# Patient Record
Sex: MALE | Race: WHITE | NOT HISPANIC OR LATINO | Employment: OTHER | URBAN - METROPOLITAN AREA
[De-identification: names, ages, dates, MRNs, and addresses within clinical notes are randomized per-mention and may not be internally consistent; named-entity substitution may affect disease eponyms.]

---

## 2017-03-28 ENCOUNTER — TELEPHONE (OUTPATIENT)
Dept: ORTHOPEDICS | Facility: CLINIC | Age: 67
End: 2017-03-28

## 2017-03-28 DIAGNOSIS — M16.11 PRIMARY OSTEOARTHRITIS OF RIGHT HIP: Primary | ICD-10-CM

## 2017-03-28 NOTE — TELEPHONE ENCOUNTER
----- Message from Charissa Singleton NP sent at 3/28/2017  3:50 PM CDT -----  Contact: self@ home   Order is in. Can you please schedule.  Felisha Carrington  ----- Message -----     From: Deidre Garay MA     Sent: 3/28/2017  10:32 AM       To: Charissa Singleton NP     Can you put in a order for this or does the pt need to be seen first?   ----- Message -----     From: Kaelyn Velasco     Sent: 3/28/2017  10:03 AM       To: Rafael CLARK Staff    Pt would like to schedule a right hip inj through radiology.

## 2017-03-28 NOTE — PROGRESS NOTES
Pt of Dr. Hall's with right hip djd. He had a cortisone injection in August 2016 and is requiring another injection at this time. Order entered.

## 2017-03-28 NOTE — TELEPHONE ENCOUNTER
Spoke to pt and he was notified the order was put in our system. I transferred the pt over to schedule the injection.

## 2017-05-10 ENCOUNTER — TELEPHONE (OUTPATIENT)
Dept: RADIOLOGY | Facility: HOSPITAL | Age: 67
End: 2017-05-10

## 2017-05-11 ENCOUNTER — HOSPITAL ENCOUNTER (OUTPATIENT)
Dept: RADIOLOGY | Facility: HOSPITAL | Age: 67
Discharge: HOME | End: 2017-05-11
Attending: NURSE PRACTITIONER
Payer: COMMERCIAL

## 2017-05-11 DIAGNOSIS — M16.11 PRIMARY OSTEOARTHRITIS OF RIGHT HIP: ICD-10-CM

## 2017-05-11 PROCEDURE — 63600175 PHARM REV CODE 636 W HCPCS: Performed by: NURSE PRACTITIONER

## 2017-05-11 PROCEDURE — 20610 DRAIN/INJ JOINT/BURSA W/O US: CPT | Mod: GC,,, | Performed by: RADIOLOGY

## 2017-05-11 PROCEDURE — 25000003 PHARM REV CODE 250: Performed by: NURSE PRACTITIONER

## 2017-05-11 PROCEDURE — 77002 NEEDLE LOCALIZATION BY XRAY: CPT | Mod: 26,GC,, | Performed by: RADIOLOGY

## 2017-05-11 PROCEDURE — 25500020 PHARM REV CODE 255: Performed by: NURSE PRACTITIONER

## 2017-05-11 PROCEDURE — 77002 NEEDLE LOCALIZATION BY XRAY: CPT | Mod: TC

## 2017-05-11 RX ORDER — BUPIVACAINE HYDROCHLORIDE 2.5 MG/ML
5 INJECTION, SOLUTION EPIDURAL; INFILTRATION; INTRACAUDAL ONCE
Status: COMPLETED | OUTPATIENT
Start: 2017-05-11 | End: 2017-05-11

## 2017-05-11 RX ORDER — LIDOCAINE HYDROCHLORIDE 10 MG/ML
2 INJECTION, SOLUTION EPIDURAL; INFILTRATION; INTRACAUDAL; PERINEURAL ONCE
Status: COMPLETED | OUTPATIENT
Start: 2017-05-11 | End: 2017-05-11

## 2017-05-11 RX ORDER — METHYLPREDNISOLONE ACETATE 80 MG/ML
80 INJECTION, SUSPENSION INTRA-ARTICULAR; INTRALESIONAL; INTRAMUSCULAR; SOFT TISSUE ONCE
Status: COMPLETED | OUTPATIENT
Start: 2017-05-11 | End: 2017-05-11

## 2017-05-11 RX ADMIN — METHYLPREDNISOLONE ACETATE 80 MG: 80 INJECTION, SUSPENSION INTRA-ARTICULAR; INTRALESIONAL; INTRAMUSCULAR; SOFT TISSUE at 11:05

## 2017-05-11 RX ADMIN — BUPIVACAINE HYDROCHLORIDE 12.5 MG: 2.5 INJECTION, SOLUTION EPIDURAL; INFILTRATION; INTRACAUDAL; PERINEURAL at 11:05

## 2017-05-11 RX ADMIN — LIDOCAINE HYDROCHLORIDE 20 MG: 10 INJECTION, SOLUTION EPIDURAL; INFILTRATION; INTRACAUDAL; PERINEURAL at 11:05

## 2017-05-11 RX ADMIN — IOHEXOL 1 ML: 300 INJECTION, SOLUTION INTRAVENOUS at 11:05

## 2017-05-11 NOTE — PROGRESS NOTES
Pt arrived for right hip injection. Pt identified using two pt identifiers. Allergies reviewed. NAD noted. VSS. Pt able to clearly state that he is having a right hip injection today. To be left under care of [facility] radiology resident and Xray staff.

## 2018-02-28 ENCOUNTER — TELEPHONE (OUTPATIENT)
Dept: ORTHOPEDICS | Facility: CLINIC | Age: 68
End: 2018-02-28

## 2018-02-28 NOTE — TELEPHONE ENCOUNTER
Spoke to pt and he stated he would like to schedule a hip injection. I informed the patient that he will have to schedule an appointment to be seen first because he has not been seen in over a year. Pt stated he wanted to discuss some new procedures also. I explained to the patient that if he wanted to discuss sx he will randa to see Dr. Hall but if he only wanted to be evaluated for an injection he can see a mid-level provider. Pt verbalized understanding.       ----- Message from Aurelia Lane sent at 2/28/2018  1:07 PM CST -----  Contact: Pt  Pt was calling in regards to a injection.    Pt would like a call back at [phone].    Thank You

## 2018-03-09 ENCOUNTER — HOSPITAL ENCOUNTER (OUTPATIENT)
Dept: RADIOLOGY | Facility: HOSPITAL | Age: 68
Discharge: HOME | End: 2018-03-09
Attending: NURSE PRACTITIONER
Payer: MEDICARE

## 2018-03-09 ENCOUNTER — OFFICE VISIT (OUTPATIENT)
Dept: ORTHOPEDICS | Facility: CLINIC | Age: 68
End: 2018-03-09
Payer: MEDICARE

## 2018-03-09 DIAGNOSIS — M25.562 LEFT KNEE PAIN, UNSPECIFIED CHRONICITY: ICD-10-CM

## 2018-03-09 DIAGNOSIS — M17.12 PRIMARY OSTEOARTHRITIS OF LEFT KNEE: ICD-10-CM

## 2018-03-09 DIAGNOSIS — M25.551 RIGHT HIP PAIN: Primary | ICD-10-CM

## 2018-03-09 DIAGNOSIS — M16.11 PRIMARY OSTEOARTHRITIS OF RIGHT HIP: ICD-10-CM

## 2018-03-09 DIAGNOSIS — M25.551 RIGHT HIP PAIN: ICD-10-CM

## 2018-03-09 PROCEDURE — 99213 OFFICE O/P EST LOW 20 MIN: CPT | Mod: PBBFAC,25 | Performed by: NURSE PRACTITIONER

## 2018-03-09 PROCEDURE — 73562 X-RAY EXAM OF KNEE 3: CPT | Mod: TC,RT

## 2018-03-09 PROCEDURE — 73502 X-RAY EXAM HIP UNI 2-3 VIEWS: CPT | Mod: TC,RT

## 2018-03-09 PROCEDURE — 73562 X-RAY EXAM OF KNEE 3: CPT | Mod: 26,59,RT, | Performed by: RADIOLOGY

## 2018-03-09 PROCEDURE — 99213 OFFICE O/P EST LOW 20 MIN: CPT | Mod: 25,S$PBB,, | Performed by: NURSE PRACTITIONER

## 2018-03-09 PROCEDURE — 20610 DRAIN/INJ JOINT/BURSA W/O US: CPT | Mod: PBBFAC | Performed by: NURSE PRACTITIONER

## 2018-03-09 PROCEDURE — 73564 X-RAY EXAM KNEE 4 OR MORE: CPT | Mod: 26,LT,, | Performed by: RADIOLOGY

## 2018-03-09 PROCEDURE — 73502 X-RAY EXAM HIP UNI 2-3 VIEWS: CPT | Mod: 26,RT,, | Performed by: RADIOLOGY

## 2018-03-09 PROCEDURE — 20610 DRAIN/INJ JOINT/BURSA W/O US: CPT | Mod: S$PBB,LT,, | Performed by: NURSE PRACTITIONER

## 2018-03-09 PROCEDURE — 99999 PR PBB SHADOW E&M-EST. PATIENT-LVL III: CPT | Mod: PBBFAC,,, | Performed by: NURSE PRACTITIONER

## 2018-03-09 RX ADMIN — TRIAMCINOLONE ACETONIDE 40 MG: 40 INJECTION, SUSPENSION INTRA-ARTICULAR; INTRAMUSCULAR at 02:03

## 2018-03-11 RX ORDER — TRIAMCINOLONE ACETONIDE 40 MG/ML
40 INJECTION, SUSPENSION INTRA-ARTICULAR; INTRAMUSCULAR
Status: COMPLETED | OUTPATIENT
Start: 2018-03-09 | End: 2018-03-09

## 2018-03-11 NOTE — PROGRESS NOTES
CC: Pain of the Right Hip and Pain of the Left Knee      HPI: Pt with right hip and left knee pain for years. He had a cortisone injection in the right hip last May with relief of his pain until now. He is aware that he has significant djd of the right hip and left knee, but he has a planned trip coming up in the [facility] and he wants to have cortisone injections for pain relief today in preparation for his trip. He will be ready to consider replacement when he returns. He reports that he has had injections in the left knee by an orthopedist in [place], but his last injection was 6 months ago. He comes to the appt in a wheelchair. He uses a cane to walk at home. He is accompanied by his wife at this appt..    ROS  General: denies fever and chills  Resp: no c/o sob  CVS: no c/o cp  MSK: c/o right hip pain which is grinding and worse with walking and standing and left knee pain which is aching and global and worse with increased activity    PE  General: AAOx3, pleasant and cooperative  Resp: respirations even and unlabored  MSK: right hip exam  + stinchfield  + straight leg raise  + pain with internal rotation  + pain with external rotation    Left knee exam  -5 degrees extension  90 degrees flexion  No erythema or warmth  - effusion    Xray:  Reviewed by me: hip:severe DJD of the right hip    Knee:  left knee has severe joint space loss with complete obliteration of the tibiofemoral joints and lateral patellofemoral joint space.  There is some lateral subluxation of patella    Assessment:  Right hip djd  Left knee djd    Plan:  Cortisone injection right hip scheduled in IR  Cortisone injection left knee today for pain relief  RICE  nsaids prn  Pain medication as prescribed  F/u as scheduled to discuss surgery. He and his wife understand that the cortisone injections will delay surgery by 3 months due to the increased risk of infection    Knee Injection Procedure Note    Pre-operative Diagnosis: left knee  degenerative arthritis    Post-operative Diagnosis: same    Indications: left knee pain    Anesthesia: none    Procedure Details     Verbal consent was obtained for the procedure. The injection site was identified and the skin was prepared with alcohol. The left knee was injected from an anterolateral approach with 1 ml of Kenalog and 5 ml Lidocaine under sterile technique using a 22 gauge needle. The needle was removed and the area cleansed and dressed.    Complications:  None; patient tolerated the procedure well.    he was advised to rest the knee today, using ice and elevation as needed for comfort and swelling. he did receive immediate relief of the knee pain. he was told this would be short lived and is secondary to the lidocaine. he may have an increase in discomfort tonight followed by steady improvement over the next several days. It may take 1-3 weeks following the injection to get the full benefit of the medication.

## 2018-03-21 ENCOUNTER — TELEPHONE (OUTPATIENT)
Dept: RADIOLOGY | Facility: HOSPITAL | Age: 68
End: 2018-03-21

## 2018-03-21 ENCOUNTER — HOSPITAL ENCOUNTER (OUTPATIENT)
Dept: RADIOLOGY | Facility: HOSPITAL | Age: 68
Discharge: HOME | End: 2018-03-21
Attending: NURSE PRACTITIONER
Payer: MEDICARE

## 2018-03-21 VITALS
RESPIRATION RATE: 17 BRPM | DIASTOLIC BLOOD PRESSURE: 87 MMHG | OXYGEN SATURATION: 96 % | SYSTOLIC BLOOD PRESSURE: 168 MMHG | HEART RATE: 63 BPM

## 2018-03-21 DIAGNOSIS — M16.11 PRIMARY OSTEOARTHRITIS OF RIGHT HIP: ICD-10-CM

## 2018-03-21 PROCEDURE — 77002 NEEDLE LOCALIZATION BY XRAY: CPT | Mod: 26,RT,, | Performed by: RADIOLOGY

## 2018-03-21 PROCEDURE — 63600175 PHARM REV CODE 636 W HCPCS: Performed by: NURSE PRACTITIONER

## 2018-03-21 PROCEDURE — 77002 NEEDLE LOCALIZATION BY XRAY: CPT | Mod: TC

## 2018-03-21 PROCEDURE — 20610 DRAIN/INJ JOINT/BURSA W/O US: CPT | Mod: ,,, | Performed by: RADIOLOGY

## 2018-03-21 PROCEDURE — 25500020 PHARM REV CODE 255: Performed by: NURSE PRACTITIONER

## 2018-03-21 PROCEDURE — 25000003 PHARM REV CODE 250: Performed by: NURSE PRACTITIONER

## 2018-03-21 RX ORDER — LIDOCAINE HYDROCHLORIDE 10 MG/ML
5 INJECTION, SOLUTION EPIDURAL; INFILTRATION; INTRACAUDAL; PERINEURAL ONCE
Status: COMPLETED | OUTPATIENT
Start: 2018-03-21 | End: 2018-03-21

## 2018-03-21 RX ORDER — METHYLPREDNISOLONE ACETATE 80 MG/ML
80 INJECTION, SUSPENSION INTRA-ARTICULAR; INTRALESIONAL; INTRAMUSCULAR; SOFT TISSUE ONCE
Status: COMPLETED | OUTPATIENT
Start: 2018-03-21 | End: 2018-03-21

## 2018-03-21 RX ORDER — BUPIVACAINE HYDROCHLORIDE 2.5 MG/ML
5 INJECTION, SOLUTION EPIDURAL; INFILTRATION; INTRACAUDAL ONCE
Status: COMPLETED | OUTPATIENT
Start: 2018-03-21 | End: 2018-03-21

## 2018-03-21 RX ADMIN — METHYLPREDNISOLONE ACETATE 80 MG: 80 INJECTION, SUSPENSION INTRA-ARTICULAR; INTRALESIONAL; INTRAMUSCULAR; SOFT TISSUE at 02:03

## 2018-03-21 RX ADMIN — LIDOCAINE HYDROCHLORIDE 50 MG: 10 INJECTION, SOLUTION EPIDURAL; INFILTRATION; INTRACAUDAL; PERINEURAL at 02:03

## 2018-03-21 RX ADMIN — IOHEXOL 5 ML: 300 INJECTION, SOLUTION INTRAVENOUS at 02:03

## 2018-03-21 RX ADMIN — BUPIVACAINE HYDROCHLORIDE 12.5 MG: 2.5 INJECTION, SOLUTION EPIDURAL; INFILTRATION; INTRACAUDAL; PERINEURAL at 02:03

## 2018-04-23 ENCOUNTER — OFFICE VISIT (OUTPATIENT)
Dept: ORTHOPEDICS | Facility: CLINIC | Age: 68
End: 2018-04-23
Payer: MEDICARE

## 2018-04-23 VITALS — BODY MASS INDEX: 42.34 KG/M2 | HEIGHT: 70 IN | WEIGHT: 295.75 LBS

## 2018-04-23 DIAGNOSIS — M16.11 PRIMARY OSTEOARTHRITIS OF RIGHT HIP: ICD-10-CM

## 2018-04-23 DIAGNOSIS — M17.12 PRIMARY OSTEOARTHRITIS OF LEFT KNEE: Primary | ICD-10-CM

## 2018-04-23 PROCEDURE — 99213 OFFICE O/P EST LOW 20 MIN: CPT | Mod: S$PBB,,, | Performed by: ORTHOPAEDIC SURGERY

## 2018-04-23 PROCEDURE — 99213 OFFICE O/P EST LOW 20 MIN: CPT | Mod: PBBFAC | Performed by: ORTHOPAEDIC SURGERY

## 2018-04-23 PROCEDURE — 99999 PR PBB SHADOW E&M-EST. PATIENT-LVL III: CPT | Mod: PBBFAC,,, | Performed by: ORTHOPAEDIC SURGERY

## 2018-04-23 RX ORDER — AMOXICILLIN 500 MG/1
CAPSULE ORAL
COMMUNITY
Start: 2018-01-19 | End: 2019-02-22

## 2018-04-23 RX ORDER — FLUTICASONE PROPIONATE 50 MCG
1 SPRAY, SUSPENSION (ML) NASAL NIGHTLY
Status: ON HOLD | COMMUNITY
Start: 2018-03-16 | End: 2023-04-06 | Stop reason: CLARIF

## 2018-04-23 RX ORDER — AMLODIPINE BESYLATE 10 MG/1
TABLET ORAL
COMMUNITY
Start: 2018-03-16 | End: 2019-08-26

## 2018-04-23 RX ORDER — LISINOPRIL 40 MG/1
40 TABLET ORAL DAILY
COMMUNITY
Start: 2018-04-16

## 2018-04-23 RX ORDER — ESCITALOPRAM OXALATE 20 MG/1
20 TABLET ORAL DAILY
COMMUNITY
Start: 2018-03-24 | End: 2021-02-02 | Stop reason: SDUPTHER

## 2018-04-23 RX ORDER — OXYBUTYNIN CHLORIDE 10 MG/1
TABLET, EXTENDED RELEASE ORAL
COMMUNITY
Start: 2018-04-07 | End: 2019-02-22

## 2018-04-23 RX ORDER — CIPROFLOXACIN 500 MG/1
TABLET ORAL
COMMUNITY
Start: 2018-02-05 | End: 2019-02-22

## 2018-04-23 RX ORDER — OXYCODONE AND ACETAMINOPHEN 10; 325 MG/1; MG/1
1 TABLET ORAL EVERY 4 HOURS PRN
Status: ON HOLD | COMMUNITY
Start: 2018-04-18 | End: 2019-09-03 | Stop reason: HOSPADM

## 2018-04-23 NOTE — PROGRESS NOTES
"Subjective:      Patient ID: Иван Fuentes is a 67 y.o. male.    Chief Complaint: Pain of the Left Knee and Pain of the Right Hip    HPI  Иван Fuentes has left knee pain. His hip pain is improved following the injection by Dr. Mak.  The knee pain is unchanged. The pain is located in the global aspect of the knee.  There  is not radiation.   There is not associated stiffness.   There is not catching and locking. The pain is described as achy. The pain is aggravated by walking.  It is alleviated by rest on occasion.  There is associated back pain.  His history, medications and problem list were reviewed.    Review of Systems   Constitution: Negative for chills, fever and night sweats.   HENT: Negative for hearing loss.    Eyes: Negative for blurred vision and double vision.   Cardiovascular: Negative for chest pain, claudication and leg swelling.   Respiratory: Negative for shortness of breath.    Endocrine: Negative for polydipsia, polyphagia and polyuria.   Hematologic/Lymphatic: Negative for adenopathy and bleeding problem. Does not bruise/bleed easily.   Skin: Negative for poor wound healing.   Musculoskeletal: Positive for back pain, joint pain and neck pain.   Gastrointestinal: Negative for diarrhea and heartburn.   Genitourinary: Negative for bladder incontinence.   Neurological: Negative for focal weakness, headaches, numbness, paresthesias and sensory change.   Psychiatric/Behavioral: The patient is not nervous/anxious.    Allergic/Immunologic: Negative for persistent infections.         Objective:      Body mass index is 42.44 kg/m².  Vitals:    04/23/18 1618   Weight: 134.1 kg (295 lb 12 oz)   Height: 5' 10" (1.778 m)           General    Constitutional: He is oriented to person, place, and time. He appears well-developed and well-nourished.   HENT:   Head: Normocephalic and atraumatic.   Eyes: EOM are normal.   Cardiovascular: Normal rate.    Pulmonary/Chest: Effort normal.   Neurological: " He is alert and oriented to person, place, and time.   Psychiatric: He has a normal mood and affect. His behavior is normal.     General Musculoskeletal Exam   Gait: abnormal       Right Knee Exam     Inspection   Erythema: absent  Scars: absent  Swelling: absent  Effusion: effusion  Deformity: deformity  Bruising: absent    Tenderness   The patient is experiencing no tenderness.         Range of Motion   Extension: 0   Flexion: 130     Tests   Ligament Examination Lachman: normal (-1 to 2mm)   MCL - Valgus: normal (0 to 2mm)  LCL - Varus: normal  Patella   Passive Patellar Tilt: neutral    Other   Sensation: normal    Left Knee Exam     Inspection   Erythema: absent  Scars: absent  Swelling: present  Effusion: absent  Deformity: deformity  Bruising: absent    Tenderness   The patient tender to palpation of the medial joint line and lateral joint line.    Range of Motion   Extension: 0   Flexion: 110     Tests   Stability Lachman: normal (-1 to 2mm)   MCL - Valgus: normal (0 to 2mm)  LCL - Varus: normal (0 to 2mm)  Patella   Passive Patellar Tilt: neutral    Other   Sensation: normal    Muscle Strength   Right Lower Extremity   Hip Abduction: 5/5   Quadriceps:  5/5   Hamstrin/5   Left Lower Extremity   Hip Abduction: 5/5   Quadriceps:  5/5   Hamstrin/5     Reflexes     Left Side  Quadriceps:  2+    Right Side   Quadriceps:  2+    Vascular Exam     Right Pulses  Dorsalis Pedis:      2+          Left Pulses  Dorsalis Pedis:      2+          Edema  Right Lower Leg: present  Left Lower Leg: present    Radiographs taken previuosly and reviewed by me demonstrate severe arthritic change of the left KNEE(s).There is not a fracture.   The changes are tricompartmental.            Assessment:       Encounter Diagnoses   Name Primary?    Primary osteoarthritis of left knee Yes    Primary osteoarthritis of right hip           Plan:       Иван was seen today for pain and pain.    Diagnoses and all orders for this  visit:    Primary osteoarthritis of left knee  -     Ambulatory referral to Pain Clinic    Primary osteoarthritis of right hip  -     Ambulatory referral to Pain Clinic      Options discussed. Due to his co-morbidities and gait abnormality secondary to his cervical myelopathy he is at increased risk for complications and continued gait problems. After a discussion we will try an RFA.  I will see him back in three months.

## 2018-04-30 ENCOUNTER — OFFICE VISIT (OUTPATIENT)
Dept: PAIN MEDICINE | Facility: CLINIC | Age: 68
End: 2018-04-30
Attending: ANESTHESIOLOGY
Payer: MEDICARE

## 2018-04-30 VITALS
HEART RATE: 65 BPM | DIASTOLIC BLOOD PRESSURE: 70 MMHG | WEIGHT: 295 LBS | BODY MASS INDEX: 42.23 KG/M2 | SYSTOLIC BLOOD PRESSURE: 134 MMHG | TEMPERATURE: 98 F | HEIGHT: 70 IN

## 2018-04-30 DIAGNOSIS — Z98.1 S/P CERVICAL SPINAL FUSION: Primary | ICD-10-CM

## 2018-04-30 DIAGNOSIS — G95.9 CERVICAL MYELOPATHY: ICD-10-CM

## 2018-04-30 DIAGNOSIS — G89.29 CHRONIC PAIN OF LEFT KNEE: ICD-10-CM

## 2018-04-30 DIAGNOSIS — M17.12 PRIMARY OSTEOARTHRITIS OF LEFT KNEE: ICD-10-CM

## 2018-04-30 DIAGNOSIS — M25.562 CHRONIC PAIN OF LEFT KNEE: ICD-10-CM

## 2018-04-30 PROCEDURE — 99999 PR PBB SHADOW E&M-EST. PATIENT-LVL III: CPT | Mod: PBBFAC,,, | Performed by: ANESTHESIOLOGY

## 2018-04-30 PROCEDURE — 99213 OFFICE O/P EST LOW 20 MIN: CPT | Mod: PBBFAC | Performed by: ANESTHESIOLOGY

## 2018-04-30 PROCEDURE — 99204 OFFICE O/P NEW MOD 45 MIN: CPT | Mod: S$PBB,,, | Performed by: ANESTHESIOLOGY

## 2018-04-30 RX ORDER — CHLORHEXIDINE GLUCONATE ORAL RINSE 1.2 MG/ML
SOLUTION DENTAL
COMMUNITY
Start: 2018-04-24 | End: 2019-08-26

## 2018-04-30 RX ORDER — DICLOFENAC SODIUM 10 MG/G
2 GEL TOPICAL 3 TIMES DAILY
Qty: 1 TUBE | Refills: 2 | Status: SHIPPED | OUTPATIENT
Start: 2018-04-30 | End: 2018-08-07 | Stop reason: SDUPTHER

## 2018-04-30 NOTE — LETTER
April 30, 2018      Ever Hall MD  [address]  [facility] [address]           [facility]  [address]  [place] [address]  Phone: [phone]  Fax: [phone]          Patient: Иван Fuentes   MR Number: [id number]   YOB: 1950   Date of Visit: 4/30/2018       Dear Dr. Ever Hall:    Thank you for referring Иван Fuentes to me for evaluation. Attached you will find relevant portions of my assessment and plan of care.    If you have questions, please do not hesitate to call me. I look forward to following Иван Fuentes along with you.    Sincerely,    Dania Garcia MD    Enclosure  CC:  No Recipients    If you would like to receive this communication electronically, please contact [email or web address] or [phone] to request more information on [facility] access.    For providers and/or their staff who would like to refer a patient to [facility], please contact us through our one-stop-shop provider referral line, [facility], at [phone].    If you feel you have received this communication in error or would no longer like to receive these types of communications, please e-mail [email or web address]

## 2018-04-30 NOTE — PROGRESS NOTES
Chronic Pain - New Consult    Referring Physician: Ever Hall MD    Chief Complaint:   Chief Complaint   Patient presents with    Knee Pain     Left side    Hip Pain     Right Hip        SUBJECTIVE: Disclaimer: This note has been generated using voice-recognition software. There may be typographical errors that have been missed during proof-reading    Initial encounter:    Иван Fuentes presents to the clinic for the evaluation of left knee and right hip pain. The pain started 3 years ago following arthritis and symptoms have been worsening.    Brief history:  Patient being sent for evaluation for knee RFA    Pain Description:    The pain is located in the left knee and right hip area.      At BEST  2/10     At WORST  9/10 on the WORST day.      On average pain is rated as 7/10.     Today the pain is rated as 6/10    The pain is described as sharp      Symptoms interfere with daily activity.     Exacerbating factors: Standing, Walking and Extension.      Mitigating factors medications.     Patient denies night fever/night sweats, urinary incontinence, bowel incontinence, significant weight loss, significant motor weakness and loss of sensations.  Patient denies any suicidal or homicidal ideations    Pain Medications:  Current:  Advil  Aleve  Percocet  Tramadol  Norco    Tried in Past:  NSAIDs -Never  TCA -Never  SNRI -Never  Anti-convulsants -Never  Muscle Relaxants -Never  Opioids-Never    Physical Therapy/Home Exercise: no       report:  Reviewed and consistent with medication use as prescribed.    Pain Procedures: previous hip injections with steroid with significant relief for approximately 8 months at a time (last injection 2 months ago, still getting relief)  Previous knee joint injections without sustained relief.    Chiropractor -never  Acupuncture - never  TENS unit -never  Spinal decompression -never  Joint replacement -left hip replacement    Imaging:     Narrative     2 views:  There is severe DJD of the right hip. There is a left NATALIYA in place. No fracture dislocation bone destruction seen.        Electronically signed by: ELICIA FRANCO MD  Date: 03/09/18  Time: 15:46        Narrative     Left knee orthopedic views with flexion includes 5 total views.  The study includes AP standing and flexion views.  There is slight medial subluxation of the femurs in relation to the tibia bilaterally, more on the left.  There is mild genu varus deformity on the left     The left knee has severe joint space loss with complete obliteration of the tibiofemoral joints and lateral patellofemoral joint space.  There is some lateral subluxation of patella.      Right knee also has narrowing of the lateral patellar joint space, lateral subluxation of the patella, and moderate to severe medial tibiofemoral joint space narrowing.  There is a 6 mm exostosis from the medial proximal tibia metaphysis.   Impression      Extensive degenerative changes, as above.      Electronically signed by: OLESYA OCONNOR MD  Date: 03/09/18  Time: 15:18            Past Medical History:   Diagnosis Date    Arthritis of both knees     BPH (benign prostatic hyperplasia)     CVA (cerebral vascular accident)     Diabetes mellitus     High cholesterol     Hypertension     Left-sided weakness     Obese     Sleep apnea     Urosepsis      Past Surgical History:   Procedure Laterality Date    BACK SURGERY      HIP ARTHROPLASTY      JOINT REPLACEMENT      PROSTATE SURGERY  2015    SPINAL FUSION  09/2014    TONSILLECTOMY       Social History     Social History    Marital status:      Spouse name: N/A    Number of children: N/A    Years of education: N/A     Occupational History    Not on file.     Social History Main Topics    Smoking status: Former Smoker     Quit date: 1/1/2012    Smokeless tobacco: Not on file    Alcohol use No    Drug use: No    Sexual activity: Not on file     Other Topics Concern    Not on  file     Social History Narrative    No narrative on file     Family History   Problem Relation Age of Onset    Hypertension Brother     Heart attack Neg Hx     Heart disease Neg Hx        Review of patient's allergies indicates:  No Known Allergies    Current Outpatient Prescriptions   Medication Sig    amLODIPine (NORVASC) 10 MG tablet     amoxicillin (AMOXIL) 500 MG capsule     atorvastatin (LIPITOR) 80 MG tablet Take by mouth once daily.     chlorhexidine (PERIDEX) 0.12 % solution     CIALIS 20 mg Tab     ciclesonide (OMNARIS) 50 mcg Spry 2 sprays by Each Nare route once daily.    dutasteride (AVODART) 0.5 mg capsule Take 0.5 mg by mouth once daily.    escitalopram oxalate (LEXAPRO) 20 MG tablet     FLORANEX 100 million cell packet     fluticasone (FLONASE) 50 mcg/actuation nasal spray     levothyroxine (SYNTHROID) 50 MCG tablet Take 50 mcg by mouth once daily.    lisinopril (PRINIVIL,ZESTRIL) 2.5 MG tablet Take by mouth once daily.    lisinopril (PRINIVIL,ZESTRIL) 40 MG tablet     LYRICA 100 mg capsule Take 100 mg by mouth 2 (two) times daily.    metformin (GLUCOPHAGE) 500 MG tablet Take 500 mg by mouth 2 (two) times daily with meals.    oxybutynin (DITROPAN-XL) 10 MG 24 hr tablet     oxyCODONE-acetaminophen (PERCOCET)  mg per tablet     senna-docusate 8.6-50 mg (PERICOLACE) 8.6-50 mg per tablet Take 2 tablets by mouth nightly as needed for Constipation.    tamsulosin (FLOMAX) 0.4 mg Cp24 Take 0.4 mg by mouth once daily.    tramadol (ULTRAM) 50 mg tablet Take 50 mg by mouth every 6 (six) hours as needed for Pain.    valsartan (DIOVAN) 160 MG tablet Take 160 mg by mouth once daily.    ciprofloxacin HCl (CIPRO) 500 MG tablet     diazepam (VALIUM) 5 MG tablet Take 1 tablet (5 mg total) by mouth every 6 (six) hours as needed (muscle spasm).    diclofenac sodium 1 % Gel Apply 2 g topically 3 (three) times daily.    eszopiclone 3 mg Tab Take 3 mg by mouth every evening.     No  "current facility-administered medications for this visit.        REVIEW OF SYSTEMS:    GENERAL:  No weight loss, malaise or fevers.  HEENT:   No recent changes in vision or hearing  NECK:  Negative for lumps, no difficulty with swallowing.  RESPIRATORY:  Negative for cough, wheezing or shortness of breath, patient denies any recent URI.  CARDIOVASCULAR:  Negative for chest pain, leg swelling or palpitations. Hx PFO, takes prophylactic abx prior to dental procedures.  GI:  Negative for abdominal discomfort, blood in stools or black stools or change in bowel habits.  : recent treatment with cipro for UTI, symptoms resolved.  Not currently on abx.  MUSCULOSKELETAL:  See HPI.  SKIN:  Negative for lesions, rash, and itching.  PSYCH:  No mood disorder or recent psychosocial stressors.  Patients sleep is disturbed secondary to pain.  HEMATOLOGY/LYMPHOLOGY:  Negative for prolonged bleeding, bruising easily or swollen nodes.  Patient is not currently taking any anti-coagulants  ENDO: DM2, hypothyroidism on stable dose of synthroid  NEURO:   Hx of CVA, Hx of cervical fusion and meylopathy  All other reviewed and negative other than HPI.    OBJECTIVE:    /70   Pulse 65   Temp 97.6 °F (36.4 °C)   Ht 5' 10" (1.778 m)   Wt 133.8 kg (295 lb)   BMI 42.33 kg/m²     PHYSICAL EXAMINATION:    GENERAL: Well appearing, in no acute distress, alert and oriented x3.  PSYCH:  Mood and affect appropriate.  SKIN: Skin color, texture, turgor normal, no rashes or lesions.  HEAD/FACE:  Normocephalic, atraumatic.   CV: RRR with palpation of the radial artery.  PULM: No evidence of respiratory difficulty, symmetric chest rise.  BACK:   There is pain with palpation over the facet joints of the lumbar spine bilaterally. There is decreased range of motion with extension to 15 degrees, and facet loading maneuvers cause reproducible pain.    EXTREMITIES: bilateral lower extremity edema, 1 + pitting.  MUSCULOSKELETAL: left knee provocative " maneuvers caused pain.  No evidence of instability, pain with palpation over the medial and lateral joint line. There is  pain with palpation over the sacroiliac joints bilaterally.  There is no pain to palpation over the greater trochanteric bursa bilaterally.  FABERs test is positive.  FADIRs test is negative.   Bilateral lower extremity strength is normal and symmetric.  No atrophy or tone abnormalities are noted.  NEURO: Cranial nerves grossly intact.  GAIT: Antalgic, ambulates with cane, presented in wheelchair    Recent lab work done, but not available for review today    ASSESSMENT: 67 y.o. year old male with pain, consistent with     Encounter Diagnoses   Name Primary?    S/P cervical spinal fusion Yes    Cervical myelopathy     Chronic pain of left knee     Primary osteoarthritis of left knee        PLAN:   I will schedule the patient for a left knee genicular nerve block under x-ray to be followed by radio frequency ablation of diagnostic, follow up with NP 2 weeks after injection to determine response to procedure.    I provided the patient with a Natalya perry DR informational packet to consider spinal cord stimulation for both his cervical radicular symptoms, peripheral neuropathy lumbar spine is history of cervical posterior decompression and fusion.    In the future can consider further escalation of Lyrica to 300 mg per day    Voltaren gel to apply over the knee 3 times a day when necessary    The above plan and management options were discussed at length with patient. Patient is in agreement with the above and verbalized understanding. It will be communicated with the referring physician via electronic record, fax, or mail.    Dania Garcia  04/30/2018

## 2018-05-10 ENCOUNTER — HOSPITAL ENCOUNTER (OUTPATIENT)
Facility: OTHER | Age: 68
Discharge: HOME | End: 2018-05-10
Attending: ANESTHESIOLOGY | Admitting: ANESTHESIOLOGY
Payer: MEDICARE

## 2018-05-10 VITALS
HEIGHT: 70 IN | TEMPERATURE: 97 F | SYSTOLIC BLOOD PRESSURE: 138 MMHG | HEART RATE: 58 BPM | BODY MASS INDEX: 42.23 KG/M2 | RESPIRATION RATE: 18 BRPM | OXYGEN SATURATION: 95 % | DIASTOLIC BLOOD PRESSURE: 86 MMHG | WEIGHT: 295 LBS

## 2018-05-10 DIAGNOSIS — G89.29 CHRONIC PAIN OF LEFT KNEE: Primary | ICD-10-CM

## 2018-05-10 DIAGNOSIS — M17.12 PRIMARY OSTEOARTHRITIS OF LEFT KNEE: ICD-10-CM

## 2018-05-10 DIAGNOSIS — M25.562 CHRONIC PAIN OF LEFT KNEE: Primary | ICD-10-CM

## 2018-05-10 LAB — POCT GLUCOSE: 101 MG/DL (ref 70–110)

## 2018-05-10 PROCEDURE — 77002 NEEDLE LOCALIZATION BY XRAY: CPT | Performed by: ANESTHESIOLOGY

## 2018-05-10 PROCEDURE — 63600175 PHARM REV CODE 636 W HCPCS: Performed by: ANESTHESIOLOGY

## 2018-05-10 PROCEDURE — 64450 NJX AA&/STRD OTHER PN/BRANCH: CPT | Mod: LT,,, | Performed by: ANESTHESIOLOGY

## 2018-05-10 PROCEDURE — 64450 NJX AA&/STRD OTHER PN/BRANCH: CPT | Performed by: ANESTHESIOLOGY

## 2018-05-10 PROCEDURE — 25000003 PHARM REV CODE 250: Performed by: ANESTHESIOLOGY

## 2018-05-10 RX ORDER — METHYLPREDNISOLONE ACETATE 40 MG/ML
INJECTION, SUSPENSION INTRA-ARTICULAR; INTRALESIONAL; INTRAMUSCULAR; SOFT TISSUE
Status: DISCONTINUED | OUTPATIENT
Start: 2018-05-10 | End: 2018-05-10 | Stop reason: HOSPADM

## 2018-05-10 RX ORDER — LIDOCAINE HYDROCHLORIDE 10 MG/ML
INJECTION INFILTRATION; PERINEURAL
Status: DISCONTINUED | OUTPATIENT
Start: 2018-05-10 | End: 2018-05-10 | Stop reason: HOSPADM

## 2018-05-10 RX ORDER — BUPIVACAINE HYDROCHLORIDE 2.5 MG/ML
INJECTION, SOLUTION EPIDURAL; INFILTRATION; INTRACAUDAL
Status: DISCONTINUED | OUTPATIENT
Start: 2018-05-10 | End: 2018-05-10 | Stop reason: HOSPADM

## 2018-05-10 RX ORDER — SODIUM CHLORIDE 9 MG/ML
INJECTION, SOLUTION INTRAVENOUS CONTINUOUS
Status: DISCONTINUED | OUTPATIENT
Start: 2018-05-10 | End: 2018-05-10 | Stop reason: HOSPADM

## 2018-05-10 NOTE — OP NOTE
Procedure Note:   Left   4 needle Geniculate nerve block under fluoroscopy                               1) suprapatellar geniculate nerve block  2) medial superior epicondyle geniculate nerve block  3) lateral superior epicondyle geniculate nerve block  4) medial tibial metaphysis geniculate nerve block  5) xray guidance for needle placement                               Pre-Op Diagnosis:  Left  Left knee pain, unspecified chronicity [M25.562]  Osteoarthritis of left knee, unspecified osteoarthritis type [M17.12]    Post-Op Diagnosis: Left knee pain, unspecified chronicity [M25.562]  Osteoarthritis of left knee, unspecified osteoarthritis type [M17.12]    Surgeon: Dania Garcia M.D.    Assistant: None    EBL: None    Complications: None    Specimens: None    Description of procedure:    After written consent was obtained, patient placed in supine position.  The area over the medial and lateral aspect of the superior patellar, superior epi-condyle of the femur and the medial tibial metaphysis were prepped with chlorhexidine.  The area was draped in the usual sterile fashion.  Approximately 8 mL total 1% lidocaine was infiltrated into the skin overlying the 4 predetermined entry points. A 22 gauge spinal needle was then advanced under fluoroscopy in the AP and lateral views into the positions of the geniculate nerves at these levels. A mixture of 9mL 0.25% bupivacaine +40mg depo-medrol was prepared (10mL total).  After negative aspiration and no paresthesias there was injection of 2.5 mL of this mixture was injected into each of these 4 areas for a total volume of 10 mL. Needle was withdrawn and a sterile band-aid applied to the skin.    Patient tolerated the procedure well, and was reporting improvement of pain symptoms after the injection.  She was discharged from the clinic in stable condition.

## 2018-05-10 NOTE — DISCHARGE SUMMARY
Discharge Note  Short Stay      SUMMARY     Admit Date: 5/10/2018    Attending Physician: Dania Garcia    Discharge Diagnosis: Left knee pain, unspecified chronicity [M25.562]  Osteoarthritis of left knee, unspecified osteoarthritis type [M17.12]    Discharge Physician: Dania Garcia      Discharge Date: 5/10/2018 4:20 PM     Procedure Note:   Left   4 needle Geniculate nerve block under fluoroscopy                               1) suprapatellar geniculate nerve block  2) medial superior epicondyle geniculate nerve block  3) lateral superior epicondyle geniculate nerve block  4) medial tibial metaphysis geniculate nerve block  5) xray guidance for needle placement                               Pre-Op Diagnosis:  Left  Left knee pain, unspecified chronicity [M25.562]  Osteoarthritis of left knee, unspecified osteoarthritis type [M17.12]    Post-Op Diagnosis: Left knee pain, unspecified chronicity [M25.562]  Osteoarthritis of left knee, unspecified osteoarthritis type [M17.12]    Disposition: Home or self care    Patient Instructions:   Discharge Medication List as of 5/10/2018 11:46 AM      CONTINUE these medications which have NOT CHANGED    Details   amLODIPine (NORVASC) 10 MG tablet Starting Fri 3/16/2018, Historical Med      amoxicillin (AMOXIL) 500 MG capsule Starting Fri 1/19/2018, Historical Med      atorvastatin (LIPITOR) 80 MG tablet Take by mouth once daily. , Until Discontinued, Historical Med      chlorhexidine (PERIDEX) 0.12 % solution Starting Tue 4/24/2018, Historical Med      CIALIS 20 mg Tab Starting 10/30/2014, Until Discontinued, Historical Med      ciclesonide (OMNARIS) 50 mcg Spry 2 sprays by Each Nare route once daily., Until Discontinued, Historical Med      ciprofloxacin HCl (CIPRO) 500 MG tablet Starting Mon 2/5/2018, Historical Med      diclofenac sodium 1 % Gel Apply 2 g topically 3 (three) times daily., Starting Mon 4/30/2018, Normal      dutasteride (AVODART) 0.5 mg capsule Take  0.5 mg by mouth once daily., Until Discontinued, Historical Med      escitalopram oxalate (LEXAPRO) 20 MG tablet Starting Sat 3/24/2018, Historical Med      eszopiclone 3 mg Tab Take 3 mg by mouth every evening., Until Discontinued, Historical Med      FLORANEX 100 million cell packet Starting 10/30/2014, Until Discontinued, Historical Med      fluticasone (FLONASE) 50 mcg/actuation nasal spray Starting Fri 3/16/2018, Historical Med      levothyroxine (SYNTHROID) 50 MCG tablet Take 50 mcg by mouth once daily., Until Discontinued, Historical Med      !! lisinopril (PRINIVIL,ZESTRIL) 2.5 MG tablet Take by mouth once daily., Until Discontinued, Historical Med      !! lisinopril (PRINIVIL,ZESTRIL) 40 MG tablet Starting Mon 4/16/2018, Historical Med      LYRICA 100 mg capsule Take 100 mg by mouth 2 (two) times daily., Starting 5/12/2015, Until Discontinued, Historical Med      metformin (GLUCOPHAGE) 500 MG tablet Take 500 mg by mouth 2 (two) times daily with meals., Until Discontinued, Historical Med      oxybutynin (DITROPAN-XL) 10 MG 24 hr tablet Starting Sat 4/7/2018, Historical Med      oxyCODONE-acetaminophen (PERCOCET)  mg per tablet Starting Wed 4/18/2018, Historical Med      senna-docusate 8.6-50 mg (PERICOLACE) 8.6-50 mg per tablet Take 2 tablets by mouth nightly as needed for Constipation., Starting 10/15/2014, Until Discontinued, No Print      tamsulosin (FLOMAX) 0.4 mg Cp24 Take 0.4 mg by mouth once daily., Until Discontinued, Historical Med      tramadol (ULTRAM) 50 mg tablet Take 50 mg by mouth every 6 (six) hours as needed for Pain., Until Discontinued, Historical Med      valsartan (DIOVAN) 160 MG tablet Take 160 mg by mouth once daily., Until Discontinued, Historical Med       !! - Potential duplicate medications found. Please discuss with provider.          Resume home diet and activity

## 2018-05-10 NOTE — DISCHARGE INSTRUCTIONS
Home Care Instructions Pain Management:    1. DIET:   You may resume your normal diet today.   2. BATHING:   You may shower with luke warm water.  3. DRESSING:   You may remove your bandage today.   4. ACTIVITY LEVEL:   You may resume your normal activities 24 hrs after your procedure.  5. MEDICATIONS:   You may resume your normal medications today.   6. SPECIAL INSTRUCTIONS:   No heat to the injection site for 24 hrs including, bath or shower, heating pad, moist heat, or hot tubs.    Use ice pack to injection site for any pain or discomfort.  Apply ice packs for 20 minute intervals as needed.   If you have received any sedatives by mouth today you may not drive for 12 hours.    If you have received any sedation through your IV, you may not drive for 24 hrs.     PLEASE CALL YOUR DOCTOR IF:  1. Redness or swelling around the injection site.  2. Fever of 101 degrees  3. Drainage (pus) from the injection site.  4. For any continuous bleeding (some dried blood over the incision is normal.)    FOR EMERGENCIES:   If any unusual problems or difficulties occur during clinic hours, call [phone] or [phone].     Thank you for allowing us to care for you today. You may receive a survey about the care we provided. Your feedback is valuable and helps us provide excellent care throughout the community.

## 2018-05-14 ENCOUNTER — TELEPHONE (OUTPATIENT)
Dept: PAIN MEDICINE | Facility: CLINIC | Age: 68
End: 2018-05-14

## 2018-05-14 NOTE — TELEPHONE ENCOUNTER
----- Message from Bony Beth sent at 5/14/2018 10:09 AM CDT -----  Contact: Иван Fuentes      Name of Who is Calling: Иван Fuentes      What is the request in detail: Patient reporting pain diary results.   Hour No. 1- Pain Scale- 1,   Hour No. 2- 1,   Hour No. 3- 0,   Hour No. 4- 0,   Hour No. 5- 1 Pain Scale,   12 Hour post procedure- 1,   24 Hour- 1      Can the clinic reply by [facility]: No      [facility]: [phone]

## 2018-05-14 NOTE — TELEPHONE ENCOUNTER
Attempted to contact patient regarding message, no answer, left voice message requesting return call.

## 2018-05-15 NOTE — TELEPHONE ENCOUNTER
Per Ana Laura Martinez, patient to keep his follow up appointment on 05/25/18 to discuss left knee genicular block relief and will also discuss scheduling RFA if pt reports 50% or more relief.

## 2018-05-19 ENCOUNTER — PATIENT MESSAGE (OUTPATIENT)
Dept: PAIN MEDICINE | Facility: CLINIC | Age: 68
End: 2018-05-19

## 2018-05-20 ENCOUNTER — PATIENT MESSAGE (OUTPATIENT)
Dept: PAIN MEDICINE | Facility: CLINIC | Age: 68
End: 2018-05-20

## 2018-05-21 ENCOUNTER — TELEPHONE (OUTPATIENT)
Dept: PAIN MEDICINE | Facility: CLINIC | Age: 68
End: 2018-05-21

## 2018-05-21 NOTE — TELEPHONE ENCOUNTER
Spoke with patient regarding appointment that he assumed was for for today but it is scheduled for 05/25/2018, patient changed appointment to 05/23/2018 at this time

## 2018-05-23 ENCOUNTER — OFFICE VISIT (OUTPATIENT)
Dept: PAIN MEDICINE | Facility: CLINIC | Age: 68
End: 2018-05-23
Payer: MEDICARE

## 2018-05-23 VITALS
HEIGHT: 70 IN | TEMPERATURE: 98 F | DIASTOLIC BLOOD PRESSURE: 86 MMHG | HEART RATE: 88 BPM | SYSTOLIC BLOOD PRESSURE: 143 MMHG | RESPIRATION RATE: 18 BRPM

## 2018-05-23 DIAGNOSIS — G89.29 CHRONIC PAIN OF LEFT KNEE: Primary | ICD-10-CM

## 2018-05-23 DIAGNOSIS — M25.562 CHRONIC PAIN OF LEFT KNEE: Primary | ICD-10-CM

## 2018-05-23 DIAGNOSIS — M17.12 PRIMARY OSTEOARTHRITIS OF LEFT KNEE: ICD-10-CM

## 2018-05-23 PROCEDURE — 99999 PR PBB SHADOW E&M-EST. PATIENT-LVL III: CPT | Mod: PBBFAC,,, | Performed by: NURSE PRACTITIONER

## 2018-05-23 PROCEDURE — 99213 OFFICE O/P EST LOW 20 MIN: CPT | Mod: PBBFAC | Performed by: NURSE PRACTITIONER

## 2018-05-23 PROCEDURE — 99213 OFFICE O/P EST LOW 20 MIN: CPT | Mod: S$PBB,,, | Performed by: NURSE PRACTITIONER

## 2018-05-23 NOTE — PROGRESS NOTES
Chronic Pain - Established Visit    Referring Physician: No ref. provider found    Chief Complaint:   Chief Complaint   Patient presents with    Follow-up        SUBJECTIVE: Disclaimer: This note has been generated using voice-recognition software. There may be typographical errors that have been missed during proof-reading    Interval History 5/23/2018:  The patient returns to clinic today for follow up. He is s/p left genicular nerve block on 5/10/2018. He reports 90% relief of his left knee pain. He continues to report benefit at this time. He reports that he is able to walk for longer distances and periods of time since procedure. He denies any other health changes. His pain today is 0/10.    Initial encounter:    Иван Fuentes presents to the clinic for the evaluation of left knee and right hip pain. The pain started 3 years ago following arthritis and symptoms have been worsening.    Brief history:  Patient being sent for evaluation for knee RFA    Pain Description:    The pain is located in the left knee and right hip area.      At BEST  2/10     At WORST  9/10 on the WORST day.      On average pain is rated as 7/10.     Today the pain is rated as 6/10    The pain is described as sharp      Symptoms interfere with daily activity.     Exacerbating factors: Standing, Walking and Extension.      Mitigating factors medications.     Patient denies night fever/night sweats, urinary incontinence, bowel incontinence, significant weight loss, significant motor weakness and loss of sensations.  Patient denies any suicidal or homicidal ideations    Pain Medications:  Current:  Advil  Aleve  Percocet    Tried in Past:  NSAIDs -Never  TCA -Never  SNRI -Never  Anti-convulsants -Never  Muscle Relaxants -Never  Opioids-Never    Physical Therapy/Home Exercise: no       report:  Reviewed and consistent with medication use as prescribed.    Pain Procedures: previous hip injections with steroid with significant  relief for approximately 8 months at a time (last injection 2 months ago, still getting relief)  Previous knee joint injections without sustained relief.   5/10/2018- Left genicular nerve block-90% relief     Chiropractor -never  Acupuncture - never  TENS unit -never  Spinal decompression -never  Joint replacement -left hip replacement    Imaging:      Hip Xray 3/9/2018:  2 views: There is severe DJD of the right hip. There is a left NATALIYA in place. No fracture dislocation bone destruction seen.        Electronically signed by: ELICIA FRANCO MD  Date: 03/09/18  Time: 15:46        Xray Knee 3/9/2018:  Left knee orthopedic views with flexion includes 5 total views.  The study includes AP standing and flexion views.  There is slight medial subluxation of the femurs in relation to the tibia bilaterally, more on the left.  There is mild genu varus deformity on the left     The left knee has severe joint space loss with complete obliteration of the tibiofemoral joints and lateral patellofemoral joint space.  There is some lateral subluxation of patella.      Right knee also has narrowing of the lateral patellar joint space, lateral subluxation of the patella, and moderate to severe medial tibiofemoral joint space narrowing.  There is a 6 mm exostosis from the medial proximal tibia metaphysis.   Impression      Extensive degenerative changes, as above.      Electronically signed by: OLESYA OCONNOR MD  Date: 03/09/18  Time: 15:18            Past Medical History:   Diagnosis Date    Arthritis of both knees     BPH (benign prostatic hyperplasia)     CVA (cerebral vascular accident)     Diabetes mellitus     High cholesterol     Hypertension     Left-sided weakness     Obese     Sleep apnea     Urosepsis      Past Surgical History:   Procedure Laterality Date    BACK SURGERY      HIP ARTHROPLASTY      JOINT REPLACEMENT      PROSTATE SURGERY  2015    SPINAL FUSION  09/2014    TONSILLECTOMY       Social History      Social History    Marital status:      Spouse name: N/A    Number of children: N/A    Years of education: N/A     Occupational History    Not on file.     Social History Main Topics    Smoking status: Current Every Day Smoker     Packs/day: 0.50    Smokeless tobacco: Not on file    Alcohol use No    Drug use: No    Sexual activity: Not on file     Other Topics Concern    Not on file     Social History Narrative    No narrative on file     Family History   Problem Relation Age of Onset    Hypertension Brother     Heart attack Neg Hx     Heart disease Neg Hx        Review of patient's allergies indicates:  No Known Allergies    Current Outpatient Prescriptions   Medication Sig    amLODIPine (NORVASC) 10 MG tablet     amoxicillin (AMOXIL) 500 MG capsule     atorvastatin (LIPITOR) 80 MG tablet Take by mouth once daily.     chlorhexidine (PERIDEX) 0.12 % solution     CIALIS 20 mg Tab     ciclesonide (OMNARIS) 50 mcg Spry 2 sprays by Each Nare route once daily.    ciprofloxacin HCl (CIPRO) 500 MG tablet     diclofenac sodium 1 % Gel Apply 2 g topically 3 (three) times daily.    dutasteride (AVODART) 0.5 mg capsule Take 0.5 mg by mouth once daily.    escitalopram oxalate (LEXAPRO) 20 MG tablet     eszopiclone 3 mg Tab Take 3 mg by mouth every evening.    FLORANEX 100 million cell packet     fluticasone (FLONASE) 50 mcg/actuation nasal spray     levothyroxine (SYNTHROID) 50 MCG tablet Take 50 mcg by mouth once daily.    lisinopril (PRINIVIL,ZESTRIL) 2.5 MG tablet Take by mouth once daily.    lisinopril (PRINIVIL,ZESTRIL) 40 MG tablet     LYRICA 100 mg capsule Take 100 mg by mouth 2 (two) times daily.    metformin (GLUCOPHAGE) 500 MG tablet Take 500 mg by mouth 2 (two) times daily with meals.    oxybutynin (DITROPAN-XL) 10 MG 24 hr tablet     oxyCODONE-acetaminophen (PERCOCET)  mg per tablet     senna-docusate 8.6-50 mg (PERICOLACE) 8.6-50 mg per tablet Take 2 tablets by  "mouth nightly as needed for Constipation.    tamsulosin (FLOMAX) 0.4 mg Cp24 Take 0.4 mg by mouth once daily.    tramadol (ULTRAM) 50 mg tablet Take 50 mg by mouth every 6 (six) hours as needed for Pain.    valsartan (DIOVAN) 160 MG tablet Take 160 mg by mouth once daily.     No current facility-administered medications for this visit.        REVIEW OF SYSTEMS:    GENERAL:  No weight loss, malaise or fevers.  HEENT:   No recent changes in vision or hearing  NECK:  Negative for lumps, no difficulty with swallowing.  RESPIRATORY:  Negative for cough, wheezing or shortness of breath, patient denies any recent URI.  CARDIOVASCULAR:  Negative for chest pain, leg swelling or palpitations. Hx PFO, takes prophylactic abx prior to dental procedures.  GI:  Negative for abdominal discomfort, blood in stools or black stools or change in bowel habits.  : recent treatment with cipro for UTI, symptoms resolved.  Not currently on abx.  MUSCULOSKELETAL:  See HPI.  SKIN:  Negative for lesions, rash, and itching.  PSYCH:  No mood disorder or recent psychosocial stressors.  Patient's sleep is disturbed secondary to pain.  HEMATOLOGY/LYMPHOLOGY:  Negative for prolonged bleeding, bruising easily or swollen nodes.  Patient is not currently taking any anti-coagulants  ENDO: DM2, hypothyroidism on stable dose of synthroid  NEURO:   Hx of CVA, Hx of cervical fusion and meylopathy  All other reviewed and negative other than HPI.    OBJECTIVE:    BP (!) 143/86   Pulse 88   Temp 97.7 °F (36.5 °C) (Oral)   Resp 18   Ht 5' 10" (1.778 m)     PHYSICAL EXAMINATION:    GENERAL: Well appearing, in no acute distress, alert and oriented x3.  PSYCH:  Mood and affect appropriate.  SKIN: Skin color, texture, turgor normal, no rashes or lesions.  HEAD/FACE:  Normocephalic, atraumatic.   CV: RRR with palpation of the radial artery.  PULM: No evidence of respiratory difficulty, symmetric chest rise.  BACK:   There is pain with palpation over the " facet joints of the lumbar spine bilaterally. There is decreased range of motion with extension to 15 degrees, and facet loading maneuvers cause reproducible pain.    EXTREMITIES: bilateral lower extremity edema, 1 + pitting.  MUSCULOSKELETAL: No pain with palpation over medial and lateral joint line of left knee. Full ROM with mild pain on extension of left knee. Crepitus noted to left knee. There is no pain to palpation over the greater trochanteric bursa bilaterally.  FABERs test is positive.  FADIRs test is negative.   Bilateral lower extremity strength is normal and symmetric.  No atrophy or tone abnormalities are noted.  NEURO: Cranial nerves grossly intact.  GAIT: Antalgic, ambulates with cane, presented in wheelchair        ASSESSMENT: 67 y.o. year old male with pain, consistent with     Encounter Diagnoses   Name Primary?    Chronic pain of left knee Yes    Primary osteoarthritis of left knee        PLAN:     - Previous imaging was reviewed and discussed with the patient today.    - He is s/p left genicular nerve block with significant relief. He continues to report relief at this time.     - In the future, we may schedule left genicular RFA.     - He may be a candidate for spinal cord stimulation in the future. Previous provided with Natalya Martinez DR information.     - Continue Voltaren gel.     - Continue home exercise routine.     - RTC PRN.     - Dr. Garcia was consulted on the patient and agrees with this plan.    The above plan and management options were discussed at length with patient. Patient is in agreement with the above and verbalized understanding.    Ana Laura Martinez NP  05/23/2018

## 2018-07-24 ENCOUNTER — PATIENT MESSAGE (OUTPATIENT)
Dept: PAIN MEDICINE | Facility: CLINIC | Age: 68
End: 2018-07-24

## 2018-07-25 DIAGNOSIS — M16.11 PRIMARY OSTEOARTHRITIS OF RIGHT HIP: Primary | ICD-10-CM

## 2018-07-25 NOTE — TELEPHONE ENCOUNTER
Please schedule the patient for left genicular nerve block with steroid for therapeutic purposes with Dr. Garcia.

## 2018-08-07 ENCOUNTER — SURGERY (OUTPATIENT)
Age: 68
End: 2018-08-07

## 2018-08-07 ENCOUNTER — HOSPITAL ENCOUNTER (OUTPATIENT)
Facility: OTHER | Age: 68
Discharge: HOME | End: 2018-08-07
Attending: ANESTHESIOLOGY | Admitting: ANESTHESIOLOGY
Payer: MEDICARE

## 2018-08-07 VITALS
HEART RATE: 56 BPM | BODY MASS INDEX: 42.23 KG/M2 | OXYGEN SATURATION: 96 % | WEIGHT: 295 LBS | TEMPERATURE: 98 F | SYSTOLIC BLOOD PRESSURE: 146 MMHG | RESPIRATION RATE: 18 BRPM | DIASTOLIC BLOOD PRESSURE: 67 MMHG | HEIGHT: 70 IN

## 2018-08-07 DIAGNOSIS — M17.12 PRIMARY OSTEOARTHRITIS OF LEFT KNEE: Primary | ICD-10-CM

## 2018-08-07 LAB — POCT GLUCOSE: 90 MG/DL (ref 70–110)

## 2018-08-07 PROCEDURE — 63600175 PHARM REV CODE 636 W HCPCS: Performed by: ANESTHESIOLOGY

## 2018-08-07 PROCEDURE — 82947 ASSAY GLUCOSE BLOOD QUANT: CPT | Performed by: ANESTHESIOLOGY

## 2018-08-07 PROCEDURE — 64450 NJX AA&/STRD OTHER PN/BRANCH: CPT | Performed by: ANESTHESIOLOGY

## 2018-08-07 PROCEDURE — 64450 NJX AA&/STRD OTHER PN/BRANCH: CPT | Mod: LT,,, | Performed by: ANESTHESIOLOGY

## 2018-08-07 PROCEDURE — S0020 INJECTION, BUPIVICAINE HYDRO: HCPCS | Performed by: ANESTHESIOLOGY

## 2018-08-07 PROCEDURE — 25000003 PHARM REV CODE 250: Performed by: ANESTHESIOLOGY

## 2018-08-07 RX ORDER — BUPIVACAINE HYDROCHLORIDE 5 MG/ML
INJECTION, SOLUTION EPIDURAL; INTRACAUDAL
Status: DISCONTINUED | OUTPATIENT
Start: 2018-08-07 | End: 2018-08-07 | Stop reason: HOSPADM

## 2018-08-07 RX ORDER — DICLOFENAC SODIUM 10 MG/G
2 GEL TOPICAL 3 TIMES DAILY
Qty: 1 TUBE | Refills: 2 | Status: SHIPPED | OUTPATIENT
Start: 2018-08-07 | End: 2018-12-06 | Stop reason: SDUPTHER

## 2018-08-07 RX ORDER — METHYLPREDNISOLONE ACETATE 40 MG/ML
INJECTION, SUSPENSION INTRA-ARTICULAR; INTRALESIONAL; INTRAMUSCULAR; SOFT TISSUE
Status: DISCONTINUED | OUTPATIENT
Start: 2018-08-07 | End: 2018-08-07 | Stop reason: HOSPADM

## 2018-08-07 RX ORDER — SODIUM CHLORIDE 9 MG/ML
INJECTION, SOLUTION INTRAVENOUS CONTINUOUS
Status: DISCONTINUED | OUTPATIENT
Start: 2018-08-07 | End: 2018-08-07 | Stop reason: HOSPADM

## 2018-08-07 RX ORDER — LIDOCAINE HYDROCHLORIDE 20 MG/ML
INJECTION, SOLUTION INFILTRATION; PERINEURAL
Status: DISCONTINUED | OUTPATIENT
Start: 2018-08-07 | End: 2018-08-07 | Stop reason: HOSPADM

## 2018-08-07 RX ADMIN — LIDOCAINE HYDROCHLORIDE 10 MG: 20 INJECTION, SOLUTION INFILTRATION; PERINEURAL at 04:08

## 2018-08-07 RX ADMIN — METHYLPREDNISOLONE ACETATE 40 MG: 40 INJECTION, SUSPENSION INTRA-ARTICULAR; INTRALESIONAL; INTRAMUSCULAR; SOFT TISSUE at 04:08

## 2018-08-07 RX ADMIN — BUPIVACAINE HYDROCHLORIDE 10 ML: 5 INJECTION, SOLUTION EPIDURAL; INTRACAUDAL; PERINEURAL at 04:08

## 2018-08-07 NOTE — DISCHARGE INSTRUCTIONS

## 2018-08-07 NOTE — DISCHARGE SUMMARY
Discharge Note  Short Stay      SUMMARY     Admit Date: 8/7/2018    Attending Physician: Dania Garcia      Discharge Physician: Dania Garcia      Discharge Date: 8/7/2018 4:12 PM    Procedure(s) (LRB):  BLOCK-NERVE-GENICULAR (Left)    Final Diagnosis: Left knee pain, unspecified chronicity [M25.562]    Disposition: Home or self care    Patient Instructions:   Current Discharge Medication List      CONTINUE these medications which have NOT CHANGED    Details   amLODIPine (NORVASC) 10 MG tablet       amoxicillin (AMOXIL) 500 MG capsule       atorvastatin (LIPITOR) 80 MG tablet Take by mouth once daily.       chlorhexidine (PERIDEX) 0.12 % solution       CIALIS 20 mg Tab       ciclesonide (OMNARIS) 50 mcg Spry 2 sprays by Each Nare route once daily.      ciprofloxacin HCl (CIPRO) 500 MG tablet       diclofenac sodium 1 % Gel Apply 2 g topically 3 (three) times daily.  Qty: 1 Tube, Refills: 2      dutasteride (AVODART) 0.5 mg capsule Take 0.5 mg by mouth once daily.      escitalopram oxalate (LEXAPRO) 20 MG tablet       eszopiclone 3 mg Tab Take 3 mg by mouth every evening.      FLORANEX 100 million cell packet       fluticasone (FLONASE) 50 mcg/actuation nasal spray       levothyroxine (SYNTHROID) 50 MCG tablet Take 50 mcg by mouth once daily.      !! lisinopril (PRINIVIL,ZESTRIL) 2.5 MG tablet Take by mouth once daily.      !! lisinopril (PRINIVIL,ZESTRIL) 40 MG tablet       LYRICA 100 mg capsule Take 100 mg by mouth 2 (two) times daily.  Refills: 5      metformin (GLUCOPHAGE) 500 MG tablet Take 500 mg by mouth 2 (two) times daily with meals.      oxybutynin (DITROPAN-XL) 10 MG 24 hr tablet       oxyCODONE-acetaminophen (PERCOCET)  mg per tablet       senna-docusate 8.6-50 mg (PERICOLACE) 8.6-50 mg per tablet Take 2 tablets by mouth nightly as needed for Constipation.      tamsulosin (FLOMAX) 0.4 mg Cp24 Take 0.4 mg by mouth once daily.      tramadol (ULTRAM) 50 mg tablet Take 50 mg by mouth every 6  (six) hours as needed for Pain.      valsartan (DIOVAN) 160 MG tablet Take 160 mg by mouth once daily.       !! - Potential duplicate medications found. Please discuss with provider.              Discharge Diagnosis: Left knee pain, unspecified chronicity [M25.562]  Condition on Discharge: Stable with no complications to procedure   Diet on Discharge: Same as before.  Activity: as per instruction sheet.  Discharge to: Home with a responsible adult.  Follow up: 2-4 weeks

## 2018-08-07 NOTE — OP NOTE
Procedure Note:   Left   4 needle Geniculate nerve block under fluoroscopy                               1) suprapatellar geniculate nerve block  2) medial superior epicondyle geniculate nerve block  3) lateral superior epicondyle geniculate nerve block  4) medial tibial metaphysis geniculate nerve block  5) xray guidance for needle placement                               Pre-Op Diagnosis:  Left  Left knee pain, unspecified chronicity [M25.562]    Post-Op Diagnosis: Left knee pain, unspecified chronicity [M25.562]    Surgeon: Dania Garcia M.D.    Assistant: None    EBL: None    Complications: None    Specimens: None    Description of procedure:    After written consent was obtained, patient placed in supine position.  The area over the medial and lateral aspect of the superior patellar, superior epi-condyle of the femur and the medial tibial metaphysis were prepped with chlorhexidine.  The area was draped in the usual sterile fashion.  Approximately 8 mL total 1% lidocaine was infiltrated into the skin overlying the 4 predetermined entry points. A 22 gauge spinal needle was then advanced under fluoroscopy in the AP and lateral views into the positions of the geniculate nerves at these levels. A mixture of 9mL 0.25% bupivacaine +40mg depo-medrol was prepared (10mL total).  After negative aspiration and no paresthesias there was injection of 2.5 mL of this mixture was injected into each of these 4 areas for a total volume of 10 mL. Needle was withdrawn and a sterile band-aid applied to the skin.    Patient tolerated the procedure well, and was reporting improvement of pain symptoms after the injection.  She was discharged from the clinic in stable condition.

## 2018-08-07 NOTE — PLAN OF CARE
Pt tolerated procedure well. Reports 1/10 pain after procedure. Pt assisted up for first time, steady on feet. D/c instructions given.

## 2018-08-14 ENCOUNTER — TELEPHONE (OUTPATIENT)
Dept: INTERVENTIONAL RADIOLOGY/VASCULAR | Facility: HOSPITAL | Age: 68
End: 2018-08-14

## 2018-08-14 DIAGNOSIS — M16.11 PRIMARY OSTEOARTHRITIS OF RIGHT HIP: Primary | ICD-10-CM

## 2018-08-28 ENCOUNTER — HOSPITAL ENCOUNTER (OUTPATIENT)
Dept: RADIOLOGY | Facility: HOSPITAL | Age: 68
Discharge: HOME | End: 2018-08-28
Attending: NURSE PRACTITIONER
Payer: MEDICARE

## 2018-08-28 VITALS — HEART RATE: 65 BPM | RESPIRATION RATE: 16 BRPM | DIASTOLIC BLOOD PRESSURE: 67 MMHG | SYSTOLIC BLOOD PRESSURE: 137 MMHG

## 2018-08-28 DIAGNOSIS — M16.11 PRIMARY OSTEOARTHRITIS OF RIGHT HIP: ICD-10-CM

## 2018-08-28 PROCEDURE — 77002 NEEDLE LOCALIZATION BY XRAY: CPT | Mod: 26,RT,, | Performed by: RADIOLOGY

## 2018-08-28 PROCEDURE — 25500020 PHARM REV CODE 255: Performed by: NURSE PRACTITIONER

## 2018-08-28 PROCEDURE — 25000003 PHARM REV CODE 250: Performed by: NURSE PRACTITIONER

## 2018-08-28 PROCEDURE — 20610 DRAIN/INJ JOINT/BURSA W/O US: CPT | Mod: ,,, | Performed by: RADIOLOGY

## 2018-08-28 PROCEDURE — 63600175 PHARM REV CODE 636 W HCPCS: Performed by: NURSE PRACTITIONER

## 2018-08-28 PROCEDURE — 77002 NEEDLE LOCALIZATION BY XRAY: CPT | Mod: TC

## 2018-08-28 RX ORDER — BUPIVACAINE HYDROCHLORIDE 2.5 MG/ML
5 INJECTION, SOLUTION EPIDURAL; INFILTRATION; INTRACAUDAL ONCE
Status: COMPLETED | OUTPATIENT
Start: 2018-08-28 | End: 2018-08-28

## 2018-08-28 RX ORDER — METHYLPREDNISOLONE ACETATE 80 MG/ML
80 INJECTION, SUSPENSION INTRA-ARTICULAR; INTRALESIONAL; INTRAMUSCULAR; SOFT TISSUE ONCE
Status: COMPLETED | OUTPATIENT
Start: 2018-08-28 | End: 2018-08-28

## 2018-08-28 RX ORDER — LIDOCAINE HYDROCHLORIDE 10 MG/ML
5 INJECTION, SOLUTION EPIDURAL; INFILTRATION; INTRACAUDAL; PERINEURAL ONCE
Status: COMPLETED | OUTPATIENT
Start: 2018-08-28 | End: 2018-08-28

## 2018-08-28 RX ADMIN — LIDOCAINE HYDROCHLORIDE 50 MG: 10 INJECTION, SOLUTION EPIDURAL; INFILTRATION; INTRACAUDAL; PERINEURAL at 01:08

## 2018-08-28 RX ADMIN — IOHEXOL 2 ML: 300 INJECTION, SOLUTION INTRAVENOUS at 01:08

## 2018-08-28 RX ADMIN — BUPIVACAINE HYDROCHLORIDE 12.5 MG: 2.5 INJECTION, SOLUTION EPIDURAL; INFILTRATION; INTRACAUDAL; PERINEURAL at 01:08

## 2018-08-28 RX ADMIN — METHYLPREDNISOLONE ACETATE 80 MG: 80 INJECTION, SUSPENSION INTRA-ARTICULAR; INTRALESIONAL; INTRAMUSCULAR; SOFT TISSUE at 01:08

## 2018-08-28 NOTE — PROGRESS NOTES
Patient in fluoroscopy for hip injection. Verified name,  and allergies with patient. VSS. Patient left under care of [facility] staff.

## 2018-12-06 ENCOUNTER — PATIENT MESSAGE (OUTPATIENT)
Dept: PAIN MEDICINE | Facility: CLINIC | Age: 68
End: 2018-12-06

## 2018-12-06 DIAGNOSIS — M17.12 OSTEOARTHRITIS OF LEFT KNEE, UNSPECIFIED OSTEOARTHRITIS TYPE: ICD-10-CM

## 2018-12-06 DIAGNOSIS — G89.29 CHRONIC PAIN OF LEFT KNEE: Primary | ICD-10-CM

## 2018-12-06 DIAGNOSIS — M25.562 CHRONIC PAIN OF LEFT KNEE: Primary | ICD-10-CM

## 2018-12-06 RX ORDER — DICLOFENAC SODIUM 10 MG/G
2 GEL TOPICAL 3 TIMES DAILY
Qty: 1 TUBE | Refills: 2 | Status: SHIPPED | OUTPATIENT
Start: 2018-12-06 | End: 2020-12-11 | Stop reason: SDUPTHER

## 2019-02-20 ENCOUNTER — TELEPHONE (OUTPATIENT)
Dept: PAIN MEDICINE | Facility: CLINIC | Age: 69
End: 2019-02-20

## 2019-02-20 NOTE — TELEPHONE ENCOUNTER
----- Message from Dorita Dowell sent at 2/20/2019 11:58 AM CST -----  Contact: MADI MALIN [[id number]]  Name of Who is Calling:MADI MALIN [[id number]]        What is the request in detail: Pt requesting to schedule nerve block injection for his knee. He would like to schedule on 2.22.19 if possible. Advise at your earliest convenience. Thanks-          Can the clinic reply by [facility]: N    What Number to Call Back if not in [facility]: [phone]

## 2019-02-22 ENCOUNTER — OFFICE VISIT (OUTPATIENT)
Dept: PAIN MEDICINE | Facility: CLINIC | Age: 69
End: 2019-02-22
Payer: MEDICARE

## 2019-02-22 VITALS
BODY MASS INDEX: 42.66 KG/M2 | TEMPERATURE: 98 F | WEIGHT: 271.81 LBS | HEIGHT: 67 IN | HEART RATE: 67 BPM | DIASTOLIC BLOOD PRESSURE: 70 MMHG | SYSTOLIC BLOOD PRESSURE: 119 MMHG

## 2019-02-22 DIAGNOSIS — M17.12 PRIMARY OSTEOARTHRITIS OF LEFT KNEE: Primary | ICD-10-CM

## 2019-02-22 DIAGNOSIS — G89.29 CHRONIC PAIN OF LEFT KNEE: ICD-10-CM

## 2019-02-22 DIAGNOSIS — M25.562 CHRONIC PAIN OF LEFT KNEE: ICD-10-CM

## 2019-02-22 PROCEDURE — 99213 OFFICE O/P EST LOW 20 MIN: CPT | Mod: S$PBB,,, | Performed by: NURSE PRACTITIONER

## 2019-02-22 PROCEDURE — 99999 PR PBB SHADOW E&M-EST. PATIENT-LVL III: CPT | Mod: PBBFAC,,, | Performed by: NURSE PRACTITIONER

## 2019-02-22 PROCEDURE — 99213 PR OFFICE/OUTPT VISIT, EST, LEVL III, 20-29 MIN: ICD-10-PCS | Mod: S$PBB,,, | Performed by: NURSE PRACTITIONER

## 2019-02-22 PROCEDURE — 99213 OFFICE O/P EST LOW 20 MIN: CPT | Mod: PBBFAC | Performed by: NURSE PRACTITIONER

## 2019-02-22 PROCEDURE — 99999 PR PBB SHADOW E&M-EST. PATIENT-LVL III: ICD-10-PCS | Mod: PBBFAC,,, | Performed by: NURSE PRACTITIONER

## 2019-02-22 NOTE — PROGRESS NOTES
Chronic Pain - Established Visit    Referring Physician: No ref. provider found    Chief Complaint:   Chief Complaint   Patient presents with    Knee Pain     Left Side        SUBJECTIVE: Disclaimer: This note has been generated using voice-recognition software. There may be typographical errors that have been missed during proof-reading    Interval History 2/22/2019:  The patient returns to clinic today for follow up. He reports that his left knee pain returned over the last month. He describes this pain as sharp in nature. His pain is worse with prolonged standing and walking. He reports that previous procedure provided significant relief for several months. He would like to repeat. He denies any right knee pain. He continues to participate in a home exercise routine. His pain today is 7/10.    Interval History 5/23/2018:  The patient returns to clinic today for follow up. He is s/p left genicular nerve block on 5/10/2018. He reports 90% relief of his left knee pain. He continues to report benefit at this time. He reports that he is able to walk for longer distances and periods of time since procedure. He denies any other health changes. His pain today is 0/10.    Initial encounter:    Иван Fuentes presents to the clinic for the evaluation of left knee and right hip pain. The pain started 3 years ago following arthritis and symptoms have been worsening.    Brief history:  Patient being sent for evaluation for knee RFA    Pain Description:    The pain is located in the left knee and right hip area.      At BEST  2/10     At WORST  9/10 on the WORST day.      On average pain is rated as 7/10.     Today the pain is rated as 6/10    The pain is described as sharp      Symptoms interfere with daily activity.     Exacerbating factors: Standing, Walking and Extension.      Mitigating factors medications.     Patient denies night fever/night sweats, urinary incontinence, bowel incontinence, significant weight  loss, significant motor weakness and loss of sensations.  Patient denies any suicidal or homicidal ideations    Pain Medications:  Current:  Advil  Aleve  Percocet    Tried in Past:  NSAIDs -Never  TCA -Never  SNRI -Never  Anti-convulsants -Never  Muscle Relaxants -Never  Opioids-Never    Physical Therapy/Home Exercise: no       report:  Reviewed and consistent with medication use as prescribed.    Pain Procedures: previous hip injections with steroid with significant relief for approximately 8 months at a time (last injection 2 months ago, still getting relief)  Previous knee joint injections without sustained relief.   5/10/2018- Left genicular nerve block with steroid-90% relief     Chiropractor -never  Acupuncture - never  TENS unit -never  Spinal decompression -never  Joint replacement -left hip replacement    Imaging:      Hip Xray 3/9/2018:  2 views: There is severe DJD of the right hip. There is a left NATALIYA in place. No fracture dislocation bone destruction seen.        Electronically signed by: ELICIA FRANCO MD  Date: 03/09/18  Time: 15:46        Xray Knee 3/9/2018:  Left knee orthopedic views with flexion includes 5 total views.  The study includes AP standing and flexion views.  There is slight medial subluxation of the femurs in relation to the tibia bilaterally, more on the left.  There is mild genu varus deformity on the left     The left knee has severe joint space loss with complete obliteration of the tibiofemoral joints and lateral patellofemoral joint space.  There is some lateral subluxation of patella.      Right knee also has narrowing of the lateral patellar joint space, lateral subluxation of the patella, and moderate to severe medial tibiofemoral joint space narrowing.  There is a 6 mm exostosis from the medial proximal tibia metaphysis.   Impression      Extensive degenerative changes, as above.      Electronically signed by: OLESYA OCONNOR MD  Date: 03/09/18  Time: 15:18            Past  Medical History:   Diagnosis Date    Arthritis of both knees     BPH (benign prostatic hyperplasia)     CVA (cerebral vascular accident)     Diabetes mellitus     High cholesterol     Hypertension     Left-sided weakness     Obese     Sleep apnea     Urosepsis      Past Surgical History:   Procedure Laterality Date    BACK SURGERY      BLOCK-NERVE-GENICULAR Left 8/7/2018    [facility]    HIP ARTHROPLASTY      INJECTION-JOINT Left 5/10/2018    Performed [facility]    JOINT REPLACEMENT      LAMINECTOMY-CERVICAL/FUSION-POSTERIOR N/A 10/9/2014    [facility] OR [address] [place] [address]    PROSTATE SURGERY  2015    SPINAL FUSION  09/2014    TONSILLECTOMY       Social History     Socioeconomic History    Marital status:      Spouse name: Not on file    Number of children: Not on file    Years of education: Not on file    Highest education level: Not on file   Social Needs    Financial resource strain: Not on file    Food insecurity - worry: Not on file    Food insecurity - inability: Not on file    Transportation needs - medical: Not on file    Transportation needs - non-medical: Not on file   Occupational History    Not on file   Tobacco Use    Smoking status: Current Every Day Smoker     Packs/day: 0.50   Substance and Sexual Activity    Alcohol use: No    Drug use: No    Sexual activity: Not on file   Other Topics Concern    Not on file   Social History Narrative    Not on file     Family History   Problem Relation Age of Onset    Hypertension Brother     Heart attack Neg Hx     Heart disease Neg Hx        Review of patient's allergies indicates:  No Known Allergies    Current Outpatient Medications   Medication Sig    amLODIPine (NORVASC) 10 MG tablet     atorvastatin (LIPITOR) 80 MG tablet Take by mouth once daily.     chlorhexidine (PERIDEX) 0.12 % solution     ciclesonide (OMNARIS) 50  "mcg [place] 2 sprays by Each Nare route once daily.    diclofenac sodium (VOLTAREN) 1 % Gel Apply 2 g topically 3 (three) times daily.    escitalopram oxalate (LEXAPRO) 20 MG tablet     eszopiclone 3 mg Tab Take 3 mg by mouth every evening.    fluticasone (FLONASE) 50 mcg/actuation nasal spray     levothyroxine (SYNTHROID) 50 MCG tablet Take 50 mcg by mouth once daily.    lisinopril (PRINIVIL,ZESTRIL) 40 MG tablet     LYRICA 100 mg capsule Take 100 mg by mouth 2 (two) times daily.    metformin (GLUCOPHAGE) 500 MG tablet Take 500 mg by mouth 2 (two) times daily with meals.    oxyCODONE-acetaminophen (PERCOCET)  mg per tablet     valsartan (DIOVAN) 160 MG tablet Take 160 mg by mouth once daily.     No current facility-administered medications for this visit.        REVIEW OF SYSTEMS:    GENERAL:  No weight loss, malaise or fevers.  HEENT:   No recent changes in vision or hearing  NECK:  Negative for lumps, no difficulty with swallowing.  RESPIRATORY:  Negative for cough, wheezing or shortness of breath, patient denies any recent URI.  CARDIOVASCULAR:  Negative for chest pain, leg swelling or palpitations. Hx PFO, takes prophylactic abx prior to dental procedures.  GI:  Negative for abdominal discomfort, blood in stools or black stools or change in bowel habits.  : recent treatment with cipro for UTI, symptoms resolved.  Not currently on abx.  MUSCULOSKELETAL:  See HPI.  SKIN:  Negative for lesions, rash, and itching.  PSYCH:  No mood disorder or recent psychosocial stressors.  Patient's sleep is disturbed secondary to pain.  HEMATOLOGY/LYMPHOLOGY:  Negative for prolonged bleeding, bruising easily or swollen nodes.  Patient is not currently taking any anti-coagulants  ENDO: DM2, hypothyroidism on stable dose of synthroid  NEURO:   Hx of CVA, Hx of cervical fusion and meylopathy  All other reviewed and negative other than HPI.    OBJECTIVE:    /70   Pulse 67   Temp 98.1 °F (36.7 °C)   Ht 5' 7" " (1.702 m)   Wt 123.3 kg (271 lb 13.2 oz)   BMI 42.57 kg/m²     PHYSICAL EXAMINATION:    GENERAL: Well appearing, in no acute distress, alert and oriented x3.  PSYCH:  Mood and affect appropriate.  SKIN: Skin color, texture, turgor normal, no rashes or lesions.  HEAD/FACE:  Normocephalic, atraumatic.   CV: RRR with palpation of the radial artery.  PULM: No evidence of respiratory difficulty, symmetric chest rise.    EXTREMITIES: bilateral lower extremity edema, 1 + pitting.  MUSCULOSKELETAL: There is pain with palpation over medial and lateral joint line of left knee. Full ROM with pain on flexion and extension of left knee. Crepitus noted to left knee. There is no pain to palpation over the greater trochanteric bursa bilaterally.  FABERs test is positive.  FADIRs test is negative.   Bilateral lower extremity strength is normal and symmetric.  No atrophy or tone abnormalities are noted.  NEURO: Cranial nerves grossly intact.  GAIT: Antalgic, ambulates with cane, presented in wheelchair        ASSESSMENT: 68 y.o. year old male with pain, consistent with     Encounter Diagnoses   Name Primary?    Primary osteoarthritis of left knee Yes    Chronic pain of left knee        PLAN:     - Previous imaging was reviewed and discussed with the patient today.    - Schedule for left genicular nerve block with steroid.   The procedure, risks, benefits and options were discussed with patient. There are no contraindications to the procedure. The patient expressed understanding and agreed to proceed.     - In the future, we may schedule left genicular RFA.     - He may be a candidate for spinal cord stimulation in the future. Previous provided with Natalya Martinez DR information.     - Continue Voltaren gel.     - Continue home exercise routine.     - RTC 2 weeks after above procedure.     - Dr. Garcia was consulted on the patient and agrees with this plan.    The above plan and management options were discussed at length with  patient. Patient is in agreement with the above and verbalized understanding.    Ana Laura Martinez NP  02/22/2019

## 2019-02-25 DIAGNOSIS — M16.11 PRIMARY OSTEOARTHRITIS OF RIGHT HIP: Primary | ICD-10-CM

## 2019-03-06 ENCOUNTER — PATIENT MESSAGE (OUTPATIENT)
Dept: ORTHOPEDICS | Facility: CLINIC | Age: 69
End: 2019-03-06

## 2019-03-08 ENCOUNTER — TELEPHONE (OUTPATIENT)
Dept: INTERVENTIONAL RADIOLOGY/VASCULAR | Facility: HOSPITAL | Age: 69
End: 2019-03-08

## 2019-03-20 ENCOUNTER — TELEPHONE (OUTPATIENT)
Dept: RADIOLOGY | Facility: HOSPITAL | Age: 69
End: 2019-03-20

## 2019-03-21 ENCOUNTER — HOSPITAL ENCOUNTER (OUTPATIENT)
Facility: OTHER | Age: 69
Discharge: HOME | End: 2019-03-21
Attending: ANESTHESIOLOGY | Admitting: ANESTHESIOLOGY
Payer: MEDICARE

## 2019-03-21 ENCOUNTER — HOSPITAL ENCOUNTER (OUTPATIENT)
Dept: RADIOLOGY | Facility: HOSPITAL | Age: 69
Discharge: HOME | End: 2019-03-21
Attending: ORTHOPAEDIC SURGERY
Payer: MEDICARE

## 2019-03-21 VITALS
RESPIRATION RATE: 18 BRPM | SYSTOLIC BLOOD PRESSURE: 141 MMHG | BODY MASS INDEX: 40.47 KG/M2 | HEIGHT: 68 IN | WEIGHT: 267 LBS | DIASTOLIC BLOOD PRESSURE: 74 MMHG | TEMPERATURE: 98 F | OXYGEN SATURATION: 95 % | HEART RATE: 56 BPM

## 2019-03-21 DIAGNOSIS — M17.12 PRIMARY OSTEOARTHRITIS OF LEFT KNEE: Primary | ICD-10-CM

## 2019-03-21 DIAGNOSIS — M16.11 PRIMARY OSTEOARTHRITIS OF RIGHT HIP: ICD-10-CM

## 2019-03-21 LAB — POCT GLUCOSE: 106 MG/DL (ref 70–110)

## 2019-03-21 PROCEDURE — 64450 NJX AA&/STRD OTHER PN/BRANCH: CPT | Mod: LT,,, | Performed by: PHYSICAL MEDICINE & REHABILITATION

## 2019-03-21 PROCEDURE — 77002 NEEDLE LOCALIZATION BY XRAY: CPT | Performed by: ANESTHESIOLOGY

## 2019-03-21 PROCEDURE — 20610 FL ASPIRATION INJECTION MAJOR JOINT RIGHT WITH FLUORO: ICD-10-PCS | Mod: RT,,, | Performed by: RADIOLOGY

## 2019-03-21 PROCEDURE — 63600175 PHARM REV CODE 636 W HCPCS: Performed by: ANESTHESIOLOGY

## 2019-03-21 PROCEDURE — 63600175 PHARM REV CODE 636 W HCPCS: Performed by: ORTHOPAEDIC SURGERY

## 2019-03-21 PROCEDURE — 77002 NEEDLE LOCALIZATION BY XRAY: CPT | Mod: 26,RT,, | Performed by: RADIOLOGY

## 2019-03-21 PROCEDURE — 64450 NJX AA&/STRD OTHER PN/BRANCH: CPT | Performed by: ANESTHESIOLOGY

## 2019-03-21 PROCEDURE — 82947 ASSAY GLUCOSE BLOOD QUANT: CPT | Performed by: ANESTHESIOLOGY

## 2019-03-21 PROCEDURE — 77002 FL ASPIRATION INJECTION MAJOR JOINT RIGHT WITH FLUORO: ICD-10-PCS | Mod: 26,RT,, | Performed by: RADIOLOGY

## 2019-03-21 PROCEDURE — 64450 PR NERVE BLOCK INJ, ANES/STEROID, OTHER PERIPHERAL: ICD-10-PCS | Mod: LT,,, | Performed by: PHYSICAL MEDICINE & REHABILITATION

## 2019-03-21 PROCEDURE — 25500020 PHARM REV CODE 255: Performed by: ORTHOPAEDIC SURGERY

## 2019-03-21 PROCEDURE — 25000003 PHARM REV CODE 250: Performed by: ORTHOPAEDIC SURGERY

## 2019-03-21 PROCEDURE — 77002 NEEDLE LOCALIZATION BY XRAY: CPT

## 2019-03-21 PROCEDURE — 20610 DRAIN/INJ JOINT/BURSA W/O US: CPT | Mod: RT,,, | Performed by: RADIOLOGY

## 2019-03-21 PROCEDURE — S0020 INJECTION, BUPIVICAINE HYDRO: HCPCS | Performed by: ANESTHESIOLOGY

## 2019-03-21 PROCEDURE — 25000003 PHARM REV CODE 250: Performed by: ANESTHESIOLOGY

## 2019-03-21 RX ORDER — LIDOCAINE HYDROCHLORIDE 10 MG/ML
INJECTION INFILTRATION; PERINEURAL
Status: DISCONTINUED | OUTPATIENT
Start: 2019-03-21 | End: 2019-03-21 | Stop reason: HOSPADM

## 2019-03-21 RX ORDER — BUPIVACAINE HYDROCHLORIDE 2.5 MG/ML
INJECTION, SOLUTION EPIDURAL; INFILTRATION; INTRACAUDAL
Status: DISCONTINUED | OUTPATIENT
Start: 2019-03-21 | End: 2019-03-21 | Stop reason: HOSPADM

## 2019-03-21 RX ORDER — BUPIVACAINE HYDROCHLORIDE 2.5 MG/ML
5 INJECTION, SOLUTION EPIDURAL; INFILTRATION; INTRACAUDAL ONCE
Status: COMPLETED | OUTPATIENT
Start: 2019-03-21 | End: 2019-03-21

## 2019-03-21 RX ORDER — LIDOCAINE HYDROCHLORIDE 10 MG/ML
5 INJECTION, SOLUTION EPIDURAL; INFILTRATION; INTRACAUDAL; PERINEURAL ONCE
Status: COMPLETED | OUTPATIENT
Start: 2019-03-21 | End: 2019-03-21

## 2019-03-21 RX ORDER — BUPIVACAINE HYDROCHLORIDE 5 MG/ML
INJECTION, SOLUTION EPIDURAL; INTRACAUDAL
Status: DISCONTINUED | OUTPATIENT
Start: 2019-03-21 | End: 2019-03-21 | Stop reason: HOSPADM

## 2019-03-21 RX ORDER — METHYLPREDNISOLONE ACETATE 40 MG/ML
INJECTION, SUSPENSION INTRA-ARTICULAR; INTRALESIONAL; INTRAMUSCULAR; SOFT TISSUE
Status: DISCONTINUED | OUTPATIENT
Start: 2019-03-21 | End: 2019-03-21 | Stop reason: HOSPADM

## 2019-03-21 RX ORDER — METHYLPREDNISOLONE ACETATE 80 MG/ML
80 INJECTION, SUSPENSION INTRA-ARTICULAR; INTRALESIONAL; INTRAMUSCULAR; SOFT TISSUE ONCE
Status: COMPLETED | OUTPATIENT
Start: 2019-03-21 | End: 2019-03-21

## 2019-03-21 RX ADMIN — IOHEXOL 2 ML: 300 INJECTION, SOLUTION INTRAVENOUS at 01:03

## 2019-03-21 RX ADMIN — METHYLPREDNISOLONE ACETATE 80 MG: 80 INJECTION, SUSPENSION INTRA-ARTICULAR; INTRALESIONAL; INTRAMUSCULAR; SOFT TISSUE at 01:03

## 2019-03-21 RX ADMIN — LIDOCAINE HYDROCHLORIDE 50 MG: 10 INJECTION, SOLUTION EPIDURAL; INFILTRATION; INTRACAUDAL; PERINEURAL at 01:03

## 2019-03-21 RX ADMIN — BUPIVACAINE HYDROCHLORIDE 12.5 MG: 2.5 INJECTION, SOLUTION EPIDURAL; INFILTRATION; INTRACAUDAL; PERINEURAL at 01:03

## 2019-03-21 NOTE — DISCHARGE INSTRUCTIONS
Thank you for allowing us to care for you today. You may receive a survey about the care we provided. Your feedback is valuable and helps us provide excellent care throughout the community.     Home Care Instructions for Pain Management:    1. DIET:   You may resume your normal diet today.   2. BATHING:   You may shower with luke warm water. No tub baths or anything that will soak injection sites under water for the next 24 hours.  3. DRESSING:   You may remove your bandage today.   4. ACTIVITY LEVEL:   You may resume your normal activities IMMEDIATELY after your procedure. Nothing strenuous today.  5. MEDICATIONS:   You may resume your normal medications today. To restart blood thinners, ask your doctor.  6. DRIVING    If you have received any sedatives by mouth today, you may not drive for 12 hours.    If you have received any sedation through your IV, you may not drive for 24 hrs.   7. SPECIAL INSTRUCTIONS:   No heat to the injection site for 24 hrs including, hot bath or shower, heating pad, moist heat, or hot tubs.    Use ice pack to injection site for any pain or discomfort.  Apply ice packs for 20 minute intervals as needed.    IF you have diabetes, be sure to monitor your blood sugar more closely. IF your injection contained steroids your blood sugar levels may become higher than normal.    If you are still having pain upon discharge:  Your pain may improve over the next 48 hours. The anesthetic (numbing medication) works immediately to 48 hours. IF your injection contained a steroid (anti-inflammatory medication), it takes approximately 3 days to start feeling relief and 7-10 days to see your greatest results from the medication. It is possible you may need subsequent injections. This would be discussed at your follow up appointment with pain management or your referring doctor.      PLEASE CALL YOUR DOCTOR IF:  1. Redness or swelling around the injection site.  2. Fever of 101 degrees or more  3.  Drainage (pus) from the injection site.  4. For any continuous bleeding (some dried blood over the incision is normal.)    FOR EMERGENCIES:   If any unusual problems or difficulties occur during clinic hours, call [phone] or 319.

## 2019-03-21 NOTE — OP NOTE
Procedure Note:    Left  Geniculate nerve block under fluoroscopy                               Surgeon: Dania Garcia M.D.     Assistant: Yisel Díaz MD    Pre-Op Diagnosis:  Left  Chronic pain of left knee [M25.562, G89.29]    Post-Op Diagnosis: Chronic pain of left knee [M25.562, G89.29]    EBL: None    Complications: None    Specimens: None    Description of procedure:    After written consent was obtained, patient placed in supine position.  The area over the medial and lateral aspect of the superior epi-condyle of the femur and the medial tibial metaphysis were prepped with chlorhexidine.  The area was draped in the usual sterile fashion.  Approximately 8 mL total 1% lidocaine was infiltrated into the skin overlying the 3 predetermined entry points. A 22 gauge spinal needle was then advanced under fluoroscopy in the AP and lateral views into the positions of the geniculate nerves at these levels. After negative aspiration and no paresthesias there was injection of 2 mL of 0.25% bupivacaine into each of these 3 areas for a total volume of 6 mL of 0.25% bupivacaine. Needle was withdrawn and a sterile band-aid applied to the skin.    Patient tolerated the procedure well, and was reporting improvement of pain symptoms after the injection.  She was discharged from the clinic in stable condition.

## 2019-06-14 DIAGNOSIS — M16.11 PRIMARY OSTEOARTHRITIS OF RIGHT HIP: ICD-10-CM

## 2019-06-14 DIAGNOSIS — M17.12 PRIMARY OSTEOARTHRITIS OF LEFT KNEE: Primary | ICD-10-CM

## 2019-06-17 ENCOUNTER — HOSPITAL ENCOUNTER (OUTPATIENT)
Dept: RADIOLOGY | Facility: HOSPITAL | Age: 69
Discharge: HOME | End: 2019-06-17
Attending: ORTHOPAEDIC SURGERY
Payer: MEDICARE

## 2019-06-17 ENCOUNTER — OFFICE VISIT (OUTPATIENT)
Dept: ORTHOPEDICS | Facility: CLINIC | Age: 69
End: 2019-06-17
Payer: MEDICARE

## 2019-06-17 VITALS — HEIGHT: 68 IN | BODY MASS INDEX: 40.6 KG/M2 | WEIGHT: 267.88 LBS

## 2019-06-17 DIAGNOSIS — M17.12 PRIMARY OSTEOARTHRITIS OF LEFT KNEE: Primary | ICD-10-CM

## 2019-06-17 DIAGNOSIS — M17.12 PRIMARY OSTEOARTHRITIS OF LEFT KNEE: ICD-10-CM

## 2019-06-17 DIAGNOSIS — M16.11 PRIMARY OSTEOARTHRITIS OF RIGHT HIP: ICD-10-CM

## 2019-06-17 DIAGNOSIS — E11.9 TYPE 2 DIABETES MELLITUS WITHOUT COMPLICATION, WITHOUT LONG-TERM CURRENT USE OF INSULIN: ICD-10-CM

## 2019-06-17 PROCEDURE — 73502 X-RAY EXAM HIP UNI 2-3 VIEWS: CPT | Mod: 26,RT,, | Performed by: RADIOLOGY

## 2019-06-17 PROCEDURE — 99213 OFFICE O/P EST LOW 20 MIN: CPT | Mod: PBBFAC,25 | Performed by: ORTHOPAEDIC SURGERY

## 2019-06-17 PROCEDURE — 73560 X-RAY EXAM OF KNEE 1 OR 2: CPT | Mod: 26,59,LT, | Performed by: RADIOLOGY

## 2019-06-17 PROCEDURE — 99999 PR PBB SHADOW E&M-EST. PATIENT-LVL III: CPT | Mod: PBBFAC,,, | Performed by: ORTHOPAEDIC SURGERY

## 2019-06-17 PROCEDURE — 73560 X-RAY EXAM OF KNEE 1 OR 2: CPT | Mod: TC,LT

## 2019-06-17 PROCEDURE — 73502 XR HIP 2 VIEW RIGHT: ICD-10-PCS | Mod: 26,RT,, | Performed by: RADIOLOGY

## 2019-06-17 PROCEDURE — 99999 PR PBB SHADOW E&M-EST. PATIENT-LVL III: ICD-10-PCS | Mod: PBBFAC,,, | Performed by: ORTHOPAEDIC SURGERY

## 2019-06-17 PROCEDURE — 73560 XR KNEE ORTHO LEFT: ICD-10-PCS | Mod: 26,59,LT, | Performed by: RADIOLOGY

## 2019-06-17 PROCEDURE — 99213 OFFICE O/P EST LOW 20 MIN: CPT | Mod: S$PBB,,, | Performed by: ORTHOPAEDIC SURGERY

## 2019-06-17 PROCEDURE — 73562 X-RAY EXAM OF KNEE 3: CPT | Mod: TC,LT

## 2019-06-17 PROCEDURE — 73562 XR KNEE ORTHO LEFT: ICD-10-PCS | Mod: 26,LT,, | Performed by: RADIOLOGY

## 2019-06-17 PROCEDURE — 73502 X-RAY EXAM HIP UNI 2-3 VIEWS: CPT | Mod: TC,RT

## 2019-06-17 PROCEDURE — 73562 X-RAY EXAM OF KNEE 3: CPT | Mod: 26,LT,, | Performed by: RADIOLOGY

## 2019-06-17 PROCEDURE — 99213 PR OFFICE/OUTPT VISIT, EST, LEVL III, 20-29 MIN: ICD-10-PCS | Mod: S$PBB,,, | Performed by: ORTHOPAEDIC SURGERY

## 2019-06-17 NOTE — PROGRESS NOTES
"Subjective:      Patient ID: Иван Fuentes is a 68 y.o. male.    Chief Complaint: Pain of the Left Knee and Pain of the Right Hip    HPI  Иван Fuentes has left knee pain.  The pain has worsened. The pain is located in the global aspect of the knee.  There  is not radiation.    There is associated stiffness.   There is not catching and locking. The pain is described as achy. The pain is aggravated by activity.  It is alleviated by rest on occasion. The symptoms have worsened to the point where it is interfering with his activities of daily living.  He has difficulty with stairs, getting dressed and getting in an out of a car.   There is not associated back pain.  His history, medications and problem list were reviewed.    Review of Systems   Constitution: Negative for chills, fever and night sweats.   HENT: Negative for hearing loss.    Eyes: Negative for blurred vision and double vision.   Cardiovascular: Negative for chest pain, claudication and leg swelling.   Respiratory: Negative for shortness of breath.    Endocrine: Negative for polydipsia, polyphagia and polyuria.   Hematologic/Lymphatic: Negative for adenopathy and bleeding problem. Does not bruise/bleed easily.   Skin: Negative for poor wound healing.   Musculoskeletal: Positive for joint pain.   Gastrointestinal: Negative for diarrhea and heartburn.   Genitourinary: Negative for bladder incontinence.   Neurological: Negative for focal weakness, headaches, numbness, paresthesias and sensory change.   Psychiatric/Behavioral: The patient is not nervous/anxious.    Allergic/Immunologic: Negative for persistent infections.         Objective:      Body mass index is 40.73 kg/m².  Vitals:    06/17/19 1358   Weight: 121.5 kg (267 lb 14.5 oz)   Height: 5' 8" (1.727 m)           General    Constitutional: He is oriented to person, place, and time. He appears well-developed and well-nourished.   HENT:   Head: Normocephalic and atraumatic.   Eyes: EOM are " normal.   Cardiovascular: Normal rate.    Pulmonary/Chest: Effort normal.   Neurological: He is alert and oriented to person, place, and time.   Psychiatric: He has a normal mood and affect. His behavior is normal.     General Musculoskeletal Exam   Gait: normal       Right Knee Exam     Inspection   Erythema: absent  Scars: absent  Swelling: absent  Effusion: absent  Deformity: absent  Bruising: absent    Tenderness   The patient is experiencing no tenderness.     Range of Motion   Extension: 0   Flexion: 130     Tests   Ligament Examination Lachman: normal (-1 to 2mm)   MCL - Valgus: normal (0 to 2mm)  LCL - Varus: normal  Patella   Passive Patellar Tilt: neutral    Other   Sensation: normal    Left Knee Exam     Inspection   Erythema: absent  Scars: absent  Swelling: present  Effusion: absent  Deformity: absent  Bruising: absent    Tenderness   The patient tender to palpation of the medial joint line and lateral joint line.    Crepitus   The patient has crepitus of the patella.    Range of Motion   Extension: 10   Flexion: 120     Tests   Stability Lachman: normal (-1 to 2mm)   MCL - Valgus: normal (0 to 2mm)  LCL - Varus: normal (0 to 2mm)  Patella   Passive Patellar Tilt: neutral    Other   Sensation: normal    Muscle Strength   Right Lower Extremity   Hip Abduction: 5/5   Quadriceps:  5/5   Hamstrin/5   Left Lower Extremity   Hip Abduction: 5/5   Quadriceps:  5/5   Hamstrin/5     Reflexes     Left Side  Quadriceps:  2+    Right Side   Quadriceps:  2+    Vascular Exam     Right Pulses  Dorsalis Pedis:      2+          Left Pulses  Dorsalis Pedis:      2+          Edema  Right Lower Leg: present  Left Lower Leg: present    Radiographs taken today and reviewed by me demonstrate severe arthritic change of the left KNEE(s).There  is not bone destruction.  There is not a fracture. The medial compartment is most involved.  There is a varus deformity.  The changes are tricompartmental.    Radiographs taken  today and reviewed by me demonstrate severe arthritic change of the right hip(s).There  is not bone destruction.  There is not a fracture.    Radiographs taken today were reviewed by me.  There is a prosthetic replacement of the left hip(s).  The prosthesis is well positioned.  There is not evidence of bone loss, osteolysis, or loosening. There is not a fracture.  ASR.          Assessment:       Encounter Diagnoses   Name Primary?    Primary osteoarthritis of left knee Yes    Type 2 diabetes mellitus without complication, without long-term current use of insulin           Plan:       Иван was seen today for pain and pain.    Diagnoses and all orders for this visit:    Primary osteoarthritis of left knee  -     Hemoglobin A1c; Future  -     CBC auto differential; Future  -     Comprehensive metabolic panel; Future    Type 2 diabetes mellitus without complication, without long-term current use of insulin  -     Hemoglobin A1c; Future  -     CBC auto differential; Future  -     Comprehensive metabolic panel; Future        He has several modifiable and non modifiable risk factors including PFO, smoking BMI> 40 and edema.  I would like him seen by cardiology here prior to surgery and he will work on continuing to lower BMI and quit smoking.  Tentative date of 9/3.    Will see early August.

## 2019-06-26 ENCOUNTER — HOSPITAL ENCOUNTER (OUTPATIENT)
Dept: CARDIOLOGY | Facility: CLINIC | Age: 69
Discharge: HOME | End: 2019-06-26
Payer: MEDICARE

## 2019-06-26 ENCOUNTER — OFFICE VISIT (OUTPATIENT)
Dept: CARDIOLOGY | Facility: CLINIC | Age: 69
End: 2019-06-26
Payer: MEDICARE

## 2019-06-26 ENCOUNTER — TELEPHONE (OUTPATIENT)
Dept: CARDIOLOGY | Facility: CLINIC | Age: 69
End: 2019-06-26

## 2019-06-26 VITALS
DIASTOLIC BLOOD PRESSURE: 60 MMHG | HEART RATE: 60 BPM | SYSTOLIC BLOOD PRESSURE: 117 MMHG | HEIGHT: 67 IN | WEIGHT: 263.69 LBS | BODY MASS INDEX: 41.39 KG/M2

## 2019-06-26 DIAGNOSIS — Z01.810 PRE-OPERATIVE CARDIOVASCULAR EXAMINATION: Primary | ICD-10-CM

## 2019-06-26 DIAGNOSIS — R00.2 PALPITATION: Primary | ICD-10-CM

## 2019-06-26 DIAGNOSIS — R00.2 PALPITATION: ICD-10-CM

## 2019-06-26 PROCEDURE — 99999 PR PBB SHADOW E&M-EST. PATIENT-LVL IV: ICD-10-PCS | Mod: PBBFAC,GC,, | Performed by: INTERNAL MEDICINE

## 2019-06-26 PROCEDURE — 99214 OFFICE O/P EST MOD 30 MIN: CPT | Mod: PBBFAC,25 | Performed by: INTERNAL MEDICINE

## 2019-06-26 PROCEDURE — 99203 PR OFFICE/OUTPT VISIT, NEW, LEVL III, 30-44 MIN: ICD-10-PCS | Mod: S$PBB,GC,, | Performed by: INTERNAL MEDICINE

## 2019-06-26 PROCEDURE — 93010 ELECTROCARDIOGRAM REPORT: CPT | Mod: S$PBB,,, | Performed by: INTERNAL MEDICINE

## 2019-06-26 PROCEDURE — 99203 OFFICE O/P NEW LOW 30 MIN: CPT | Mod: S$PBB,GC,, | Performed by: INTERNAL MEDICINE

## 2019-06-26 PROCEDURE — 99999 PR PBB SHADOW E&M-EST. PATIENT-LVL IV: CPT | Mod: PBBFAC,GC,, | Performed by: INTERNAL MEDICINE

## 2019-06-26 PROCEDURE — 93005 ELECTROCARDIOGRAM TRACING: CPT | Mod: PBBFAC | Performed by: INTERNAL MEDICINE

## 2019-06-26 PROCEDURE — 93010 EKG 12-LEAD: ICD-10-PCS | Mod: S$PBB,,, | Performed by: INTERNAL MEDICINE

## 2019-06-26 RX ORDER — BETAMETHASONE DIPROPIONATE 0.5 MG/G
CREAM TOPICAL
Status: ON HOLD | COMMUNITY
Start: 2019-05-01 | End: 2023-04-06 | Stop reason: CLARIF

## 2019-06-26 RX ORDER — FUROSEMIDE 40 MG/1
40 TABLET ORAL DAILY PRN
COMMUNITY
Start: 2019-06-11

## 2019-06-26 NOTE — PROGRESS NOTES
Cardiology Clinic Note  Reason for Visit: Cardiac risk assessment for non-cardiac event    HPI:   67 y/o male with PMH of PFO, HTN, HLD, and DMII who presents for cardiac risk assessment prior to non-cardiac surgery (left knee replacement). ECG c/w NSR, HR 61 BPM, low QRS voltage, and qWs in III and aVF. The patient reports a stroke after left hip replacement in 2008. He says he was told that recurrent stroke is an indication for PFO, so it has been considered by his primary care doctors. The patient does not take ASA. He is on PO medications for his diabetes. He was on warfarin after the stroke but is no longer on blood thinners. The patient uses a CPAP at night. He denies CP but doesn't exercise currently because of his knee and hip issues. The patient smokes tobacco. He uses a walker to get around as well as a mobility scooter. He has a cardiologist in [place].     TTE in 2008  EF 65%, +DD, mild AV sclerosis, MAC, trivial TR    Review of Systems   Constitution: Negative for chills and fever.   HENT: Negative for ear discharge.    Eyes: Negative for pain and visual disturbance.   Cardiovascular: Negative for chest pain, dyspnea on exertion, irregular heartbeat, leg swelling, orthopnea, palpitations, paroxysmal nocturnal dyspnea and syncope.   Respiratory: Negative for hemoptysis, shortness of breath and wheezing.    Skin: Negative for rash and suspicious lesions.   Musculoskeletal: Negative for joint pain and muscle weakness.   Gastrointestinal: Negative for abdominal pain, diarrhea, nausea and vomiting.   Genitourinary: Negative for dysuria and frequency.   Neurological: Negative for focal weakness, headaches and tremors.       PMH:     Past Medical History:   Diagnosis Date    Arthritis of both knees     BPH (benign prostatic hyperplasia)     CVA (cerebral vascular accident)     Diabetes mellitus     High cholesterol     Hypertension     Left-sided weakness     Obese     Sleep apnea      Urosepsis      Past Surgical History:   Procedure Laterality Date    BACK SURGERY      BLOCK, NERVE, GENICULAR, with steroid Left 3/21/2019    [facility]    BLOCK-NERVE-GENICULAR Left 8/7/2018    [facility] at [facility]    HIP ARTHROPLASTY      INJECTION-JOINT Left 5/10/2018    [facility]    JOINT REPLACEMENT      LAMINECTOMY-CERVICAL/FUSION-POSTERIOR N/A 10/9/2014    [facility] [address] [place] [address]    PROSTATE SURGERY  2015    SPINAL FUSION  09/2014    TONSILLECTOMY       Allergies:   Review of patient's allergies indicates:  No Known Allergies  Medications:     Current Outpatient Medications on File Prior to Visit   Medication Sig Dispense Refill    atorvastatin (LIPITOR) 80 MG tablet Take by mouth once daily.       augmented betamethasone dipropionate (DIPROLENE-AF) 0.05 % cream       diclofenac sodium (VOLTAREN) 1 % Gel Apply 2 g topically 3 (three) times daily. 1 Tube 2    escitalopram oxalate (LEXAPRO) 20 MG tablet Take 20 mg by mouth once daily.       eszopiclone 3 mg Tab Take 3 mg by mouth every evening.      fluticasone (FLONASE) 50 mcg/actuation nasal spray 1 spray daily as needed.       furosemide (LASIX) 40 MG tablet 40 mg daily as needed.       levothyroxine (SYNTHROID) 50 MCG tablet Take 50 mcg by mouth once daily.      lisinopril (PRINIVIL,ZESTRIL) 40 MG tablet Take 40 mg by mouth once daily.       LYRICA 100 mg capsule Take 100 mg by mouth 2 (two) times daily.  5    metformin (GLUCOPHAGE) 500 MG tablet Take 500 mg by mouth 2 (two) times daily with meals.      oxyCODONE-acetaminophen (PERCOCET)  mg per tablet 1 tablet every 4 (four) hours as needed.       amLODIPine (NORVASC) 10 MG tablet       chlorhexidine (PERIDEX) 0.12 % solution       ciclesonide (OMNARIS) 50 mcg Spry 2 sprays by Each Nare route once daily.      valsartan (DIOVAN) 160 MG  "tablet Take 160 mg by mouth once daily.       No current facility-administered medications on file prior to visit.      Social History:     Social History     Tobacco Use    Smoking status: Current Every Day Smoker     Packs/day: 0.50    Smokeless tobacco: Never Used   Substance Use Topics    Alcohol use: No     Family History:     Family History   Problem Relation Age of Onset    Hypertension Brother     Heart attack Neg Hx     Heart disease Neg Hx        Physical Exam   Constitutional: He is oriented to person, place, and time. He appears well-developed and well-nourished.   HENT:   Head: Normocephalic and atraumatic.   Eyes: EOM are normal.   Cardiovascular: Normal rate and regular rhythm. Exam reveals no gallop and no friction rub.   Pulmonary/Chest: Effort normal and breath sounds normal. No stridor. He has no wheezes. He has no rales.   Abdominal: Soft. Bowel sounds are normal. There is no rebound and no guarding.   Musculoskeletal: He exhibits no edema.   Neurological: He is alert and oriented to person, place, and time. No cranial nerve deficit.   Skin: Skin is warm and dry.   Psychiatric: He has a normal mood and affect. His behavior is normal.     /60 (BP Location: Left arm, Patient Position: Sitting, BP Method: X-Large (Automatic))   Pulse 60   Ht 5' 7" (1.702 m)   Wt 119.6 kg (263 lb 10.7 oz)   BMI 41.30 kg/m²          Labs:     Lab Results   Component Value Date     06/17/2019    K 4.2 06/17/2019     06/17/2019    CO2 25 06/17/2019    BUN 31 (H) 06/17/2019    CREATININE 0.7 06/17/2019    ANIONGAP 13 06/17/2019     Lab Results   Component Value Date    HGBA1C 5.9 (H) 06/17/2019     No results found for: BNP, BNPTRIAGEBLO Lab Results   Component Value Date    WBC 8.85 06/17/2019    HGB 16.1 06/17/2019    HCT 50.2 06/17/2019     06/17/2019    GRAN 4.5 06/17/2019    GRAN 51.0 06/17/2019     Lab Results   Component Value Date    CHOL 140 10/16/2008    HDL 20 (L) 10/16/2008 "    LDLCALC 75.2 10/16/2008    TRIG 224 (H) 10/16/2008          No results found for: EF    Assessment and Plan  Иван Fuentes is a 69 y/o male with PMH of PFO, HTN, HLD, and DMII who presents for cardiac risk assessment prior to non-cardiac surgery.     Cardiac risk assessment for non-cardiac surgery  - RCRI for pre-operative risk is 5-10% for 30-day risk of death, MI, or cardiac arrest  - if orthopedic surgery prefers the risk to be <5-10%, we can perform a stress test for further stratification; reach out to us if this is the case   - if 5-10% risk is acceptable to orthopedic surgery, we recommend proceeding with surgery  - we do not recommend intervention or further cardiac management at this time with a normal BP  - recommend taking ASA in the setting of PFO  - recommend PFO closure assessment after surgery in the setting of stroke hx  - obtain ECHO records for documentation of the PFO  - c/w statin    Signed:    Cesar Frank

## 2019-07-11 DIAGNOSIS — M16.11 PRIMARY OSTEOARTHRITIS OF RIGHT HIP: Primary | ICD-10-CM

## 2019-07-12 ENCOUNTER — TELEPHONE (OUTPATIENT)
Dept: INTERVENTIONAL RADIOLOGY/VASCULAR | Facility: HOSPITAL | Age: 69
End: 2019-07-12

## 2019-07-24 ENCOUNTER — TELEPHONE (OUTPATIENT)
Dept: RADIOLOGY | Facility: HOSPITAL | Age: 69
End: 2019-07-24

## 2019-07-25 ENCOUNTER — HOSPITAL ENCOUNTER (OUTPATIENT)
Dept: RADIOLOGY | Facility: HOSPITAL | Age: 69
Discharge: HOME | End: 2019-07-25
Attending: ORTHOPAEDIC SURGERY
Payer: MEDICARE

## 2019-07-25 DIAGNOSIS — M16.11 PRIMARY OSTEOARTHRITIS OF RIGHT HIP: ICD-10-CM

## 2019-07-25 PROCEDURE — 25500020 PHARM REV CODE 255: Performed by: ORTHOPAEDIC SURGERY

## 2019-07-25 PROCEDURE — 63600175 PHARM REV CODE 636 W HCPCS: Performed by: ORTHOPAEDIC SURGERY

## 2019-07-25 PROCEDURE — S0020 INJECTION, BUPIVICAINE HYDRO: HCPCS | Performed by: ORTHOPAEDIC SURGERY

## 2019-07-25 PROCEDURE — 77002 NEEDLE LOCALIZATION BY XRAY: CPT | Mod: 26,,, | Performed by: RADIOLOGY

## 2019-07-25 PROCEDURE — 25000003 PHARM REV CODE 250: Performed by: ORTHOPAEDIC SURGERY

## 2019-07-25 PROCEDURE — 20610 DRAIN/INJ JOINT/BURSA W/O US: CPT | Mod: RT,,, | Performed by: RADIOLOGY

## 2019-07-25 PROCEDURE — 20610 FL ASPIRATION INJECTION MAJOR JOINT RIGHT WITH FLUORO: ICD-10-PCS | Mod: RT,,, | Performed by: RADIOLOGY

## 2019-07-25 PROCEDURE — 77002 FL ASPIRATION INJECTION MAJOR JOINT RIGHT WITH FLUORO: ICD-10-PCS | Mod: 26,,, | Performed by: RADIOLOGY

## 2019-07-25 PROCEDURE — 77002 NEEDLE LOCALIZATION BY XRAY: CPT

## 2019-07-25 RX ORDER — LIDOCAINE HYDROCHLORIDE 10 MG/ML
3 INJECTION INFILTRATION; PERINEURAL ONCE
Status: COMPLETED | OUTPATIENT
Start: 2019-07-25 | End: 2019-07-25

## 2019-07-25 RX ORDER — METHYLPREDNISOLONE ACETATE 40 MG/ML
80 INJECTION, SUSPENSION INTRA-ARTICULAR; INTRALESIONAL; INTRAMUSCULAR; SOFT TISSUE ONCE
Status: COMPLETED | OUTPATIENT
Start: 2019-07-25 | End: 2019-07-25

## 2019-07-25 RX ORDER — BUPIVACAINE HYDROCHLORIDE 5 MG/ML
2.5 INJECTION, SOLUTION EPIDURAL; INTRACAUDAL ONCE
Status: COMPLETED | OUTPATIENT
Start: 2019-07-25 | End: 2019-07-25

## 2019-07-25 RX ADMIN — BUPIVACAINE HYDROCHLORIDE 2.5 MG: 5 INJECTION, SOLUTION EPIDURAL; INTRACAUDAL; PERINEURAL at 01:07

## 2019-07-25 RX ADMIN — LIDOCAINE HYDROCHLORIDE 3 ML: 10 INJECTION, SOLUTION INFILTRATION; PERINEURAL at 01:07

## 2019-07-25 RX ADMIN — IOHEXOL 2 ML: 300 INJECTION, SOLUTION INTRAVENOUS at 01:07

## 2019-07-25 RX ADMIN — METHYLPREDNISOLONE ACETATE 80 MG: 40 INJECTION, SUSPENSION INTRA-ARTICULAR; INTRALESIONAL; INTRAMUSCULAR; SOFT TISSUE at 01:07

## 2019-08-05 ENCOUNTER — OFFICE VISIT (OUTPATIENT)
Dept: ORTHOPEDICS | Facility: CLINIC | Age: 69
End: 2019-08-05
Payer: MEDICARE

## 2019-08-05 VITALS — HEIGHT: 67 IN | BODY MASS INDEX: 41.32 KG/M2 | WEIGHT: 263.25 LBS

## 2019-08-05 DIAGNOSIS — M17.12 PRIMARY OSTEOARTHRITIS OF LEFT KNEE: Primary | ICD-10-CM

## 2019-08-05 DIAGNOSIS — E66.01 MORBID OBESITY WITH BMI OF 40.0-44.9, ADULT: ICD-10-CM

## 2019-08-05 DIAGNOSIS — Z72.0 TOBACCO ABUSE: ICD-10-CM

## 2019-08-05 PROCEDURE — 99999 PR PBB SHADOW E&M-EST. PATIENT-LVL III: CPT | Mod: PBBFAC,,, | Performed by: ORTHOPAEDIC SURGERY

## 2019-08-05 PROCEDURE — 99213 PR OFFICE/OUTPT VISIT, EST, LEVL III, 20-29 MIN: ICD-10-PCS | Mod: S$PBB,,, | Performed by: ORTHOPAEDIC SURGERY

## 2019-08-05 PROCEDURE — 99213 OFFICE O/P EST LOW 20 MIN: CPT | Mod: S$PBB,,, | Performed by: ORTHOPAEDIC SURGERY

## 2019-08-05 PROCEDURE — 99999 PR PBB SHADOW E&M-EST. PATIENT-LVL III: ICD-10-PCS | Mod: PBBFAC,,, | Performed by: ORTHOPAEDIC SURGERY

## 2019-08-05 PROCEDURE — 99213 OFFICE O/P EST LOW 20 MIN: CPT | Mod: PBBFAC | Performed by: ORTHOPAEDIC SURGERY

## 2019-08-05 NOTE — PROGRESS NOTES
"Subjective:      Patient ID: Иван Fuentes is a 68 y.o. male.    Chief Complaint: Pain of the Left Knee    HPI  Иван Fuentes has left knee pain.  The pain is unchanged. The pain is located in the global aspect of the knee.  There  is not radiation.  There is associated stiffness.   There is catching and locking. The pain is described as achy. The pain is aggravated by standing and walking.  It is alleviated by rest on occasion.  There is associated back pain.  His history, medications and problem list were reviewed.    Review of Systems   Constitution: Negative for chills, fever and night sweats.   HENT: Negative for hearing loss.    Eyes: Negative for blurred vision and double vision.   Cardiovascular: Negative for chest pain, claudication and leg swelling.   Respiratory: Negative for shortness of breath.    Endocrine: Negative for polydipsia, polyphagia and polyuria.   Hematologic/Lymphatic: Negative for adenopathy and bleeding problem. Does not bruise/bleed easily.   Skin: Negative for poor wound healing.   Musculoskeletal: Positive for joint pain.   Gastrointestinal: Negative for diarrhea and heartburn.   Genitourinary: Negative for bladder incontinence.   Neurological: Negative for focal weakness, headaches, numbness, paresthesias and sensory change.   Psychiatric/Behavioral: The patient is not nervous/anxious.    Allergic/Immunologic: Negative for persistent infections.         Objective:      Body mass index is 41.23 kg/m².  Vitals:    08/05/19 1307   Weight: 119.4 kg (263 lb 3.7 oz)   Height: 5' 7" (1.702 m)           General    Constitutional: He is oriented to person, place, and time. He appears well-developed and well-nourished.   HENT:   Head: Normocephalic and atraumatic.   Eyes: EOM are normal.   Cardiovascular: Normal rate.    Pulmonary/Chest: Effort normal.   Neurological: He is alert and oriented to person, place, and time.   Psychiatric: He has a normal mood and affect. His behavior is " normal.     General Musculoskeletal Exam   Gait: normal and short leg       Right Knee Exam     Inspection   Erythema: absent  Scars: absent  Swelling: absent  Effusion: absent  Deformity: absent  Bruising: absent    Tenderness   The patient is experiencing no tenderness.     Range of Motion   Extension: 0   Flexion: 130     Tests   Ligament Examination Lachman: normal (-1 to 2mm)   MCL - Valgus: normal (0 to 2mm)  LCL - Varus: normal  Patella   Passive Patellar Tilt: neutral    Other   Sensation: normal    Left Knee Exam     Inspection   Erythema: absent  Scars: absent  Swelling: present  Effusion: present  Deformity: absent  Bruising: absent    Tenderness   The patient tender to palpation of the medial joint line and lateral joint line.    Crepitus   The patient has crepitus of the patella, medial joint line and lateral joint line.    Range of Motion   Extension: 15   Flexion: 100     Tests   Stability Lachman: normal (-1 to 2mm)   MCL - Valgus: normal (0 to 2mm)  LCL - Varus: normal (0 to 2mm)  Patella   Passive Patellar Tilt: neutral    Other   Sensation: normal    Muscle Strength   Right Lower Extremity   Hip Abduction: 5/5   Quadriceps:  5/5   Hamstrin/5   Left Lower Extremity   Hip Abduction: 5/5   Quadriceps:  5/5   Hamstrin/5     Reflexes     Left Side  Quadriceps:  2+    Right Side   Quadriceps:  2+    Vascular Exam     Right Pulses  Dorsalis Pedis:      2+          Left Pulses  Dorsalis Pedis:      2+          Edema  Right Lower Leg: absent  Left Lower Leg: absent      Radiographs taken today and reviewed by me demonstrate severe arthritic change of the left KNEE(s).There  is not bone destruction.  There is not a fracture.  The changes are tricompartmental.            Assessment:       Encounter Diagnoses   Name Primary?    Primary osteoarthritis of left knee Yes    Morbid obesity with BMI of 40.0-44.9, adult     Tobacco abuse           Plan:       Иван was seen today for pain.    Diagnoses  and all orders for this visit:    Primary osteoarthritis of left knee    Morbid obesity with BMI of 40.0-44.9, adult    Tobacco abuse      Treatment options were discussed. The surgical process of left knee replacement was discussed in detail with the patient including a detailed discussion of the procedure itself (including visual model, x-ray review, and literature review). The typical perioperative and post-operative course was discussed and perioperative risks were discussed to the patient's satisfaction.  Risks and complications discussed included but were not limited to the risks of anesthetic complications, infection, bleeding, wound healing complications, stiffness, aseptic loosening, instability, limb length inequality, neurologic dysfunction including numbness and weakness, additional surgery,  DVT, pulmonary embolism, perioperative medical risks (cardiac, pulmonary, renal, neurologic), and death and the patient elects to proceed.  The patient should get medically cleared and attend the joint seminar.Reviewed cardiology note.  Will have him get a stress test.  ASA for DVT prophylaxis.  HE will need to lower BMI and stop smoking prior to surgery.Will check new HbA1C.  Outpt PT.  Will go direct to outpt PT.  Will set pre-hab up now for conditioning pre-op.

## 2019-08-06 DIAGNOSIS — M17.12 PRIMARY OSTEOARTHRITIS OF LEFT KNEE: Primary | ICD-10-CM

## 2019-08-06 DIAGNOSIS — Z01.818 PRE-OP EXAM: Primary | ICD-10-CM

## 2019-08-07 ENCOUNTER — TELEPHONE (OUTPATIENT)
Dept: CARDIOLOGY | Facility: CLINIC | Age: 69
End: 2019-08-07

## 2019-08-15 DIAGNOSIS — Z01.818 PRE-OP TESTING: Primary | ICD-10-CM

## 2019-08-15 DIAGNOSIS — M79.605 PAIN OF LEFT LOWER EXTREMITY: ICD-10-CM

## 2019-08-16 ENCOUNTER — TELEPHONE (OUTPATIENT)
Dept: PREADMISSION TESTING | Facility: HOSPITAL | Age: 69
End: 2019-08-16

## 2019-08-16 NOTE — TELEPHONE ENCOUNTER
----- Message from Yun Still LPN sent at 8/15/2019  3:26 PM CDT -----  9/3/19-Ortho (arthroplasty knee) Patient needs BMP,PT/INR,OPOC and POC appt.

## 2019-08-19 ENCOUNTER — ANESTHESIA EVENT (OUTPATIENT)
Dept: SURGERY | Facility: HOSPITAL | Age: 69
End: 2019-08-19
Payer: MEDICARE

## 2019-08-19 ENCOUNTER — TELEPHONE (OUTPATIENT)
Dept: ORTHOPEDICS | Facility: CLINIC | Age: 69
End: 2019-08-19

## 2019-08-19 DIAGNOSIS — M17.12 PRIMARY OSTEOARTHRITIS OF LEFT KNEE: Primary | ICD-10-CM

## 2019-08-19 NOTE — ANESTHESIA PREPROCEDURE EVALUATION
Anesthesia Assessment: Preoperative EQUATION     Planned Procedure: Procedure(s) (LRB):  ARTHROPLASTY, KNEE, TOTAL-SANDIP (Left)  Requested Anesthesia Type:Regional  Surgeon: Ever Hall MD  Service: Orthopedics  Known or anticipated Date of Surgery:9/3/2019     Surgeon notes: Rafael 8/5/19        Previous anesthesia records:GETA and No problems 10/9/14: Posterior Cervical Fusion- Glidescope used     Last PCP note: > 1 year ago   Subspecialty notes: Cardiology: General: Cleared for surgery 6/17/19 with 5-10% risk     Other important co-morbidities: CVA/HTN/HLP/PFO/DM II/ BPH     Tests already available:  Available tests,  within 3 months , within [facility] . EKG 6/2019                                   Optimization:  Anesthesia Preop Clinic Assessment  Indicated                                Sub-specialist consult indicated:   Cleared 6/17/19 (Cardiology)                            Plan:              Testing:  Hematology Profile, BMP, A1C and PT/INR   Pre-anesthesia  visit                                        Visit focus: possible regional anesthesia and/or nerve block                            Consultation:IM Perioperative Hospitalist                               Navigation: Tests Scheduled.                         Consults scheduled.                        Results will be tracked by Preop Clinic.                          Caitlyn Rhodes RN                                                                                                           [facility]  Anesthesia Pre-Operative Evaluation         Patient Name: Иван Fuentes  YOB: 1950  MRN: [id number]    SUBJECTIVE:     Pre-operative evaluation for Procedure(s) (LRB):  ARTHROPLASTY, KNEE, TOTAL-SANDIP (Left)     08/26/2019    Иван Fuentes is a 68 y.o. male w/ a significant PMHx of CVA 2008 with residual left-sided weakness, DM type 2, HTN, HLD, Hypothyroidism, BPH, multiple-level cervical  stenosis/myelopathy s/p cervical fusion, Morbid obesity and primary OA of left knee.    Patient now presents for the above procedure(s).      LDA: None documented.       Prev airway: None documented.    Drips: None documented.      Patient Active Problem List   Diagnosis    Hyperreflexia    Upper extremity weakness    Lower extremity weakness    HLD (hyperlipidemia)    HTN (hypertension)    H/O: CVA (cerebrovascular accident)    BPH (benign prostatic hyperplasia)    Hypothyroid    Cervical myelopathy    PFO (patent foramen ovale)    Cerebral embolism with cerebral infarction    Preoperative cardiovascular examination    Diabetes mellitus, type 2    Obesity    S/P cervical spinal fusion    Chronic pain of left knee    Primary osteoarthritis of left knee    Osteoarthritis of left knee       Review of patient's allergies indicates:  No Known Allergies    Current Inpatient Medications:      Current Outpatient Medications on File Prior to Encounter   Medication Sig Dispense Refill    amLODIPine (NORVASC) 10 MG tablet       atorvastatin (LIPITOR) 80 MG tablet Take by mouth once daily.       augmented betamethasone dipropionate (DIPROLENE-AF) 0.05 % cream       chlorhexidine (PERIDEX) 0.12 % solution       ciclesonide (OMNARIS) 50 mcg Spry 2 sprays by Each Nare route once daily.      diclofenac sodium (VOLTAREN) 1 % Gel Apply 2 g topically 3 (three) times daily. 1 Tube 2    escitalopram oxalate (LEXAPRO) 20 MG tablet Take 20 mg by mouth once daily.       eszopiclone 3 mg Tab Take 3 mg by mouth every evening.      fluticasone (FLONASE) 50 mcg/actuation nasal spray 1 spray daily as needed.       furosemide (LASIX) 40 MG tablet 40 mg daily as needed.       levothyroxine (SYNTHROID) 50 MCG tablet Take 50 mcg by mouth once daily.      lisinopril (PRINIVIL,ZESTRIL) 40 MG tablet Take 40 mg by mouth once daily.       LYRICA 100 mg capsule Take 100 mg by mouth 2 (two) times daily.  5    metformin  (GLUCOPHAGE) 500 MG tablet Take 500 mg by mouth 2 (two) times daily with meals.      oxyCODONE-acetaminophen (PERCOCET)  mg per tablet 1 tablet every 4 (four) hours as needed.       valsartan (DIOVAN) 160 MG tablet Take 160 mg by mouth once daily.       No current facility-administered medications on file prior to encounter.        Past Surgical History:   Procedure Laterality Date    BACK SURGERY      BLOCK, NERVE, GENICULAR, with steroid Left 3/21/2019    [facility]    BLOCK-NERVE-GENICULAR Left 8/7/2018    [facility]    HIP ARTHROPLASTY      INJECTION-JOINT Left 5/10/2018    [facility]    JOINT REPLACEMENT      LAMINECTOMY-CERVICAL/FUSION-POSTERIOR N/A 10/9/2014    [facility]    PROSTATE SURGERY  2015    SPINAL FUSION  09/2014    TONSILLECTOMY         Social History     Socioeconomic History    Marital status:      Spouse name: Not on file    Number of children: Not on file    Years of education: Not on file    Highest education level: Not on file   Occupational History    Not on file   Social Needs    Financial resource strain: Not on file    Food insecurity:     Worry: Not on file     Inability: Not on file    Transportation needs:     Medical: Not on file     Non-medical: Not on file   Tobacco Use    Smoking status: Current Every Day Smoker     Packs/day: 0.50     Types: Cigarettes    Smokeless tobacco: Never Used   Substance and Sexual Activity    Alcohol use: No    Drug use: No    Sexual activity: Not on file   Lifestyle    Physical activity:     Days per week: Not on file     Minutes per session: Not on file    Stress: Not on file   Relationships    Social connections:     Talks on phone: Not on file     Gets together: Not on file     Attends [employer] service: Not on file     Active member of club or  organization: Not on file     Attends meetings of clubs or organizations: Not on file     Relationship status: Not on file   Other Topics Concern    Not on file   Social History Narrative    Not on file       OBJECTIVE:     Vital Signs Range (Last 24H):  Temp:  [36 °C (96.8 °F)-36.1 °C (97 °F)]   Pulse:  [57-58]   Resp:  [18]   BP: (132-136)/(65-72)   SpO2:  [93 %-94 %]       Significant Labs:  Lab Results   Component Value Date    WBC 8.85 06/17/2019    HGB 16.1 06/17/2019    HCT 50.2 06/17/2019     06/17/2019    CHOL 140 10/16/2008    TRIG 224 (H) 10/16/2008    HDL 20 (L) 10/16/2008    ALT 30 06/17/2019    AST 32 06/17/2019     06/17/2019    K 4.2 06/17/2019     06/17/2019    CREATININE 0.7 06/17/2019    BUN 31 (H) 06/17/2019    CO2 25 06/17/2019    TSH 3.489 10/02/2014    PSA 2.5 09/24/2004    INR 1.1 10/09/2014    HGBA1C 5.9 (H) 06/17/2019       Diagnostic Studies: No relevant studies.    EKG:   Results for orders placed or performed during the hospital encounter of 06/26/19   SCHEDULED EKG 12-LEAD (to Muse)    Collection Time: 06/26/19  1:21 PM    Narrative    Test Reason : R00.2,    Vent. Rate : 061 BPM     Atrial Rate : 061 BPM     P-R Int : 192 ms          QRS Dur : 084 ms      QT Int : 414 ms       P-R-T Axes : 009 -15 030 degrees     QTc Int : 416 ms    Normal sinus rhythm  Low voltage, precordial leads  Inferior infarct ,age undetermined  Abnormal ECG  When compared with ECG of 01-OCT-2014 17:49,  Inferior infarct is now Present  Confirmed by EMILIA BIANCHI MD ([id number]) on 6/26/2019 5:14:11 PM    Referred By: HERNESTO CARMONA           Confirmed By:EMILIA BIANCHI MD       2D ECHO:  TTE 08/2019  STRESS TEST, REGADENOSON W MYOCARDIAL PERFUSION SPECT (MULTI STUDY)   Conclusion          The perfusion scan is free of evidence from myocardial ischemia or injury.    LV cavity size is normal at rest.    There is mild  apical thinning which is a normal variant.    There is  normal wall motion at rest.     Visually estimated LV function is normal.    The EKG portion of this study is negative for ischemia.    The patient reported no chest pain during the stress test.    There are no prior studies for comparison.        BIBI:  No results found for this or any previous visit.    ASSESSMENT/PLAN:         Anesthesia Evaluation    I have reviewed the Patient Summary Reports.    I have reviewed the Nursing Notes.   I have reviewed the Medications.     Review of Systems  Anesthesia Hx:  No problems with previous Anesthesia  History of prior surgery of interest to airway management or planning: cervical fusion. Previous anesthesia: General Denies Family Hx of Anesthesia complications.   Denies Personal Hx of Anesthesia complications.   Social:  Smoker, No Alcohol Use    Hematology/Oncology:         -- Denies Anemia: Denies Current/Recent Cancer   EENT/Dental:   denies chronic allergic rhinitis   Cardiovascular:   Denies Pacemaker. Hypertension  Denies Valvular problems/Murmurs.  Denies MI.  Denies CAD.    Denies CABG/stent.  Denies Dysrhythmias.      hyperlipidemia ECG has been reviewed.    Pulmonary:   Denies COPD.  Denies Asthma. Sleep Apnea, CPAP    Renal/:   Denies Chronic Renal Disease. no renal calculi BPH    Hepatic/GI:   Denies PUD. Denies GERD. Denies Liver Disease.  Denies Hepatitis.    Musculoskeletal:   Arthritis   Spine Disorders: cervical Degenerative disease    Neurological:   CVA Denies Headaches.    Endocrine:   Diabetes Hypothyroidism        Physical Exam  General:  Well nourished, Obesity    Airway/Jaw/Neck:  Airway Findings: Mouth Opening: Normal Tongue: Normal  General Airway Assessment: Adult  Mallampati: III  Improves to II with phonation.  TM Distance: 4 - 6 cm  Jaw/Neck Findings:  Neck ROM: Decreased Lateral Motion, Extension Decreased, Mod.     Eyes/Ears/Nose:  EYES/EARS/NOSE FINDINGS: Normal   Dental:  Dental Findings: In tact   Chest/Lungs:  Chest/Lungs Findings: Clear to auscultation, Normal  Respiratory Rate     Heart/Vascular:  Heart Findings: Rate: Normal  Rhythm: Regular Rhythm  Sounds: Normal  Vascular Findings:     Abdomen:  Abdomen Findings:  Normal, Nontender, Soft     Musculoskeletal:  Musculoskeletal Findings:    Skin:  Skin Findings:  Bilateral LE mild swelling (better than baseline today per wife--improved w home lasix), no tenderness, normal ROM, temp normal;      Mental Status:  Mental Status Findings:  Cooperative, Alert and Oriented         Anesthesia Plan  Type of Anesthesia, risks & benefits discussed:  Anesthesia Type:  regional, spinal  Patient's Preference:   Intra-op Monitoring Plan: standard ASA monitors  Intra-op Monitoring Plan Comments:   Post Op Pain Control Plan: multimodal analgesia, IV/PO Opioids PRN and per primary service following discharge from PACU  Post Op Pain Control Plan Comments:   Induction:   IV  Beta Blocker:  Patient is not currently on a Beta-Blocker (No further documentation required).       Informed Consent: Patient understands risks and agrees with Anesthesia plan.  Questions answered. Anesthesia consent signed with patient.  ASA Score: 3     Day of Surgery Review of History & Physical:    H&P update referred to the surgeon.         Ready For Surgery From Anesthesia Perspective.

## 2019-08-19 NOTE — PRE ADMISSION SCREENING
Anesthesia Assessment: Preoperative EQUATION    Planned Procedure: Procedure(s) (LRB):  ARTHROPLASTY, KNEE, TOTAL-SNADIP (Left)  Requested Anesthesia Type:Regional  Surgeon: Ever Hall MD  Service: Orthopedics  Known or anticipated Date of Surgery:9/3/2019    Surgeon notes: Rafael 8/5/19      Previous anesthesia records:GETA and No problems 10/9/14: Posterior Cervical Fusion- Glidescope used    Last PCP note: > 1 year ago   Subspecialty notes: Cardiology: General: Cleared for surgery 6/17/19 with 5-10% risk    Other important co-morbidities: CVA/HTN/HLP/PFO/DM II/ BPH     Tests already available:  Available tests,  within 3 months , within [facility] . EKG 6/2019                 Optimization:  Anesthesia Preop Clinic Assessment  Indicated           Sub-specialist consult indicated:   Cleared 6/17/19 (Cardiology)      Plan:    Testing:  Hematology Profile, BMP, A1C and PT/INR   Pre-anesthesia  visit       Visit focus: possible regional anesthesia and/or nerve block      Consultation:IM Perioperative Hospitalist        Navigation: Tests Scheduled.              Consults scheduled.             Results will be tracked by Preop Clinic.                Caitlyn Rhodes RN

## 2019-08-19 NOTE — TELEPHONE ENCOUNTER
Left message stating date and time of Iovera Procedure. Provided name and number for questions and concerns.

## 2019-08-20 ENCOUNTER — CLINICAL SUPPORT (OUTPATIENT)
Dept: CARDIOLOGY | Facility: CLINIC | Age: 69
End: 2019-08-20
Attending: INTERNAL MEDICINE
Payer: MEDICARE

## 2019-08-20 VITALS — WEIGHT: 263 LBS | HEIGHT: 67 IN | BODY MASS INDEX: 41.28 KG/M2

## 2019-08-20 DIAGNOSIS — Z01.818 PRE-OP EXAM: ICD-10-CM

## 2019-08-20 LAB
CV PHARM DOSE: 0.4 MG
CV STRESS BASE HR: 56 BPM
DIASTOLIC BLOOD PRESSURE: 75 MMHG
END DIASTOLIC INDEX-HIGH: 170 ML/M2
END SYSTOLIC INDEX-HIGH: 70 ML/M2
OHS CV CPX 85 PERCENT MAX PREDICTED HEART RATE MALE: 128
OHS CV CPX MAX PREDICTED HEART RATE: 151
OHS CV CPX PATIENT IS FEMALE: 0
OHS CV CPX PATIENT IS MALE: 1
OHS CV CPX PEAK DIASTOLIC BLOOD PRESSURE: 89 MMHG
OHS CV CPX PEAK HEAR RATE: 58 BPM
OHS CV CPX PEAK RATE PRESSURE PRODUCT: 8294
OHS CV CPX PEAK SYSTOLIC BLOOD PRESSURE: 143 MMHG
OHS CV CPX PERCENT MAX PREDICTED HEART RATE ACHIEVED: 38
OHS CV CPX RATE PRESSURE PRODUCT PRESENTING: 7840
RETIRED EF AND QEF - SEE NOTES: 51 %
STRESS ECHO TARGET HR: 128.35 BPM
SYSTOLIC BLOOD PRESSURE: 140 MMHG

## 2019-08-20 PROCEDURE — 99999 PR PBB SHADOW E&M-EST. PATIENT-LVL I: ICD-10-PCS | Mod: PBBFAC,,,

## 2019-08-20 PROCEDURE — 93018 CV STRESS TEST I&R ONLY: CPT | Mod: S$PBB,,, | Performed by: INTERNAL MEDICINE

## 2019-08-20 PROCEDURE — 99211 OFF/OP EST MAY X REQ PHY/QHP: CPT | Mod: PBBFAC,25

## 2019-08-20 PROCEDURE — 93018 STRESS TEST WITH MYOCARDIAL PERFUSION (CUPID ONLY): ICD-10-PCS | Mod: S$PBB,,, | Performed by: INTERNAL MEDICINE

## 2019-08-20 PROCEDURE — 78452 HT MUSCLE IMAGE SPECT MULT: CPT | Mod: PBBFAC | Performed by: INTERNAL MEDICINE

## 2019-08-20 PROCEDURE — 93016 CV STRESS TEST SUPVJ ONLY: CPT | Mod: S$PBB,,, | Performed by: INTERNAL MEDICINE

## 2019-08-20 PROCEDURE — 78452 STRESS TEST WITH MYOCARDIAL PERFUSION (CUPID ONLY): ICD-10-PCS | Mod: 26,S$PBB,, | Performed by: INTERNAL MEDICINE

## 2019-08-20 PROCEDURE — 99999 PR PBB SHADOW E&M-EST. PATIENT-LVL I: CPT | Mod: PBBFAC,,,

## 2019-08-20 PROCEDURE — 93016 STRESS TEST WITH MYOCARDIAL PERFUSION (CUPID ONLY): ICD-10-PCS | Mod: S$PBB,,, | Performed by: INTERNAL MEDICINE

## 2019-08-20 RX ORDER — REGADENOSON 0.08 MG/ML
0.4 INJECTION, SOLUTION INTRAVENOUS
Status: COMPLETED | OUTPATIENT
Start: 2019-08-20 | End: 2019-08-20

## 2019-08-20 RX ADMIN — REGADENOSON 0.4 MG: 0.08 INJECTION, SOLUTION INTRAVENOUS at 09:08

## 2019-08-21 ENCOUNTER — TELEPHONE (OUTPATIENT)
Dept: ORTHOPEDICS | Facility: CLINIC | Age: 69
End: 2019-08-21

## 2019-08-21 NOTE — TELEPHONE ENCOUNTER
Spoke to pt's wife, confirmed Iovera appointment and answered questions. She also asked about stress results. Informed her to call Dr. Frank office as he ordered. Informed her that I will also notify Dr. Rafael ORTIZ as I am not sure if Dr. Hall can give results. Understanding verbalized.

## 2019-08-26 ENCOUNTER — INITIAL CONSULT (OUTPATIENT)
Dept: INTERNAL MEDICINE | Facility: CLINIC | Age: 69
End: 2019-08-26
Payer: MEDICARE

## 2019-08-26 ENCOUNTER — OFFICE VISIT (OUTPATIENT)
Dept: ORTHOPEDICS | Facility: CLINIC | Age: 69
End: 2019-08-26
Payer: MEDICARE

## 2019-08-26 ENCOUNTER — HOSPITAL ENCOUNTER (OUTPATIENT)
Dept: PREADMISSION TESTING | Facility: HOSPITAL | Age: 69
Discharge: HOME | End: 2019-08-26
Attending: ANESTHESIOLOGY | Admitting: ORTHOPAEDIC SURGERY
Payer: MEDICARE

## 2019-08-26 ENCOUNTER — HOSPITAL ENCOUNTER (OUTPATIENT)
Dept: RADIOLOGY | Facility: HOSPITAL | Age: 69
Discharge: HOME | End: 2019-08-26
Attending: NURSE PRACTITIONER | Admitting: ORTHOPAEDIC SURGERY
Payer: MEDICARE

## 2019-08-26 ENCOUNTER — HOSPITAL ENCOUNTER (OUTPATIENT)
Facility: HOSPITAL | Age: 69
Discharge: HOME | End: 2019-08-26
Attending: ORTHOPAEDIC SURGERY | Admitting: ANESTHESIOLOGY
Payer: MEDICARE

## 2019-08-26 VITALS
WEIGHT: 262 LBS | HEART RATE: 57 BPM | HEIGHT: 68 IN | SYSTOLIC BLOOD PRESSURE: 134 MMHG | RESPIRATION RATE: 18 BRPM | TEMPERATURE: 97 F | DIASTOLIC BLOOD PRESSURE: 70 MMHG | OXYGEN SATURATION: 93 % | BODY MASS INDEX: 39.71 KG/M2

## 2019-08-26 VITALS
WEIGHT: 264 LBS | OXYGEN SATURATION: 96 % | HEART RATE: 69 BPM | TEMPERATURE: 97 F | SYSTOLIC BLOOD PRESSURE: 132 MMHG | HEIGHT: 68 IN | DIASTOLIC BLOOD PRESSURE: 63 MMHG | BODY MASS INDEX: 40.01 KG/M2

## 2019-08-26 VITALS — HEIGHT: 68 IN | WEIGHT: 261.94 LBS | BODY MASS INDEX: 39.7 KG/M2

## 2019-08-26 DIAGNOSIS — M17.12 PRIMARY OSTEOARTHRITIS OF LEFT KNEE: ICD-10-CM

## 2019-08-26 DIAGNOSIS — I10 ESSENTIAL HYPERTENSION: ICD-10-CM

## 2019-08-26 DIAGNOSIS — Z98.1 S/P CERVICAL SPINAL FUSION: ICD-10-CM

## 2019-08-26 DIAGNOSIS — Z01.818 PREOP EXAMINATION: Primary | ICD-10-CM

## 2019-08-26 DIAGNOSIS — R80.9 PROTEINURIA, UNSPECIFIED TYPE: ICD-10-CM

## 2019-08-26 DIAGNOSIS — K59.00 CONSTIPATION, UNSPECIFIED CONSTIPATION TYPE: ICD-10-CM

## 2019-08-26 DIAGNOSIS — F32.A DEPRESSION, UNSPECIFIED DEPRESSION TYPE: ICD-10-CM

## 2019-08-26 DIAGNOSIS — Q21.12 PFO (PATENT FORAMEN OVALE): Chronic | ICD-10-CM

## 2019-08-26 DIAGNOSIS — I83.892 VARICOSE VEINS OF LEFT LOWER EXTREMITY WITH OTHER COMPLICATIONS: ICD-10-CM

## 2019-08-26 DIAGNOSIS — Z86.718 HISTORY OF THROMBOSIS: ICD-10-CM

## 2019-08-26 DIAGNOSIS — G47.30 SLEEP APNEA, UNSPECIFIED TYPE: ICD-10-CM

## 2019-08-26 DIAGNOSIS — E11.40 TYPE 2 DIABETES MELLITUS WITH DIABETIC NEUROPATHY, WITHOUT LONG-TERM CURRENT USE OF INSULIN: Chronic | ICD-10-CM

## 2019-08-26 DIAGNOSIS — M17.12 PRIMARY OSTEOARTHRITIS OF LEFT KNEE: Primary | ICD-10-CM

## 2019-08-26 DIAGNOSIS — R60.9 EDEMA, UNSPECIFIED TYPE: ICD-10-CM

## 2019-08-26 DIAGNOSIS — F11.90 CHRONIC, CONTINUOUS USE OF OPIOIDS: ICD-10-CM

## 2019-08-26 DIAGNOSIS — Z86.73 H/O: CVA (CEREBROVASCULAR ACCIDENT): ICD-10-CM

## 2019-08-26 DIAGNOSIS — E03.9 HYPOTHYROIDISM, UNSPECIFIED TYPE: ICD-10-CM

## 2019-08-26 DIAGNOSIS — M25.562 CHRONIC PAIN OF LEFT KNEE: ICD-10-CM

## 2019-08-26 DIAGNOSIS — E66.01 MORBID OBESITY: ICD-10-CM

## 2019-08-26 DIAGNOSIS — R06.2 WHEEZING: ICD-10-CM

## 2019-08-26 DIAGNOSIS — G89.29 CHRONIC PAIN OF LEFT KNEE: ICD-10-CM

## 2019-08-26 DIAGNOSIS — R35.0 BENIGN PROSTATIC HYPERPLASIA WITH URINARY FREQUENCY: ICD-10-CM

## 2019-08-26 DIAGNOSIS — Z72.0 TOBACCO ABUSE: ICD-10-CM

## 2019-08-26 DIAGNOSIS — E78.5 HYPERLIPIDEMIA, UNSPECIFIED HYPERLIPIDEMIA TYPE: ICD-10-CM

## 2019-08-26 DIAGNOSIS — N40.1 BENIGN PROSTATIC HYPERPLASIA WITH URINARY FREQUENCY: ICD-10-CM

## 2019-08-26 PROCEDURE — 99204 OFFICE O/P NEW MOD 45 MIN: CPT | Mod: S$PBB,,, | Performed by: HOSPITALIST

## 2019-08-26 PROCEDURE — 99999 PR PBB SHADOW E&M-EST. PATIENT-LVL III: CPT | Mod: PBBFAC,,, | Performed by: NURSE PRACTITIONER

## 2019-08-26 PROCEDURE — 73560 X-RAY EXAM OF KNEE 1 OR 2: CPT | Mod: 26,LT,, | Performed by: RADIOLOGY

## 2019-08-26 PROCEDURE — 99213 OFFICE O/P EST LOW 20 MIN: CPT | Mod: PBBFAC,25,27 | Performed by: NURSE PRACTITIONER

## 2019-08-26 PROCEDURE — 73560 X-RAY EXAM OF KNEE 1 OR 2: CPT | Mod: TC,LT

## 2019-08-26 PROCEDURE — 99999 PR PBB SHADOW E&M-EST. PATIENT-LVL III: ICD-10-PCS | Mod: PBBFAC,,, | Performed by: NURSE PRACTITIONER

## 2019-08-26 PROCEDURE — C2618 PROBE/NEEDLE, CRYO: HCPCS | Performed by: NURSE PRACTITIONER

## 2019-08-26 PROCEDURE — 99999 PR PBB SHADOW E&M-EST. PATIENT-LVL III: CPT | Mod: PBBFAC,,, | Performed by: HOSPITALIST

## 2019-08-26 PROCEDURE — 25000003 PHARM REV CODE 250: Performed by: NURSE PRACTITIONER

## 2019-08-26 PROCEDURE — 64640 PR DESTRUCT BY NEURO AGENT; OTHER PERIPH NERVE: ICD-10-PCS | Mod: LT,,, | Performed by: NURSE PRACTITIONER

## 2019-08-26 PROCEDURE — 73560 XR KNEE 1 OR 2 VIEW LEFT: ICD-10-PCS | Mod: 26,LT,, | Performed by: RADIOLOGY

## 2019-08-26 PROCEDURE — 99499 UNLISTED E&M SERVICE: CPT | Mod: S$PBB,,, | Performed by: NURSE PRACTITIONER

## 2019-08-26 PROCEDURE — 99999 PR PBB SHADOW E&M-EST. PATIENT-LVL III: ICD-10-PCS | Mod: PBBFAC,,, | Performed by: HOSPITALIST

## 2019-08-26 PROCEDURE — 99213 OFFICE O/P EST LOW 20 MIN: CPT | Mod: PBBFAC,25 | Performed by: HOSPITALIST

## 2019-08-26 PROCEDURE — 99204 PR OFFICE/OUTPT VISIT, NEW, LEVL IV, 45-59 MIN: ICD-10-PCS | Mod: S$PBB,,, | Performed by: HOSPITALIST

## 2019-08-26 PROCEDURE — 64640 INJECTION TREATMENT OF NERVE: CPT

## 2019-08-26 PROCEDURE — 99499 NO LOS: ICD-10-PCS | Mod: S$PBB,,, | Performed by: NURSE PRACTITIONER

## 2019-08-26 PROCEDURE — 64640 INJECTION TREATMENT OF NERVE: CPT | Mod: LT,,, | Performed by: NURSE PRACTITIONER

## 2019-08-26 RX ORDER — TRAMADOL HYDROCHLORIDE 100 MG/1
100 TABLET, EXTENDED RELEASE ORAL DAILY PRN
Status: ON HOLD | COMMUNITY
End: 2019-09-03 | Stop reason: HOSPADM

## 2019-08-26 RX ORDER — ALBUTEROL SULFATE 90 UG/1
2 AEROSOL, METERED RESPIRATORY (INHALATION) EVERY 6 HOURS PRN
Qty: 18 G | Refills: 0 | Status: SHIPPED | OUTPATIENT
Start: 2019-08-26

## 2019-08-26 RX ORDER — LIDOCAINE HYDROCHLORIDE 20 MG/ML
INJECTION, SOLUTION EPIDURAL; INFILTRATION; INTRACAUDAL; PERINEURAL
Status: DISCONTINUED | OUTPATIENT
Start: 2019-08-26 | End: 2019-08-26 | Stop reason: HOSPADM

## 2019-08-26 RX ORDER — DIPHENHYDRAMINE HCL 25 MG
25 TABLET ORAL NIGHTLY PRN
COMMUNITY
End: 2021-10-19

## 2019-08-26 RX ORDER — ACETAMINOPHEN 500 MG
1000 TABLET ORAL
Status: ON HOLD | COMMUNITY
End: 2019-09-03 | Stop reason: HOSPADM

## 2019-08-26 NOTE — DISCHARGE INSTRUCTIONS
Your surgery has been scheduled for:__________________________________________    You should report to:  ____[facility], located on the [place] side of the first floor of the           [facility] ([phone])  ____[facility], located on the [facility] side of the            Second floor of the [facility] ([phone])  ____3rd Floor [facility] located on the [facility] side of the [facility] [phone]  Please Note   - Tell your doctor if you take Aspirin, products containing Aspirin, herbal medications  or blood thinners, such as Coumadin, Ticlid, or Plavix.  (Consult your provider regarding holding or stopping before surgery).  - Arrange for someone to drive you home following surgery.  You will not be allowed to leave the surgical facility alone or drive yourself home following sedation and anesthesia.  Before Surgery  - Stop taking all herbal medications 14days prior to surgery  - No Motrin/Advil (Ibuprofen) 7 days before surgery  - No Aleve (Naproxen) 7 days before surgery  - Stop Taking Asprin, products containing Asprin _____days before surgery  - Stop taking blood thinners_______days before surgery  - No Goody's/BC  Powder 7 days before surgery  - Refrain from drinking alcoholic beverages for 24hours before and after surgery  - Stop or limit smoking _________days before surgery  - You may take Tylenol for pain  Night before Surgery  - Do not eat or drink after midnight  - Take a shower or bath (shower is recommended).  Bathe with Hibiclens soap or an antibacterial soap from the neck down.  If not supplied by your surgeon, hibiclens soap will need to be purchased over the counter in pharmacy.  Rinse soap off thoroughly.  - Shampoo your hair with your regular shampoo  The Day of Surgery  - Take another bath or shower with hibiclens or any antibacterial soap, to reduce the chance of infection.  - Take heart  and blood pressure medications with a small sip of water, as advised by the perioperative team.  - Do not take fluid pills  - You may brush your teeth and rinse your mouth, but do not swall any additional water.   - Do not apply perfumes, powder, body lotions or deodorant on the day of surgery.  - Nail polish should be removed.  - Do not wear makeup or moisturizer  - Wear comfortable clothes, such as a button front shirt and loose fitting pants.  - Leave all jewelry, including body piercings, and valuables at home.    - Bring any devices you will neeed after surgery such as crutches or canes.  - If you have sleep apnea, please bring your CPAP machine  In the event that your physical condition changes including the onset of a cold or respiratory illness, or if you have to delay or cancel your surgery, please notify your surgeon.   Anesthesia: Regional Anesthesia    Youre scheduled for surgery. During surgery, youll receive medicine called anesthesia to keep you comfortable and pain-free. Your surgeon has decided that youll receive regional anesthesia. This sheet tells you what to expect with this type of anesthesia.  What is regional anesthesia?  Regional anesthesia numbs one region of your body. The anesthesia may be given around nerves or into veins in your arms, neck, or legs (nerve block or [place] block). Or it may be sent into the spinal fluid (spinal anesthesia) or into the space just outside the spinal fluid (epidural anesthesia). You may also be given sedatives to help you relax.  Nerve block or [place] block  A small area of the body, such as an arm or leg, can be numbed using a nerve block or [place] block.  · Nerve block. During a nerve block, your skin is numbed. A needle is then inserted near nerves that serve the area to be numbed. Anesthetic is sent through the needle.  · IV regional or [place] block. For this type of block, an IV line is put into a vein. The blood flow to the area to be numbed is blocked for  a short time. Anesthetic is sent through the IV.  Spinal anesthesia  Spinal anesthesia numbs your body from about the waist down.  · Anesthetic is injected into the spinal fluid. This is a substance that surrounds the spinal cord in your spinal column. The anesthetic blocks pain traveling from the body to the brain.  · To receive the anesthetic, your skin is numbed at the injection site on your back.  · A needle is then inserted into the spinal space. Anesthetic is sent into the spinal fluid through the needle.  Epidural anesthesia  Epidural anesthesia is most commonly used during childbirth and may also be used after surgical procedures of the chest, belly, and legs.  · Anesthetic is injected into the epidural space. This is just outside the dural sac which contains the spinal fluid.  · To receive the anesthetic, your skin is numbed at the injection site on your back.  · A needle is then inserted into the epidural space. Anesthetic is sent into the epidural space through the needle.  · A small flexible catheter may be attached to the needle and left in place. This allows for continuous injections or infusions of anesthetic.  Anesthesia tools and medicines that might be near you during your procedure  · Local anesthetic. This medicine is given through a needle numbs one region of your body.  · Electrocardiography leads (electrodes). These are used to record your heart rate and rhythm.  · Blood pressure cuff. A cuff is placed on your arm to keep track of your blood pressure.  · Pulse oximeter. This small clip is placed on the end of the finger. It measures your blood oxygen level.  · Sedatives. These medicines may be given through an IV. They help to relax you and keep you comfortable. You may stay awake or sleep lightly.  · Oxygen. You may be given oxygen through a facemask.  Risks and possible complications  Regional anesthesia carries some risks. These include:  · Nausea and  vomiting  · Headache  · Backache  · Decreased blood pressure  · Allergic reaction to the anesthetic  · Ongoing numbness (rare)  · Irregular heartbeat (rare)  · Cardiac arrest (rare)   Date Last Reviewed: 12/1/2016  © 7406-4965 [employer]. [address] [place] [address]. All rights reserved. This information is not intended as a substitute for professional medical care. Always follow your healthcare professional's instructions.

## 2019-08-26 NOTE — ASSESSMENT & PLAN NOTE
Albuterol inhaler ordered   Technique taught    I suggest consideration of inhaled bronchodilator use if the patient has perioperative bronchospasm

## 2019-08-26 NOTE — OP NOTE
Procedure Note Iovera:    DATE OF PROCEDURE:  8/26/2019    PREOPERATIVE DIAGNOSIS: left knee osteoarthritis.     POSTOPERATIVE DIAGNOSIS: left knee osteoarthritis.     PROCEDURE: Iovera treatment of anterior femoral cutaneous nerve, and both branches of infrapatellar saphenous nerve using at least 3 different punctures to treat all 3 nerves. (cpt 64640 x3)    ATTENDING SURGEON: Ita Manning NP    Interventional Pain Management    ASSISTANT: none    COMPLICATIONS: None.     IMPLANTS:  None    ESTIMATED BLOOD LOSS:  < 5cc    SPECIMENS REMOVED:  None    ANESTHESIA: Local lidocaine 2%    INDICATIONS FOR PROCEDURE: This is a 68 y.o. male with longstanding knee pain. They have failed non operative management including injections.I discussed a new treatment therapy called Iovera, which is cryotherapy, to provide symptomatic relief along the sensory distribution of the infrapatellar tendon branch of the saphenous nerve  and AFCN. The patient elected to move forward with the procedure. The patient was given patient information and literature to review prior to the procedure as well.  Based on this, the patient agreed to move forward with doing the procedure.      PROCEDURE:    The patient was placed supine on the exam table and the proximal medial aspect of the left tibia and anterior aspect of distal femur was prepped with Chlorhexidine.  A line was drawn extending approximately 5 cm medial to inferior pole of the patella distally to a point approximately 5 cm medial to the tibial tubercle.  A second line was drawn in a medial to lateral direction the width of the patella approximately 7 cm proximal to the patella. We then infiltrated the skin with lidocaine 2% along both lines using a 25g needle. We then introduced the Iovera device along these lines and this device penetrated the skin, creating cryotherapy to both branches of the infrapatellar saphenous nerve and a third treatment to the anterior femoral  cutaneous nerve. 7 punctures of the skin were made to treat the 2 branches of the ISN and another 7 punctures were made to treat the AFCN. There were a total of 3 nerves treated with iovera. The patient tolerated the procedure well with no problem

## 2019-08-26 NOTE — PLAN OF CARE
Problem: Adult Inpatient Plan of Care  Goal: Plan of Care Review  Outcome: Ongoing (interventions implemented as appropriate)  Received report from Eusebio. Patient s/p pain injection, AAOx3. VSS, no c/o pain or discomfort at this time, resp even and unlabored. Bandaid x 2 to L knee is CDI. No active bleeding. No hematoma noted. Post procedure protocol reviewed with patient. Understanding verbalized. Nurse call bell within reach. Will continue to monitor per post procedure protocol.

## 2019-08-26 NOTE — PROGRESS NOTES
[facility] - Pre Op Consult  Progress Note    Patient Name: Иван Fuentes  MRN: [id number]  Date of Evaluation- 08/26/2019  PCP- Manuel Wiley MD    Future cases for Иван Fuentes [[id number]]     Case ID Status Date Time Johnnie Procedure Provider Location    [id number] [facility] 9/3/2019  9:00  ARTHROPLASTY, KNEE, TOTAL-SANDIP Hall MD [[id number]] [facility] OR 2ND FLR      Left     HPI:  History of present illness- I had the pleasure of meeting this pleasant 68 y.o. gentleman in the pre op clinic prior to his elective Orthopedic surgery. The patient is new to me . Иван was accompanied by wife Viktoria. Goes by Jeff    I have obtained the history by speaking to the patient and by reviewing the electronic health records.    Events leading up to surgery / History of presenting illness -    He has been troubled with severe  Left knee  pain for 3 years  . Pain increases with activity and decreases with resting.    Relevant health conditions of significance for the perioperative period/ History of presenting illness -    Health conditions of significance for the perioperative period - HTN, DM, Sleep apnea     Lives with wife   Help available post op     Not known to have heart disease ,  Lung disease       Subjective/ Objective:          Chief complaint-Preoperative evaluation, Perioperative Medical management, complication reduction plan     Active cardiac conditions- none    Revised cardiac risk index predictors- none    Functional capacity -Examples of physical activity . Home exercises ,  can take a flight of stairs holding on to the railing----- He can undertake all the above activities without  chest pain,chest tightness, Shortness of breath ,dizziness,lightheadedness making his exercise tolerance more,   than 4 Mets.       Review of Systems   Constitutional: Negative for chills and fever.        As noted   HENT:        Sleep apnea   Eyes:        No unusual vision changes   Respiratory:        No cough ,  "phlegm    No Hemoptysis   Cardiovascular:        As noted   Gastrointestinal:        Bowels-constipated  No overt GI/ blood losses   Endocrine:        Prednisone use > 20 mg daily for 3 weeks- None   Rt hip steroid injection - Julye 2019    Genitourinary: Negative for dysuria.        No urinary hesitancy    Musculoskeletal:        As above   Rt hip pain   Skin:        Psoriasis   No recent flare op    Neurological: Negative for syncope.        No unilateral weakness   Hematological:        Current use of Anticoagulants- None   Supposed to be on ASA - 81 mg po daily   Not taking it   Suggested checking With Nephrologist    Psychiatric/Behavioral:          Depression- Controlled   Family support      NO  VASCULAR STENTING             No anesthesia, bleeding, cardiac problems , PONVwith previous surgeries/procedures.  Medications and Allergies reviewed in epic.   FH- No anesthesia,bleeding / venous thrombosis , early onset heart disease in family     Physical Exam  Blood pressure 132/63, pulse 69, temperature 96.8 °F (36 °C), height 5' 8" (1.727 m), weight 119.7 kg (264 lb), SpO2 96 %.    Physical Exam  Constitutional- Vitals - Body mass index is 40.14 kg/m².,   Vitals:    08/26/19 1426   BP: 132/63   Pulse: 69   Temp: 96.8 °F (36 °C)     General appearance-Conscious,Coherent  Eyes- No conjunctival icterus,pupils  round  and reactive to light   ENT-Oral cavity- moist  , Hearing grossly normal   Neck- No thyromegaly ,Trachea -central, No jugular venous distension,   No Carotid Bruit   Cardiovascular -Heart Sounds- Normal  and  no murmur   , No gallop rhythm   Respiratory - Normal Respiratory Effort, Normal breath sounds,  wheeze  and Forced Expiratory wheeze    Peripheral pitting pedal edema-- mild, no calf pain   Gastrointestinal -Soft abdomen, No palpable masses, Non Tender,Liver,Spleen not palpable. No-- free fluid and shifting dullness  Musculoskeletal- No finger Clubbing. Strength grossly normal   Lymphatic-No " Palpable cervical, axillary,Inguinal lymphadenopathy   Psychiatric - normal effect,Orientation  Rt Dorsalis pedis pulses-palpable    Lt Dorsalis pedis pulses- palpable   Rt Posterior tibial pulses -palpable   Left posterior tibial pulses -palpable   Miscellaneous -  no asterixis,  Surgical scarPost neck   and  no renal bruit   No suggestion of bacterial feet infection     Investigations  Lab and Imaging have been reviewed in [facility].    Review of Medicine tests    EKG- I had independently reviewed the EKG from--6/26/2019   It was reported to be showing     Normal sinus rhythm  Low voltage, precordial leads  Inferior infarct ,age undetermined  Abnormal ECG  When compared with ECG of 01-OCT-2014 17:49,  Inferior infarct is now Present    MRI- Oct 2014     Old right LATOSHA infarct     Stress test Aug 2019     The perfusion scan is free of evidence from myocardial ischemia or injury.    LV cavity size is normal at rest.    There is mild  apical thinning which is a normal variant.    There is  normal wall motion at rest.    Visually estimated LV function is normal.    The EKG portion of this study is negative for ischemia.    The patient reported no chest pain during the stress test.    There are no prior studies for comparison.    Review of clinical lab tests:  Lab Results   Component Value Date    CREATININE 0.7 06/17/2019    HGB 16.1 06/17/2019     06/17/2019           Review of old records- Was done and information gathered regards to events leading to surgery and health conditions of significance in the perioperative period.        Preoperative cardiac risk assessment-  The patient does not have any active cardiac conditions . Revised cardiac risk index predictors- 0---.Functional capacity is more than 4 Mets. He will be undergoing a Orthopedic procedure that carries a intermediate risk     Risk of a major Cardiac event ( Defined as death, myocardial infarction, or cardiac arrest at 30 days after noncardiac  surgery), based on RCRI score     3.9%        No further cardiac work up is indicated prior to proceeding with the surgery     Orders Placed This Encounter    albuterol (VENTOLIN HFA) 90 mcg/actuation inhaler       American Society of Anesthesiologists Physical status classification ( ASA ) class: 3     Postoperative pulmonary complication risk assessment:      ARISCAT ( Canet) risk index- risk class -  Low, if duration of surgery is under 3 hours, intermediate, if duration of surgery is over 3 hours      Ashley Respiratory failure index- percentage risk of respiratory failure:1.8 %     Assessment/Plan:     HTN (hypertension)  Lisinopril    Home BP readings -130/70  Recent BP readings in the record-130/70's  Hypertension-  Blood pressure is acceptable .  I suggest holding Lisinopril  on the morning of the surgery and can continue that  post operatively under blood pressure, electrolyte and renal function monitoring as long as they are acceptable.I suggest addressing pain control as uncontrolled pain can increased blood pressure     H/O: CVA (cerebrovascular accident)  Reports a stroke 2 days after left hip replacement in 2008.   PFO   Left sided weakness   Recovered well   Residual - balance problem  No dysphagia    2008- no significant carotid stenosis   2008 - no evidence of lower extremity DVT    S/P cervical spinal fusion  Had quadriplegia- 2014    C3C6 decompression fusion - 2014   Had rehab in [facility]   Possible foot ball related injury   Having bladder problems, balance problems      Has limited neck movement    I suggest that the perioperative team be aware of this so that appropriate care can be taken     Chronic pain of left knee  For left knee replacement     Morbid obesity  Lost 40 pounds in the past 1 year with diet  Does upper and lower extremity exercises     Not known to  have fatty liver     Encouraged weight loss    Hypothyroid  Hypothyroidism- I suggest continuation of synthroid replacement  in the perioperative period    Under lab monitoring       Diabetes mellitus, type 2  Type 2  Diabetes Mellitus  On treatment with oral agent,not on  Insulin    Hemoglobin A1c- 5.9- June 2019  Following Keto diet   Suggested having balanced  meal for surgery    Capillary glucose check-none       Diabetes Mellitus-I suggest monitoring the glucose in the perioperative period ( Before meals and bed time,if the patient is on oral feeds or every 6 hourly ,if the patient is NPO )  Blood glucose target in hospitalized patients is 140-180. Oral Hypoglycemic agents are generally avoided during the hospital stay . If glucose is consistently elevated ,I suggest using basal ,prandial Insulin regimen to control the glucose , as elevated glucose can be associated with adverse surgical out comes. Please consider involving Hospital Medicine or Endocrinology ,if any help is needed with Glucose control. Patient will be instructed based on the pre op clinic guidelines  about adjustment of diabetic treatment (If applicable )  considering the NPO status for Surgery     Neuropathy - feet care suggested         BPH (benign prostatic hyperplasia)  Had surgery   Has urinary incontinence -Tried Botox    Increased risk of post operative urinary retention    PFO (patent foramen ovale)  Was not closed  Had Cardiology evaluation     HLD (hyperlipidemia)  HLD-I  suggest continuation of statin during the entire perioperative period.    Edema  Edema- I suggested avoidance of added salt,avoidance of NSAID's, unless advised or ordered  and suggested Limb elevation and keron hose use    Sleep apnea  As per wife - Severe sleep apnea   Suggested having it ready for use in Recovery    Sleep apnea .Uses CPAP .Suggested bringing for hospital use . Informed the risk of worsening deep apnea in the perioperative period and suggest using CPAP use any time in 24 hrs ( day or night )for planned sleep      I suggest a sleep study and suggest caution with usage of  medication that can cause respiratory suppression in the perioperative period    Avoidance of  supine sleep, weight gain , alcoholic beverages , care with , sedative , CNS depressant use indicated  since all of these can worsen EVONNE     Care suggested care with Sedative use             Depression  Controled on lexapro   I suggest monitoring the sodium as SIADH from Lexapro  use and hypersecretion of ADH associated with surgery can reduce sodium in the perioperative period    Chronic, continuous use of opioids  For 1.5 years   Not under pain management   Chronic continuous opioid use- In view of the opioid use, the patient may have opioid tolerance .. I suggest considering the possibility of opioid tolerance  in planning post operative pain control     History of thrombosis  In the setting of Rt hip replacement     History of DVT/  1 PE  Episode- 1  Associated with reduced mobility, no long journeys, nocancer, prior surgery, Hospital stay, no HRT  Anticoagulated with Coumadin for 2.5 - Years   IVC filter - None     Increased risk of thrombosis in the noni operative period , compression stocking use discussed    Proteinuria  NSAID related   Under Nephrology care    Constipation  Opioid related   Constipation- I suggest giving laxative regimen as opioid use,reduced ambulation  can increase the constipation   Suggested follow up Colonoscopy       Tobacco abuse   I suggested to consider stopping  smoking tobacco for its benefits in the noni operative period and in the long term    Tobacco use-I  Informed about risk of wound healing problem ,infection,lung complications,thrombosis with tobacco use     Not known to have COPD, Bronchitis, Emphysema, wheezing , chronic phlegm   No inhaler, oxygen use     Wheezing  Albuterol inhaler ordered   Technique taught    I suggest consideration of inhaled bronchodilator use if the patient has perioperative bronchospasm       Varicose veins of left lower extremity with other  complications  Increased risk of thrombosis in the noni operative period , compression stocking use discussed        Preventive perioperative care    Thromboembolic prophylaxis:  His risk factors for thrombosis include tobacco use, morbid obesity, surgical procedure, previous history of thrombosis, age and reduced mobility.I suggest  thromboembolic prophylaxis ( mechanical/pharmacological, weighing the risk benefits of pharmacological agent use considering noni procedural bleeding )  during the perioperative period.I suggested being active in the post operative period. The patient is a candidate for extended DVT prophylaxis     Postoperative pulmonary complication prophylaxis-Risk factors for post operative pulmonary complications include sleep apnea  age over 65 years, ASA class >2, functional dependence and tobacco use- I suggest incentive spirometry use, early ambulation and end tidal carbon dioxide monitoring  , oral care , head end of bed elevation      Renal complication prophylaxis-Risk factors for renal complications include diabetes mellitus and hypertension . I suggest keeping him well hydrated in the perioperative period.  Hydration suggested      Surgical site Infection Prophylaxis-I  suggest appropriate antibiotic for Prophylaxis against Surgical site infections     Delirium prophylaxis-Risk factors - opioid use - I suggest avoidance / minimizing the use of  Benzodiazepines ( unless the patient has been taking it on a regular basis ),Anticholinergic medication,Antihistamines ( like  Benadryl).I suggest minimizing the use of opioid medication and use of IV tylenol,if it is appropriate. I suggest using the lowest possible dose of opioids for the shortest duration possible in the perioperative period. I suggest to Keep shades/blinds open during the day, lights off and shades closed at night to encourage normal sleep/wake cycle.I encourage the presence of the family member with the patient at all times, if  at all possible as mental status changes can be picked up early by the family members and they help with reorientation. I encouraged the presence of family to help with orientation in the perioperative period. Benadryl avoidance suggested      In view of LUTS the patient  is at risk of postoperative urinary retention.  I suggest avoidance / minimizing the of  Benzodiazepines,Anticholinergic medication,antihistamines ( Benadryl) , if possible in the perioperative period. I suggest using the minimum possible use of opioids for the minimum period of time in the perioperative period. Benadryl avoidance suggested      This visit was focused on Preoperative evaluation, Perioperative Medical management, complication reduction plans. I suggest that the patient follows up with primary care or relevant sub specialists for ongoing health care.    I appreciate the opportunity to be involved in this patients care. Please feel free to contact me if there were any questions about this consultation.    Patient is optimized     Patient was instructed to call and update me about any changes to health,  medication, office visits ,testing out side of the noni operative care center , hospitalizations between now and surgery     Mary Centeno MD  Perioperative Medicine  [facility]   Pager [phone]  --  8/26- 2023     labs from 8/26   Hb, HCT , PLT - N  Macrocytosis - un likely to be alcohol related - as per history     , cardiplogy , PBVp repotts   --  8/28- 18 31    Called and spoke to him   As per him , still has PFO per primary cardiologist , last seen about 1 year ago   Call with questions   -  8/30- 2022    Called to follow up , spoke to him,  to address any concerns with the up coming surgery or any questions on Medication instructions-   Doing well ,No changes to Medication, Health -    Care with Tylenol suggested     Suggest taking into consideration the previous thrombosis in the post operative period in  deciding  the agent for VTE     Albuterol helping - no wheezing   Care with sedatives suggested     June 2019     cardiology evaluation   - recommend taking ASA in the setting of PFO  - recommend PFO closure assessment after surgery in the setting of stroke hx  Started ASA 81 mg po daily  Currently not taking other NSAID    No overt GI/  bleeding   -  20 51     Spoke to wife regarding ASA 81 mg  Coated PO daily   -  9/2- 19 15    Called to check to see how he is tolerating  ASA   Called to follow up , to address any concerns with the up coming surgery or any questions on Medication instructions   Unable to speak   Left a message to call, if needed   If due to take ASA on the AM of surgery, to take it   To let PCP know about ASA use

## 2019-08-26 NOTE — OUTPATIENT SUBJECTIVE & OBJECTIVE
"Outpatient Subjective & Objective     Chief complaint-Preoperative evaluation, Perioperative Medical management, complication reduction plan     Active cardiac conditions- none    Revised cardiac risk index predictors- none    Functional capacity -Examples of physical activity . Home exercises ,  can take a flight of stairs holding on to the railing----- He can undertake all the above activities without  chest pain,chest tightness, Shortness of breath ,dizziness,lightheadedness making his exercise tolerance more,   than 4 Mets.       Review of Systems   Constitutional: Negative for chills and fever.        As noted   HENT:        Sleep apnea   Eyes:        No unusual vision changes   Respiratory:        No cough , phlegm    No Hemoptysis   Cardiovascular:        As noted   Gastrointestinal:        Bowels-constipated  No overt GI/ blood losses   Endocrine:        Prednisone use > 20 mg daily for 3 weeks- None   Rt hip steroid injection - Julye 2019    Genitourinary: Negative for dysuria.        No urinary hesitancy    Musculoskeletal:        As above   Rt hip pain   Skin:        Psoriasis   No recent flare op    Neurological: Negative for syncope.        No unilateral weakness   Hematological:        Current use of Anticoagulants- None   Supposed to be on ASA - 81 mg po daily   Not taking it   Suggested checking With Nephrologist    Psychiatric/Behavioral:          Depression- Controlled   Family support      NO  VASCULAR STENTING             No anesthesia, bleeding, cardiac problems , PONVwith previous surgeries/procedures.  Medications and Allergies reviewed in epic.   FH- No anesthesia,bleeding / venous thrombosis , early onset heart disease in family     Physical Exam  Blood pressure 132/63, pulse 69, temperature 96.8 °F (36 °C), height 5' 8" (1.727 m), weight 119.7 kg (264 lb), SpO2 96 %.    Physical Exam  Constitutional- Vitals - Body mass index is 40.14 kg/m².,   Vitals:    08/26/19 1426   BP: 132/63 "   Pulse: 69   Temp: 96.8 °F (36 °C)     General appearance-Conscious,Coherent  Eyes- No conjunctival icterus,pupils  round  and reactive to light   ENT-Oral cavity- moist  , Hearing grossly normal   Neck- No thyromegaly ,Trachea -central, No jugular venous distension,   No Carotid Bruit   Cardiovascular -Heart Sounds- Normal  and  no murmur   , No gallop rhythm   Respiratory - Normal Respiratory Effort, Normal breath sounds,  wheeze  and Forced Expiratory wheeze    Peripheral pitting pedal edema-- mild, no calf pain   Gastrointestinal -Soft abdomen, No palpable masses, Non Tender,Liver,Spleen not palpable. No-- free fluid and shifting dullness  Musculoskeletal- No finger Clubbing. Strength grossly normal   Lymphatic-No Palpable cervical, axillary,Inguinal lymphadenopathy   Psychiatric - normal effect,Orientation  Rt Dorsalis pedis pulses-palpable    Lt Dorsalis pedis pulses- palpable   Rt Posterior tibial pulses -palpable   Left posterior tibial pulses -palpable   Miscellaneous -  no asterixis,  Surgical scarPost neck   and  no renal bruit   No suggestion of bacterial feet infection     Investigations  Lab and Imaging have been reviewed in epic.    Review of Medicine tests    EKG- I had independently reviewed the EKG from--6/26/2019   It was reported to be showing     Normal sinus rhythm  Low voltage, precordial leads  Inferior infarct ,age undetermined  Abnormal ECG  When compared with ECG of 01-OCT-2014 17:49,  Inferior infarct is now Present    MRI- Oct 2014     Old right LATOSHA infarct     Stress test Aug 2019     The perfusion scan is free of evidence from myocardial ischemia or injury.    LV cavity size is normal at rest.    There is mild  apical thinning which is a normal variant.    There is  normal wall motion at rest.    Visually estimated LV function is normal.    The EKG portion of this study is negative for ischemia.    The patient reported no chest pain during the stress test.    There are no prior  studies for comparison.    Review of clinical lab tests:  Lab Results   Component Value Date    CREATININE 0.7 06/17/2019    HGB 16.1 06/17/2019     06/17/2019           Review of old records- Was done and information gathered regards to events leading to surgery and health conditions of significance in the perioperative period.    Outpatient Subjective & Objective

## 2019-08-26 NOTE — ASSESSMENT & PLAN NOTE
Reports a stroke 2 days after left hip replacement in 2008.   PFO   Left sided weakness   Recovered well   Residual - balance problem  No dysphagia    2008- no significant carotid stenosis   2008 - no evidence of lower extremity DVT

## 2019-08-26 NOTE — ASSESSMENT & PLAN NOTE
Lisinopril    Home BP readings -130/70  Recent BP readings in the record-130/70's  Hypertension-  Blood pressure is acceptable .  I suggest holding Lisinopril  on the morning of the surgery and can continue that  post operatively under blood pressure, electrolyte and renal function monitoring as long as they are acceptable.I suggest addressing pain control as uncontrolled pain can increased blood pressure

## 2019-08-26 NOTE — H&P
"HPI    PMHx, PSHx, Allergies, Medications reviewed in epic    ROS negative except pain complaints in HPI    OBJECTIVE:    /72 (BP Location: Left arm, Patient Position: Sitting)   Pulse (!) 58   Temp 97 °F (36.1 °C) (Oral)   Resp 18   Ht 5' 8" (1.727 m)   Wt 118.8 kg (262 lb)   SpO2 (!) 94%   BMI 39.84 kg/m²     PHYSICAL EXAMINATION:    GENERAL: Well appearing, in no acute distress, alert and oriented x3.  PSYCH:  Mood and affect appropriate.  SKIN: Skin color, texture, turgor normal, no rashes or lesions.  CV: RRR with palpation of the radial artery.  PULM: No evidence of respiratory difficulty, symmetric chest rise.  NEURO: Cranial nerves grossly intact.    Plan:    Proceed with injection as planned    Ita Manning  08/26/2019    "

## 2019-08-26 NOTE — ASSESSMENT & PLAN NOTE
Had surgery   Has urinary incontinence -Tried Botox    Increased risk of post operative urinary retention

## 2019-08-26 NOTE — ASSESSMENT & PLAN NOTE
Had quadriplegia- 2014    C3C6 decompression fusion - 2014   Had rehab in [facility]   Possible foot ball related injury   Having bladder problems, balance problems      Has limited neck movement    I suggest that the perioperative team be aware of this so that appropriate care can be taken

## 2019-08-26 NOTE — ASSESSMENT & PLAN NOTE
Controled on lexapro   I suggest monitoring the sodium as SIADH from Lexapro  use and hypersecretion of ADH associated with surgery can reduce sodium in the perioperative period

## 2019-08-26 NOTE — HPI
History of present illness- I had the pleasure of meeting this pleasant 68 y.o. gentleman in the pre op clinic prior to his elective Orthopedic surgery. The patient is new to me . Иван was accompanied by wife Viktoria. Goes by Jeff    I have obtained the history by speaking to the patient and by reviewing the electronic health records.    Events leading up to surgery / History of presenting illness -    He has been troubled with severe  Left knee  pain for 3 years  . Pain increases with activity and decreases with resting.    Relevant health conditions of significance for the perioperative period/ History of presenting illness -    Health conditions of significance for the perioperative period - HTN, DM, Sleep apnea     Lives with wife   Help available post op     Not known to have heart disease ,  Lung disease

## 2019-08-26 NOTE — ASSESSMENT & PLAN NOTE
I suggested to consider stopping  smoking tobacco for its benefits in the noni operative period and in the long term    Tobacco use-I  Informed about risk of wound healing problem ,infection,lung complications,thrombosis with tobacco use     Not known to have COPD, Bronchitis, Emphysema, wheezing , chronic phlegm   No inhaler, oxygen use

## 2019-08-26 NOTE — ASSESSMENT & PLAN NOTE
Type 2  Diabetes Mellitus  On treatment with oral agent,not on  Insulin    Hemoglobin A1c- 5.9- June 2019  Following Keto diet   Suggested having balanced  meal for surgery    Capillary glucose check-none       Diabetes Mellitus-I suggest monitoring the glucose in the perioperative period ( Before meals and bed time,if the patient is on oral feeds or every 6 hourly ,if the patient is NPO )  Blood glucose target in hospitalized patients is 140-180. Oral Hypoglycemic agents are generally avoided during the hospital stay . If glucose is consistently elevated ,I suggest using basal ,prandial Insulin regimen to control the glucose , as elevated glucose can be associated with adverse surgical out comes. Please consider involving Hospital Medicine or Endocrinology ,if any help is needed with Glucose control. Patient will be instructed based on the pre op clinic guidelines  about adjustment of diabetic treatment (If applicable )  considering the NPO status for Surgery     Neuropathy - feet care suggested

## 2019-08-26 NOTE — ASSESSMENT & PLAN NOTE
Opioid related   Constipation- I suggest giving laxative regimen as opioid use,reduced ambulation  can increase the constipation   Suggested follow up Colonoscopy

## 2019-08-26 NOTE — ASSESSMENT & PLAN NOTE
In the setting of Rt hip replacement     History of DVT/  1 PE  Episode- 1  Associated with reduced mobility, no long journeys, nocancer, prior surgery, Hospital stay, no HRT  Anticoagulated with Coumadin for 2.5 - Years   IVC filter - None     Increased risk of thrombosis in the noni operative period , compression stocking use discussed

## 2019-08-26 NOTE — ASSESSMENT & PLAN NOTE
Lost 40 pounds in the past 1 year with diet  Does upper and lower extremity exercises     Not known to  have fatty liver     Encouraged weight loss

## 2019-08-26 NOTE — DISCHARGE INSTRUCTIONS
Home Care Instructions Pain Management:    1. DIET:   You may resume your normal diet today.   2. BATHING:   You may shower with luke warm water.  3. DRESSING:   You may remove your bandage today.   4. ACTIVITY LEVEL:   You may resume your normal activities today. No strenuous activity for 24 hours after your procedure.   5. MEDICATIONS:   You may resume your normal medications today.   6. SPECIAL INSTRUCTIONS:   You may bath or shower this evening.    Use ice pack to injection site for any pain or discomfort.  Apply ice packs for 20 minute intervals as needed.     PLEASE CALL YOUR DOCTOR IF:  1. Redness or swelling around the injection site.  2. Fever of 101 degrees  3. Drainage (pus) from the injection site.  4. For any continuous bleeding (some dried blood over the incision is normal.)    FOR EMERGENCIES:   If any unusual problems or difficulties occur during clinic hours, call [phone] or [phone].

## 2019-08-26 NOTE — ASSESSMENT & PLAN NOTE
Edema- I suggested avoidance of added salt,avoidance of NSAID's, unless advised or ordered  and suggested Limb elevation and keron hose use

## 2019-08-26 NOTE — ASSESSMENT & PLAN NOTE
For 1.5 years   Not under pain management   Chronic continuous opioid use- In view of the opioid use, the patient may have opioid tolerance .. I suggest considering the possibility of opioid tolerance  in planning post operative pain control

## 2019-08-26 NOTE — ASSESSMENT & PLAN NOTE
As per wife - Severe sleep apnea   Suggested having it ready for use in Recovery    Sleep apnea .Uses CPAP .Suggested bringing for hospital use . Informed the risk of worsening deep apnea in the perioperative period and suggest using CPAP use any time in 24 hrs ( day or night )for planned sleep      I suggest a sleep study and suggest caution with usage of medication that can cause respiratory suppression in the perioperative period    Avoidance of  supine sleep, weight gain , alcoholic beverages , care with , sedative , CNS depressant use indicated  since all of these can worsen EVONNE     Care suggested care with Sedative use

## 2019-08-26 NOTE — DISCHARGE SUMMARY
Discharge Note  Short Stay      SUMMARY     Admit Date: 8/26/2019    Attending Physician: Ita Manning      Discharge Physician: Ita Manning      Discharge Date: 8/26/2019 10:05 AM    Procedure(s) (LRB):  CRYOTHERAPY, NERVE, PERIPHERAL, PERCUTANEOUS, USING LIQUID NITROUS OXIDE IN CLOSED NEEDLE DEVICE LEFT KNEE SIMON (Left)    Final Diagnosis: Primary osteoarthritis of left knee [M17.12]    Disposition: Home or self care    Patient Instructions:   Current Discharge Medication List      CONTINUE these medications which have NOT CHANGED    Details   atorvastatin (LIPITOR) 80 MG tablet Take by mouth once daily.       escitalopram oxalate (LEXAPRO) 20 MG tablet Take 20 mg by mouth once daily.       eszopiclone 3 mg Tab Take 3 mg by mouth every evening.      fluticasone (FLONASE) 50 mcg/actuation nasal spray 1 spray daily as needed.       levothyroxine (SYNTHROID) 50 MCG tablet Take 50 mcg by mouth once daily.      lisinopril (PRINIVIL,ZESTRIL) 40 MG tablet Take 40 mg by mouth once daily.       LYRICA 100 mg capsule Take 100 mg by mouth 2 (two) times daily.  Refills: 5      metformin (GLUCOPHAGE) 500 MG tablet Take 500 mg by mouth 2 (two) times daily with meals.      oxyCODONE-acetaminophen (PERCOCET)  mg per tablet 1 tablet every 4 (four) hours as needed.       amLODIPine (NORVASC) 10 MG tablet       augmented betamethasone dipropionate (DIPROLENE-AF) 0.05 % cream       chlorhexidine (PERIDEX) 0.12 % solution       ciclesonide (OMNARIS) 50 mcg Spry 2 sprays by Each Nare route once daily.      diclofenac sodium (VOLTAREN) 1 % Gel Apply 2 g topically 3 (three) times daily.  Qty: 1 Tube, Refills: 2    Associated Diagnoses: Chronic pain of left knee; Osteoarthritis of left knee, unspecified osteoarthritis type      furosemide (LASIX) 40 MG tablet 40 mg daily as needed.       valsartan (DIOVAN) 160 MG tablet Take 160 mg by mouth once daily.                 Discharge Diagnosis: Primary osteoarthritis of  left knee [M17.12]  Condition on Discharge: Stable with no complications to procedure   Diet on Discharge: Same as before.  Activity: as per instruction sheet.  Discharge to: Home with a responsible adult.  Follow up: 2-4 weeks       Please call my office or pager at [phone] if experienced any weakness or loss of sensation, fever > 101.5, pain uncontrolled with oral medications, persistent nausea/vomiting/or diarrhea, redness or drainage from the incisions, or any other worrisome concerns. If physician on call was not reached or could not communicate with our office for any reason please go to the nearest emergency department

## 2019-08-26 NOTE — ASSESSMENT & PLAN NOTE
Hypothyroidism- I suggest continuation of synthroid replacement in the perioperative period    Under lab monitoring

## 2019-08-26 NOTE — PLAN OF CARE
Problem: Adult Inpatient Plan of Care  Goal: Plan of Care Review  Outcome: Ongoing (interventions implemented as appropriate)  Pt arrived to unit accompanied by spouse.  Vss.  Oriented pt to rm and unit.  Pre op orders and assessment initiated.  Pt in no acute distress and verbalizes no complaints.  Will continue to monitor.  Call bell within reach.

## 2019-08-27 RX ORDER — PREGABALIN 25 MG/1
75 CAPSULE ORAL NIGHTLY
Status: CANCELLED | OUTPATIENT
Start: 2019-08-27

## 2019-08-27 RX ORDER — LIDOCAINE HYDROCHLORIDE 10 MG/ML
1 INJECTION, SOLUTION EPIDURAL; INFILTRATION; INTRACAUDAL; PERINEURAL
Status: CANCELLED | OUTPATIENT
Start: 2019-08-27

## 2019-08-27 RX ORDER — OXYCODONE HYDROCHLORIDE 5 MG/1
10 TABLET ORAL
Status: CANCELLED | OUTPATIENT
Start: 2019-08-27

## 2019-08-27 RX ORDER — CELECOXIB 100 MG/1
400 CAPSULE ORAL
Status: CANCELLED | OUTPATIENT
Start: 2019-08-27

## 2019-08-27 RX ORDER — FENTANYL CITRATE 50 UG/ML
25 INJECTION, SOLUTION INTRAMUSCULAR; INTRAVENOUS EVERY 5 MIN PRN
Status: CANCELLED | OUTPATIENT
Start: 2019-08-27

## 2019-08-27 RX ORDER — PREGABALIN 25 MG/1
75 CAPSULE ORAL
Status: CANCELLED | OUTPATIENT
Start: 2019-08-27

## 2019-08-27 RX ORDER — AMOXICILLIN 250 MG
1 CAPSULE ORAL 2 TIMES DAILY
Status: CANCELLED | OUTPATIENT
Start: 2019-08-27

## 2019-08-27 RX ORDER — OXYCODONE HYDROCHLORIDE 5 MG/1
15 TABLET ORAL
Status: CANCELLED | OUTPATIENT
Start: 2019-08-27

## 2019-08-27 RX ORDER — FAMOTIDINE 20 MG/1
20 TABLET, FILM COATED ORAL 2 TIMES DAILY
Status: CANCELLED | OUTPATIENT
Start: 2019-08-27

## 2019-08-27 RX ORDER — NALOXONE HCL 0.4 MG/ML
0.02 VIAL (ML) INJECTION
Status: CANCELLED | OUTPATIENT
Start: 2019-08-27

## 2019-08-27 RX ORDER — MIDAZOLAM HYDROCHLORIDE 1 MG/ML
1 INJECTION INTRAMUSCULAR; INTRAVENOUS EVERY 5 MIN PRN
Status: CANCELLED | OUTPATIENT
Start: 2019-08-27

## 2019-08-27 RX ORDER — ONDANSETRON 2 MG/ML
4 INJECTION INTRAMUSCULAR; INTRAVENOUS EVERY 8 HOURS PRN
Status: CANCELLED | OUTPATIENT
Start: 2019-08-27

## 2019-08-27 RX ORDER — BISACODYL 10 MG
10 SUPPOSITORY, RECTAL RECTAL EVERY 12 HOURS PRN
Status: CANCELLED | OUTPATIENT
Start: 2019-08-27

## 2019-08-27 RX ORDER — SODIUM CHLORIDE 0.9 % (FLUSH) 0.9 %
10 SYRINGE (ML) INJECTION
Status: CANCELLED | OUTPATIENT
Start: 2019-08-27

## 2019-08-27 RX ORDER — MUPIROCIN 20 MG/G
1 OINTMENT TOPICAL 2 TIMES DAILY
Status: CANCELLED | OUTPATIENT
Start: 2019-08-27 | End: 2019-09-01

## 2019-08-27 RX ORDER — ACETAMINOPHEN 500 MG
1000 TABLET ORAL EVERY 6 HOURS
Status: CANCELLED | OUTPATIENT
Start: 2019-08-27 | End: 2019-08-29

## 2019-08-27 RX ORDER — SODIUM CHLORIDE 9 MG/ML
INJECTION, SOLUTION INTRAVENOUS CONTINUOUS
Status: CANCELLED | OUTPATIENT
Start: 2019-08-27 | End: 2019-08-28

## 2019-08-27 RX ORDER — ASPIRIN 81 MG/1
81 TABLET ORAL 2 TIMES DAILY
Status: CANCELLED | OUTPATIENT
Start: 2019-08-27

## 2019-08-27 RX ORDER — ACETAMINOPHEN 10 MG/ML
1000 INJECTION, SOLUTION INTRAVENOUS ONCE
Status: CANCELLED | OUTPATIENT
Start: 2019-08-27 | End: 2019-08-27

## 2019-08-27 RX ORDER — MORPHINE SULFATE 10 MG/ML
2 INJECTION, SOLUTION INTRAMUSCULAR; INTRAVENOUS
Status: CANCELLED | OUTPATIENT
Start: 2019-08-27

## 2019-08-27 RX ORDER — POLYETHYLENE GLYCOL 3350 17 G/17G
17 POWDER, FOR SOLUTION ORAL DAILY
Status: CANCELLED | OUTPATIENT
Start: 2019-08-28

## 2019-08-27 RX ORDER — RAMELTEON 8 MG/1
8 TABLET ORAL NIGHTLY PRN
Status: CANCELLED | OUTPATIENT
Start: 2019-08-27

## 2019-08-27 RX ORDER — MUPIROCIN 20 MG/G
1 OINTMENT TOPICAL
Status: CANCELLED | OUTPATIENT
Start: 2019-08-27

## 2019-08-27 RX ORDER — SODIUM CHLORIDE 9 MG/ML
INJECTION, SOLUTION INTRAVENOUS
Status: CANCELLED | OUTPATIENT
Start: 2019-08-27

## 2019-08-27 RX ORDER — CELECOXIB 100 MG/1
200 CAPSULE ORAL DAILY
Status: CANCELLED | OUTPATIENT
Start: 2019-08-28

## 2019-08-27 RX ORDER — OXYCODONE HYDROCHLORIDE 5 MG/1
5 TABLET ORAL
Status: CANCELLED | OUTPATIENT
Start: 2019-08-27

## 2019-08-27 RX ORDER — ROPIVACAINE HYDROCHLORIDE 2 MG/ML
8 INJECTION, SOLUTION EPIDURAL; INFILTRATION; PERINEURAL CONTINUOUS
Status: CANCELLED | OUTPATIENT
Start: 2019-08-27

## 2019-08-27 NOTE — H&P
CC: Left knee pain    Иван Fuentes is a 68 y.o. male with a history of Left knee pain. Pain is worse with activity and weight bearing.  Patient has experienced interference of activities of daily living due to decreased range of motion and an increase in joint pain and swelling.  Patient has failed non-operative treatment including NSAIDs, corticosteroid injections, viscosupplement injections, and activity modification.  Иван Fuentes currently ambulates with a cane at his home only.     Relevant medical conditions of significance in perioperative period:  Nicotine use- down to 1-2 cigarettes a day and still quitting  HTN- on medication managed by pcp  Hyperlipidemia- on medication managed by pcp  Chronic narcotic use- managed by pain management    Past Medical History:   Diagnosis Date    Arthritis of both knees     BPH (benign prostatic hyperplasia)     CVA (cerebral vascular accident)     Deep vein thrombosis     Diabetes mellitus     Diabetes mellitus, type 2     High cholesterol     Hypertension     Left-sided weakness     Obese     Sleep apnea     Thyroid disease     Urosepsis        Past Surgical History:   Procedure Laterality Date    BACK SURGERY      BLOCK, NERVE, GENICULAR, with steroid Left 3/21/2019    [facility]    BLOCK-NERVE-GENICULAR Left 8/7/2018    [facility]    CRYOTHERAPY, NERVE, PERIPHERAL, PERCUTANEOUS, USING LIQUID NITROUS OXIDE IN CLOSED NEEDLE DEVICE LEFT KNEE SIMON Left 8/26/2019    Performed by [facility] at [facility] CATH LAB    HIP ARTHROPLASTY      INJECTION-JOINT Left 5/10/2018    [facility]    JOINT REPLACEMENT      LAMINECTOMY-CERVICAL/FUSION-POSTERIOR N/A 10/9/2014    Performed by Parker Zarate [facility] at [facility] OR 2ND FLR    PROSTATE SURGERY  2015    SPINAL FUSION  09/2014    TONSILLECTOMY         Family History    Problem Relation Age of Onset    Hypertension Brother     Diabetes Mellitus Mother     Heart attack Neg Hx     Heart disease Neg Hx        Review of patient's allergies indicates:  No Known Allergies      Current Outpatient Medications:     atorvastatin (LIPITOR) 80 MG tablet, Take by mouth once daily. , Disp: , Rfl:     augmented betamethasone dipropionate (DIPROLENE-AF) 0.05 % cream, , Disp: , Rfl:     diclofenac sodium (VOLTAREN) 1 % Gel, Apply 2 g topically 3 (three) times daily., Disp: 1 Tube, Rfl: 2    escitalopram oxalate (LEXAPRO) 20 MG tablet, Take 20 mg by mouth once daily. , Disp: , Rfl:     eszopiclone 3 mg Tab, Take 3 mg by mouth every evening., Disp: , Rfl:     fluticasone (FLONASE) 50 mcg/actuation nasal spray, 1 spray every evening. , Disp: , Rfl:     furosemide (LASIX) 40 MG tablet, 40 mg daily as needed. , Disp: , Rfl:     levothyroxine (SYNTHROID) 50 MCG tablet, Take 50 mcg by mouth once daily., Disp: , Rfl:     lisinopril (PRINIVIL,ZESTRIL) 40 MG tablet, Take 40 mg by mouth once daily. , Disp: , Rfl:     LYRICA 100 mg capsule, Take 100 mg by mouth 2 (two) times daily., Disp: , Rfl: 5    metformin (GLUCOPHAGE) 500 MG tablet, Take 500 mg by mouth 2 (two) times daily with meals., Disp: , Rfl:     oxyCODONE-acetaminophen (PERCOCET)  mg per tablet, 1 tablet every 4 (four) hours as needed. , Disp: , Rfl:     acetaminophen (TYLENOL) 500 MG tablet, Take 1,000 mg by mouth as needed for Pain., Disp: , Rfl:     acetaminophen/diphenhydramine (TYLENOL PM ORAL), Take by mouth., Disp: , Rfl:     albuterol (VENTOLIN HFA) 90 mcg/actuation inhaler, Inhale 2 puffs into the lungs every 6 (six) hours as needed for Wheezing. Rescue, Disp: 18 g, Rfl: 0    diphenhydrAMINE (SOMINEX) 25 mg tablet, Take 25 mg by mouth nightly as needed for Insomnia., Disp: , Rfl:     docusate sodium (STOOL SOFTENER ORAL), Take by mouth every evening., Disp: , Rfl:     Lactobacillus acidophilus (PROBIOTIC  "ORAL), Take by mouth Daily., Disp: , Rfl:     traMADol (ULTRAM-ER) 100 MG Tb24, Take 100 mg by mouth daily as needed., Disp: , Rfl:     Review of Systems:  Constitutional: no fever or chills  Eyes: no visual changes  ENT: no nasal congestion or sore throat  Respiratory: no cough or shortness of breath  Cardiovascular: no chest pain or palpitations  Gastrointestinal: no nausea or vomiting, tolerating diet  Genitourinary: no hematuria or dysuria  Integument/Breast: no rash or pruritis  Hematologic/Lymphatic: no easy bruising or lymphadenopathy  Musculoskeletal: positive for c/o severe left knee pain  Neurological: no seizures or tremors  Behavioral/Psych: no auditory or visual hallucinations  Endocrine: no heat or cold intolerance    PE:  Ht 5' 8" (1.727 m)   Wt 118.8 kg (261 lb 14.5 oz)   BMI 39.82 kg/m²   General: Pleasant, cooperative, NAD   Gait: antalgic  HEENT: NCAT, sclera nonicteric   Lungs: Respirations clear bilaterally; equal and unlabored.   CV: S1S2; 2+ bilateral upper and lower extremity pulses.   Skin: Intact throughout with no rashes, erythema, or lesions  Extremities: No LE edema,  no erythema or warmth of the skin in either lower extremity.    Left knee exam:  Knee Range of Motion:limited by pain, pain with passive range of motion  Effusion:none  Condition of skin:intact  Location of tenderness:Medial joint line and Lateral joint line   Strength:limited by pain  Stability: stable to testing    Hip Examination:normal    Radiographs: Radiographs reveal advanced bilateral degenerative arthritic changes, more severely involving the left knee than the right     Knee Alignment: normal    Diagnosis: osteoarthritis Left knee    Plan: Left total knee arthroplasty    Due to the serious nature of total joint infection and the high prevalence of community acquired MRSA, vancomycin will be used perioperatively.            "

## 2019-08-27 NOTE — H&P (VIEW-ONLY)
CC: Left knee pain    Иван Fuentes is a 68 y.o. male with a history of Left knee pain. Pain is worse with activity and weight bearing.  Patient has experienced interference of activities of daily living due to decreased range of motion and an increase in joint pain and swelling.  Patient has failed non-operative treatment including NSAIDs, corticosteroid injections, viscosupplement injections, and activity modification.  Иавн Fuentes currently ambulates with a cane at his home only.     Relevant medical conditions of significance in perioperative period:  Nicotine use- down to 1-2 cigarettes a day and still quitting  HTN- on medication managed by pcp  Hyperlipidemia- on medication managed by pcp  Chronic narcotic use- managed by pain management    Past Medical History:   Diagnosis Date    Arthritis of both knees     BPH (benign prostatic hyperplasia)     CVA (cerebral vascular accident)     Deep vein thrombosis     Diabetes mellitus     Diabetes mellitus, type 2     High cholesterol     Hypertension     Left-sided weakness     Obese     Sleep apnea     Thyroid disease     Urosepsis        Past Surgical History:   Procedure Laterality Date    BACK SURGERY      BLOCK, NERVE, GENICULAR, with steroid Left 3/21/2019    [facility]    BLOCK-NERVE-GENICULAR Left 8/7/2018    [facility]    CRYOTHERAPY, NERVE, PERIPHERAL, PERCUTANEOUS, USING LIQUID NITROUS OXIDE IN CLOSED NEEDLE DEVICE LEFT KNEE SIMON Left 8/26/2019    [facility]formed [facility] at [facility] CATH LAB    HIP ARTHROPLASTY      INJECTION-JOINT Left 5/10/2018    [facility]    JOINT REPLACEMENT      LAMINECTOMY-CERVICAL/FUSION-POSTERIOR N/A 10/9/2014    Performed by Parker [facility] at [facility] OR 2ND FLR    PROSTATE SURGERY  2015    SPINAL FUSION  09/2014    TONSILLECTOMY         Family History    Problem Relation Age of Onset    Hypertension Brother     Diabetes Mellitus Mother     Heart attack Neg Hx     Heart disease Neg Hx        Review of patient's allergies indicates:  No Known Allergies      Current Outpatient Medications:     atorvastatin (LIPITOR) 80 MG tablet, Take by mouth once daily. , Disp: , Rfl:     augmented betamethasone dipropionate (DIPROLENE-AF) 0.05 % cream, , Disp: , Rfl:     diclofenac sodium (VOLTAREN) 1 % Gel, Apply 2 g topically 3 (three) times daily., Disp: 1 Tube, Rfl: 2    escitalopram oxalate (LEXAPRO) 20 MG tablet, Take 20 mg by mouth once daily. , Disp: , Rfl:     eszopiclone 3 mg Tab, Take 3 mg by mouth every evening., Disp: , Rfl:     fluticasone (FLONASE) 50 mcg/actuation nasal spray, 1 spray every evening. , Disp: , Rfl:     furosemide (LASIX) 40 MG tablet, 40 mg daily as needed. , Disp: , Rfl:     levothyroxine (SYNTHROID) 50 MCG tablet, Take 50 mcg by mouth once daily., Disp: , Rfl:     lisinopril (PRINIVIL,ZESTRIL) 40 MG tablet, Take 40 mg by mouth once daily. , Disp: , Rfl:     LYRICA 100 mg capsule, Take 100 mg by mouth 2 (two) times daily., Disp: , Rfl: 5    metformin (GLUCOPHAGE) 500 MG tablet, Take 500 mg by mouth 2 (two) times daily with meals., Disp: , Rfl:     oxyCODONE-acetaminophen (PERCOCET)  mg per tablet, 1 tablet every 4 (four) hours as needed. , Disp: , Rfl:     acetaminophen (TYLENOL) 500 MG tablet, Take 1,000 mg by mouth as needed for Pain., Disp: , Rfl:     acetaminophen/diphenhydramine (TYLENOL PM ORAL), Take by mouth., Disp: , Rfl:     albuterol (VENTOLIN HFA) 90 mcg/actuation inhaler, Inhale 2 puffs into the lungs every 6 (six) hours as needed for Wheezing. Rescue, Disp: 18 g, Rfl: 0    diphenhydrAMINE (SOMINEX) 25 mg tablet, Take 25 mg by mouth nightly as needed for Insomnia., Disp: , Rfl:     docusate sodium (STOOL SOFTENER ORAL), Take by mouth every evening., Disp: , Rfl:     Lactobacillus acidophilus (PROBIOTIC  "ORAL), Take by mouth Daily., Disp: , Rfl:     traMADol (ULTRAM-ER) 100 MG Tb24, Take 100 mg by mouth daily as needed., Disp: , Rfl:     Review of Systems:  Constitutional: no fever or chills  Eyes: no visual changes  ENT: no nasal congestion or sore throat  Respiratory: no cough or shortness of breath  Cardiovascular: no chest pain or palpitations  Gastrointestinal: no nausea or vomiting, tolerating diet  Genitourinary: no hematuria or dysuria  Integument/Breast: no rash or pruritis  Hematologic/Lymphatic: no easy bruising or lymphadenopathy  Musculoskeletal: positive for c/o severe left knee pain  Neurological: no seizures or tremors  Behavioral/Psych: no auditory or visual hallucinations  Endocrine: no heat or cold intolerance    PE:  Ht 5' 8" (1.727 m)   Wt 118.8 kg (261 lb 14.5 oz)   BMI 39.82 kg/m²   General: Pleasant, cooperative, NAD   Gait: antalgic  HEENT: NCAT, sclera nonicteric   Lungs: Respirations clear bilaterally; equal and unlabored.   CV: S1S2; 2+ bilateral upper and lower extremity pulses.   Skin: Intact throughout with no rashes, erythema, or lesions  Extremities: No LE edema,  no erythema or warmth of the skin in either lower extremity.    Left knee exam:  Knee Range of Motion:limited by pain, pain with passive range of motion  Effusion:none  Condition of skin:intact  Location of tenderness:Medial joint line and Lateral joint line   Strength:limited by pain  Stability: stable to testing    Hip Examination:normal    Radiographs: Radiographs reveal advanced bilateral degenerative arthritic changes, more severely involving the left knee than the right     Knee Alignment: normal    Diagnosis: osteoarthritis Left knee    Plan: Left total knee arthroplasty    Due to the serious nature of total joint infection and the high prevalence of community acquired MRSA, vancomycin will be used perioperatively.            "

## 2019-08-27 NOTE — PROGRESS NOTES
Иван Fuentes is a 68 y.o. year old here today for a pre-operative visit in preparation for a Left total knee arthroplasty to be performed by  Dr. Hall on 9/3/19.  he was last seen and treated in the clinic on 8/5/2019. he will be medically optimized by the pre op center. There has been no significant change in medical status since last visit. No fever, chills, malaise, or unexplained weight change.      Allergies, Medications, past medical and surgical history reviewed.    Focused examination performed.    Dr. Hall saw this patient today in clinic. All questions answered. Patient encouraged to call with questions. Contact information given.     Pre, noni, and post operative procedures and expectations discussed. Questions were answered. Иван Fuentes has been educated and is ready to proceed with surgery. Approximately 30 minutes was spent discussing surgical outcomes, plans, procedures pre, noni, and post operative expections and care.  Surgical consent signed.    Иван Fuentes will contact us if there are any questions, concerns, or changes in medical status prior to surgery.

## 2019-08-30 ENCOUNTER — TELEPHONE (OUTPATIENT)
Dept: ORTHOPEDICS | Facility: CLINIC | Age: 69
End: 2019-08-30

## 2019-08-30 RX ORDER — MELATONIN 10 MG
CAPSULE ORAL NIGHTLY
COMMUNITY

## 2019-08-30 RX ORDER — MAGNESIUM 250 MG
TABLET ORAL NIGHTLY
Status: ON HOLD | COMMUNITY
End: 2023-04-06 | Stop reason: CLARIF

## 2019-08-30 RX ORDER — ASPIRIN 81 MG/1
81 TABLET ORAL DAILY
Status: ON HOLD | COMMUNITY
End: 2019-09-03 | Stop reason: HOSPADM

## 2019-08-30 NOTE — TELEPHONE ENCOUNTER
Spoke with pt notified him of his 0500 arrival time for surgery on the 2nd floor. Pt verbalized understanding and had no further questions.

## 2019-09-03 ENCOUNTER — ANESTHESIA (OUTPATIENT)
Dept: SURGERY | Facility: HOSPITAL | Age: 69
End: 2019-09-03
Payer: MEDICARE

## 2019-09-03 ENCOUNTER — HOSPITAL ENCOUNTER (OUTPATIENT)
Facility: HOSPITAL | Age: 69
Discharge: HOME | End: 2019-09-04
Attending: ORTHOPAEDIC SURGERY | Admitting: ORTHOPAEDIC SURGERY
Payer: MEDICARE

## 2019-09-03 DIAGNOSIS — M17.12 PRIMARY OSTEOARTHRITIS OF LEFT KNEE: ICD-10-CM

## 2019-09-03 LAB
POCT GLUCOSE: 117 MG/DL (ref 70–110)
POCT GLUCOSE: 118 MG/DL (ref 70–110)
POCT GLUCOSE: 142 MG/DL (ref 70–110)

## 2019-09-03 PROCEDURE — 25000003 PHARM REV CODE 250: Performed by: ANESTHESIOLOGY

## 2019-09-03 PROCEDURE — 25000003 PHARM REV CODE 250: Performed by: NURSE PRACTITIONER

## 2019-09-03 PROCEDURE — 64448 ADDUCTOR CANAL CATHETER: ICD-10-PCS | Mod: 59,LT,, | Performed by: ANESTHESIOLOGY

## 2019-09-03 PROCEDURE — 94799 UNLISTED PULMONARY SVC/PX: CPT

## 2019-09-03 PROCEDURE — 88311 TISSUE SPECIMEN TO PATHOLOGY - SURGERY: ICD-10-PCS | Mod: 26,,, | Performed by: PATHOLOGY

## 2019-09-03 PROCEDURE — 63600175 PHARM REV CODE 636 W HCPCS: Performed by: NURSE PRACTITIONER

## 2019-09-03 PROCEDURE — C1776 JOINT DEVICE (IMPLANTABLE): HCPCS | Performed by: ORTHOPAEDIC SURGERY

## 2019-09-03 PROCEDURE — 63600175 PHARM REV CODE 636 W HCPCS: Performed by: NURSE ANESTHETIST, CERTIFIED REGISTERED

## 2019-09-03 PROCEDURE — 76942 ADDUCTOR CANAL CATHETER: ICD-10-PCS | Mod: 26,,, | Performed by: ANESTHESIOLOGY

## 2019-09-03 PROCEDURE — G0378 HOSPITAL OBSERVATION PER HR: HCPCS

## 2019-09-03 PROCEDURE — D9220A PRA ANESTHESIA: ICD-10-PCS | Mod: ANES,,, | Performed by: ANESTHESIOLOGY

## 2019-09-03 PROCEDURE — 25000242 PHARM REV CODE 250 ALT 637 W/ HCPCS: Performed by: ORTHOPAEDIC SURGERY

## 2019-09-03 PROCEDURE — 25000003 PHARM REV CODE 250: Performed by: ORTHOPAEDIC SURGERY

## 2019-09-03 PROCEDURE — 71000033 HC RECOVERY, INTIAL HOUR: Performed by: ORTHOPAEDIC SURGERY

## 2019-09-03 PROCEDURE — D9220A PRA ANESTHESIA: ICD-10-PCS | Mod: CRNA,,, | Performed by: NURSE ANESTHETIST, CERTIFIED REGISTERED

## 2019-09-03 PROCEDURE — 63600175 PHARM REV CODE 636 W HCPCS: Performed by: ANESTHESIOLOGY

## 2019-09-03 PROCEDURE — 97161 PT EVAL LOW COMPLEX 20 MIN: CPT

## 2019-09-03 PROCEDURE — 97530 THERAPEUTIC ACTIVITIES: CPT

## 2019-09-03 PROCEDURE — 88305 TISSUE EXAM BY PATHOLOGIST: CPT | Mod: 26,,, | Performed by: PATHOLOGY

## 2019-09-03 PROCEDURE — 25000003 PHARM REV CODE 250: Performed by: NURSE ANESTHETIST, CERTIFIED REGISTERED

## 2019-09-03 PROCEDURE — D9220A PRA ANESTHESIA: Mod: CRNA,,, | Performed by: NURSE ANESTHETIST, CERTIFIED REGISTERED

## 2019-09-03 PROCEDURE — 76942 ECHO GUIDE FOR BIOPSY: CPT | Mod: 26,,, | Performed by: ANESTHESIOLOGY

## 2019-09-03 PROCEDURE — 36415 COLL VENOUS BLD VENIPUNCTURE: CPT

## 2019-09-03 PROCEDURE — D9220A PRA ANESTHESIA: Mod: ANES,,, | Performed by: ANESTHESIOLOGY

## 2019-09-03 PROCEDURE — 36000710: Performed by: ORTHOPAEDIC SURGERY

## 2019-09-03 PROCEDURE — 27447 PR TOTAL KNEE ARTHROPLASTY: ICD-10-PCS | Mod: LT,GC,, | Performed by: ORTHOPAEDIC SURGERY

## 2019-09-03 PROCEDURE — 37000009 HC ANESTHESIA EA ADD 15 MINS: Performed by: ORTHOPAEDIC SURGERY

## 2019-09-03 PROCEDURE — 97165 OT EVAL LOW COMPLEX 30 MIN: CPT

## 2019-09-03 PROCEDURE — 37000008 HC ANESTHESIA 1ST 15 MINUTES: Performed by: ORTHOPAEDIC SURGERY

## 2019-09-03 PROCEDURE — 63600175 PHARM REV CODE 636 W HCPCS: Performed by: ORTHOPAEDIC SURGERY

## 2019-09-03 PROCEDURE — 88305 TISSUE EXAM BY PATHOLOGIST: CPT | Performed by: PATHOLOGY

## 2019-09-03 PROCEDURE — 88305 TISSUE SPECIMEN TO PATHOLOGY - SURGERY: ICD-10-PCS | Mod: 26,,, | Performed by: PATHOLOGY

## 2019-09-03 PROCEDURE — 94761 N-INVAS EAR/PLS OXIMETRY MLT: CPT

## 2019-09-03 PROCEDURE — 97116 GAIT TRAINING THERAPY: CPT | Mod: 59

## 2019-09-03 PROCEDURE — 76942 ECHO GUIDE FOR BIOPSY: CPT | Performed by: STUDENT IN AN ORGANIZED HEALTH CARE EDUCATION/TRAINING PROGRAM

## 2019-09-03 PROCEDURE — 27447 TOTAL KNEE ARTHROPLASTY: CPT | Mod: LT,GC,, | Performed by: ORTHOPAEDIC SURGERY

## 2019-09-03 PROCEDURE — 82962 GLUCOSE BLOOD TEST: CPT | Performed by: ORTHOPAEDIC SURGERY

## 2019-09-03 PROCEDURE — C1713 ANCHOR/SCREW BN/BN,TIS/BN: HCPCS | Performed by: ORTHOPAEDIC SURGERY

## 2019-09-03 PROCEDURE — 82962 GLUCOSE BLOOD TEST: CPT | Mod: 91 | Performed by: ORTHOPAEDIC SURGERY

## 2019-09-03 PROCEDURE — 27201423 OPTIME MED/SURG SUP & DEVICES STERILE SUPPLY: Performed by: ORTHOPAEDIC SURGERY

## 2019-09-03 PROCEDURE — 64450 NJX AA&/STRD OTHER PN/BRANCH: CPT | Mod: 59,LT,, | Performed by: ANESTHESIOLOGY

## 2019-09-03 PROCEDURE — 88311 DECALCIFY TISSUE: CPT | Mod: 26,,, | Performed by: PATHOLOGY

## 2019-09-03 PROCEDURE — 64448 NJX AA&/STRD FEM NRV NFS IMG: CPT | Mod: 59,LT,, | Performed by: ANESTHESIOLOGY

## 2019-09-03 PROCEDURE — 64450 IPACK SINGLE INJECTION BLOCK: ICD-10-PCS | Mod: 59,LT,, | Performed by: ANESTHESIOLOGY

## 2019-09-03 PROCEDURE — 64450 NJX AA&/STRD OTHER PN/BRANCH: CPT | Performed by: STUDENT IN AN ORGANIZED HEALTH CARE EDUCATION/TRAINING PROGRAM

## 2019-09-03 PROCEDURE — 36000711: Performed by: ORTHOPAEDIC SURGERY

## 2019-09-03 PROCEDURE — 64448 NJX AA&/STRD FEM NRV NFS IMG: CPT | Performed by: STUDENT IN AN ORGANIZED HEALTH CARE EDUCATION/TRAINING PROGRAM

## 2019-09-03 DEVICE — CEMENT BONE SMPLX HV GENTMYCN: Type: IMPLANTABLE DEVICE | Site: KNEE | Status: FUNCTIONAL

## 2019-09-03 DEVICE — BASEPLATE TIB TOTAL STAB SZ 5: Type: IMPLANTABLE DEVICE | Site: KNEE | Status: FUNCTIONAL

## 2019-09-03 DEVICE — COMP FEM POST STAB CEM SZ 6 LT: Type: IMPLANTABLE DEVICE | Site: KNEE | Status: FUNCTIONAL

## 2019-09-03 DEVICE — INSERT TIBIAL POST SZ 5 9MM: Type: IMPLANTABLE DEVICE | Site: KNEE | Status: FUNCTIONAL

## 2019-09-03 DEVICE — PATELLA TRIATHLON 33X9 SYMTRC: Type: IMPLANTABLE DEVICE | Site: KNEE | Status: FUNCTIONAL

## 2019-09-03 RX ORDER — MORPHINE SULFATE 2 MG/ML
2 INJECTION, SOLUTION INTRAMUSCULAR; INTRAVENOUS
Status: DISCONTINUED | OUTPATIENT
Start: 2019-09-03 | End: 2019-09-03

## 2019-09-03 RX ORDER — MIDAZOLAM HYDROCHLORIDE 1 MG/ML
1 INJECTION INTRAMUSCULAR; INTRAVENOUS EVERY 5 MIN PRN
Status: DISCONTINUED | OUTPATIENT
Start: 2019-09-03 | End: 2019-09-03 | Stop reason: HOSPADM

## 2019-09-03 RX ORDER — LISINOPRIL 20 MG/1
40 TABLET ORAL DAILY
Status: DISCONTINUED | OUTPATIENT
Start: 2019-09-04 | End: 2019-09-04 | Stop reason: HOSPADM

## 2019-09-03 RX ORDER — GLUCAGON 1 MG
1 KIT INJECTION
Status: DISCONTINUED | OUTPATIENT
Start: 2019-09-03 | End: 2019-09-04 | Stop reason: HOSPADM

## 2019-09-03 RX ORDER — OXYCODONE HYDROCHLORIDE 5 MG/1
5 TABLET ORAL EVERY 6 HOURS PRN
Qty: 40 TABLET | Refills: 0 | Status: SHIPPED | OUTPATIENT
Start: 2019-09-03 | End: 2019-09-03 | Stop reason: SDUPTHER

## 2019-09-03 RX ORDER — SODIUM CHLORIDE 0.9 % (FLUSH) 0.9 %
10 SYRINGE (ML) INJECTION
Status: DISCONTINUED | OUTPATIENT
Start: 2019-09-03 | End: 2019-09-04 | Stop reason: HOSPADM

## 2019-09-03 RX ORDER — IBUPROFEN 200 MG
16 TABLET ORAL
Status: DISCONTINUED | OUTPATIENT
Start: 2019-09-03 | End: 2019-09-04 | Stop reason: HOSPADM

## 2019-09-03 RX ORDER — MORPHINE SULFATE 2 MG/ML
2 INJECTION, SOLUTION INTRAMUSCULAR; INTRAVENOUS
Status: DISCONTINUED | OUTPATIENT
Start: 2019-09-03 | End: 2019-09-04 | Stop reason: HOSPADM

## 2019-09-03 RX ORDER — PREGABALIN 75 MG/1
75 CAPSULE ORAL NIGHTLY
Status: DISCONTINUED | OUTPATIENT
Start: 2019-09-03 | End: 2019-09-04 | Stop reason: HOSPADM

## 2019-09-03 RX ORDER — NALOXONE HCL 0.4 MG/ML
0.02 VIAL (ML) INJECTION
Status: DISCONTINUED | OUTPATIENT
Start: 2019-09-03 | End: 2019-09-04 | Stop reason: HOSPADM

## 2019-09-03 RX ORDER — DEXAMETHASONE SODIUM PHOSPHATE 4 MG/ML
INJECTION, SOLUTION INTRA-ARTICULAR; INTRALESIONAL; INTRAMUSCULAR; INTRAVENOUS; SOFT TISSUE
Status: DISCONTINUED | OUTPATIENT
Start: 2019-09-03 | End: 2019-09-03

## 2019-09-03 RX ORDER — DEXTROMETHORPHAN HYDROBROMIDE, GUAIFENESIN 5; 100 MG/5ML; MG/5ML
650 LIQUID ORAL EVERY 8 HOURS
Qty: 42 TABLET | Refills: 0 | Status: SHIPPED | OUTPATIENT
Start: 2019-09-03 | End: 2019-09-17

## 2019-09-03 RX ORDER — FAMOTIDINE 20 MG/1
20 TABLET, FILM COATED ORAL 2 TIMES DAILY
Status: DISCONTINUED | OUTPATIENT
Start: 2019-09-03 | End: 2019-09-04 | Stop reason: HOSPADM

## 2019-09-03 RX ORDER — PREGABALIN 75 MG/1
75 CAPSULE ORAL
Status: DISCONTINUED | OUTPATIENT
Start: 2019-09-03 | End: 2019-09-03

## 2019-09-03 RX ORDER — CELECOXIB 200 MG/1
200 CAPSULE ORAL DAILY
Status: DISCONTINUED | OUTPATIENT
Start: 2019-09-04 | End: 2019-09-04 | Stop reason: HOSPADM

## 2019-09-03 RX ORDER — RAMELTEON 8 MG/1
8 TABLET ORAL NIGHTLY PRN
Status: DISCONTINUED | OUTPATIENT
Start: 2019-09-03 | End: 2019-09-04 | Stop reason: HOSPADM

## 2019-09-03 RX ORDER — FENTANYL CITRATE 50 UG/ML
INJECTION, SOLUTION INTRAMUSCULAR; INTRAVENOUS
Status: DISCONTINUED | OUTPATIENT
Start: 2019-09-03 | End: 2019-09-03

## 2019-09-03 RX ORDER — OXYCODONE HYDROCHLORIDE 5 MG/1
5 TABLET ORAL
Status: DISCONTINUED | OUTPATIENT
Start: 2019-09-03 | End: 2019-09-03

## 2019-09-03 RX ORDER — ROPIVACAINE HYDROCHLORIDE 2 MG/ML
8 INJECTION, SOLUTION EPIDURAL; INFILTRATION; PERINEURAL CONTINUOUS
Status: DISCONTINUED | OUTPATIENT
Start: 2019-09-03 | End: 2019-09-04 | Stop reason: HOSPADM

## 2019-09-03 RX ORDER — SODIUM CHLORIDE 9 MG/ML
INJECTION, SOLUTION INTRAVENOUS CONTINUOUS
Status: ACTIVE | OUTPATIENT
Start: 2019-09-03 | End: 2019-09-04

## 2019-09-03 RX ORDER — CELECOXIB 200 MG/1
200 CAPSULE ORAL DAILY
Qty: 14 CAPSULE | Refills: 0 | Status: SHIPPED | OUTPATIENT
Start: 2019-09-03 | End: 2019-09-18

## 2019-09-03 RX ORDER — OXYCODONE HYDROCHLORIDE 10 MG/1
10 TABLET ORAL
Status: DISCONTINUED | OUTPATIENT
Start: 2019-09-03 | End: 2019-09-04 | Stop reason: HOSPADM

## 2019-09-03 RX ORDER — CEFAZOLIN SODIUM 1 G/3ML
2 INJECTION, POWDER, FOR SOLUTION INTRAMUSCULAR; INTRAVENOUS
Status: COMPLETED | OUTPATIENT
Start: 2019-09-03 | End: 2019-09-03

## 2019-09-03 RX ORDER — FENTANYL CITRATE 50 UG/ML
25 INJECTION, SOLUTION INTRAMUSCULAR; INTRAVENOUS EVERY 5 MIN PRN
Status: DISCONTINUED | OUTPATIENT
Start: 2019-09-03 | End: 2019-09-03 | Stop reason: HOSPADM

## 2019-09-03 RX ORDER — AMOXICILLIN 250 MG
1 CAPSULE ORAL 2 TIMES DAILY
Status: DISCONTINUED | OUTPATIENT
Start: 2019-09-03 | End: 2019-09-04 | Stop reason: HOSPADM

## 2019-09-03 RX ORDER — OXYCODONE HYDROCHLORIDE 5 MG/1
15 TABLET ORAL
Status: DISCONTINUED | OUTPATIENT
Start: 2019-09-03 | End: 2019-09-03

## 2019-09-03 RX ORDER — ACETAMINOPHEN 500 MG
1000 TABLET ORAL EVERY 6 HOURS
Status: DISCONTINUED | OUTPATIENT
Start: 2019-09-03 | End: 2019-09-04 | Stop reason: HOSPADM

## 2019-09-03 RX ORDER — FLUTICASONE PROPIONATE 50 MCG
1 SPRAY, SUSPENSION (ML) NASAL NIGHTLY
Status: DISCONTINUED | OUTPATIENT
Start: 2019-09-03 | End: 2019-09-04 | Stop reason: HOSPADM

## 2019-09-03 RX ORDER — LIDOCAINE HYDROCHLORIDE 10 MG/ML
1 INJECTION, SOLUTION EPIDURAL; INFILTRATION; INTRACAUDAL; PERINEURAL
Status: DISCONTINUED | OUTPATIENT
Start: 2019-09-03 | End: 2019-09-03 | Stop reason: HOSPADM

## 2019-09-03 RX ORDER — SODIUM CHLORIDE 9 MG/ML
INJECTION, SOLUTION INTRAVENOUS CONTINUOUS PRN
Status: DISCONTINUED | OUTPATIENT
Start: 2019-09-03 | End: 2019-09-03

## 2019-09-03 RX ORDER — OXYCODONE HYDROCHLORIDE 5 MG/1
5 TABLET ORAL EVERY 6 HOURS PRN
Qty: 40 TABLET | Refills: 0 | Status: SHIPPED | OUTPATIENT
Start: 2019-09-03 | End: 2019-09-13 | Stop reason: SDUPTHER

## 2019-09-03 RX ORDER — CELECOXIB 200 MG/1
400 CAPSULE ORAL
Status: COMPLETED | OUTPATIENT
Start: 2019-09-03 | End: 2019-09-03

## 2019-09-03 RX ORDER — POLYETHYLENE GLYCOL 3350 17 G/17G
17 POWDER, FOR SOLUTION ORAL DAILY
Status: DISCONTINUED | OUTPATIENT
Start: 2019-09-03 | End: 2019-09-04 | Stop reason: HOSPADM

## 2019-09-03 RX ORDER — ALBUTEROL SULFATE 90 UG/1
2 AEROSOL, METERED RESPIRATORY (INHALATION) EVERY 6 HOURS PRN
Status: DISCONTINUED | OUTPATIENT
Start: 2019-09-03 | End: 2019-09-04 | Stop reason: HOSPADM

## 2019-09-03 RX ORDER — FENTANYL CITRATE 50 UG/ML
25 INJECTION, SOLUTION INTRAMUSCULAR; INTRAVENOUS EVERY 5 MIN PRN
Status: COMPLETED | OUTPATIENT
Start: 2019-09-03 | End: 2019-09-03

## 2019-09-03 RX ORDER — CELECOXIB 200 MG/1
200 CAPSULE ORAL DAILY
Status: DISCONTINUED | OUTPATIENT
Start: 2019-09-04 | End: 2019-09-03

## 2019-09-03 RX ORDER — BISACODYL 10 MG
10 SUPPOSITORY, RECTAL RECTAL EVERY 12 HOURS PRN
Status: DISCONTINUED | OUTPATIENT
Start: 2019-09-03 | End: 2019-09-04 | Stop reason: HOSPADM

## 2019-09-03 RX ORDER — PREGABALIN 50 MG/1
100 CAPSULE ORAL 2 TIMES DAILY
Status: DISCONTINUED | OUTPATIENT
Start: 2019-09-03 | End: 2019-09-03

## 2019-09-03 RX ORDER — ATORVASTATIN CALCIUM 20 MG/1
80 TABLET, FILM COATED ORAL DAILY
Status: DISCONTINUED | OUTPATIENT
Start: 2019-09-04 | End: 2019-09-04 | Stop reason: HOSPADM

## 2019-09-03 RX ORDER — EPHEDRINE SULFATE 50 MG/ML
INJECTION, SOLUTION INTRAVENOUS
Status: DISCONTINUED | OUTPATIENT
Start: 2019-09-03 | End: 2019-09-03

## 2019-09-03 RX ORDER — LEVOTHYROXINE SODIUM 50 UG/1
50 TABLET ORAL DAILY
Status: DISCONTINUED | OUTPATIENT
Start: 2019-09-04 | End: 2019-09-04 | Stop reason: HOSPADM

## 2019-09-03 RX ORDER — ESCITALOPRAM OXALATE 20 MG/1
20 TABLET ORAL NIGHTLY
Status: DISCONTINUED | OUTPATIENT
Start: 2019-09-03 | End: 2019-09-04 | Stop reason: HOSPADM

## 2019-09-03 RX ORDER — DIPHENHYDRAMINE HCL 25 MG
25 CAPSULE ORAL NIGHTLY PRN
Status: DISCONTINUED | OUTPATIENT
Start: 2019-09-03 | End: 2019-09-04 | Stop reason: HOSPADM

## 2019-09-03 RX ORDER — CEFAZOLIN SODIUM 1 G/3ML
INJECTION, POWDER, FOR SOLUTION INTRAMUSCULAR; INTRAVENOUS
Status: DISCONTINUED | OUTPATIENT
Start: 2019-09-03 | End: 2019-09-03

## 2019-09-03 RX ORDER — SODIUM CHLORIDE 9 MG/ML
INJECTION, SOLUTION INTRAVENOUS
Status: DISCONTINUED | OUTPATIENT
Start: 2019-09-03 | End: 2019-09-03 | Stop reason: HOSPADM

## 2019-09-03 RX ORDER — MUPIROCIN 20 MG/G
1 OINTMENT TOPICAL 2 TIMES DAILY
Status: DISCONTINUED | OUTPATIENT
Start: 2019-09-03 | End: 2019-09-04 | Stop reason: HOSPADM

## 2019-09-03 RX ORDER — SODIUM CHLORIDE 0.9 % (FLUSH) 0.9 %
10 SYRINGE (ML) INJECTION
Status: DISCONTINUED | OUTPATIENT
Start: 2019-09-03 | End: 2019-09-03 | Stop reason: HOSPADM

## 2019-09-03 RX ORDER — OXYCODONE HYDROCHLORIDE 5 MG/1
10 TABLET ORAL
Status: DISCONTINUED | OUTPATIENT
Start: 2019-09-03 | End: 2019-09-03

## 2019-09-03 RX ORDER — ACETAMINOPHEN 10 MG/ML
1000 INJECTION, SOLUTION INTRAVENOUS ONCE
Status: COMPLETED | OUTPATIENT
Start: 2019-09-03 | End: 2019-09-03

## 2019-09-03 RX ORDER — KETAMINE HCL IN 0.9 % NACL 50 MG/5 ML
SYRINGE (ML) INTRAVENOUS
Status: DISCONTINUED | OUTPATIENT
Start: 2019-09-03 | End: 2019-09-03

## 2019-09-03 RX ORDER — ASPIRIN 81 MG/1
81 TABLET ORAL 2 TIMES DAILY
Status: DISCONTINUED | OUTPATIENT
Start: 2019-09-03 | End: 2019-09-04 | Stop reason: HOSPADM

## 2019-09-03 RX ORDER — CEFAZOLIN SODIUM 1 G/3ML
2 INJECTION, POWDER, FOR SOLUTION INTRAMUSCULAR; INTRAVENOUS
Status: DISCONTINUED | OUTPATIENT
Start: 2019-09-03 | End: 2019-09-03 | Stop reason: HOSPADM

## 2019-09-03 RX ORDER — MUPIROCIN 20 MG/G
1 OINTMENT TOPICAL
Status: COMPLETED | OUTPATIENT
Start: 2019-09-03 | End: 2019-09-03

## 2019-09-03 RX ORDER — OXYCODONE HYDROCHLORIDE 5 MG/1
5 TABLET ORAL
Status: DISCONTINUED | OUTPATIENT
Start: 2019-09-03 | End: 2019-09-04 | Stop reason: HOSPADM

## 2019-09-03 RX ORDER — INSULIN ASPART 100 [IU]/ML
1-10 INJECTION, SOLUTION INTRAVENOUS; SUBCUTANEOUS
Status: DISCONTINUED | OUTPATIENT
Start: 2019-09-03 | End: 2019-09-04 | Stop reason: HOSPADM

## 2019-09-03 RX ORDER — PROPOFOL 10 MG/ML
VIAL (ML) INTRAVENOUS CONTINUOUS PRN
Status: DISCONTINUED | OUTPATIENT
Start: 2019-09-03 | End: 2019-09-03

## 2019-09-03 RX ORDER — IBUPROFEN 200 MG
24 TABLET ORAL
Status: DISCONTINUED | OUTPATIENT
Start: 2019-09-03 | End: 2019-09-04 | Stop reason: HOSPADM

## 2019-09-03 RX ORDER — ASPIRIN 81 MG/1
81 TABLET ORAL 2 TIMES DAILY
Qty: 30 TABLET | Refills: 0 | Status: ON HOLD | OUTPATIENT
Start: 2019-09-03 | End: 2021-10-28 | Stop reason: HOSPADM

## 2019-09-03 RX ORDER — ONDANSETRON 2 MG/ML
4 INJECTION INTRAMUSCULAR; INTRAVENOUS EVERY 8 HOURS PRN
Status: DISCONTINUED | OUTPATIENT
Start: 2019-09-03 | End: 2019-09-04 | Stop reason: HOSPADM

## 2019-09-03 RX ADMIN — OXYCODONE HYDROCHLORIDE 10 MG: 10 TABLET ORAL at 01:09

## 2019-09-03 RX ADMIN — MIDAZOLAM 1 MG: 1 INJECTION INTRAMUSCULAR; INTRAVENOUS at 06:09

## 2019-09-03 RX ADMIN — RAMELTEON 8 MG: 8 TABLET ORAL at 09:09

## 2019-09-03 RX ADMIN — VANCOMYCIN HYDROCHLORIDE 1500 MG: 1.5 INJECTION, POWDER, LYOPHILIZED, FOR SOLUTION INTRAVENOUS at 06:09

## 2019-09-03 RX ADMIN — SODIUM CHLORIDE: 0.9 INJECTION, SOLUTION INTRAVENOUS at 07:09

## 2019-09-03 RX ADMIN — Medication 30 MG: at 07:09

## 2019-09-03 RX ADMIN — OXYCODONE HYDROCHLORIDE 15 MG: 5 TABLET ORAL at 09:09

## 2019-09-03 RX ADMIN — SODIUM CHLORIDE, SODIUM GLUCONATE, SODIUM ACETATE, POTASSIUM CHLORIDE, MAGNESIUM CHLORIDE, SODIUM PHOSPHATE, DIBASIC, AND POTASSIUM PHOSPHATE: .53; .5; .37; .037; .03; .012; .00082 INJECTION, SOLUTION INTRAVENOUS at 07:09

## 2019-09-03 RX ADMIN — POLYETHYLENE GLYCOL 3350 17 G: 17 POWDER, FOR SOLUTION ORAL at 09:09

## 2019-09-03 RX ADMIN — FENTANYL CITRATE 25 MCG: 50 INJECTION INTRAMUSCULAR; INTRAVENOUS at 11:09

## 2019-09-03 RX ADMIN — PROPOFOL 100 MCG/KG/MIN: 10 INJECTION, EMULSION INTRAVENOUS at 07:09

## 2019-09-03 RX ADMIN — SENNOSIDES,DOCUSATE SODIUM 1 TABLET: 8.6; 5 TABLET, FILM COATED ORAL at 09:09

## 2019-09-03 RX ADMIN — EPHEDRINE SULFATE 10 MG: 50 INJECTION, SOLUTION INTRAMUSCULAR; INTRAVENOUS; SUBCUTANEOUS at 07:09

## 2019-09-03 RX ADMIN — ACETAMINOPHEN 1000 MG: 10 INJECTION, SOLUTION INTRAVENOUS at 09:09

## 2019-09-03 RX ADMIN — ROPIVACAINE HYDROCHLORIDE 8 ML/HR: 2 INJECTION, SOLUTION EPIDURAL; INFILTRATION at 09:09

## 2019-09-03 RX ADMIN — OXYCODONE HYDROCHLORIDE 10 MG: 10 TABLET ORAL at 09:09

## 2019-09-03 RX ADMIN — ASPIRIN 81 MG: 81 TABLET, COATED ORAL at 09:09

## 2019-09-03 RX ADMIN — FENTANYL CITRATE 25 MCG: 50 INJECTION INTRAMUSCULAR; INTRAVENOUS at 10:09

## 2019-09-03 RX ADMIN — FAMOTIDINE 20 MG: 20 TABLET, FILM COATED ORAL at 09:09

## 2019-09-03 RX ADMIN — PREGABALIN 75 MG: 75 CAPSULE ORAL at 08:09

## 2019-09-03 RX ADMIN — FENTANYL CITRATE 50 MCG: 50 INJECTION, SOLUTION INTRAMUSCULAR; INTRAVENOUS at 07:09

## 2019-09-03 RX ADMIN — SENNOSIDES,DOCUSATE SODIUM 1 TABLET: 8.6; 5 TABLET, FILM COATED ORAL at 08:09

## 2019-09-03 RX ADMIN — SODIUM CHLORIDE: 0.9 INJECTION, SOLUTION INTRAVENOUS at 05:09

## 2019-09-03 RX ADMIN — CELECOXIB 400 MG: 200 CAPSULE ORAL at 06:09

## 2019-09-03 RX ADMIN — OXYCODONE HYDROCHLORIDE 10 MG: 10 TABLET ORAL at 05:09

## 2019-09-03 RX ADMIN — ROPIVACAINE HYDROCHLORIDE 8 ML/HR: 2 INJECTION, SOLUTION EPIDURAL; INFILTRATION at 05:09

## 2019-09-03 RX ADMIN — SODIUM CHLORIDE: 0.9 INJECTION, SOLUTION INTRAVENOUS at 09:09

## 2019-09-03 RX ADMIN — ASPIRIN 81 MG: 81 TABLET, COATED ORAL at 08:09

## 2019-09-03 RX ADMIN — ACETAMINOPHEN 1000 MG: 500 TABLET ORAL at 11:09

## 2019-09-03 RX ADMIN — CEFAZOLIN 2 G: 1 INJECTION, POWDER, FOR SOLUTION INTRAMUSCULAR; INTRAVENOUS at 04:09

## 2019-09-03 RX ADMIN — ESCITALOPRAM OXALATE 20 MG: 20 TABLET ORAL at 08:09

## 2019-09-03 RX ADMIN — EPHEDRINE SULFATE 10 MG: 50 INJECTION, SOLUTION INTRAMUSCULAR; INTRAVENOUS; SUBCUTANEOUS at 08:09

## 2019-09-03 RX ADMIN — FLUTICASONE PROPIONATE 50 MCG: 50 SPRAY, METERED NASAL at 08:09

## 2019-09-03 RX ADMIN — MUPIROCIN 1 G: 20 OINTMENT TOPICAL at 08:09

## 2019-09-03 RX ADMIN — MUPIROCIN 1 G: 20 OINTMENT TOPICAL at 06:09

## 2019-09-03 RX ADMIN — CEFAZOLIN 2 G: 1 INJECTION, POWDER, FOR SOLUTION INTRAMUSCULAR; INTRAVENOUS at 11:09

## 2019-09-03 RX ADMIN — FAMOTIDINE 20 MG: 20 TABLET, FILM COATED ORAL at 08:09

## 2019-09-03 RX ADMIN — DEXAMETHASONE SODIUM PHOSPHATE 8 MG: 4 INJECTION, SOLUTION INTRAMUSCULAR; INTRAVENOUS at 07:09

## 2019-09-03 RX ADMIN — ACETAMINOPHEN 1000 MG: 500 TABLET ORAL at 04:09

## 2019-09-03 RX ADMIN — MEPIVACAINE HYDROCHLORIDE 3 ML: 15 INJECTION, SOLUTION EPIDURAL; INFILTRATION at 07:09

## 2019-09-03 RX ADMIN — CEFAZOLIN 2 G: 330 INJECTION, POWDER, FOR SOLUTION INTRAMUSCULAR; INTRAVENOUS at 07:09

## 2019-09-03 NOTE — PROGRESS NOTES
Pt c/o 7/10 pain anterior.  Bolused PNC 5ml ropivacaine.  Deficit present to medial malleolus.  WCTM.

## 2019-09-03 NOTE — ANESTHESIA PROCEDURE NOTES
CSE    Patient location during procedure: OR  Start time: 9/3/2019 7:08 AM  Timeout: 9/3/2019 7:07 AM  End time: 9/3/2019 7:21 AM    Staffing  Authorizing Provider: Pamela Wilson MD  Performing Provider: Pamela Wilson MD    Preanesthetic Checklist  Completed: patient identified, site marked, surgical consent, pre-op evaluation, timeout performed, IV checked, risks and benefits discussed and monitors and equipment checked  CSE  Patient position: sitting  Prep: ChloraPrep  Patient monitoring: heart rate and frequent blood pressure checks  Approach: midline  Spinal Needle  Needle type: Justyna   Needle gauge: 25 G  Needle length: 5 in  Epidural Needle  Injection technique: KAJAL saline  Needle type: Tuohy   Needle gauge: 17 G  Needle length: 3.5 in  Needle insertion depth: 8 cm  Location: L3-4  Needle localization: anatomical landmarks  Catheter  Catheter type: springw[employer]  Catheter size: 19 G  Catheter at skin depth: 12 cm  Assessment  Intrathecal Medications:  administered: primary anesthetic mcg of    Additional Notes  Spinal attempted however required CSE 2/2 pt habitus/ KAJAL depth

## 2019-09-03 NOTE — ANESTHESIA PROCEDURE NOTES
IPACK Single Injection Block    Patient location during procedure: pre-op   Block not for primary anesthetic.  Reason for block: at surgeon's request and post-op pain management   Post-op Pain Location: Left knee pain  Start time: 9/3/2019 6:20 AM  Timeout: 9/3/2019 6:19 AM   End time: 9/3/2019 6:35 AM    Staffing  Authorizing Provider: Shakila Razo MD  Performing Provider: Hemanth Lion MD    Preanesthetic Checklist  Completed: patient identified, site marked, surgical consent, pre-op evaluation, timeout performed, IV checked, risks and benefits discussed and monitors and equipment checked  Peripheral Block  Patient position: supine  Prep: ChloraPrep  Patient monitoring: heart rate, cardiac monitor, continuous pulse ox, continuous capnometry and frequent blood pressure checks  Block type: I PACK  Laterality: left  Injection technique: single shot  Needle  Needle type: Stimuplex   Needle gauge: 21 G  Needle length: 4 in  Needle localization: anatomical landmarks and ultrasound guidance   -ultrasound image captured on disc.  Assessment  Injection assessment: negative aspiration, negative parasthesia and local visualized surrounding nerve  Paresthesia pain: none  Heart rate change: no  Slow fractionated injection: yes  Additional Notes  VSS.  [facility] RN monitoring vitals throughout procedure.  Patient tolerated procedure well.

## 2019-09-03 NOTE — PLAN OF CARE
Problem: Physical Therapy Goal  Goal: Physical Therapy Goal  Goals to be met by: 9/10/19    Patient will increase functional independence with mobility by performin. Supine to sit with supervision  2. Sit to supine with supervision  3. Sit to stand transfer with Supervision  4. Gait x200 feet with Supervision using Rolling Walker  5. Ascend/Descend 1 curb step with Supervision using Rolling Walker  6. Lower extremity exercise program x30 reps per handout, with supervision        Patient tolerated PT session fairly well today. He ambulated 3 steps forward, backward, and side stepped to HOB with SW and CGA. He would continue to benefit from PT in order to get back to PLOF.

## 2019-09-03 NOTE — PT/OT/SLP EVAL
Physical Therapy Evaluation and Treatment    Patient Name:  Иван Fuentes   MRN:  [id number]    Recommendations:     Discharge Recommendations:  outpatient PT   Discharge Equipment Recommendations: commode, walker, rolling   Barriers to discharge: None    Assessment:     Иван Fuentes is a 68 y.o. male admitted with a medical diagnosis of Primary osteoarthritis of left knee.  He presents with the following impairments/functional limitations:  weakness, impaired functional mobilty, gait instability, impaired balance, decreased lower extremity function, decreased ROM, orthopedic precautions, impaired skin, pain, impaired joint extensibility. Patient tolerated PT session fairly well today. He ambulated 3 steps forward, backward, and side stepped to HOB with SW and CGA. He would continue to benefit from PT in order to get back to PLOF.     Rehab Prognosis: Good; patient would benefit from acute skilled PT services to address these deficits and reach maximum level of function.    Recent Surgery: Procedure(s) (LRB):  ARTHROPLASTY, KNEE, TOTAL-SANDIP (Left) Day of Surgery    Plan:     During this hospitalization, patient to be seen daily to address the identified rehab impairments via therapeutic activities, therapeutic exercises, gait training and progress toward the following goals:    · Plan of Care Expires:  09/10/19    Subjective     Chief Complaint: Pain in left knee.   Patient/Family Comments/goals: To get back to PLOF.   Pain/Comfort:  · Pain Rating 1: (did not rate)  · Location - Side 1: Left  · Location 1: knee  · Pain Addressed 1: Pre-medicate for activity, Distraction, Cessation of Activity, Nurse notified    Living Environment:  Patient lives with his wife in a H with no steps to enter. Prior to admission, patients level of function was mod I with rollator for functional mobility. Equipment used at home: rollator, cane, straight. Upon discharge, patient will have assistance from  wife.    Objective:     Communicated with RN prior to session.  Patient found supine with blood pressure cuff, cryotherapy, FCD, perineural catheter, peripheral IV, pulse ox (continuous), telemetry  upon PT entry to room.    General Precautions: Standard, fall   Orthopedic Precautions:LLE weight bearing as tolerated   Braces: N/A     Exams:  · Cognitive Exam:  Patient is oriented to Person, Place, Time and Situation  · Sensation:    · -       Intact  · RLE ROM: WFL  · RLE Strength: WFL  · LLE ROM: WFL except limited by bulky surgical dressing at knee  · LLE Strength: appears WFL but unable to formally assess due to pain    Functional Mobility:  · Bed Mobility:     · Supine to Sit: contact guard assistance  · Sit to Supine: contact guard assistance  · Transfers:     · Sit to Stand:  contact guard assistance with rolling walker  · Gait:   He ambulated 3 steps forward, backward, and side stepped to HOB with SW and CGA. No LOB or SOB noted.       Therapeutic Activities and Exercises:   Patient educated in:  -PT role and POC  -safety with transfers including hand placement  -gait sequencing and SW management  -OOB activity to maximize recovery     AM-PAC 6 CLICK MOBILITY  Total Score:17     Patient left supine with all lines intact and RN notified.    GOALS:   Multidisciplinary Problems     Physical Therapy Goals        Problem: Physical Therapy Goal    Goal Priority Disciplines Outcome Goal Variances Interventions   Physical Therapy Goal     PT, PT/OT      Description:  Goals to be met by: 9/10/19    Patient will increase functional independence with mobility by performin. Supine to sit with supervision  2. Sit to supine with supervision  3. Sit to stand transfer with Supervision  4. Gait x200 feet with Supervision using Rolling Walker  5. Ascend/Descend 1 curb step with Supervision using Rolling Walker  6. Lower extremity exercise program x30 reps per handout, with supervision                        History:      Past Medical History:   Diagnosis Date    Arthritis of both knees     BPH (benign prostatic hyperplasia)     CVA (cerebral vascular accident)     Deep vein thrombosis     Diabetes mellitus     Diabetes mellitus, type 2     High cholesterol     Hypertension     Left-sided weakness     Obese     Sleep apnea     Thyroid disease     Urosepsis        Past Surgical History:   Procedure Laterality Date    BACK SURGERY      BLOCK, NERVE, GENICULAR, with steroid Left 3/21/2019    [facility]    BLOCK-NERVE-GENICULAR Left 8/7/2018    [facility]    CRYOTHERAPY, NERVE, PERIPHERAL, PERCUTANEOUS, USING LIQUID NITROUS OXIDE IN CLOSED NEEDLE DEVICE LEFT KNEE SIMON Left 8/26/2019    Performed by [facility]with at [facility] CATH LAB    HIP ARTHROPLASTY      INJECTION-JOINT Left 5/10/2018    [facility]    JOINT REPLACEMENT      LAMINECTOMY-CERVICAL/FUSION-POSTERIOR N/A 10/9/2014    Performed by Parker Zarate [facility] at [facility] OR 2ND FLR    PROSTATE SURGERY  2015    SPINAL FUSION  09/2014    TONSILLECTOMY         Time Tracking:     PT Received On: 09/03/19  PT Start Time: 1330     PT Stop Time: 1350  PT Total Time (min): 20 min     Billable Minutes: Evaluation 12 and Gait Training 8      Bria Olivares, PT  09/03/2019

## 2019-09-03 NOTE — PT/OT/SLP EVAL
Occupational Therapy   Evaluation    Name: Иван Fuentes  MRN: [id number]  Admitting Diagnosis:  Primary osteoarthritis of left knee Day of Surgery    Recommendations:     Discharge Recommendations: outpatient OT  Discharge Equipment Recommendations:  commode, walker, rolling  Barriers to discharge:  None    Assessment:     Иван Fuentes is a 68 y.o. male with a medical diagnosis of Primary osteoarthritis of left knee.  He was able to perform supine/sit, sit/stand, and take steps c CGA.  B UE WFL.  Able to perform UB dressing c total assist.  Pt is progressing well. Performance deficits affecting function: weakness, impaired endurance, impaired self care skills, impaired functional mobilty, orthopedic precautions.      Rehab Prognosis: Good; patient would benefit from acute skilled OT services to address these deficits and reach maximum level of function.       Plan:     Patient to be seen daily to address the above listed problems via self-care/home management, therapeutic activities, therapeutic exercises  · Plan of Care Expires: 09/10/19  · Plan of Care Reviewed with: patient    Subjective     Chief Complaint: Pt is s/p L TKA and is WBAT L LE  Patient/Family Comments/goals: To get better.    Occupational Profile:  Living Environment: Pt lives in a one story home c no GERSON.  Has a walk-in shower.  Previous level of function: I PTA  Equipment Used at Home:  rollator, cane, straight  Assistance upon Discharge: Wife    Pain/Comfort:  · Pain Rating 1: 7/10    Patients cultural, spiritual, Catholic conflicts given the current situation: no    Objective:     Communicated with: RN prior to session.  Patient found supine with blood pressure cuff, perineural catheter, peripheral IV, pulse ox (continuous), telemetry upon OT entry to room.    General Precautions: Standard, fall   Orthopedic Precautions:LLE weight bearing as tolerated   Braces: N/A     Occupational Performance:    Bed Mobility:    · Patient  completed Supine to Sit with contact guard assistance  · Patient completed Sit to Supine with contact guard assistance    Functional Mobility/Transfers:  · Patient completed Sit <> Stand Transfer with contact guard assistance  with  standard walker   · Functional Mobility: Pt was able to take 3 steps forwards and backwards c CGA and SW.    Activities of Daily Living:  · Upper Body Dressing: total assistance to don hospital gown 2* IV lines.  · Lower Body Dressing: total assistance to don socks.    Cognitive/Visual Perceptual:  Cognitive/Psychosocial Skills:     -       Oriented to: Person, Place, Time and Situation   -       Follows Commands/attention:Follows multistep  commands    Physical Exam:  Upper Extremity Range of Motion:     -       Right Upper Extremity: WFL  -       Left Upper Extremity: WFL  Upper Extremity Strength:    -       Right Upper Extremity: WFL  -       Left Upper Extremity: WFL    AMPAC 6 Click ADL:  AMPAC Total Score: 15      Education:    Patient left supine with all lines intact, call button in reach and RN notified    GOALS:   Multidisciplinary Problems     Occupational Therapy Goals        Problem: Occupational Therapy Goal    Goal Priority Disciplines Outcome Interventions   Occupational Therapy Goal     OT, PT/OT     Description:  Goals to be met by: 9/10/19     Patient will increase functional independence with ADLs by performing:    UE Dressing with [place].  LE Dressing with Modified [place] and Assistive Devices as needed.  Grooming while standing at sink with Modified [place].  Toileting from bedside commode with Modified [place] for hygiene and clothing management.   Bathing from  shower chair/bench with Modified [place].  Toilet transfer to bedside commode with Modified [place].  Increased functional strength to WFL for B UE.  Upper extremity exercise program x15 reps per handout, with independence.                      History:     Past Medical  History:   Diagnosis Date    Arthritis of both knees     BPH (benign prostatic hyperplasia)     CVA (cerebral vascular accident)     Deep vein thrombosis     Diabetes mellitus     Diabetes mellitus, type 2     High cholesterol     Hypertension     Left-sided weakness     Obese     Sleep apnea     Thyroid disease     Urosepsis        Past Surgical History:   Procedure Laterality Date    BACK SURGERY      BLOCK, NERVE, GENICULAR, with steroid Left 3/21/2019    [facility]    BLOCK-NERVE-GENICULAR Left 8/7/2018    Performed by [facility] at [facility]    CRYOTHERAPY, NERVE, PERIPHERAL, PERCUTANEOUS, USING LIQUID NITROUS OXIDE IN CLOSED NEEDLE DEVICE LEFT KNEE SIMON Left 8/26/2019    Performed by [facility] Beckwith at [facility] CATH LAB    HIP ARTHROPLASTY      INJECTION-JOINT Left 5/10/2018    Performed by [facility] at [facility]    JOINT REPLACEMENT      LAMINECTOMY-CERVICAL/FUSION-POSTERIOR N/A 10/9/2014    Performed by Parker [facility] at [facility] OR 2ND FLR    PROSTATE SURGERY  2015    SPINAL FUSION  09/2014    TONSILLECTOMY         Time Tracking:     OT Date of Treatment: 09/03/19  OT Start Time: 1330  OT Stop Time: 1347  OT Total Time (min): 17 min    Billable Minutes:Evaluation 9  Therapeutic Activity 8    ADELA Thakur  9/3/2019

## 2019-09-03 NOTE — PLAN OF CARE
[facility]    HOME HEALTH ORDERS  FACE TO FACE ENCOUNTER    Patient Name: Иван Fuentes  YOB: 1950    PCP: Manuel Wiley MD   PCP Address: [address]  PCP Phone Number: [phone]  PCP Fax: [phone]    Encounter Date: 09/03/2019    Admit to Home Health    Diagnoses:  Active Hospital Problems    Diagnosis  POA    *Primary osteoarthritis of left knee [M17.12]  Yes      Resolved Hospital Problems   No resolved problems to display.       Future Appointments   Date Time Provider Department Center   9/5/2019  2:00 PM Jarrell Cueva, [facility]   9/17/2019  1:00 PM Charissa Singleton NP [facility]           I have seen and examined this patient face to face today. My clinical findings that support the need for the home health skilled services and home bound status are the following:  Weakness/numbness causing balance and gait disturbance due to Joint Replacement making it taxing to leave home.    Allergies:Review of patient's allergies indicates:  No Known Allergies    Diet: regular diet    Activities: activity as tolerated    Nursing:   SN to complete comprehensive assessment including routine vital signs. Instruct on disease process and s/s of complications to report to MD. Follow specific home health arthoplasty protocol. Review/verify medication list sent home with the patient at time of discharge  and instruct patient/caregiver as needed. If coumadin ordered, coumadin clinic to manage INR with INR draws 2x per week with a goal to maintain INR between 1.8 and 2.2. Frequency may be adjusted depending on start of care date.    Notify MD if SBP > 160 or < 90; DBP > 90 or < 50; HR > 120 or < 50; Temp > 101    Home Medical Equipment:  Walker, 3-1 bedside commode, transfer tub bench    CONSULTS:    Physical Therapy to evaluate and treat. Evaluate for home safety and equipment needs;  Establish/upgrade home exercise program. Perform / instruct on therapeutic exercises, gait training, transfer training, and Range of Motion.    OTHER:  Occupational Therapy to evaluate and treat. Evaluate home environment for safety and equipment needs. Perform/Instruct on transfers, ADL training, ROM, and therapeutic exercises.    MISCELLANEOUS CARE:  Routine Skin for Bedridden Patients: Instruct patient/caregiver to apply moisture barrier cream to all skin folds and wet areas in perineal area daily and after baths and all bowel movements.    WOUND CARE ORDERS  Do not remove surgical dressing for 2 weeks post-op. This will be done only by MD at initial post-op visit. If dressing is completely saturated, replace with identical dressing - silver-impregnated hydrocolloid dressing.     Do not get dressings wet. Do not shower.     If dressing continues to be saturated or there are signs of infection, please call [facility] [phone] for further instructions and to make appt to be seen.         Medications: Review discharge medications with patient and family and provide education.      Current Discharge Medication List      CONTINUE these medications which have NOT CHANGED    Details   albuterol (VENTOLIN HFA) 90 mcg/actuation inhaler Inhale 2 puffs into the lungs every 6 (six) hours as needed for Wheezing. Rescue  Qty: 18 g, Refills: 0      atorvastatin (LIPITOR) 80 MG tablet Take by mouth once daily.       augmented betamethasone dipropionate (DIPROLENE-AF) 0.05 % cream       diclofenac sodium (VOLTAREN) 1 % Gel Apply 2 g topically 3 (three) times daily.  Qty: 1 Tube, Refills: 2    Associated Diagnoses: Chronic pain of left knee; Osteoarthritis of left knee, unspecified osteoarthritis type      diphenhydrAMINE (SOMINEX) 25 mg tablet Take 25 mg by mouth nightly as needed for Insomnia.      docusate sodium (STOOL SOFTENER ORAL) Take by mouth every evening.      escitalopram oxalate (LEXAPRO) 20 MG tablet Take 20 mg  by mouth once daily.       eszopiclone 3 mg Tab Take 3 mg by mouth every evening.      fluticasone (FLONASE) 50 mcg/actuation nasal spray 1 spray every evening.       furosemide (LASIX) 40 MG tablet 40 mg daily as needed.       levothyroxine (SYNTHROID) 50 MCG tablet Take 50 mcg by mouth once daily.      LYRICA 100 mg capsule Take 100 mg by mouth 2 (two) times daily.  Refills: 5      magnesium 250 mg Tab Take by mouth every evening.      melatonin 10 mg Cap Take by mouth every evening.      Lactobacillus acidophilus (PROBIOTIC ORAL) Take by mouth Daily.      lisinopril (PRINIVIL,ZESTRIL) 40 MG tablet Take 40 mg by mouth once daily.       metformin (GLUCOPHAGE) 500 MG tablet Take 500 mg by mouth 2 (two) times daily with meals.         STOP taking these medications       acetaminophen/diphenhydramine (TYLENOL PM ORAL) Comments:   Reason for Stopping:         oxyCODONE-acetaminophen (PERCOCET)  mg per tablet Comments:   Reason for Stopping:         traMADol (ULTRAM-ER) 100 MG Tb24 Comments:   Reason for Stopping:         acetaminophen (TYLENOL) 500 MG tablet Comments:   Reason for Stopping:         aspirin (ECOTRIN) 81 MG EC tablet Comments:   Reason for Stopping:               I certify that this patient is confined to his home and needs physical therapy and occupational therapy.

## 2019-09-03 NOTE — NURSING TRANSFER
Nursing Transfer Note      9/3/2019     Transfer To: 503 From PACU    Transfer via stretcher    Transfer with cardiac monitoring    Transported by PCT    Medicines sent: none    Chart send with patient: Yes    Notified: spouse    Patient reassessed at: 9/3/19 @ 1430    Upon arrival to floor:

## 2019-09-03 NOTE — OP NOTE
DATE OF PROCEDURE: 9/3/2019  PREOPERATIVE DIAGNOSIS: Arthritis, Left knee. Morbid Obesity BMI 40  POSTOPERATIVE DIAGNOSIS: Arthritis, Left knee.  Morbid Obesity BMI 40  PROCEDURES PERFORMED: Left total knee arthroplasty.   SURGEON: Ever Hall M.D.   ASSISTANT: ANDRY Rodriguez MD  ANESTHESIA: Regional.   COMPLICATIONS: None.   COUNTS: Correct.   DISPOSITION: Recovery Room, stable.   SPECIMENS: Bone and cartilage.   FINDINGS: Tricompartmental degenerative change. .   FLUIDS: 2000 mL.   BLOOD LOSS: Less than 50 mL.   IMPLANTS: Aurelia Triathlon, size 6 Left posterior stabilized   cemented femoral component with a 5 cemented tibial tray, a 33 mm 3-peg   patella and a size 5, 9 mm posterior stabilized polyethylene insert.   INDICATIONS FOR PROCEDURE: Иван Fuentes is a 68-year-old male who has   severe arthritis in the Left knee . He is having continued pain in the   Left knee and did not respond to nonoperative measures, decided to undergo   Left knee replacement. He is aware of reasonable treatment options as   well as risks and benefits. {He did not respond to NSAIDS or injections. He received a course or pre-operative physical therapy.  PROCEDURE IN DETAIL: After appropriate consent was obtained, the patient   Was brought in the Operating Room, anesthesia was administered. He received   antibiotic prophylaxis.  Cast   padding and tourniquet was applied to the proximal Left thigh. Left  lower extremity was prepped and draped in usual sterile fashion. Limb was   elevated, tourniquet was inflated. The knee was flexed. An incision was   made from the tibial tubercle just proximal to the superior pole of the   patella. It was taken down through the skin and retinacular. A medial   parapatellar arthrotomy was performed followed by a standard medial   release. Patellar fat pad was partially excised. ACL was resected.   Femoral punch was used to make the guide hole for the femoral drill. The   distal cutting guide  was set at 6 degrees valgus left. I opted to take 2   extra mm off the distal femur secondary to preoperative flexion   contracture. Distal femoral cut was made.We were pleased with the alignment and quality of the cut.  The PCL retractor was used to bring   the tibia into the field. Meniscal remnants were resected. The   extramedullary tibial guide was pinned in place using the lateral side, as most   defective, 2 mm bone was taken off of this. We checked the alignment in   both planes both before and after making the cut. We were satisfied with the   alignment of the cut and the amount of bone removed.The femur was then  sized, found to be a size 6. A size 6 cutting guide was pinned in 3   degrees of external rotation. This was based off the posterior condyle,   checked the transepicondylar axis. Anterior, posterior and chamfer cuts   were made. The box guide was pinned in position. Femoral box cut was   made. Bone fragments were removed. Lamina spreaders were   then used to open up posteriorly. The PCL was resected and some soft   tissue debris was removed.  A 9 mm spacer block gave excellent flexion-extension gap balance.   I was also satisfied with the medial and lateral stability. At this   point, the femoral trial was placed. The tibia was sized, found to be a   Size 5. A size 5 tibial trial was pinned in appropriate alignment and   rotation. This was based on the medial one third of the tibial tubercle.  A stem extension hole was drilled. A keel hole was punched and a   trial reduction was performed with a size 5, 9 mm insert. He  achieved full   extension. There was no recurvatum. He easily had 125 degrees of flexion.   The femoral component ranged symmetrically in the tibial tray. There was   no lift off. There was no instability of full extension, 30 degrees of   flexion, mid flexion and full flexion. Patella was measured and found to   be 24 mm thick, 8 mm of bone removed. The 3-peg holes were drilled 33  mm   3-peg trial was placed. The knee was brought through range of motion. The   patella tracked extremely well. Therefore, at this point, we were   satisfied with knee range of motion and stability, component position,   sizing and alignment as well as patella tracking. All trial components   were removed. Bone was prepared for cementing by pulsatile lavage and   drying. Components were cemented into place, femur followed by tibia.   Meticulous care was taken to remove excess cement. Tibial insert was   firmly seated in the tibial tray. The knee was inspected. There was no   loose body, foreign body or soft tissue interposition. Knee was then   reduced, brought into extension. Patella button was cemented in place.   Excess cement was removed. The wound was then copiously irrigated with   antibiotic-impregnated solution. Once the cement was dried, tranexamic   acid was applied. The arthrotomy was closed with #1 Vicryl. Once the   arthrotomy was closed, the knee was brought through range of motion, stable   as previously described. Patella tracked very well. Retinacular was   closed with 0 Vicryl, subcutaneous layer with 2-0 Vicryl, skin with   monocryl and dermabond. Sterile dressing was applied. Tourniquet was let down.  He was   transferred to a stretcher, brought to Recovery Room in stable condition,   tolerated the procedure well, no known complications.

## 2019-09-03 NOTE — TRANSFER OF CARE
"Anesthesia Transfer of Care Note    Patient: Иван Fuentes    Procedure(s) Performed: Procedure(s) (LRB):  ARTHROPLASTY, KNEE, TOTAL-SANDIP (Left)    Patient location: PACU    Anesthesia Type: regional    Transport from OR: Transported from OR on room air with adequate spontaneous ventilation    Post pain: adequate analgesia    Post assessment: no apparent anesthetic complications and tolerated procedure well    Post vital signs: stable    Level of consciousness: awake, alert and oriented    Nausea/Vomiting: no nausea/vomiting    Complications: none    Transfer of care protocol was followed      Last vitals:   Visit Vitals  /67   Pulse 64   Temp 36.3 °C (97.3 °F) (Temporal)   Resp 18   Ht 5' 8" (1.727 m)   Wt 118.8 kg (262 lb)   SpO2 99%   BMI 39.84 kg/m²     "

## 2019-09-03 NOTE — INTERVAL H&P NOTE
Иван Fuentes was interviewed, examined and the H and P reviewed.  There has been no interval change in his History and Physical.

## 2019-09-03 NOTE — PLAN OF CARE
Problem: Occupational Therapy Goal  Goal: Occupational Therapy Goal  Goals to be met by: 9/10/19     Patient will increase functional independence with ADLs by performing:    UE Dressing with [place].  LE Dressing with Modified [place] and Assistive Devices as needed.  Grooming while standing at sink with Modified [place].  Toileting from bedside commode with Modified [place] for hygiene and clothing management.   Bathing from  shower chair/bench with Modified [place].  Toilet transfer to bedside commode with Modified [place].  Increased functional strength to WFL for B UE.  Upper extremity exercise program x15 reps per handout, with independence.    POC initiated.

## 2019-09-03 NOTE — ANESTHESIA PROCEDURE NOTES
Adductor Canal Catheter    Patient location during procedure: pre-op   Block not for primary anesthetic.  Reason for block: at surgeon's request and post-op pain management   Post-op Pain Location: Left knee pain  Start time: 9/3/2019 6:20 AM  Timeout: 9/3/2019 6:19 AM   End time: 9/3/2019 6:30 AM    Staffing  Authorizing Provider: Shakila Razo MD  Performing Provider: Hemanth Lion MD    Preanesthetic Checklist  Completed: patient identified, site marked, surgical consent, pre-op evaluation, timeout performed, IV checked, risks and benefits discussed and monitors and equipment checked  Peripheral Block  Patient position: supine  Prep: ChloraPrep and site prepped and draped  Patient monitoring: heart rate, cardiac monitor, continuous pulse ox, continuous capnometry and frequent blood pressure checks  Block type: adductor canal  Laterality: left  Injection technique: continuous  Needle  Needle type: Tuohy   Needle gauge: 17 G  Needle length: 3.5 in  Needle localization: anatomical landmarks and ultrasound guidance  Catheter type: spring wound  Catheter size: 19 G  Test dose: lidocaine 1.5% with Epi 1-to-200,000 and negative   -ultrasound image captured on disc.  Assessment  Injection assessment: negative aspiration, negative parasthesia and local visualized surrounding nerve  Paresthesia pain: none  Heart rate change: no  Slow fractionated injection: yes  Additional Notes  VSS.  [facility] RN monitoring vitals throughout procedure.  Patient tolerated procedure well.

## 2019-09-03 NOTE — NURSING
Patient arrived to the floor at this time. Vital signs stable. No acute distress noted, respirations even and unlabored. Patient's surgical dressing to LLE is clean, dry, and intact. Patient reports no pain at this time. Questions and concerns addressed. Safety precautions implemented. Will continue to monitor the patient closely.

## 2019-09-04 VITALS
SYSTOLIC BLOOD PRESSURE: 149 MMHG | OXYGEN SATURATION: 97 % | HEIGHT: 68 IN | DIASTOLIC BLOOD PRESSURE: 68 MMHG | BODY MASS INDEX: 39.71 KG/M2 | HEART RATE: 71 BPM | TEMPERATURE: 97 F | WEIGHT: 262 LBS | RESPIRATION RATE: 20 BRPM

## 2019-09-04 LAB
CREAT SERPL-MCNC: 0.8 MG/DL (ref 0.5–1.4)
EST. GFR  (AFRICAN AMERICAN): >60 ML/MIN/1.73 M^2
EST. GFR  (NON AFRICAN AMERICAN): >60 ML/MIN/1.73 M^2
POCT GLUCOSE: 108 MG/DL (ref 70–110)
POCT GLUCOSE: 123 MG/DL (ref 70–110)

## 2019-09-04 PROCEDURE — 97530 THERAPEUTIC ACTIVITIES: CPT

## 2019-09-04 PROCEDURE — 36415 COLL VENOUS BLD VENIPUNCTURE: CPT

## 2019-09-04 PROCEDURE — 63600175 PHARM REV CODE 636 W HCPCS: Performed by: NURSE PRACTITIONER

## 2019-09-04 PROCEDURE — 99212 OFFICE O/P EST SF 10 MIN: CPT | Mod: GC,,, | Performed by: ANESTHESIOLOGY

## 2019-09-04 PROCEDURE — G0378 HOSPITAL OBSERVATION PER HR: HCPCS

## 2019-09-04 PROCEDURE — 97116 GAIT TRAINING THERAPY: CPT

## 2019-09-04 PROCEDURE — 25000003 PHARM REV CODE 250: Performed by: NURSE PRACTITIONER

## 2019-09-04 PROCEDURE — 97535 SELF CARE MNGMENT TRAINING: CPT

## 2019-09-04 PROCEDURE — 82565 ASSAY OF CREATININE: CPT

## 2019-09-04 PROCEDURE — 99212 PR OFFICE/OUTPT VISIT, EST, LEVL II, 10-19 MIN: ICD-10-PCS | Mod: GC,,, | Performed by: ANESTHESIOLOGY

## 2019-09-04 PROCEDURE — 97110 THERAPEUTIC EXERCISES: CPT

## 2019-09-04 PROCEDURE — 25000003 PHARM REV CODE 250: Performed by: ANESTHESIOLOGY

## 2019-09-04 PROCEDURE — 25000003 PHARM REV CODE 250: Performed by: ORTHOPAEDIC SURGERY

## 2019-09-04 RX ADMIN — SENNOSIDES,DOCUSATE SODIUM 1 TABLET: 8.6; 5 TABLET, FILM COATED ORAL at 09:09

## 2019-09-04 RX ADMIN — POLYETHYLENE GLYCOL 3350 17 G: 17 POWDER, FOR SOLUTION ORAL at 09:09

## 2019-09-04 RX ADMIN — OXYCODONE HYDROCHLORIDE 10 MG: 10 TABLET ORAL at 11:09

## 2019-09-04 RX ADMIN — LISINOPRIL 40 MG: 20 TABLET ORAL at 09:09

## 2019-09-04 RX ADMIN — ACETAMINOPHEN 1000 MG: 500 TABLET ORAL at 05:09

## 2019-09-04 RX ADMIN — ROPIVACAINE HYDROCHLORIDE 8 ML/HR: 2 INJECTION, SOLUTION EPIDURAL; INFILTRATION at 05:09

## 2019-09-04 RX ADMIN — ACETAMINOPHEN 1000 MG: 500 TABLET ORAL at 11:09

## 2019-09-04 RX ADMIN — LEVOTHYROXINE SODIUM 50 MCG: 50 TABLET ORAL at 09:09

## 2019-09-04 RX ADMIN — MUPIROCIN 1 G: 20 OINTMENT TOPICAL at 09:09

## 2019-09-04 RX ADMIN — CELECOXIB 200 MG: 200 CAPSULE ORAL at 09:09

## 2019-09-04 RX ADMIN — OXYCODONE HYDROCHLORIDE 10 MG: 10 TABLET ORAL at 03:09

## 2019-09-04 RX ADMIN — ATORVASTATIN CALCIUM 80 MG: 20 TABLET, FILM COATED ORAL at 09:09

## 2019-09-04 RX ADMIN — ASPIRIN 81 MG: 81 TABLET, COATED ORAL at 09:09

## 2019-09-04 RX ADMIN — OXYCODONE HYDROCHLORIDE 10 MG: 10 TABLET ORAL at 12:09

## 2019-09-04 RX ADMIN — FAMOTIDINE 20 MG: 20 TABLET, FILM COATED ORAL at 09:09

## 2019-09-04 RX ADMIN — OXYCODONE HYDROCHLORIDE 10 MG: 10 TABLET ORAL at 07:09

## 2019-09-04 NOTE — ANESTHESIA POSTPROCEDURE EVALUATION
Anesthesia Post Evaluation    Patient: Иван Fuentes    Procedure(s) Performed: Procedure(s) (LRB):  ARTHROPLASTY, KNEE, TOTAL-SANDIP (Left)    Final Anesthesia Type: CSE  Patient location during evaluation: PACU  Patient participation: Yes- Able to Participate  Level of consciousness: awake and alert  Post-procedure vital signs: reviewed and stable  Pain management: adequate  Airway patency: patent  PONV status at discharge: No PONV  Anesthetic complications: no      Cardiovascular status: hemodynamically stable  Respiratory status: unassisted, spontaneous ventilation and room air  Hydration status: euvolemic  Follow-up not needed.          Vitals Value Taken Time   /58 9/4/2019  4:45 AM   Temp 35.7 °C (96.3 °F) 9/4/2019  4:45 AM   Pulse 51 9/4/2019  7:23 AM   Resp 16 9/4/2019  4:45 AM   SpO2 96 % 9/4/2019  4:45 AM         Event Time     Out of Recovery 10:15:00          Pain/Edd Score: Pain Rating Prior to Med Admin: 7 (9/4/2019  7:02 AM)  Pain Rating Post Med Admin: 6 (9/3/2019 12:00 PM)  Edd Score: 10 (9/3/2019  2:30 PM)

## 2019-09-04 NOTE — PT/OT/SLP PROGRESS
Occupational Therapy   Treatment/ Discharge Summary    Name: Иван Fuentes  MRN: [id number]  Admitting Diagnosis:  Primary osteoarthritis of left knee  1 Day Post-Op    Recommendations:     Discharge Recommendations: home with home health  Discharge Equipment Recommendations:  commode, walker, rolling  Barriers to discharge:  None    Assessment:     Иван Fuentes is a 68 y.o. male with a medical diagnosis of Primary osteoarthritis of left knee.  He presents with no further acute OT needs. Pt is performing all self-care and functional mobility tasks safely and appropriately s/p L TKA. Pt will benefit from further therapy w/ HH to maximize his overall functional strength, independence and safety w/ all functional task performances in home environment      Rehab Prognosis:  Good; patient would benefit from acute skilled OT services to address these deficits and reach maximum level of function.       Plan:     Patient to be seen (d/c from acute OT - please reconsult if anything changes ) to address the above listed problems via self-care/home management, therapeutic activities, therapeutic exercises  · Plan of Care Expires: 09/10/19  · Plan of Care Reviewed with: patient, spouse    Subjective     Pain/Comfort:  · Pain Rating 1: (did not rate)  · Location - Side 1: Left  · Location 1: knee  · Pain Addressed 1: Pre-medicate for activity, Reposition, Distraction, Cessation of Activity  · Pain Rating Post-Intervention 1: (did not rate)    Objective:     Communicated with: RN prior to session.  Patient found HOB elevated with perineural catheter, telemetry, cryotherapy, FCD upon OT entry to room.    General Precautions: Standard, fall   Orthopedic Precautions:LLE weight bearing as tolerated   Braces: N/A     Occupational Performance:     Bed Mobility:    · Patient completed Scooting/Bridging with supervision  · Patient completed Supine to Sit with supervision     Functional Mobility/Transfers:  · Patient completed  Sit <> Stand Transfer with stand by assistance  with  rolling walker   · Patient completed Toilet Transfer Step Transfer technique with stand by assistance with  rolling walker  · Functional Mobility: Pt ambulated household distance w/ CGA, RW, and cueing for 3-point gait sequence w/ RW management. No LOB noted.     Activities of Daily Living:  · Grooming: supervision standing at sink brushing his teeth w/ good WB tolerance to L LE  · Bathing: modified independence sitting EOB using bath wipes  · Upper Body Dressing: independence donning overhead tshirt sitting EOB  · Lower Body Dressing: stand by assistance and cueing for adaptive sequence threading B feet into pant legs sitting EOB'  · Toileting: supervision standing at toilet voiding       [facility] 6 Click ADL: 23    Treatment & Education:  - OT POC  - Importance of OOB activity to maximize recovery   - safety w/ functional mobility; hand placement to ensure safe transfers to various surfaces in prep for ADLs  - Reviewed gait sequence and RW management via verbalization and demonstration   - educated on LBD adaptive techniques  - Polar ice management   - Encouraged to ambulate at least every hour to hour 1/2 within home environment.    Patient left up in chair with all lines intact, call button in reach, RN notified and spouse presentEducation:      GOALS:   Multidisciplinary Problems     Occupational Therapy Goals     Not on file          Multidisciplinary Problems (Resolved)        Problem: Occupational Therapy Goal    Goal Priority Disciplines Outcome Interventions   Occupational Therapy Goal   (Resolved)     OT, PT/OT Outcome(s) achieved    Description:  Goals to be met by: 9/10/19     Patient will increase functional independence with ADLs by performing:    UE Dressing with [place].  LE Dressing with Modified [place] and Assistive Devices as needed.  Grooming while standing at sink with Modified [place].  Toileting from bedside commode with  Modified [place] for hygiene and clothing management.   Bathing from  shower chair/bench with Modified [place].  Toilet transfer to bedside commode with Modified [place].  Increased functional strength to WFL for B UE.  Upper extremity exercise program x15 reps per handout, with independence.                      Time Tracking:     OT Date of Treatment: 09/04/19  OT Start Time: 0910  OT Stop Time: 0948  OT Total Time (min): 38 min    Billable Minutes:Self Care/Home Management 25  Therapeutic Activity 13    Soheila Chacon, OT  9/4/2019

## 2019-09-04 NOTE — NURSING
". Pt given medication from [facility] . Nurse instructed on filling polar ice .  Nurse reviewed medication  copy of discharged papers instructions. Pt denies pain at this time. Pt educated on signs and symptoms of when to call the doctor. All belongings with the patient . Pt verbalized understanding. Patient stated, " I'm going to eat before I leave. To go home.     "

## 2019-09-04 NOTE — SUBJECTIVE & OBJECTIVE
Principal Problem:Primary osteoarthritis of left knee    Principal Orthopedic Problem: same    Interval History: AFVSS. Pain controlled. Tolerated diet yesterday. Took a few steps with PT/OT in PACU.    Review of patient's allergies indicates:  No Known Allergies    Current Facility-Administered Medications   Medication    0.9%  NaCl infusion    acetaminophen tablet 1,000 mg    albuterol inhaler 2 puff    aspirin EC tablet 81 mg    atorvastatin tablet 80 mg    bisacodyl suppository 10 mg    celecoxib capsule 200 mg    dextrose 10% (D10W) Bolus    dextrose 10% (D10W) Bolus    dextrose 50% injection 12.5 g    diphenhydrAMINE capsule 25 mg    escitalopram oxalate tablet 20 mg    famotidine tablet 20 mg    fluticasone propionate 50 mcg/actuation nasal spray 50 mcg    glucagon (human recombinant) injection 1 mg    glucose chewable tablet 16 g    glucose chewable tablet 24 g    insulin aspart U-100 pen 1-10 Units    levothyroxine tablet 50 mcg    lisinopril tablet 40 mg    morphine injection 2 mg    morphine injection 2 mg    mupirocin 2 % ointment 1 g    naloxone 0.4 mg/mL injection 0.02 mg    naloxone 0.4 mg/mL injection 0.02 mg    ondansetron injection 4 mg    oxyCODONE immediate release tablet 10 mg    oxyCODONE immediate release tablet 5 mg    polyethylene glycol packet 17 g    pregabalin capsule 75 mg    promethazine (PHENERGAN) 6.25 mg in dextrose 5 % 50 mL IVPB    ramelteon tablet 8 mg    ropivacaine (PF) 2 mg/ml (0.2%) infusion    ropivacaine 0.2% ON-Q C-BLOC 400 ML (SELECT A FLOW)    senna-docusate 8.6-50 mg per tablet 1 tablet    sodium chloride 0.9% flush 10 mL     Objective:     Vital Signs (Most Recent):  Temp: 96.3 °F (35.7 °C) (09/04/19 0445)  Pulse: (!) 53 (09/04/19 0445)  Resp: 16 (09/04/19 0445)  BP: (!) 123/58 (09/04/19 0445)  SpO2: 96 % (09/04/19 0445) Vital Signs (24h Range):  Temp:  [96.3 °F (35.7 °C)-98 °F (36.7 °C)] 96.3 °F (35.7 °C)  Pulse:  [] 53  Resp:   "[14-22] 16  SpO2:  [93 %-100 %] 96 %  BP: (123-180)/(58-84) 123/58     Weight: 118.8 kg (262 lb)  Height: 5' 8" (172.7 cm)  Body mass index is 39.84 kg/m².      Intake/Output Summary (Last 24 hours) at 9/4/2019 0616  Last data filed at 9/4/2019 0000  Gross per 24 hour   Intake 3632.5 ml   Output 400 ml   Net 3232.5 ml       Ortho/SPM Exam   HEENT: normocephalic, atraumatic  Resp: no increased work of breathing  CV: regular rate and rhythm  MSK: moves b/l upper extremities well    left lower extremity:  Aquacel dressing c/d/i, no spotting.  Mild swelling, no ecchymoses or erythema  Sensation intact SP/DP/T/Sural/Saphenous nerves  Motor intact EHL/FHL/TA/gastroc  Palpable DP/PT pulses      Significant Labs: All pertinent labs within the past 24 hours have been reviewed.    Significant Imaging: I have reviewed all pertinent imaging results/findings.   "

## 2019-09-04 NOTE — PROGRESS NOTES
[facility]  Orthopedics  Progress Note    Patient Name: Иван Fuentes  MRN: [id number]  Admission Date: 9/3/2019  Hospital Length of Stay: 0 days  Attending Provider: Ever Hall MD  Primary Care Provider: Manuel Wiley MD  Follow-up For: Procedure(s) (LRB):  ARTHROPLASTY, KNEE, TOTAL-SANDIP (Left)    Post-Operative Day: 1 Day Post-Op  Subjective:     Principal Problem:Primary osteoarthritis of left knee    Principal Orthopedic Problem: same    Interval History: AFVSS. Pain controlled. Tolerated diet yesterday. Took a few steps with PT/OT in PACU.    Review of patient's allergies indicates:  No Known Allergies    Current Facility-Administered Medications   Medication    0.9%  NaCl infusion    acetaminophen tablet 1,000 mg    albuterol inhaler 2 puff    aspirin EC tablet 81 mg    atorvastatin tablet 80 mg    bisacodyl suppository 10 mg    celecoxib capsule 200 mg    dextrose 10% (D10W) Bolus    dextrose 10% (D10W) Bolus    dextrose 50% injection 12.5 g    diphenhydrAMINE capsule 25 mg    escitalopram oxalate tablet 20 mg    famotidine tablet 20 mg    fluticasone propionate 50 mcg/actuation nasal spray 50 mcg    glucagon (human recombinant) injection 1 mg    glucose chewable tablet 16 g    glucose chewable tablet 24 g    insulin aspart U-100 pen 1-10 Units    levothyroxine tablet 50 mcg    lisinopril tablet 40 mg    morphine injection 2 mg    morphine injection 2 mg    mupirocin 2 % ointment 1 g    naloxone 0.4 mg/mL injection 0.02 mg    naloxone 0.4 mg/mL injection 0.02 mg    ondansetron injection 4 mg    oxyCODONE immediate release tablet 10 mg    oxyCODONE immediate release tablet 5 mg    polyethylene glycol packet 17 g    pregabalin capsule 75 mg    promethazine (PHENERGAN) 6.25 mg in dextrose 5 % 50 mL IVPB    ramelteon tablet 8 mg    ropivacaine (PF) 2 mg/ml (0.2%) infusion    ropivacaine 0.2% ON-Q C-BLOC 400 ML (SELECT A FLOW)     "senna-docusate 8.6-50 mg per tablet 1 tablet    sodium chloride 0.9% flush 10 mL     Objective:     Vital Signs (Most Recent):  Temp: 96.3 °F (35.7 °C) (09/04/19 0445)  Pulse: (!) 53 (09/04/19 0445)  Resp: 16 (09/04/19 0445)  BP: (!) 123/58 (09/04/19 0445)  SpO2: 96 % (09/04/19 0445) Vital Signs (24h Range):  Temp:  [96.3 °F (35.7 °C)-98 °F (36.7 °C)] 96.3 °F (35.7 °C)  Pulse:  [] 53  Resp:  [14-22] 16  SpO2:  [93 %-100 %] 96 %  BP: (123-180)/(58-84) 123/58     Weight: 118.8 kg (262 lb)  Height: 5' 8" (172.7 cm)  Body mass index is 39.84 kg/m².      Intake/Output Summary (Last 24 hours) at 9/4/2019 0616  Last data filed at 9/4/2019 0000  Gross per 24 hour   Intake 3632.5 ml   Output 400 ml   Net 3232.5 ml       Ortho/SPM Exam   HEENT: normocephalic, atraumatic  Resp: no increased work of breathing  CV: regular rate and rhythm  MSK: moves b/l upper extremities well    left lower extremity:  Aquacel dressing c/d/i, no spotting.  Mild swelling, no ecchymoses or erythema  Sensation intact SP/DP/T/Sural/Saphenous nerves  Motor intact EHL/FHL/TA/gastroc  Palpable DP/PT pulses      Significant Labs: All pertinent labs within the past 24 hours have been reviewed.    Significant Imaging: I have reviewed all pertinent imaging results/findings.     Assessment/Plan:     * Primary osteoarthritis of left knee  Иван Fuentes is a 68 y.o. male POD1 s/p L TKA   - Antibiotics: post-op Ancef  - Weight bearing status: wbat  - Labs: reviewed  - DVT Prophylaxis: mechanical, ASA 81 mg BID  - Lines/Drains: PIV, PNC  - Pain control: multimodal per anesthesia    Dispo: home with       Constipation  - Home meds    Sleep apnea  - Home CPAP    Diabetes mellitus, type 2  - SSI    HTN (hypertension)  - Home meds    HLD (hyperlipidemia)  - Continue home statin          Otilia Rodriguez MD  Orthopedics  [facility]  "

## 2019-09-04 NOTE — PLAN OF CARE
"Problem: Physical Therapy Goal  Goal: Physical Therapy Goal  Goals to be met by: 9/10/19    Patient will increase functional independence with mobility by performin. Supine to sit with supervision  2. Sit to supine with supervision  3. Sit to stand transfer with Supervision  4. Gait x200 feet with Supervision using Rolling Walker  5. Ascend/Descend 1 curb step with Supervision using Rolling Walker  6. Lower extremity exercise program x30 reps per handout, with supervision       Outcome: Outcome(s) achieved Date Met: 19    Patient tolerated PT session fairly well today. He ambulated 50ft with RW and CGA. He was limited in ambulation distance due to left knee pain and fatigue. He ascended/descended 1 3" step with RW and CGA. He performed L LE therex x10 reps. He would benefit from  PT at discharge.       "

## 2019-09-04 NOTE — PLAN OF CARE
09/04/19 0957   Post-Acute Status   Post-Acute Authorization Placement;Other   Other Status See Comments     Patient expected to discharge with outpatient therapy, SW will continue to follow up.    Marcie Armendariz LMSW  [facility]   [id number]

## 2019-09-04 NOTE — PLAN OF CARE
Patient discharge home with spouse. Outpatient therapy. All dme at bedside.  Future Appointments   Date Time Provider Department Center   9/5/2019  2:00 PM Jarrell Cueva PT [facility] RE[facility]   9/17/2019  1:00 PM [facility] [address]     Payor: MEDICARE / Plan: MEDICARE PART A & B / Product Type: Government /         09/04/19 1432   Final Note   Assessment Type Final Discharge Note   Anticipated Discharge Disposition Home  (outpatient therapy)   What phone number can be called within the next 1-3 days to see how you are doing after discharge?   (spouse Viktoria [phone])   Hospital Follow Up  Appt(s) scheduled? Yes   Discharge plans and expectations educations in teach back method with documentation complete? Yes   Right Care Referral Info   Post Acute Recommendation No Care     Anca Villalba RN/BSN/CM  [facility]  [phone]  Ortho/IMK/IM

## 2019-09-04 NOTE — ADDENDUM NOTE
Addendum  created 09/04/19 1322 by Dong Patterson MD    Charge Capture section accepted, Cosign clinical note with attestation

## 2019-09-04 NOTE — PROGRESS NOTES
Pt and family present, consent to converting adductor PNC to On Q.  Site CDI.  Educated regarding continued pain management, fall risk, signs of complications, continued monitoring, as well as discontinuing catheter on 9/6.  Two numbers obtained for APS to follow up.  Understanding verbalized.

## 2019-09-04 NOTE — PLAN OF CARE
POD # 1 L TKA. This CM met with patient at bedside and spouse by phone. Spouse to provide transport at discharge. Discharge plan: care team recommend outpatient therapy, dme [facility], bsc. Patient states he will be going to [facility] on Thursday @ 2pm for therapy, all dme at bedside. Spouse is requesting he has HH first before outpatient therapy and she is requesting [facility]. I spoke with team concerning spouse request and was told let see how he does with therapy this am before decision of HH is discuss. Patient will discharge with outpatient therapy.      [facility] [place] - [address]  [address]  [place] [address]  Phone: [phone] Fax: [phone]    Payor: MEDICARE / Plan: MEDICARE PART A & B / Product Type: [facility] /         09/04/19 0744   Discharge Assessment   Assessment Type Discharge Planning Assessment   Confirmed/corrected address and phone number on facesheet? Yes   Assessment information obtained from? Patient   Expected Length of Stay (days) 3   Communicated expected length of stay with patient/caregiver yes   Prior to hospitilization cognitive status: Alert/Oriented   Prior to hospitalization functional status: Assistive Equipment   Current cognitive status: Alert/Oriented   Current Functional Status: Assistive Equipment   Facility Arrived From:   (brought in by spouse)   Lives With spouse   Able to Return to Prior Arrangements yes   Is patient able to care for self after discharge? Yes   Who are your caregiver(s) and their phone number(s)?   (spouse Viktoria [phone])   Patient's perception of discharge disposition home or selfcare;other (comments)  (outpatient therapy)   Readmission Within the Last 30 Days no previous admission in last 30 days   Patient currently being followed by outpatient case management? No   Patient currently receives any other outside agency services? No   Equipment Currently Used at Home rollator;cane, straight   Do you have any problems  affording any of your prescribed medications? No   Is the patient taking medications as prescribed? yes   Does the patient have transportation home? Yes   Transportation Anticipated family or friend will provide   Does the patient receive services at the [facility]? No   Discharge Plan A Other  (outpatient therapy)   DME Needed Upon Discharge  walker, rolling;bedside commode   Patient/Family in Agreement with Plan yes     Anca Villalba RN/BSN/LUCAS  [facility]  [phone]  Ortho/IMK/IM

## 2019-09-04 NOTE — ANESTHESIA POST-OP PAIN MANAGEMENT
Acute Pain Service Progress Note    HPI:  Иван Fuentes is a 68 y.o., male with HTN, HLD, T2DM, EVONNE (on CPAP), OA of left knee (s/p TKA on 9/3/2019).    Interval History:   Patient reports doing well this morning.  He has been utilizing PRN oxycodone.  Plan to work with PT/OT today.    Surgery:  ARTHROPLASTY, KNEE, TOTAL-SANDIP (Left Knee)    Post Op Day #: 1    Catheter type: perineural  saphenous     Infusion type: Ropivacaine 0.2%  8 mL/hr basal    Problem List:    Active Hospital Problems    Diagnosis  POA    *Primary osteoarthritis of left knee [M17.12]  Yes    Sleep apnea [G47.30]  Yes    Constipation [K59.00]  Yes    Diabetes mellitus, type 2 [E11.9]  Yes     Chronic    HTN (hypertension) [I10]  Yes    HLD (hyperlipidemia) [E78.5]  Yes      Resolved Hospital Problems   No resolved problems to display.       Subjective:     General appearance of alert, oriented, no complaints   Pain with rest: 5    Numbers   Pain with movement: 8    Numbers   Side Effects    1. Pruritis No    2. Nausea No    3. Motor Blockade No, 0=Ability to raise lower extremities off bed    4. Sedation No, 1=awake and alert    Objective:     Catheter site clean, dry, intact        Vitals   Vitals:    09/04/19 0750   BP: (!) 149/68   Pulse: (!) 56   Resp: 20   Temp: 36.3 °C (97.3 °F)        Labs    Admission on 09/03/2019   Component Date Value Ref Range Status    POCT Glucose 09/03/2019 117* 70 - 110 mg/dL Final    POCT Glucose 09/03/2019 118* 70 - 110 mg/dL Final    POCT Glucose 09/03/2019 142* 70 - 110 mg/dL Final    POCT Glucose 09/04/2019 108  70 - 110 mg/dL Final        Meds   Current Facility-Administered Medications   Medication Dose Route Frequency Provider Last Rate Last Dose    0.9%  NaCl infusion   Intravenous Continuous Charissa Singleton  mL/hr at 09/03/19 1730      acetaminophen tablet 1,000 mg  1,000 mg Oral Q6H Charissa Singleton NP   1,000 mg at 09/04/19 0507    albuterol inhaler 2 puff  2 puff  Inhalation Q6H PRN Otilia Rodriguez MD        aspirin EC tablet 81 mg  81 mg Oral BID [facility], NP   81 mg at 09/03/19 2042    atorvastatin tablet 80 mg  80 mg Oral Daily Otilia Rodriguez MD        bisacodyl suppository 10 mg  10 mg Rectal Q12H PRN [facility], NP        celecoxib capsule 200 mg  200 mg Oral Daily Dong Patterson MD        dextrose 10% (D10W) Bolus  12.5 g Intravenous PRN Otilia Rodriguez MD        dextrose 10% (D10W) Bolus  25 g Intravenous PRN Otilia Rodriguez MD        dextrose 50% injection 12.5 g  12.5 g Intravenous PRN Otilia Rodriguez MD        diphenhydrAMINE capsule 25 mg  25 mg Oral Nightly PRN Otilia Rodriguez MD        escitalopram oxalate tablet 20 mg  20 mg Oral QHS Otilia Rodriguez MD   20 mg at 09/03/19 2046    famotidine tablet 20 mg  20 mg Oral BID [facility] NP   20 mg at 09/03/19 2046    fluticasone propionate 50 mcg/actuation nasal spray 50 mcg  1 spray Each Nostril QHS Otilia Rodriguez MD   50 mcg at 09/03/19 2047    glucagon (human recombinant) injection 1 mg  1 mg Intramuscular PRN Otilia Rodriguez MD        glucose chewable tablet 16 g  16 g Oral PRN Otilia Rodriguez MD        glucose chewable tablet 24 g  24 g Oral PRN Otilia Rodriguez MD        insulin aspart U-100 pen 1-10 Units  1-10 Units Subcutaneous QID (AC + HS) PRN Otilia Rodriguez MD        levothyroxine tablet 50 mcg  50 mcg Oral Daily Otilia Rodriguez MD        lisinopril tablet 40 mg  40 mg Oral Daily Otilia Rodriguez MD        morphine injection 2 mg  2 mg Intravenous Q1H PRN Dong Patterson MD        morphine injection 2 mg  2 mg Intravenous Q1H PRN Dong Patterson MD        mupirocin 2 % ointment 1 g  1 g Nasal BID [facility], NP   1 g at 09/03/19 2047    naloxone 0.4 mg/mL injection 0.02 mg  0.02 mg Intravenous PRN [place]  KASSY Singleton        naloxone 0.4 mg/mL injection 0.02 mg  0.02 mg Intravenous PRN Dong Patterson MD        ondansetron injection 4 mg  4 mg Intravenous Q8H PRN Charissa Singleton NP        oxyCODONE immediate release tablet 10 mg  10 mg Oral Q3H PRN Dong Patterson MD   10 mg at 09/04/19 0702    oxyCODONE immediate release tablet 5 mg  5 mg Oral Q3H PRN Dong Patterson MD        polyethylene glycol packet 17 g  17 g Oral Daily Charissa Singleton NP   17 g at 09/03/19 0953    pregabalin capsule 75 mg  75 mg Oral QHS Dong Patterson MD   75 mg at 09/03/19 2043    promethazine (PHENERGAN) 6.25 mg in dextrose 5 % 50 mL IVPB  6.25 mg Intravenous Q6H PRN Charissa Singleton NP        ramelteon tablet 8 mg  8 mg Oral Nightly PRN Charissa Singleton NP   8 mg at 09/03/19 2104    ropivacaine (PF) 2 mg/ml (0.2%) infusion  8 mL/hr Perineural Continuous Charissa Singleton NP 8 mL/hr at 09/04/19 0506 8 mL/hr at 09/04/19 0506    ropivacaine 0.2% ON-Q C-BLOC 400 ML (SELECT A FLOW)   Perineural Continuous PRN Charissa Singleton NP        senna-docusate 8.6-50 mg per tablet 1 tablet  1 tablet Oral BID Charissa Singleton NP   1 tablet at 09/03/19 2045    sodium chloride 0.9% flush 10 mL  10 mL Intravenous PRN Pamela Wilson MD            Anticoagulant dose N/A.  ASA 81mg BID.    Assessment:     Pain control adequate    Plan:    -Patient doing well, continue present treatment.   - Continue home lisinopril 40mg po daily for HTN.  - Continue multimodal analgesia with Tylenol, Celebrex, Lyrica.  - Encouraged mobility and working with PT/OT.  - Likely home today with On-Q latoya, outpatient PT/OT    Nate Hassan MD  PGY-2 / CA-1  [facility]

## 2019-09-04 NOTE — ASSESSMENT & PLAN NOTE
Иван Fuentes is a 68 y.o. male POD1 s/p L TKA   - Antibiotics: post-op Ancef  - Weight bearing status: wbat  - Labs: reviewed  - DVT Prophylaxis: mechanical, ASA 81 mg BID  - Lines/Drains: PIV, PNC  - Pain control: multimodal per anesthesia    Dispo: home with

## 2019-09-04 NOTE — DISCHARGE SUMMARY
[facility]  Orthopedics  Discharge Summary      Patient Name: Иван Fuentes  MRN: [id number]  Admission Date: 9/3/2019  Hospital Length of Stay: 0 days  Discharge Date and Time:  09/04/2019 1:13 PM  Attending Physician: Ever Hall MD   Discharging Provider: Otilia Rodriguez MD  Primary Care Provider: Manuel Wiley MD    HPI:   Иван Fuentes is a 68 y.o. male with left knee osteoarthritis.     Procedure(s) (LRB):  ARTHROPLASTY, KNEE, TOTAL-SANDIP (Left)      Hospital Course:  Patient presented for above procedure.  Tolerated it well and was discharged home POD1 after voiding, tolerating diet, ambulating, pain controlled.  Discharge instructions, follow-up appointment, and med rec are below.      Consults (From admission, onward)        Status Ordering Provider     Inpatient consult to Pain Management  Once     Provider:  (Not yet assigned)    Acknowledged CHARISSA SINGLETON     Inpatient consult to Respiratory Care  Once     Provider:  (Not yet assigned)    Acknowledged CHARISSA SINGLETON     Inpatient consult to Social Work  Once     Provider:  (Not yet assigned)    Acknowledged CHARISSA SINGLETON          Significant Diagnostic Studies: No pertinent studies.    Pending Diagnostic Studies:     None        Final Active Diagnoses:    Diagnosis Date Noted POA    PRINCIPAL PROBLEM:  Primary osteoarthritis of left knee [M17.12] 04/30/2018 Yes    Sleep apnea [G47.30] 08/26/2019 Yes    Constipation [K59.00] 08/26/2019 Yes    Diabetes mellitus, type 2 [E11.9] 10/07/2014 Yes     Chronic    HTN (hypertension) [I10] 10/01/2014 Yes    HLD (hyperlipidemia) [E78.5] 10/01/2014 Yes      Problems Resolved During this Admission:      Discharged Condition: good    Disposition: Home or Self Care    Follow Up:  Follow-up Information     Charissa Singleton NP In 2 weeks.    Specialty:  Orthopedic Surgery  Why:  For wound re-check  Contact information:  [address]  [id number]  [phone]                 Patient Instructions:      Activity as tolerated     Call MD for:  difficulty breathing, headache or visual disturbances     Call MD for:  extreme fatigue     Call MD for:  hives     Call MD for:  persistent dizziness or light-headedness     Call MD for:  persistent nausea and vomiting     Call MD for:  redness, tenderness, or signs of infection (pain, swelling, redness, odor or green/yellow discharge around incision site)     Call MD for:  severe uncontrolled pain     Call MD for:  temperature >100.4     Keep surgical extremity elevated     Leave dressing on - Keep it clean, dry, and intact until clinic visit   Order Comments: Do not remove surgical dressing for 2 weeks post-op. This will be done only by MD at initial post-op visit. If dressing is completely saturated, replace with identical dressing - silver-impregnated hydrocolloid dressing.     Do not get dressings wet. Do not shower.     If dressing continues to be saturated or there are signs of infection, please call [facility] [phone] for further instructions and to make appt to be seen.     Lifting restrictions   Order Comments: No strenuous exercise or lifting of > 10 lbs     No driving, operating heavy equipment or signing legal documents while taking pain medication     Sponge bath only until clinic visit     Weight bearing as tolerated     Medications:  Reconciled Home Medications:      Medication List      START taking these medications    acetaminophen 650 MG Tbsr  Commonly known as:  TYLENOL  Take 1 tablet (650 mg total) by mouth every 8 (eight) hours. for 14 days  Replaces:  acetaminophen 500 MG tablet     celecoxib 200 MG capsule  Commonly known as:  CeleBREX  Take 1 capsule (200 mg total) by mouth once daily. for 14 days     oxyCODONE 5 MG immediate release tablet  Commonly known as:  ROXICODONE  Take 1 tablet (5 mg total) by mouth every 6 (six) hours as needed for Pain.        CHANGE how you take these  medications    aspirin 81 MG EC tablet  Commonly known as:  ECOTRIN  Take 1 tablet (81 mg total) by mouth 2 (two) times daily.  What changed:  when to take this        CONTINUE taking these medications    albuterol 90 mcg/actuation inhaler  Commonly known as:  VENTOLIN HFA  Inhale 2 puffs into the lungs every 6 (six) hours as needed for Wheezing. Rescue     atorvastatin 80 MG tablet  Commonly known as:  LIPITOR  Take by mouth once daily.     augmented betamethasone dipropionate 0.05 % cream  Commonly known as:  DIPROLENE-AF     diclofenac sodium 1 % Gel  Commonly known as:  VOLTAREN  Apply 2 g topically 3 (three) times daily.     diphenhydrAMINE 25 mg tablet  Commonly known as:  SOMINEX  Take 25 mg by mouth nightly as needed for Insomnia.     escitalopram oxalate 20 MG tablet  Commonly known as:  LEXAPRO  Take 20 mg by mouth once daily.     eszopiclone 3 mg Tab  Commonly known as:  LUNESTA  Take 3 mg by mouth every evening.     fluticasone propionate 50 mcg/actuation nasal spray  Commonly known as:  FLONASE  1 spray every evening.     furosemide 40 MG tablet  Commonly known as:  LASIX  40 mg daily as needed.     levothyroxine 50 MCG tablet  Commonly known as:  SYNTHROID  Take 50 mcg by mouth once daily.     lisinopril 40 MG tablet  Commonly known as:  PRINIVIL,ZESTRIL  Take 40 mg by mouth once daily.     LYRICA 100 MG capsule  Generic drug:  pregabalin  Take 100 mg by mouth 2 (two) times daily.     magnesium 250 mg Tab  Take by mouth every evening.     melatonin 10 mg Cap  Take by mouth every evening.     metFORMIN 500 MG tablet  Commonly known as:  GLUCOPHAGE  Take 500 mg by mouth 2 (two) times daily with meals.     PROBIOTIC ORAL  Take by mouth Daily.     STOOL SOFTENER ORAL  Take by mouth every evening.        STOP taking these medications    acetaminophen 500 MG tablet  Commonly known as:  TYLENOL  Replaced by:  acetaminophen 650 MG Tbsr     oxyCODONE-acetaminophen  mg per tablet  Commonly known as:   PERCOCET     traMADol 100 MG Tb24  Commonly known as:  ULTRAM-ER     TYLENOL PM ORAL            Otilia Rodriguez MD  Orthopedics  [facility]

## 2019-09-04 NOTE — ANESTHESIA RELEASE NOTE
"Anesthesia Release from PACU Note    Patient: Иван Fuentes    Procedure(s) Performed: Procedure(s) (LRB):  ARTHROPLASTY, KNEE, TOTAL-SANDIP (Left)    Anesthesia type: CSE    Post pain: Adequate analgesia    Post assessment: no apparent anesthetic complications and tolerated procedure well    Last Vitals:   Visit Vitals  BP (!) 123/58   Pulse (!) 51   Temp 35.7 °C (96.3 °F)   Resp 16   Ht 5' 8" (1.727 m)   Wt 118.8 kg (262 lb)   SpO2 96%   BMI 39.84 kg/m²       Post vital signs: stable    Level of consciousness: awake and alert     Nausea/Vomiting: no nausea/no vomiting    Complications: none    Airway Patency: patent    Respiratory: unassisted, spontaneous ventilation    Cardiovascular: stable and blood pressure at baseline    Hydration: euvolemic  "

## 2019-09-04 NOTE — PLAN OF CARE
Problem: Occupational Therapy Goal  Goal: Occupational Therapy Goal  Goals to be met by: 9/10/19     Patient will increase functional independence with ADLs by performing:    UE Dressing with [place].  LE Dressing with Modified [place] and Assistive Devices as needed.  Grooming while standing at sink with Modified [place].  Toileting from bedside commode with Modified [place] for hygiene and clothing management.   Bathing from  shower chair/bench with Modified [place].  Toilet transfer to bedside commode with Modified [place].  Increased functional strength to WFL for B UE.  Upper extremity exercise program x15 reps per handout, with independence.     Outcome: Outcome(s) achieved Date Met: 09/04/19  Pt has successfully achieved all of his functional outcomes for acute OT.     Comments: D/c from acute OT

## 2019-09-04 NOTE — PT/OT/SLP PROGRESS
"Physical Therapy Treatment and Discharge    Patient Name:  Иван Fuentes   MRN:  [id number]    Recommendations:     Discharge Recommendations:  home health PT   Discharge Equipment Recommendations: commode, walker, rolling   Barriers to discharge: easily fatigued with ambulation     Assessment:     Иван Fuentes is a 68 y.o. male admitted with a medical diagnosis of Primary osteoarthritis of left knee.  He presents with the following impairments/functional limitations:  weakness, impaired functional mobilty, gait instability, impaired balance, decreased lower extremity function, decreased ROM, orthopedic precautions, impaired skin, pain, impaired joint extensibility. Patient tolerated PT session fairly well today. He ambulated 50ft with RW and CGA. He was limited in ambulation distance due to left knee pain and fatigue. He ascended/descended 1 3" step with RW and CGA. He performed L LE therex x10 reps. He would benefit from  PT at discharge.     Rehab Prognosis: Good; patient would benefit from acute skilled PT services to address these deficits and reach maximum level of function.    Recent Surgery: Procedure(s) (LRB):  ARTHROPLASTY, KNEE, TOTAL-SANDIP (Left) 1 Day Post-Op    Plan:     During this hospitalization, patient to be seen daily to address the identified rehab impairments via therapeutic activities, therapeutic exercises, gait training and progress toward the following goals:    · Plan of Care Expires:  09/10/19    Subjective     Chief Complaint: Left knee pain.   Patient/Family Comments/goals: To walk without pain.   Pain/Comfort:  · Pain Rating 1: 7/10(with ambulation)  · Location - Side 1: Left  · Location 1: knee  · Pain Addressed 1: Pre-medicate for activity, Reposition, Distraction, Cessation of Activity      Objective:     Communicated with RN prior to session.  Patient found up in chair with perineural catheter, telemetry, cryotherapy, FCD upon PT entry to room.     General " "Precautions: Standard, fall   Orthopedic Precautions:LLE weight bearing as tolerated   Braces: N/A     Functional Mobility:  · Mat Mobility:     · Supine to Sit: supervision  · Sit to Supine: supervision  · Transfers:     · Sit to Stand:  stand by assistance with rolling walker x1 from bedside chair and x1 from mat   · Gait: He ambulated 50ft with RW and CGA. He was limited in ambulation distance due to left knee pain and fatigue. Patient demonstrated decreased ryan, decreased velocity of limb motion and decreased step length during gait due to impaired balance, pain, decreased ROM and decreased strength.  · Stairs:  Pt ascended/descended 3" curb step with Rolling Walker with Contact Guard Assistance. Verbal cues provided.       AM-PAC 6 CLICK MOBILITY  Turning over in bed (including adjusting bedclothes, sheets and blankets)?: 4  Sitting down on and standing up from a chair with arms (e.g., wheelchair, bedside commode, etc.): 4  Moving from lying on back to sitting on the side of the bed?: 4  Moving to and from a bed to a chair (including a wheelchair)?: 4  Need to walk in hospital room?: 4  Climbing 3-5 steps with a railing?: 3  Basic Mobility Total Score: 23       Therapeutic Activities and Exercises:  Patient educated in and performed L LE exercises x10 reps for ankle pumps, quad set, glute set, SAQ over bolster, heel slides, hip abd/add, SLR, and LAQ.    Patient educated in:  -PT role and POC  -safety with transfers including hand placement  -gait sequencing and RW management  -OOB activity to maximize recovery including ambulating every hour to hour and a half at home  -car transfer including stepping up backward onto running board   -stair training  -HEP for therex at home with handout provided    Patient left up in chair with all lines intact, call button in reach, RN notified and spouse and son present.    GOALS:   Multidisciplinary Problems     Physical Therapy Goals     Not on file          " Multidisciplinary Problems (Resolved)        Problem: Physical Therapy Goal    Goal Priority Disciplines Outcome Goal Variances Interventions   Physical Therapy Goal   (Resolved)     PT, PT/OT Outcome(s) achieved     Description:  Goals to be met by: 9/10/19    Patient will increase functional independence with mobility by performin. Supine to sit with supervision  2. Sit to supine with supervision  3. Sit to stand transfer with Supervision  4. Gait x200 feet with Supervision using Rolling Walker  5. Ascend/Descend 1 curb step with Supervision using Rolling Walker  6. Lower extremity exercise program x30 reps per handout, with supervision                         Time Tracking:     PT Received On: 19  PT Start Time: 1017     PT Stop Time: 1059  PT Total Time (min): 42 min     Billable Minutes: Gait Training  20 Therapeutic Activity 10 and Therapeutic Exercise 12    Treatment Type: Treatment  PT/PTA: PT        Bria Olivares, PT  2019

## 2019-09-05 ENCOUNTER — CLINICAL SUPPORT (OUTPATIENT)
Dept: REHABILITATION | Facility: HOSPITAL | Age: 69
End: 2019-09-05
Payer: MEDICARE

## 2019-09-05 DIAGNOSIS — R26.9 ABNORMALITY OF GAIT AND MOBILITY: ICD-10-CM

## 2019-09-05 DIAGNOSIS — M62.81 MUSCLE WEAKNESS: Primary | ICD-10-CM

## 2019-09-05 PROCEDURE — 97162 PT EVAL MOD COMPLEX 30 MIN: CPT

## 2019-09-05 PROCEDURE — 97110 THERAPEUTIC EXERCISES: CPT

## 2019-09-05 PROCEDURE — 97116 GAIT TRAINING THERAPY: CPT

## 2019-09-05 NOTE — PLAN OF CARE
Physical Therapy Evaluation/Plan of Care    Name: Иван Fuentes 1950  Clinic Number: [id number]    Diagnosis: No diagnosis found.  Physician: Ever Hall MD  Treatment Orders: PT Eval and Treat    Past Medical History:   Diagnosis Date    Arthritis of both knees     BPH (benign prostatic hyperplasia)     CVA (cerebral vascular accident)     Deep vein thrombosis     Diabetes mellitus     Diabetes mellitus, type 2     High cholesterol     Hypertension     Left-sided weakness     Obese     Sleep apnea     Thyroid disease     Urosepsis      Current Outpatient Medications   Medication Sig    acetaminophen (TYLENOL) 650 MG TbSR Take 1 tablet (650 mg total) by mouth every 8 (eight) hours. for 14 days    albuterol (VENTOLIN HFA) 90 mcg/actuation inhaler Inhale 2 puffs into the lungs every 6 (six) hours as needed for Wheezing. Rescue    aspirin (ECOTRIN) 81 MG EC tablet Take 1 tablet (81 mg total) by mouth 2 (two) times daily.    atorvastatin (LIPITOR) 80 MG tablet Take by mouth once daily.     augmented betamethasone dipropionate (DIPROLENE-AF) 0.05 % cream     celecoxib (CELEBREX) 200 MG capsule Take 1 capsule (200 mg total) by mouth once daily. for 14 days    diclofenac sodium (VOLTAREN) 1 % Gel Apply 2 g topically 3 (three) times daily.    diphenhydrAMINE (SOMINEX) 25 mg tablet Take 25 mg by mouth nightly as needed for Insomnia.    docusate sodium (STOOL SOFTENER ORAL) Take by mouth every evening.    escitalopram oxalate (LEXAPRO) 20 MG tablet Take 20 mg by mouth once daily.     eszopiclone 3 mg Tab Take 3 mg by mouth every evening.    fluticasone (FLONASE) 50 mcg/actuation nasal spray 1 spray every evening.     furosemide (LASIX) 40 MG tablet 40 mg daily as needed.     Lactobacillus acidophilus (PROBIOTIC ORAL) Take by mouth Daily.    levothyroxine (SYNTHROID) 50 MCG tablet Take 50 mcg by mouth once daily.    lisinopril (PRINIVIL,ZESTRIL) 40 MG tablet Take 40 mg by mouth  once daily.     LYRICA 100 mg capsule Take 100 mg by mouth 2 (two) times daily.    magnesium 250 mg Tab Take by mouth every evening.    melatonin 10 mg Cap Take by mouth every evening.    metformin (GLUCOPHAGE) 500 MG tablet Take 500 mg by mouth 2 (two) times daily with meals.    oxyCODONE (ROXICODONE) 5 MG immediate release tablet Take 1 tablet (5 mg total) by mouth every 6 (six) hours as needed for Pain.     No current facility-administered medications for this visit.      Review of patient's allergies indicates:  No Known Allergies  Precautions: per TKA protocol, fall/safety    Evaluation Date: 9/5/19  Time In: 2:10 pm  Time Out: 3:25 pm  Total Time: 60 min  Visit # authorized: 12  Authorization period: 12/31/19  Plan of care Expiration: 12/01/19    Subjective     Onset Date: 9/3/19  Prior Level of Function: mod independent ADL's with use of AE, gait with AD (rollator) in home environment, requires wheelchair/scooter in community environment  Current level of Function: patient requires assistance for all ADL's and functional mobility with pain and weakness/deconditoning limiting  Equipment owned: rollator, forward wheel walker, cane, motorized wheelchair, scooter, BSC, shower chair  Social History: patient lives with his wife in a single story home in [facility] History: Иван Fuentes is a 68 y.o. 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  Occupation: retired    History of Present Illness: Иван is a 68 y.o. male that presents to [facility] secondary to left TKA on 9/3/19. Иван reports progressive left knee pain secondary to OA with history of fall when using rollator ~1 month ago. Spouse present during eval assisting in providing history. Patient has multiple comorbidities which have impacted  "his level of mobility and self care beginning in 2014 following cervical fusion with resultant spinal cord injury requiring extensive inpatient and outpatient physical therapy to rehab. He also had a CVA a few days after having hip replacement surgery in 2008. More recent history includes decline in functional status over the past year. Spouse states he is fairly sedentary with gait limited to home environment and patient having difficulty ambulating the 30 yards from his bedroom to the bathroom. She also reports that he uses a rollator and has tendency to lean forward on it resting his forearms on the handles which he has been doing for ~ a year. He is referred to OP PT for aggressive rehab following left TKA to maximize independence with ADL's and functional mobility.    Imaging: X-ray taken postpoeratively revealed Postoperative changes are now identified relating to an interval left hip arthroplasty procedure, the prostheses appearing unremarkable.  No unusual postoperative findings or significant detrimental change since the preoperative examination of 08/26/2019 appreciated.  Again incidentally noted are a small benign exostosis arising from the medial aspect of the tibial metaphysis and prominent arterial vascular calcification in the soft tissues of the distal thigh/popliteal fossa..    Pain: current 2-3/10, worst 7/10, best 2-3/10, Aching, Throbbing and Tight, constant  Radicular symptoms: none reported  Aggravating factors: weight bearing, ROM  Easing factors: rest, cryotherapy, pain meds, and rest/elevation    Pts goals: "to walk without a walker"     Onset/NIDHI: gradual and progressive OA left knee  Primary concern/ Chief complaints: weakness, gait instability    Objective     Observation: Patient is a well developed, well nourished male presenting into clinic in W/C with perineural catheter left knee, in NAD.  Posture: slouched sitting posture, moderate trunk and hip flexion in standing with decreased " "left knee extension, and decreased weight shift to left    Range of Motion:   Knee Left active Left Passive Right Active   Flexion 94 degrees 98 degrees 110 degrees   Extension -20 degrees -18 degrees -6 degrees     Lower Extremity Strength  Right LE  Left LE    Knee extension: 5/5 Knee extension: 2+/5   Knee flexion: 5/5 Knee flexion: 3/5   Hip flexion: 4/5 Hip flexion: 3-/5   Hip extension:  4/5 Hip extension: 3/5   Hip abduction: 5/5 Hip abduction: 3-/5   Hip adduction: 5/5 Hip adduction 4/5   Ankle dorsiflexion: 5/5 Ankle dorsiflexion: 4/5   Ankle plantarflexion: 5/5 Ankle plantarflexion: 4+/5     Special Tests:  NT secondary to surgical procedure/precautions    Function:  - SLS R: unable to perform  - SLS L: unable to perfrom  - Squat: NT   - Sit <--> Stand:SBA and verbal cueing for safe/correct technique   - Bed Mobility: min assist for LLE management     Joint Mobility: decreased left knee  Palpation: global post op tenderness left knee and lower leg   Sensation: gross sensation intact, no reported N/T  Flexibility:     90/90 SLR = R minimal restriction, L moderate restriction   Ely's test: R NT, L NT   Delfina's test: R moderate restriction, L moderate restriction   Dung test: R moderate restriction, L moderate restriction    Edema: moderate edema left knee with warmth and erythema present    Functional Limitations Reports   Category: mobilty  Tool: LEFS  Score: 85% impairment (12 points)    TREATMENT:  Иван received therapeutic exercises to develop strength and endurance, flexibility for 20 minutes including:    Quad sets with sl flex with 5" hold x 10  Assisted heel slides with strap and SB x 10  Assisted hip abd with SB x 10  TKE's x 10  Assisted SLR x 10  Seated LAQ's x 10  Seated heel/toe raises x 10    Иван  received the following manual therapy techniques x 10 min. To include Joint mobilizations and Soft tissue Mobilization were applied to the: left knee To include: patellar mobility, passive HS " "stretch, passive knee flexion     Gait training x 10 min: with RW and CGA 15' x 1, 30' x 1 on level tile with CGA, verbal and tactile required cueing for upright posture, increased stride length, and increased left hip and knee ROM    Additional treatment:     After returning to sit from initial gait training of 15' using RW, PT noted small amount of fluid on patients shorts at location of PNC. Area assessed and catheter noted to have backed out ~1/2 cm with slow leakage present. Fortunately our anesthesiologist, Dr Caesar Baeza, was readily available and agreeable to assess and offer advice. Patient was given the option to pull PNC out completely or reinforce with tegaderm dressing. Patient and spouse elected to have dressing reinforced and they will remove PCN at home tomorrow at noon as initially instructed by MD. Catheter was reinforced using 2 tegaderm dressings with no further breakthrough drainage noted. Strap lengthened on PCN bag which patient wears around his neck to prevent further strain on catheter and tubing.    Home Exercises and Patient Education Provided    Education provided re: ice and elevation of left LE at least 3 times per day for pain and swelling, avoiding use of pillow under knee at rest to facilitate extension, of forward wheeled walker for gait, not rollator  - progress towards goals   - role of therapy in multi - disciplinary team, goals for therapy  - importance of HEP in carry over between treatment sessions and best rehab outcome  Pt educated on condition, POC, and expectations in therapy.  No spiritual or educational barriers to learning provided    Home exercises: quad sets in sl flex with 5" hold, heel slides, TKE's, seated LAQ's, seated heel/toe raises, seated heel slides, seated HS stretch, exercises to be performed per written instructions provided  Pt will be provided HEP during course of treatment with progressions as appropriate. Pt was advised to perform these exercises free " of pain, and to stop performing them if pain occurs.   Иван demonstrated good  understanding of the education provided.     PT Evaluation Completed: Yes  Discussed Plan of Care with patient: Yes    Assessment     This is a 68 y.o. male referred to outpatient physical therapy and presents with a medical diagnosis of s/p left TKA and demonstrates limitations as described in the problem list. Pt will benefit from physcial therapy services in order to maximize pain free and/or functional use of left LE. The following goals were discussed with the patient and patient is in agreement with them as to be addressed in the treatment plan. Pt was given a HEP consisting of left knee strengthening and flexibility exercises as noted above. Pt verbally understood the instructions as they were given and demonstrated proper form and technique during therapy. Pt was advise to perform these exercises free of pain, and to stop performing them if pain occurs.     Anticipated barriers to physical therapy: none identified    Medical necessity is demonstrated by the following IMPAIRMENTS/PROBLEM LIST:  weakness, impaired endurance, impaired self care skills, impaired functional mobility, gait instability, impaired balance, decreased coordination, decreased lower extremity function, pain, decreased ROM and edema     GOALS:   Long Term Goals: 12 weeks  Pain: Decrease pain to 2/10 to allow for improved ADL's and exercise tolerance  Strength: Improve strength in left knee to 5/5 for improved LE stability  ROM: Improve ROM to 80% of normal limits   Functional scale: Improve score on LEFS to < / = 75%  Walking: Increase walking distance/duration to > / = 200' with least restrictive AD without increase pain  Postures: Increase sitting and/or standing duration to 20 minutes without increase pain   Transfers: Perform supine <> sit, sit to stand and car transfers without increased pain or limitation  Exercise: demonstrate independence with home  exercise program to maintain gains made in therapy.      Plan     Pt will be treated by physical therapy 2-3 times a week for 12 weeks for Pt Education, HEP, therapeutic exercises, neuromuscular re-education, joint mobilizations, modalities prn to achieve established goals. Иван may at times be seen by a PTA as part of the Rehab Team.     Cont PT for 12  weeks.     Jarrell Cueva, PT    I certify the need for these services furnished under this plan of treatment and while under my care.______________________________ Physician/Referring Practitioner  Date of Signature

## 2019-09-05 NOTE — PATIENT INSTRUCTIONS
Place ____ pound weight on left ankle and straighten knee fully, lower slowly.  Repeat ____ times per set. Do ____ sets per session. Do ____ sessions per day.     https://[employer].us/732     Copyright © [employer]. All rights reserved.

## 2019-09-05 NOTE — ANESTHESIA POST-OP PAIN MANAGEMENT
Attempted to reach pt by phone to discuss post-operative pain control/management.  No answer x2.  Voicemail left.  Will attempt again tomorrow.

## 2019-09-05 NOTE — PROGRESS NOTES
See Initial Evaluation in the POC section                PT met face to face with Shantanu Willson PTA to discuss patient's treatment plan and progress towards established goals.  Treatment will be continued as described in initial report/eval and progress notes.  Patient will be seen by physical therapist every sixth visit and minimally once per month.    Additional information: N/A

## 2019-09-07 NOTE — ANESTHESIA POST-OP PAIN MANAGEMENT
Spoke with pt over the phone.  Reports doing well since discharge with pain well-controlled..  Went to outpatient PT yesterday.  States pulled PNC this afternoon without issue.

## 2019-09-10 ENCOUNTER — DOCUMENTATION ONLY (OUTPATIENT)
Dept: REHABILITATION | Facility: HOSPITAL | Age: 69
End: 2019-09-10

## 2019-09-10 ENCOUNTER — HOSPITAL ENCOUNTER (EMERGENCY)
Facility: HOSPITAL | Age: 69
Discharge: HOME | End: 2019-09-11
Attending: EMERGENCY MEDICINE
Payer: MEDICARE

## 2019-09-10 DIAGNOSIS — M79.89 LEFT LEG SWELLING: Primary | ICD-10-CM

## 2019-09-10 PROCEDURE — 85025 COMPLETE CBC W/AUTO DIFF WBC: CPT

## 2019-09-10 PROCEDURE — 86140 C-REACTIVE PROTEIN: CPT

## 2019-09-10 PROCEDURE — 99284 EMERGENCY DEPT VISIT MOD MDM: CPT | Mod: 25

## 2019-09-10 PROCEDURE — 85652 RBC SED RATE AUTOMATED: CPT

## 2019-09-10 PROCEDURE — 80053 COMPREHEN METABOLIC PANEL: CPT

## 2019-09-10 PROCEDURE — 99284 EMERGENCY DEPT VISIT MOD MDM: CPT | Mod: ,,, | Performed by: PHYSICIAN ASSISTANT

## 2019-09-10 PROCEDURE — 36000 PLACE NEEDLE IN VEIN: CPT

## 2019-09-10 PROCEDURE — 99284 PR EMERGENCY DEPT VISIT,LEVEL IV: ICD-10-PCS | Mod: ,,, | Performed by: PHYSICIAN ASSISTANT

## 2019-09-11 VITALS
HEART RATE: 67 BPM | SYSTOLIC BLOOD PRESSURE: 165 MMHG | HEIGHT: 68 IN | OXYGEN SATURATION: 97 % | BODY MASS INDEX: 39.71 KG/M2 | DIASTOLIC BLOOD PRESSURE: 75 MMHG | RESPIRATION RATE: 19 BRPM | TEMPERATURE: 99 F | WEIGHT: 262 LBS

## 2019-09-11 LAB
ALBUMIN SERPL BCP-MCNC: 3.3 G/DL (ref 3.5–5.2)
ALP SERPL-CCNC: 84 U/L (ref 55–135)
ALT SERPL W/O P-5'-P-CCNC: 25 U/L (ref 10–44)
ANION GAP SERPL CALC-SCNC: 10 MMOL/L (ref 8–16)
AST SERPL-CCNC: 26 U/L (ref 10–40)
BASOPHILS # BLD AUTO: 0.19 K/UL (ref 0–0.2)
BASOPHILS NFR BLD: 1.4 % (ref 0–1.9)
BILIRUB SERPL-MCNC: 0.3 MG/DL (ref 0.1–1)
BUN SERPL-MCNC: 27 MG/DL (ref 8–23)
CALCIUM SERPL-MCNC: 9.9 MG/DL (ref 8.7–10.5)
CHLORIDE SERPL-SCNC: 103 MMOL/L (ref 95–110)
CO2 SERPL-SCNC: 24 MMOL/L (ref 23–29)
CREAT SERPL-MCNC: 0.9 MG/DL (ref 0.5–1.4)
CRP SERPL-MCNC: 40.5 MG/L (ref 0–8.2)
DIFFERENTIAL METHOD: ABNORMAL
EOSINOPHIL # BLD AUTO: 2.2 K/UL (ref 0–0.5)
EOSINOPHIL NFR BLD: 16.5 % (ref 0–8)
ERYTHROCYTE [DISTWIDTH] IN BLOOD BY AUTOMATED COUNT: 13.9 % (ref 11.5–14.5)
ERYTHROCYTE [SEDIMENTATION RATE] IN BLOOD BY WESTERGREN METHOD: 39 MM/HR (ref 0–23)
EST. GFR  (AFRICAN AMERICAN): >60 ML/MIN/1.73 M^2
EST. GFR  (NON AFRICAN AMERICAN): >60 ML/MIN/1.73 M^2
GLUCOSE SERPL-MCNC: 114 MG/DL (ref 70–110)
HCT VFR BLD AUTO: 50.1 % (ref 40–54)
HGB BLD-MCNC: 15.7 G/DL (ref 14–18)
IMM GRANULOCYTES # BLD AUTO: 0.42 K/UL (ref 0–0.04)
IMM GRANULOCYTES NFR BLD AUTO: 3.1 % (ref 0–0.5)
LYMPHOCYTES # BLD AUTO: 3.2 K/UL (ref 1–4.8)
LYMPHOCYTES NFR BLD: 23.2 % (ref 18–48)
MCH RBC QN AUTO: 32 PG (ref 27–31)
MCHC RBC AUTO-ENTMCNC: 31.3 G/DL (ref 32–36)
MCV RBC AUTO: 102 FL (ref 82–98)
MONOCYTES # BLD AUTO: 1.2 K/UL (ref 0.3–1)
MONOCYTES NFR BLD: 8.5 % (ref 4–15)
NEUTROPHILS # BLD AUTO: 6.4 K/UL (ref 1.8–7.7)
NEUTROPHILS NFR BLD: 47.3 % (ref 38–73)
NRBC BLD-RTO: 0 /100 WBC
PLATELET # BLD AUTO: 228 K/UL (ref 150–350)
PLATELET BLD QL SMEAR: ABNORMAL
PMV BLD AUTO: 9.7 FL (ref 9.2–12.9)
POTASSIUM SERPL-SCNC: 4.9 MMOL/L (ref 3.5–5.1)
PROT SERPL-MCNC: 7.6 G/DL (ref 6–8.4)
RBC # BLD AUTO: 4.9 M/UL (ref 4.6–6.2)
SODIUM SERPL-SCNC: 137 MMOL/L (ref 136–145)
WBC # BLD AUTO: 13.57 K/UL (ref 3.9–12.7)

## 2019-09-11 PROCEDURE — 25000003 PHARM REV CODE 250: Performed by: PHYSICIAN ASSISTANT

## 2019-09-11 RX ORDER — SULFAMETHOXAZOLE AND TRIMETHOPRIM 800; 160 MG/1; MG/1
1 TABLET ORAL
Status: COMPLETED | OUTPATIENT
Start: 2019-09-11 | End: 2019-09-11

## 2019-09-11 RX ORDER — SULFAMETHOXAZOLE AND TRIMETHOPRIM 800; 160 MG/1; MG/1
1 TABLET ORAL 2 TIMES DAILY
Qty: 20 TABLET | Refills: 0 | Status: SHIPPED | OUTPATIENT
Start: 2019-09-11 | End: 2019-09-21

## 2019-09-11 RX ADMIN — SULFAMETHOXAZOLE AND TRIMETHOPRIM 1 TABLET: 800; 160 TABLET ORAL at 03:09

## 2019-09-11 NOTE — ED PROVIDER NOTES
Encounter Date: 9/10/2019       History     Chief Complaint   Patient presents with    Post-op Problem     knee replacement last tuesday. Pt told to come in by rehab for DVT evaluation. Knee red, wwarm to touch, and swollen. Denies SOB, CP     68-year-old male to the ER for evaluation of left lower extremity swelling and redness.  The patient is 1 week post left knee replacement with Orthopedic surgery here.  He has been doing well.  Swelling had improved.  Today when he reported for rehab therapist told him that he needed to call his doctor out of concern for a possible DVT.   Patient denies any trauma or injury.  History of DVT resulting in a CVA 10 years ago not on anticoagulation other than baby aspirin at this time.  He reports significant pain to the left lower extremity new over the past 24-36 hr.  He denies any fevers or chills.        Review of patient's allergies indicates:  No Known Allergies  Past Medical History:   Diagnosis Date    Arthritis of both knees     BPH (benign prostatic hyperplasia)     CVA (cerebral vascular accident)     Deep vein thrombosis     Diabetes mellitus     Diabetes mellitus, type 2     High cholesterol     Hypertension     Left-sided weakness     Obese     Sleep apnea     Thyroid disease     Urosepsis      Past Surgical History:   Procedure Laterality Date    ARTHROPLASTY, KNEE, TOTAL-SANDIP Left 9/3/2019    Performed by Ever Hall MD at [facility] OR 2ND FLR    BACK SURGERY      BLOCK, NERVE, GENICULAR, with steroid Left 3/21/2019    [facility] by [facility] at [facility] PAIN MGT    BLOCK-NERVE-GENICULAR Left 8/7/2018    Performed by Dania [facility] at [facility] PAIN MGT    CRYOTHERAPY, NERVE, PERIPHERAL, PERCUTANEOUS, USING LIQUID NITROUS OXIDE IN CLOSED NEEDLE DEVICE LEFT KNEE SIMON Left 8/26/2019    Performed by MENG Beckwith at [facility] CATH LAB    HIP ARTHROPLASTY      INJECTION-JOINT Left 5/10/2018    Performed by Dania CORONA  MD Jose at [facility] PAIN MGT    JOINT REPLACEMENT      LAMINECTOMY-CERVICAL/FUSION-POSTERIOR N/A 10/9/2014    Performed by Parker Zarate MD at [facility] OR [address] [place] [address]    PROSTATE SURGERY  2015    SPINAL FUSION  09/2014    TONSILLECTOMY       Family History   Problem Relation Age of Onset    Hypertension Brother     Diabetes Mellitus Mother     Heart attack Neg Hx     Heart disease Neg Hx      Social History     Tobacco Use    Smoking status: Current Every Day Smoker     Packs/day: 0.50     Types: Cigarettes    Smokeless tobacco: Never Used   Substance Use Topics    Alcohol use: Not Currently     Comment: history of alcohol excess until 2011    Drug use: No     Review of Systems   Constitutional: Negative for fever.   HENT: Negative for sore throat.    Respiratory: Negative for shortness of breath.    Cardiovascular: Negative for chest pain.   Gastrointestinal: Negative for nausea.   Genitourinary: Negative for dysuria.   Musculoskeletal: Negative for back pain.        Left lower extremity swelling and pain   Skin: Positive for color change. Negative for rash.   Neurological: Negative for weakness.   Hematological: Does not bruise/bleed easily.       Physical Exam     Initial Vitals [09/10/19 2000]   BP Pulse Resp Temp SpO2   (!) 142/71 72 16 98.9 °F (37.2 °C) (!) 93 %      MAP       --         Physical Exam    Constitutional: Vital signs are normal. He appears well-developed and well-nourished. He is not diaphoretic. No distress.   HENT:   Head: Normocephalic and atraumatic.   Right Ear: External ear normal.   Left Ear: External ear normal.   Eyes: Conjunctivae are normal.   Cardiovascular: Normal rate, regular rhythm and normal heart sounds. Exam reveals no gallop and no friction rub.    No murmur heard.  Pulmonary/Chest: No respiratory distress. He has no wheezes. He has no rhonchi. He has no rales. He exhibits no tenderness.   Abdominal: Soft. Normal appearance and bowel sounds are normal. There is no  tenderness.   Musculoskeletal: Normal range of motion.   Significant swelling and redness noted to the left lower extremity from the thigh down through the foot  Pulses not palpable in the foot secondary to edema    The leg is warm and red and tender  Range of motion of the joints limited to swelling and pain   Neurological: He is alert and oriented to person, place, and time.   Skin: Skin is warm and intact.   Psychiatric: He has a normal mood and affect. His speech is normal and behavior is normal. Cognition and memory are normal.         ED Course   Procedures  Labs Reviewed   SEDIMENTATION RATE - Abnormal; Notable for the following components:       Result Value    Sed Rate 39 (*)     All other components within normal limits   CBC W/ AUTO DIFFERENTIAL   COMPREHENSIVE METABOLIC PANEL   C-REACTIVE PROTEIN          Imaging Results    None          Medical Decision Making:   History:   Old Medical Records: I decided to obtain old medical records.  Old Records Summarized: other records.  Initial Assessment:   68-year-old male with left lower extremity swelling redness and warmth  Differential Diagnosis:   DVT, cellulitis, postoperative infection, abscess  ED Management:  Case discussed with Orthopedic surgery  We will get basic labs and an ultrasound and reassess  No acute findings on ultrasound  Labs show mild elevation of white blood cell count.   Case discussed with Orthopedic surgery and they have seen the patient in the ER.       Plan:  Start patient on Bactrim and discharged home.  Follow up in clinic later this week  Other:   I discussed test(s) with the performing physician.  I have discussed this case with another health care provider.                      Clinical Impression:       ICD-10-CM ICD-9-CM   1. Left leg swelling M79.89 729.81         Disposition:   Disposition: Discharged  Condition: Stable                        Austyn Stringer PA-C  09/11/19 0316

## 2019-09-11 NOTE — ED TRIAGE NOTES
Pt with history of L knee replacement on 9/3, went to rehab today and was told to come to ED to be evaluated for possible DVT to LLE. Pt reports swelling has been going on since surgery, states he has been elevating leg at home with ice without relief of swelling. Pt has redness with 3+ pitting edema to LLE. No swelling/drainage to dressing, pt denies any pain to extremity. Denies CP/SOB, fevers/chills, N/V/D at home. Hx of stroke following hip replacement approx 10 years ago from a DVT.    Patient Identifiers for Иван Fuentes checked and correct  LOC: The patient is awake, alert and aware of environment with an appropriate affect, the patient is oriented x 3 and speaking appropriate.  APPEARANCE: Patient resting comfortably and in no acute distress, patient is clean and well groomed, patient's clothing is properly fastened.  SKIN: The skin is warm and dry, patient has normal skin turgor and moist mucus membranes,no rashes or lesions.Skin Intact , No Breakdown Noted  Musculoskeletal :  Normal range of motion noted. Moves all extremeties well, No swelling or tenderness noted  RESPIRATORY: Airway is open and patent, respirations are spontaneous, patient has a normal effort and rate.  CARDIAC: Patient has a normal rate and rhythm, no periphreal edema noted, capillary refill < 3 seconds.   ABDOMEN: Soft and non tender to palpation, no distention noted.   PULSES: 2+  And symmetrical in all extremeties  NEUROLOGIC: PERRL. facial expression is symmetrical, patient moving all extremities, normal sensation in all extremities when touched with a finger.The patient is awake, alert and cooperative with a calm affect, patient is aware of environment.    Will continue to monitor

## 2019-09-11 NOTE — DISCHARGE INSTRUCTIONS
Take Bactrim as directed until complete  Follow up with Orthopedics later this week, call for an appointment  Return to the ER for any new symptoms    Our goal in the emergency department is to always give you outstanding care and exceptional service. You may receive a survey by mail or e-mail in the next week regarding your experience in our ED. We would greatly appreciate your completing and returning the survey. Your feedback provides us with a way to recognize our staff who give very good care and it helps us learn how to improve when your experience was below our aspiration of excellence.

## 2019-09-11 NOTE — ED NOTES
"Pt wife states "How much longer is it gonna be? I'm just put out by this whole place. People keep going back before us and we're major contributors to this establishment, we have friends and family who donate as well. I'm just ready to go." Pt and family updated on triage procedure and assured that they will be seen as soon as possible when a room becomes available.  "

## 2019-09-11 NOTE — CONSULTS
Orthopaedic Surgery  Consult Note    Иван Fuentes  09/11/2019    CC:  Left foot swelling s/p TKA    HPI: Иван Fuentes is a 68 y.o. male with PMHx significant for L knee OA, DM, DVT who presents with swelling of the left ankle and foot. Patients states that his left leg has been swollen since surgery.  He states that he has been aggressively elevating it throughout the day, but the swelling has continued.  He states that when he wakes up in the morning after having the leg elevated throughout the night that the swelling is decreased, but throughout the day he notices it getting worse.  Denies any pain in the left knee.  He has been bearing weight without any issues.  Denies other MSK pains or paresthesias.  DVT workup done by ER staff returned negative.      Past Medical History:   Diagnosis Date    Arthritis of both knees     BPH (benign prostatic hyperplasia)     CVA (cerebral vascular accident)     Deep vein thrombosis     Diabetes mellitus     Diabetes mellitus, type 2     High cholesterol     Hypertension     Left-sided weakness     Obese     Sleep apnea     Thyroid disease     Urosepsis      Past Surgical History:   Procedure Laterality Date    ARTHROPLASTY, KNEE, TOTAL-SANDIP Left 9/3/2019    Performed by [facility] at [facility] OR 2ND FLR    BACK SURGERY      BLOCK, NERVE, GENICULAR, with steroid Left 3/21/2019    Performed by Dania Garcia [facility] at [facility]    BLOCK-NERVE-GENICULAR Left 8/7/2018    Performed by [facility]ia Garcia [facility] at [facility]    CRYOTHERAPY, NERVE, PERIPHERAL, PERCUTANEOUS, USING LIQUID NITROUS OXIDE IN CLOSED NEEDLE DEVICE LEFT KNEE SIMON Left 8/26/2019    Performed by MENG Beckwith at [facility] CATH LAB    HIP ARTHROPLASTY      INJECTION-JOINT Left 5/10/2018    Performed by [facility] at [facility]    JOINT REPLACEMENT      LAMINECTOMY-CERVICAL/FUSION-POSTERIOR N/A 10/9/2014    Performed by Parker Zarate MD at  [facility] OR 2ND FLR    PROSTATE SURGERY  2015    SPINAL FUSION  09/2014    TONSILLECTOMY       Family History   Problem Relation Age of Onset    Hypertension Brother     Diabetes Mellitus Mother     Heart attack Neg Hx     Heart disease Neg Hx      Social History     Socioeconomic History    Marital status:      Spouse name: Not on file    Number of children: Not on file    Years of education: Not on file    Highest education level: Not on file   Occupational History    Not on file   Social Needs    Financial resource strain: Not on file    Food insecurity:     Worry: Not on file     Inability: Not on file    Transportation needs:     Medical: Not on file     Non-medical: Not on file   Tobacco Use    Smoking status: Current Every Day Smoker     Packs/day: 0.50     Types: Cigarettes    Smokeless tobacco: Never Used   Substance and Sexual Activity    Alcohol use: Not Currently     Comment: history of alcohol excess until 2011    Drug use: No    Sexual activity: Not on file   Lifestyle    Physical activity:     Days per week: Not on file     Minutes per session: Not on file    Stress: Not on file   Relationships    Social connections:     Talks on phone: Not on file     Gets together: Not on file     Attends Jew service: Not on file     Active member of club or organization: Not on file     Attends meetings of clubs or organizations: Not on file     Relationship status: Not on file   Other Topics Concern    Not on file   Social History Narrative    Not on file     No current facility-administered medications on file prior to encounter.      Current Outpatient Medications on File Prior to Encounter   Medication Sig    aspirin (ECOTRIN) 81 MG EC tablet Take 1 tablet (81 mg total) by mouth 2 (two) times daily.    acetaminophen (TYLENOL) 650 MG TbSR Take 1 tablet (650 mg total) by mouth every 8 (eight) hours. for 14 days    albuterol (VENTOLIN HFA) 90 mcg/actuation inhaler Inhale 2  puffs into the lungs every 6 (six) hours as needed for Wheezing. Rescue    atorvastatin (LIPITOR) 80 MG tablet Take by mouth once daily.     augmented betamethasone dipropionate (DIPROLENE-AF) 0.05 % cream     celecoxib (CELEBREX) 200 MG capsule Take 1 capsule (200 mg total) by mouth once daily. for 14 days    diclofenac sodium (VOLTAREN) 1 % Gel Apply 2 g topically 3 (three) times daily.    diphenhydrAMINE (SOMINEX) 25 mg tablet Take 25 mg by mouth nightly as needed for Insomnia.    docusate sodium (STOOL SOFTENER ORAL) Take by mouth every evening.    escitalopram oxalate (LEXAPRO) 20 MG tablet Take 20 mg by mouth once daily.     eszopiclone 3 mg Tab Take 3 mg by mouth every evening.    fluticasone (FLONASE) 50 mcg/actuation nasal spray 1 spray every evening.     furosemide (LASIX) 40 MG tablet 40 mg daily as needed.     Lactobacillus acidophilus (PROBIOTIC ORAL) Take by mouth Daily.    levothyroxine (SYNTHROID) 50 MCG tablet Take 50 mcg by mouth once daily.    lisinopril (PRINIVIL,ZESTRIL) 40 MG tablet Take 40 mg by mouth once daily.     LYRICA 100 mg capsule Take 100 mg by mouth 2 (two) times daily.    magnesium 250 mg Tab Take by mouth every evening.    melatonin 10 mg Cap Take by mouth every evening.    metformin (GLUCOPHAGE) 500 MG tablet Take 500 mg by mouth 2 (two) times daily with meals.    oxyCODONE (ROXICODONE) 5 MG immediate release tablet Take 1 tablet (5 mg total) by mouth every 6 (six) hours as needed for Pain.         Review of Systems:  Constitutional: negative for fevers  Eyes: negative visual changes  ENT: negative for hearing loss  Respiratory: negative for dyspnea  Cardiovascular: negative for chest pain  Gastrointestinal: negative for abdominal pain  Genitourinary: negative for dysuria  Neurological: negative for headaches  Behavioral/Psych: negative for hallucinations  Endocrine: negative for temperature intolerance      Physical Exam:    Temp:  [98.9 °F (37.2 °C)] 98.9 °F  (37.2 °C)  Pulse:  [67-72] 67  Resp:  [16-19] 19  SpO2:  [93 %-97 %] 97 %  BP: (140-165)/(67-75) 165/75    Vitals: Afebrile.  Vital signs stable.  General: No acute distress.  HEENT: Normocephalic. Atraumatic. Sclera anicteric. No tracheal deviation.  Cardio: Regular rate.  Chest: No increased work of breathing.  Abdominal: Nondistended.  Extremities: No cyanosis.  No clubbing.    Skin: No generalized rash.  Neuro: Awake. Alert. Oriented to person, place, time, and situation.  Psych: Normal appearance. Cooperative.  Appropriate mood.  Appropriate affect.      MSK:  LLE:  Surgical dressings on left knee are clean, dry, and intact  Severe swelling globally of the dorsum of the left foot  Swelling extends into the proximal ankle  No ecchymosis or erythema  No signs of cellulitis  Range of motion at the left knee is appropriate for postoperative period  No pain with range of motion of the knee  No pain with palpation of the posterior calf    Diagnostic Results:  X-ray of the left knee show no change in placement of surgical hardware or acute fractures      Assessment/Plan:  Иван Fuentes is a 68 y.o. male with postoperative swelling of the left foot and ankle. DVT workup negative. No abnormal findings on x-ray.  Patient is currently taking his aspirin 81 mg BID.  Encourage patient to aggressively elevate his leg as much as possible throughout the day.  His pain is well controlled.  Will schedule appointment for later this week to reassess swelling. Patient is agreeable to this plan.      Romulo Garza MD  Orthopedic Surgery Resident  09/11/2019

## 2019-09-13 DIAGNOSIS — Z96.659 STATUS POST KNEE REPLACEMENT, UNSPECIFIED LATERALITY: Primary | ICD-10-CM

## 2019-09-13 RX ORDER — OXYCODONE HYDROCHLORIDE 5 MG/1
5 TABLET ORAL EVERY 6 HOURS PRN
Qty: 40 TABLET | Refills: 0 | Status: SHIPPED | OUTPATIENT
Start: 2019-09-13 | End: 2019-09-23 | Stop reason: SDUPTHER

## 2019-09-17 ENCOUNTER — OFFICE VISIT (OUTPATIENT)
Dept: ORTHOPEDICS | Facility: CLINIC | Age: 69
End: 2019-09-17
Payer: MEDICARE

## 2019-09-17 VITALS
WEIGHT: 261.94 LBS | BODY MASS INDEX: 39.82 KG/M2 | SYSTOLIC BLOOD PRESSURE: 109 MMHG | DIASTOLIC BLOOD PRESSURE: 66 MMHG | HEART RATE: 77 BPM

## 2019-09-17 DIAGNOSIS — Z96.659 STATUS POST KNEE REPLACEMENT, UNSPECIFIED LATERALITY: Primary | ICD-10-CM

## 2019-09-17 PROCEDURE — 99999 PR PBB SHADOW E&M-EST. PATIENT-LVL II: CPT | Mod: PBBFAC,,, | Performed by: NURSE PRACTITIONER

## 2019-09-17 PROCEDURE — 99024 PR POST-OP FOLLOW-UP VISIT: ICD-10-PCS | Mod: POP,,, | Performed by: NURSE PRACTITIONER

## 2019-09-17 PROCEDURE — 99212 OFFICE O/P EST SF 10 MIN: CPT | Mod: PBBFAC | Performed by: NURSE PRACTITIONER

## 2019-09-17 PROCEDURE — 99999 PR PBB SHADOW E&M-EST. PATIENT-LVL II: ICD-10-PCS | Mod: PBBFAC,,, | Performed by: NURSE PRACTITIONER

## 2019-09-17 PROCEDURE — 99024 POSTOP FOLLOW-UP VISIT: CPT | Mod: POP,,, | Performed by: NURSE PRACTITIONER

## 2019-09-17 NOTE — PROGRESS NOTES
Иван Fuentes presents for initial post-operative visit following a left total knee arthroplasty performed by Dr. aHll on 9/3/2019. Tolerating pain medication well.      Exam:   Blood pressure 109/66, pulse 77, weight 118.8 kg (261 lb 14.5 oz).   Ambulating well with assistive device.  Incision is clean and dry without drainage or erythema. ROM:-5-100    Initial post-operative radiographs reviewed today revealing a well fixed and aligned prosthesis.    A/P:  2 weeks s/p left total knee replacement  - The patient was advised to keep the incision clean and dry for the next 24 hours after which he may wash the area with antibacterial soap in the shower. Will not submerge until the incision is completely healed.   - Outpatient PT: ongoing in [place]- message sent to therapist to resume therapy  - Continue aspirin for 1 month from surgery.  - Pain medication refilled last week  - Follow up in 4 weeks with me on a day Dr. Hall is in clinic so we can discuss right hip replacement. Pt will call clinic with problems/concerns.

## 2019-09-23 ENCOUNTER — TELEPHONE (OUTPATIENT)
Dept: ORTHOPEDICS | Facility: CLINIC | Age: 69
End: 2019-09-23

## 2019-09-23 DIAGNOSIS — Z96.659 STATUS POST KNEE REPLACEMENT, UNSPECIFIED LATERALITY: ICD-10-CM

## 2019-09-23 RX ORDER — OXYCODONE HYDROCHLORIDE 5 MG/1
5 TABLET ORAL EVERY 6 HOURS PRN
Qty: 28 TABLET | Refills: 0 | Status: SHIPPED | OUTPATIENT
Start: 2019-09-23 | End: 2019-10-01 | Stop reason: SDUPTHER

## 2019-09-23 NOTE — TELEPHONE ENCOUNTER
----- Message from Annabella Mcdaniel MA sent at 9/23/2019  8:08 AM CDT -----  Contact: Patient       ----- Message -----  From: Zoila Alba  Sent: 9/21/2019  10:34 AM CDT  To: Mani Carrington Staff    Patient needs a refill oxyCODONE (ROXICODONE) 5 MG immediate release tablet    [phone]

## 2019-09-24 ENCOUNTER — CLINICAL SUPPORT (OUTPATIENT)
Dept: REHABILITATION | Facility: HOSPITAL | Age: 69
End: 2019-09-24
Payer: MEDICARE

## 2019-09-24 DIAGNOSIS — R26.9 ABNORMALITY OF GAIT AND MOBILITY: ICD-10-CM

## 2019-09-24 PROCEDURE — 97116 GAIT TRAINING THERAPY: CPT

## 2019-09-24 PROCEDURE — 97110 THERAPEUTIC EXERCISES: CPT

## 2019-09-24 PROCEDURE — 97140 MANUAL THERAPY 1/> REGIONS: CPT

## 2019-09-24 NOTE — PROGRESS NOTES
"                            Physical Therapy Daily Treatment Note   Name: Иван Fuentes 1950  MRN: [id number]    Visit Date: 9/24/2019  Visit #: 2 / 12  Authorization period Expiration: 12/31/19    Plan of Care Expiration: 12/01/19  Precautions: per TKA protocol, fall safety  DOS:  9/03/19    Time In: 2:00  Time Out: 3:00  Total 1:1 Treatment Time: 53 min    Treatment Diagnosis:   Encounter Diagnosis   Name Primary?    Abnormality of gait and mobility      Physician: Ever Hall MD    Subjective   Pt reports: He just finished up a course of antibiotics this morning for cellulitis in his LLE and his knee is doing much better. Иван reports he primarily uses his mobility scooter in his home and has been doing "some of his exercises".    Pain Scale:  4/10 on VAS currently, 4/10 on VAS post treatment  Pain Location: left knee    Objective   Иван received therapeutic exercises to develop strength, endurance, ROM, flexibility and posture for 35 minutes including:    Nustep Lv 1 x 15 min, LE's only  Seated knee extension x 15  Seated heel-toe raises x 15  Quad sets in fl flex x 2 min  Quad sets with roll under heel x 2 min  Assisted HS on SB with strap x 2 min  TKE's on lg bolster with 5 sec hold x 2 min  HS sets over bolster with 5 sec hold x 2  Min  Supine hip abd on SB x 15   Supine SLR x 2 min    Иван received the following manual therapy techniques: Joint mobilizations patellar, passive HS stretch, and AA/PROM were applied to the left knee for 10 minutes.     Иван received gait training with RW and SBA 75' x 2, verbal cueing for erect posture, step through gait pattern, and continuity of walker advancement. X 8 min    Knee Left active Left Passive   Flexion 113 degrees 117 degrees   Extension -20 degrees -14 degrees     Patient to apply ice pack at home.    Home Exercises and Education Provided     Education provided re:  ice and elevation of left LE at least 3 times per day for pain and " swelling, avoiding use of pillow under knee at rest to facilitate extension, of forward wheeled walker for gait, not rollator  - progress towards goals   - role of therapy in multi - disciplinary team, goals for therapy  Pt educated on condition, POC, and expectations in therapy.  No spiritual or educational barriers to learning provided    Home exercises: reviewed HEP provided at IE  Pt will be provided HEP during course of treatment with progressions as appropriate. Pt was advised to perform these exercises free of pain, and to stop performing them if pain occurs.   Иван demonstrated good  understanding of the education provided.     Assessment   Иван tolerated treatment well without complication or increase in pain reported at end of treatment session. Patient educated in importance of achieving terminal knee extension as soon as possible to prevent contractures and restore normal knee biomechanics during walking and standing. Patient demonstrates moderate trunk and hip flexion in standing which increases with fatigue, decreased left knee extension, and decreased weight shift to left. Patient reporting/demonstrating fatigue at completion of treatment session with no increase in subjective pain reported.    Pt prognosis is Good. Pt will continue to benefit from skilled outpatient physical therapy to address the deficits listed in the problem list chart on initial evaluation, provide pt/family education and to maximize pt's level of independence in the home and community environment.     Medical necessity is demonstrated by the impairments and functional limitations listed on the Initial Evaluation.     Anticipated barriers to physical therapy: none identified  Pt's spiritual, cultural and educational needs considered and pt agreeable to plan of care and goals.    Plan   Continue with established Plan of Care towards Physical Therapy goals.   Discussed Plan of Care with patient: Yes    Jarrell Cueva,  PT  9/24/2019

## 2019-09-26 ENCOUNTER — CLINICAL SUPPORT (OUTPATIENT)
Dept: REHABILITATION | Facility: HOSPITAL | Age: 69
End: 2019-09-26
Payer: MEDICARE

## 2019-09-26 DIAGNOSIS — R26.9 ABNORMALITY OF GAIT AND MOBILITY: ICD-10-CM

## 2019-09-26 PROCEDURE — 97116 GAIT TRAINING THERAPY: CPT

## 2019-09-26 PROCEDURE — 97110 THERAPEUTIC EXERCISES: CPT

## 2019-09-26 PROCEDURE — 97140 MANUAL THERAPY 1/> REGIONS: CPT

## 2019-09-26 NOTE — PROGRESS NOTES
Physical Therapy Daily Treatment Note   Name: Иван Fuentes 1950  MRN: [id number]    Visit Date: 9/26/2019  Visit #: 3 / 12  Authorization period Expiration: 12/31/19    Plan of Care Expiration: 12/01/19  Precautions: per TKA protocol, fall safety  DOS:  9/03/19    Time In: 2:00  Time Out: 3:05  Total 1:1 Treatment Time: 55 min    Treatment Diagnosis:   Encounter Diagnosis   Name Primary?    Abnormality of gait and mobility      Physician: Ever Hall MD    Subjective   Pt reports: He is doing pretty good today, pain 3/10. Also reporting he slept better last night and only had to take 1 pain pill.    Pain Scale:  3/10 on VAS currently, 3/10 on VAS post treatment  Pain Location: left knee    Objective   Иван received therapeutic exercises to develop strength, endurance, ROM, flexibility and posture for 35 minutes including:    Nustep Lv 1 x 20 min, LE's only  Seated knee extension x 15  Seated heel-toe raises x 15  Quad sets in fl flex x 2 min  Quad sets with roll under heel x 2 min  Assisted HS on SB with strap x 2 min  TKE's on bolster with 5 sec hold x 2 min  HS sets over bolster with 5 sec hold x 2  Min  Supine hip abd on SB x 15   Supine SLR x 2 min    Иван received the following manual therapy techniques: Joint mobilizations patellar, passive HS stretch, STM to hamstring tendons, and AA/PROM were applied to the left knee for 10 minutes.     Иван received gait training with RW and SBA 75' x 2, verbal cueing for erect posture, step through gait pattern, and continuity of walker advancement. X 10 min (level inside surface, over door threshold and down cement ramp to get into car)    Knee Left active Left Passive   Flexion 113 degrees 117 degrees   Extension -20 degrees -14 degrees     Patient to apply ice pack at home.    Home Exercises and Education Provided     Education provided re:  ice and elevation of left LE at least 3 times per day for pain and swelling,  avoiding use of pillow under knee at rest to facilitate extension, of forward wheeled walker for gait, not rollator  - progress towards goals   - role of therapy in multi - disciplinary team, goals for therapy  Pt educated on condition, POC, and expectations in therapy.  No spiritual or educational barriers to learning provided    Home exercises: reviewed HEP provided at IE  Pt will be provided HEP during course of treatment with progressions as appropriate. Pt was advised to perform these exercises free of pain, and to stop performing them if pain occurs.   Иван demonstrated good  understanding of the education provided.     Assessment   Иван tolerated treatment well without complication or increase in pain reported at end of treatment session. Patient educated in importance of achieving terminal knee extension as soon as possible to prevent contractures and restore normal knee biomechanics during walking and standing. Patient demonstrates moderate trunk and hip flexion in standing which increases with fatigue, decreased left knee extension, and decreased weight shift to left. Patient reporting/demonstrating fatigue at completion of treatment session with no increase in subjective pain reported. Incision healing well, no s/s of infection evident.    Pt prognosis is Good. Pt will continue to benefit from skilled outpatient physical therapy to address the deficits listed in the problem list chart on initial evaluation, provide pt/family education and to maximize pt's level of independence in the home and community environment.     Medical necessity is demonstrated by the impairments and functional limitations listed on the Initial Evaluation.     Anticipated barriers to physical therapy: none identified  Pt's spiritual, cultural and educational needs considered and pt agreeable to plan of care and goals.    Plan   Continue with established Plan of Care towards Physical Therapy goals.   Discussed Plan of Care with  patient: Yes    Jarrell Cueva, PT  9/26/2019

## 2019-10-01 ENCOUNTER — CLINICAL SUPPORT (OUTPATIENT)
Dept: REHABILITATION | Facility: HOSPITAL | Age: 69
End: 2019-10-01
Payer: MEDICARE

## 2019-10-01 DIAGNOSIS — Z96.659 STATUS POST KNEE REPLACEMENT, UNSPECIFIED LATERALITY: ICD-10-CM

## 2019-10-01 DIAGNOSIS — R26.9 ABNORMALITY OF GAIT AND MOBILITY: ICD-10-CM

## 2019-10-01 PROCEDURE — 97110 THERAPEUTIC EXERCISES: CPT

## 2019-10-01 PROCEDURE — 97140 MANUAL THERAPY 1/> REGIONS: CPT

## 2019-10-01 RX ORDER — OXYCODONE HYDROCHLORIDE 5 MG/1
5 TABLET ORAL EVERY 6 HOURS PRN
Qty: 28 TABLET | Refills: 0 | Status: SHIPPED | OUTPATIENT
Start: 2019-10-01 | End: 2019-10-08 | Stop reason: SDUPTHER

## 2019-10-01 NOTE — PROGRESS NOTES
Physical Therapy Daily Treatment Note   Name: Иван Fuentes 1950  MRN: [id number]    Visit Date: 10/1/2019  Visit #: 4 / 12  Authorization period Expiration: 12/31/19    Plan of Care Expiration: 12/01/19  Precautions: per TKA protocol, fall safety  DOS:  9/03/19    Time In: 2:00  Time Out: 3:05  Total 1:1 Treatment Time: 55 min    Treatment Diagnosis:   Encounter Diagnosis   Name Primary?    Abnormality of gait and mobility      Physician: Ever Hall MD    Subjective   Pt reports:     Pain Scale:  3/10 on VAS currently, 3/10 on VAS post treatment  Pain Location: left knee    Objective   Иван received therapeutic exercises to develop strength, endurance, ROM, flexibility and posture for 35 minutes including:    Nustep Lv 1 x 20 min, LE's only  Seated knee extension x 15  Seated heel-toe raises x 15  Quad sets in fl flex x 2 min  Quad sets with roll under heel x 2 min  Assisted HS on SB with strap x 2 min  TKE's on bolster with 5 sec hold x 2 min  HS sets over bolster with 5 sec hold x 2  Min  Supine hip abd on SB x 15   Supine SLR x 2 min`    Иван received the following manual therapy techniques: Joint mobilizations patellar, passive HS stretch, STM to hamstring tendons, and AA/PROM were applied to the left knee for 10 minutes.     Иван received gait training with RW and SBA 75' x 2, verbal cueing for erect posture, step through gait pattern, and continuity of walker advancement. X 10 min (level inside surface, over door threshold and down cement ramp to get into car)    Knee Left active Left Passive   Flexion 113 degrees 117 degrees   Extension -20 degrees -14 degrees     Patient to apply ice pack at home.    Home Exercises and Education Provided     Education provided re:  ice and elevation of left LE at least 3 times per day for pain and swelling, avoiding use of pillow under knee at rest to facilitate extension, of forward wheeled walker for gait, not  rollator  - progress towards goals   - role of therapy in multi - disciplinary team, goals for therapy  Pt educated on condition, POC, and expectations in therapy.  No spiritual or educational barriers to learning provided    Home exercises: reviewed HEP provided at IE  Pt will be provided HEP during course of treatment with progressions as appropriate. Pt was advised to perform these exercises free of pain, and to stop performing them if pain occurs.   Иван demonstrated good  understanding of the education provided.     Assessment   Иван tolerated treatment well without complication or increase in pain reported at end of treatment session. Continues to ambulate with flexed hips and kyphotic posture leaning over RW. Continues to lack extension and quad activation. Firm end feel with flexion. No complications with treatment, educated importance of quad sets and reaching end range extension at knee.     Pt prognosis is Good. Pt will continue to benefit from skilled outpatient physical therapy to address the deficits listed in the problem list chart on initial evaluation, provide pt/family education and to maximize pt's level of independence in the home and community environment.     Medical necessity is demonstrated by the impairments and functional limitations listed on the Initial Evaluation.     Anticipated barriers to physical therapy: none identified  Pt's spiritual, cultural and educational needs considered and pt agreeable to plan of care and goals.    Plan   Continue with established Plan of Care towards Physical Therapy goals.   Discussed Plan of Care with patient: Yes    Shantanu Willson, PTA  10/1/2019

## 2019-10-02 ENCOUNTER — HOSPITAL ENCOUNTER (OUTPATIENT)
Dept: RADIOLOGY | Facility: HOSPITAL | Age: 69
Discharge: HOME | End: 2019-10-02
Attending: NURSE PRACTITIONER
Payer: MEDICARE

## 2019-10-02 ENCOUNTER — OFFICE VISIT (OUTPATIENT)
Dept: ORTHOPEDICS | Facility: CLINIC | Age: 69
End: 2019-10-02
Payer: MEDICARE

## 2019-10-02 VITALS — BODY MASS INDEX: 40.01 KG/M2 | WEIGHT: 264 LBS | HEIGHT: 68 IN

## 2019-10-02 DIAGNOSIS — Z96.651 S/P TKR (TOTAL KNEE REPLACEMENT), RIGHT: Primary | ICD-10-CM

## 2019-10-02 DIAGNOSIS — Z96.651 S/P TKR (TOTAL KNEE REPLACEMENT), RIGHT: ICD-10-CM

## 2019-10-02 PROCEDURE — 73560 XR KNEE ORTHO LEFT: ICD-10-PCS | Mod: 26,59,RT, | Performed by: RADIOLOGY

## 2019-10-02 PROCEDURE — 99999 PR PBB SHADOW E&M-EST. PATIENT-LVL III: ICD-10-PCS | Mod: PBBFAC,,, | Performed by: NURSE PRACTITIONER

## 2019-10-02 PROCEDURE — 99024 PR POST-OP FOLLOW-UP VISIT: ICD-10-PCS | Mod: POP,,, | Performed by: NURSE PRACTITIONER

## 2019-10-02 PROCEDURE — 99999 PR PBB SHADOW E&M-EST. PATIENT-LVL III: CPT | Mod: PBBFAC,,, | Performed by: NURSE PRACTITIONER

## 2019-10-02 PROCEDURE — 73562 XR KNEE ORTHO LEFT: ICD-10-PCS | Mod: 26,LT,, | Performed by: RADIOLOGY

## 2019-10-02 PROCEDURE — 99213 OFFICE O/P EST LOW 20 MIN: CPT | Mod: PBBFAC,25 | Performed by: NURSE PRACTITIONER

## 2019-10-02 PROCEDURE — 99024 POSTOP FOLLOW-UP VISIT: CPT | Mod: POP,,, | Performed by: NURSE PRACTITIONER

## 2019-10-02 PROCEDURE — 73562 X-RAY EXAM OF KNEE 3: CPT | Mod: TC,LT

## 2019-10-02 PROCEDURE — 73560 X-RAY EXAM OF KNEE 1 OR 2: CPT | Mod: 59,TC,RT

## 2019-10-02 PROCEDURE — 73560 X-RAY EXAM OF KNEE 1 OR 2: CPT | Mod: 26,59,RT, | Performed by: RADIOLOGY

## 2019-10-02 PROCEDURE — 73562 X-RAY EXAM OF KNEE 3: CPT | Mod: 26,LT,, | Performed by: RADIOLOGY

## 2019-10-03 ENCOUNTER — CLINICAL SUPPORT (OUTPATIENT)
Dept: REHABILITATION | Facility: HOSPITAL | Age: 69
End: 2019-10-03
Payer: MEDICARE

## 2019-10-03 DIAGNOSIS — R26.9 ABNORMALITY OF GAIT AND MOBILITY: ICD-10-CM

## 2019-10-03 PROCEDURE — 97110 THERAPEUTIC EXERCISES: CPT

## 2019-10-03 PROCEDURE — 97140 MANUAL THERAPY 1/> REGIONS: CPT

## 2019-10-03 NOTE — PROGRESS NOTES
Physical Therapy Daily Treatment Note   Name: Иван Fuentes 1950  MRN: [id number]    Visit Date: 10/3/2019  Visit #: 5 / 12  Authorization period Expiration: 12/31/19    Plan of Care Expiration: 12/01/19  Precautions: per TKA protocol, fall safety  DOS:  9/03/19    Time In: 2:15  Time Out: 3:20  Total 1:1 Treatment Time: 55 min    Treatment Diagnosis:   Encounter Diagnosis   Name Primary?    Abnormality of gait and mobility      Physician: Ever Hall MD    Subjective   Pt reports: worked me pretty good last time, stiffness and soreness but nothing unusual.     Pain Scale:  3/10 on VAS currently, 3/10 on VAS post treatment  Pain Location: left knee    Objective   Иван received therapeutic exercises to develop strength, endurance, ROM, flexibility and posture for 35 minutes including:    Nustep Lv 1 x 20 min, LE's only  Seated knee extension 2x10 1#  Seated heel-toe raises x 30  Quad sets in fl flex x 2 min  Quad sets with roll under heel x 2 min  Assisted HS on SB with strap x 2 min  TKE's on bolster with 5 sec hold x 2 min  HS sets over bolster with 5 sec hold x 2  Min  Supine hip abd on SB x 15   Supine SLR x 2 min`    Иван received the following manual therapy techniques: Joint mobilizations patellar, passive HS stretch, STM to hamstring tendons, and AA/PROM were applied to the left knee for 10 minutes.     Иван received gait training with RW and SBA 75' x 2, verbal cueing for erect posture, step through gait pattern, and continuity of walker advancement. X 10 min (level inside surface, over door threshold and down cement ramp to get into car) DNP    Knee Left active Left Passive   Flexion 113 degrees 117 degrees   Extension -20 degrees -14 degrees     Patient to apply ice pack at home.    Home Exercises and Education Provided     Education provided re:  ice and elevation of left LE at least 3 times per day for pain and swelling, avoiding use of pillow under  knee at rest to facilitate extension, of forward wheeled walker for gait, not rollator  - progress towards goals   - role of therapy in multi - disciplinary team, goals for therapy  Pt educated on condition, POC, and expectations in therapy.  No spiritual or educational barriers to learning provided    Home exercises: reviewed HEP provided at IE  Pt will be provided HEP during course of treatment with progressions as appropriate. Pt was advised to perform these exercises free of pain, and to stop performing them if pain occurs.   Иван demonstrated good  understanding of the education provided.     Assessment   Иван tolerated treatment well without complication or increase in pain reported at end of treatment session. Continues to ambulate with flexed hips and kyphotic posture leaning over RW. Continues to lack extension and quad activation. Firm end feel with flexion. No complications with treatment, educated importance of quad sets and reaching end range extension at knee. Educated on importance of extension and exercises to perform independently at home.     Pt prognosis is Good. Pt will continue to benefit from skilled outpatient physical therapy to address the deficits listed in the problem list chart on initial evaluation, provide pt/family education and to maximize pt's level of independence in the home and community environment.     Medical necessity is demonstrated by the impairments and functional limitations listed on the Initial Evaluation.     Anticipated barriers to physical therapy: none identified  Pt's spiritual, cultural and educational needs considered and pt agreeable to plan of care and goals.    Plan   Continue with established Plan of Care towards Physical Therapy goals.   Discussed Plan of Care with patient: Yes    Shantanu Willson, PTA  10/3/2019

## 2019-10-04 NOTE — PROGRESS NOTES
"Иван Fuentes presents for 6 weeks post-operative visit following a left total knee arthroplasty performed by Dr. Hall on 9/3/2019. Tolerating pain medication well.      Exam:   Height 5' 8" (1.727 m), weight 119.7 kg (264 lb).   Ambulating well with assistive device at home, but he and his wife do admit that he has been using his scooter to get around at home due to pain in his other knee and hip.  Incision is well healed. ROM:0-120    Initial post-operative radiographs reviewed today revealing a well fixed and aligned prosthesis.    A/P:  6 weeks s/p left total knee replacement   - Outpatient PT: ongoing at [facility], [place]  - Pain medication: refill not needed  - Follow up in 6 weeks with Dr. Hall. Pt will call clinic with problems/concerns.     "

## 2019-10-07 ENCOUNTER — CLINICAL SUPPORT (OUTPATIENT)
Dept: REHABILITATION | Facility: HOSPITAL | Age: 69
End: 2019-10-07
Payer: MEDICARE

## 2019-10-07 DIAGNOSIS — R26.9 ABNORMALITY OF GAIT AND MOBILITY: ICD-10-CM

## 2019-10-07 PROCEDURE — 97110 THERAPEUTIC EXERCISES: CPT

## 2019-10-07 NOTE — PROGRESS NOTES
Physical Therapy Daily Treatment Note   Name: Иван Fuentes 1950  MRN: [id number]    Visit Date: 10/7/2019  Visit #: 6 / 12  Authorization period Expiration: 12/31/19    Plan of Care Expiration: 12/01/19  Precautions: per TKA protocol, fall safety  DOS:  9/03/19    Time In: 4:10 pm  Time Out: 5 pm  Total 1:1 Treatment Time: 50 min    Treatment Diagnosis:   Encounter Diagnosis   Name Primary?    Abnormality of gait and mobility      Physician: Ever Hall MD    Subjective   Pt reports: getting a little better, still got a bad back but it bends pretty good just not straight as it should be.      Pain Scale:  3/10 on VAS currently, 3/10 on VAS post treatment  Pain Location: left knee    Objective   Иван received therapeutic exercises to develop strength, endurance, ROM, flexibility and posture for 35 minutes including:    Nustep Lv 1 x 20 min, LE's only DNP  Seated knee extension 2x10 1#  Seated heel-toe raises x 30  Quad sets in fl flex x 2 min  Quad sets with roll under heel x 2 min  Assisted HS on SB with strap x 2 min  TKE's on bolster with 5 sec hold x 2 min  HS sets over bolster with 5 sec hold x 2  Min  Supine hip abd on SB x 15   Supine SLR x 2 min`  HS curls: YTB 2x10    Иван received the following manual therapy techniques: Joint mobilizations patellar, passive HS stretch, STM to hamstring tendons, and AA/PROM were applied to the left knee for 10 minutes.     Иван received gait training with RW and SBA 75' x 2, verbal cueing for erect posture, step through gait pattern, and continuity of walker advancement. X 10 min (level inside surface, over door threshold and down cement ramp to get into car) DNP    Knee Left active Left Passive   Flexion 113 degrees 117 degrees   Extension -20 degrees -14 degrees     Patient to apply ice pack at home.    Home Exercises and Education Provided     Education provided re:  ice and elevation of left LE at least 3 times per  day for pain and swelling, avoiding use of pillow under knee at rest to facilitate extension, of forward wheeled walker for gait, not rollator  - progress towards goals   - role of therapy in multi - disciplinary team, goals for therapy  Pt educated on condition, POC, and expectations in therapy.  No spiritual or educational barriers to learning provided    Home exercises: reviewed HEP provided at IE  Pt will be provided HEP during course of treatment with progressions as appropriate. Pt was advised to perform these exercises free of pain, and to stop performing them if pain occurs.   Иван demonstrated good  understanding of the education provided.     Assessment   Иван tolerated treatment well without complication or increase in pain reported at end of treatment session. Continues to ambulate with flexed hips and kyphotic posture leaning over RW. Significant extension lag with SLR. Educated on importance of HEP to improve knee extension.     Pt prognosis is Good. Pt will continue to benefit from skilled outpatient physical therapy to address the deficits listed in the problem list chart on initial evaluation, provide pt/family education and to maximize pt's level of independence in the home and community environment.     Medical necessity is demonstrated by the impairments and functional limitations listed on the Initial Evaluation.     Anticipated barriers to physical therapy: none identified  Pt's spiritual, cultural and educational needs considered and pt agreeable to plan of care and goals.    Plan   Continue with established Plan of Care towards Physical Therapy goals.   Discussed Plan of Care with patient: Yes    Shantanu Willson, PTA  10/7/2019

## 2019-10-08 DIAGNOSIS — Z96.659 STATUS POST KNEE REPLACEMENT, UNSPECIFIED LATERALITY: ICD-10-CM

## 2019-10-08 RX ORDER — OXYCODONE HYDROCHLORIDE 5 MG/1
5 TABLET ORAL EVERY 6 HOURS PRN
Qty: 28 TABLET | Refills: 0 | Status: SHIPPED | OUTPATIENT
Start: 2019-10-08 | End: 2019-10-17 | Stop reason: SDUPTHER

## 2019-10-09 ENCOUNTER — CLINICAL SUPPORT (OUTPATIENT)
Dept: REHABILITATION | Facility: HOSPITAL | Age: 69
End: 2019-10-09
Payer: MEDICARE

## 2019-10-09 DIAGNOSIS — R26.9 ABNORMALITY OF GAIT AND MOBILITY: ICD-10-CM

## 2019-10-09 PROCEDURE — 97110 THERAPEUTIC EXERCISES: CPT

## 2019-10-09 NOTE — PROGRESS NOTES
Physical Therapy Daily Treatment Note   Name: Иван Fuentes 1950  MRN: [id number]    Visit Date: 10/9/2019  Visit #: 7 / 12  Authorization period Expiration: 12/31/19    Plan of Care Expiration: 12/01/19  Precautions: per TKA protocol, fall safety  DOS:  9/03/19    Time In: 4:10 pm  Time Out: 5 pm  Total 1:1 Treatment Time: 50 min    Treatment Diagnosis:   Encounter Diagnosis   Name Primary?    Abnormality of gait and mobility      Physician: Ever Hall MD    Subjective   Pt reports: I guess it's getting there, still pretty stiff. Need to try to do exercises a little more.     Pain Scale:  3/10 on VAS currently, 3/10 on VAS post treatment  Pain Location: left knee    Objective   Иван received therapeutic exercises to develop strength, endurance, ROM, flexibility and posture for 35 minutes including:    Nustep Lv 1 x 10 min, LE's only   Seated knee extension 2x10 1#  Seated heel-toe raises x 30  Quad sets in fl flex x 2 min  Quad sets with roll under heel x 2 min  Assisted HS on SB with strap x 2 min  TKE's on bolster with 5 sec hold x 2 min  HS sets over bolster with 5 sec hold x 2  Min  Supine hip abd on SB x 15   Supine SLR x 2 min`  HS curls: YTB 2x10    Иван received the following manual therapy techniques: Joint mobilizations patellar, passive HS stretch, STM to hamstring tendons, and AA/PROM were applied to the left knee for 10 minutes.     Иван received gait training with RW and SBA 75' x 2, verbal cueing for erect posture, step through gait pattern, and continuity of walker advancement. X 10 min (level inside surface, over door threshold and down cement ramp to get into car) DNP    Knee Left active Left Passive   Flexion 113 degrees 117 degrees   Extension -20 degrees -14 degrees     Patient to apply ice pack at home.    Home Exercises and Education Provided     Education provided re:  ice and elevation of left LE at least 3 times per day for pain and  swelling, avoiding use of pillow under knee at rest to facilitate extension, of forward wheeled walker for gait, not rollator  - progress towards goals   - role of therapy in multi - disciplinary team, goals for therapy  Pt educated on condition, POC, and expectations in therapy.  No spiritual or educational barriers to learning provided    Home exercises: reviewed HEP provided at IE  Pt will be provided HEP during course of treatment with progressions as appropriate. Pt was advised to perform these exercises free of pain, and to stop performing them if pain occurs.   Иван demonstrated good  understanding of the education provided.     Assessment   Иван tolerated treatment fairly well without complication. Continues to ambulate with exaggerated flexed hips and kyphotic posture relying heavily on WB'ing through UE's on RW. Lacks extension on post op knee, educated on exercises to be consisted with at home. Patient admits he is not very active lately. Continue to progress as tolerated.     Pt prognosis is Good. Pt will continue to benefit from skilled outpatient physical therapy to address the deficits listed in the problem list chart on initial evaluation, provide pt/family education and to maximize pt's level of independence in the home and community environment.     Medical necessity is demonstrated by the impairments and functional limitations listed on the Initial Evaluation.     Anticipated barriers to physical therapy: none identified  Pt's spiritual, cultural and educational needs considered and pt agreeable to plan of care and goals.    Plan   Continue with established Plan of Care towards Physical Therapy goals.   Discussed Plan of Care with patient: Yes    Shantanu Wlilson, PTA  10/9/2019

## 2019-10-14 ENCOUNTER — CLINICAL SUPPORT (OUTPATIENT)
Dept: REHABILITATION | Facility: HOSPITAL | Age: 69
End: 2019-10-14
Payer: MEDICARE

## 2019-10-14 DIAGNOSIS — R26.9 ABNORMALITY OF GAIT AND MOBILITY: ICD-10-CM

## 2019-10-14 PROCEDURE — 97110 THERAPEUTIC EXERCISES: CPT

## 2019-10-14 NOTE — PROGRESS NOTES
Physical Therapy Daily Treatment Note   Name: Иван Fuentse 1950  MRN: [id number]    Visit Date: 10/14/2019  Visit #: 7 / 12  Authorization period Expiration: 12/31/19    Plan of Care Expiration: 12/01/19  Precautions: per TKA protocol, fall safety  DOS:  9/03/19    Time In: 4:10 pm  Time Out: 5 pm  Total 1:1 Treatment Time: 50 min    Treatment Diagnosis:   Encounter Diagnosis   Name Primary?    Abnormality of gait and mobility      Physician: Ever Hall MD    Subjective   Pt reports: I guess it's getting there, still pretty stiff. Need to try to do exercises a little more.     Pain Scale:  3/10 on VAS currently, 3/10 on VAS post treatment  Pain Location: left knee    Objective   Иван received therapeutic exercises to develop strength, endurance, ROM, flexibility and posture for 35 minutes including:    Nustep Lv 1 x 10 min, LE's only   Seated knee extension 2x10 1#  Seated heel-toe raises x 30  Quad sets in fl flex x 2 min  Quad sets with roll under heel x 2 min  Assisted HS on SB with strap x 2 min  TKE's on bolster with 5 sec hold x 2 min  HS sets over bolster with 5 sec hold x 2  Min  Supine hip abd on SB x 15   Supine SLR x 2 min`  HS curls: YTB 2x10    Иван received the following manual therapy techniques: Joint mobilizations patellar, passive HS stretch, STM to hamstring tendons, and AA/PROM were applied to the left knee for 10 minutes.     Иван received gait training with RW and SBA 75' x 2, verbal cueing for erect posture, step through gait pattern, and continuity of walker advancement. X 10 min (level inside surface, over door threshold and down cement ramp to get into car) DNP    Knee Left active Left Passive   Flexion 113 degrees 117 degrees   Extension -20 degrees -14 degrees     Patient to apply ice pack at home.    Home Exercises and Education Provided     Education provided re:  ice and elevation of left LE at least 3 times per day for pain and  swelling, avoiding use of pillow under knee at rest to facilitate extension, of forward wheeled walker for gait, not rollator  - progress towards goals   - role of therapy in multi - disciplinary team, goals for therapy  Pt educated on condition, POC, and expectations in therapy.  No spiritual or educational barriers to learning provided    Home exercises: reviewed HEP provided at IE  Pt will be provided HEP during course of treatment with progressions as appropriate. Pt was advised to perform these exercises free of pain, and to stop performing them if pain occurs.   Иван demonstrated good  understanding of the education provided.     Assessment   Иван tolerated treatment fairly well without complication. Continues to ambulate with exaggerated flexed hips and kyphotic posture relying heavily on WB'ing through UE's on RW. Lacks extension on post op knee, educated on exercises to be consisted with at home. Patient admits he is not very active lately. Continue to progress as tolerated.     Pt prognosis is Good. Pt will continue to benefit from skilled outpatient physical therapy to address the deficits listed in the problem list chart on initial evaluation, provide pt/family education and to maximize pt's level of independence in the home and community environment.     Medical necessity is demonstrated by the impairments and functional limitations listed on the Initial Evaluation.     Anticipated barriers to physical therapy: none identified  Pt's spiritual, cultural and educational needs considered and pt agreeable to plan of care and goals.    Plan   Continue with established Plan of Care towards Physical Therapy goals.   Discussed Plan of Care with patient: Yes    Shantanu Willson, PTA  10/14/2019                            Physical Therapy Daily Treatment Note   Name: Иван Fuentes 1950  MRN: [id number]    Visit Date: 10/14/2019  Visit #: 8 / 12  Authorization period Expiration: 12/31/19    Plan of Care  "Expiration: 12/01/19  Precautions: per TKA protocol, fall safety  DOS:  9/03/19    Time In: 3:10 pm  Time Out: 4:07 pm  Total 1:1 Treatment Time: 55 min    Treatment Diagnosis:   Encounter Diagnosis   Name Primary?    Abnormality of gait and mobility      Physician: Ever Hall MD    Subjective   Pt reports: been working on kick outs and trying to straighten it. [place] had to go up 4 steps to motor home. No change really     Pain Scale:  3/10 on VAS currently, 3/10 on VAS post treatment  Pain Location: left knee    Objective   Иван received therapeutic exercises to develop strength, endurance, ROM, flexibility and posture for 35 minutes including:    Nustep Lv 1 x 12 min, LE's only   Seated knee extension 3x10 1#  Standing heel raise: x15  Quad sets in fl flex x 2 min  Quad sets with roll under heel x 2 min  Assisted HS on SB with strap x 2 min  TKE's on bolster with 5 sec hold x 2 min  HS sets over bolster with 5 sec hold x 2  Min  Supine hip abd on SB x 15   Supine SLR x 2 min`  HS curls: RTB 2x10  Step ups: x 10 4" step  Standing Hip abd: x 10    Иван received the following manual therapy techniques: Joint mobilizations patellar, passive HS stretch, STM to hamstring tendons, and AA/PROM were applied to the left knee for 10 minutes. DNP    Иван received gait training with RW and SBA 75' x 2, verbal cueing for erect posture, step through gait pattern, and continuity of walker advancement. X 10 min (level inside surface, over door threshold and down cement ramp to get into car) DNP    Knee Left active Left Passive   Flexion 113 degrees 117 degrees   Extension -20 degrees -14 degrees     Patient to apply ice pack at home.    Home Exercises and Education Provided     Education provided re:  ice and elevation of left LE at least 3 times per day for pain and swelling, avoiding use of pillow under knee at rest to facilitate extension, of forward wheeled walker for gait, not " rollator  - progress towards goals   - role of therapy in multi - disciplinary team, goals for therapy  Pt educated on condition, POC, and expectations in therapy.  No spiritual or educational barriers to learning provided    Home exercises: reviewed HEP provided at IE  Pt will be provided HEP during course of treatment with progressions as appropriate. Pt was advised to perform these exercises free of pain, and to stop performing them if pain occurs.   Иван demonstrated good  understanding of the education provided.     Assessment   Иван tolerated treatment fairly well without complication. Continues to ambulate with exaggerated flexed hips and kyphotic posture relying heavily on WB'ing through UE's on RW. Lacks extension on post op knee, educated on exercises to be consisted with at home. Patient admits he is not very active lately. Continue to progress as tolerated.     Pt prognosis is Good. Pt will continue to benefit from skilled outpatient physical therapy to address the deficits listed in the problem list chart on initial evaluation, provide pt/family education and to maximize pt's level of independence in the home and community environment.     Medical necessity is demonstrated by the impairments and functional limitations listed on the Initial Evaluation.     Anticipated barriers to physical therapy: none identified  Pt's spiritual, cultural and educational needs considered and pt agreeable to plan of care and goals.    Plan   Continue with established Plan of Care towards Physical Therapy goals.   Discussed Plan of Care with patient: Yes    Shantanu Willson, PTA  10/14/2019

## 2019-10-17 ENCOUNTER — CLINICAL SUPPORT (OUTPATIENT)
Dept: REHABILITATION | Facility: HOSPITAL | Age: 69
End: 2019-10-17
Payer: MEDICARE

## 2019-10-17 DIAGNOSIS — Z96.659 STATUS POST KNEE REPLACEMENT, UNSPECIFIED LATERALITY: ICD-10-CM

## 2019-10-17 DIAGNOSIS — R26.9 ABNORMALITY OF GAIT AND MOBILITY: ICD-10-CM

## 2019-10-17 PROCEDURE — 97140 MANUAL THERAPY 1/> REGIONS: CPT

## 2019-10-17 PROCEDURE — 97110 THERAPEUTIC EXERCISES: CPT

## 2019-10-17 RX ORDER — OXYCODONE HYDROCHLORIDE 5 MG/1
5 TABLET ORAL EVERY 6 HOURS PRN
Qty: 28 TABLET | Refills: 0 | Status: SHIPPED | OUTPATIENT
Start: 2019-10-17 | End: 2019-10-28 | Stop reason: SDUPTHER

## 2019-10-17 NOTE — PROGRESS NOTES
Physical Therapy Daily Treatment Note   Name: Иван Fuentes 1950  MRN: [id number]    Visit Date: 10/17/2019  Visit #: 10 / 12  Authorization period Expiration: 12/31/19    Plan of Care Expiration: 12/01/19  Precautions: per TKA protocol, fall safety  DOS:  9/03/19    Time In: 3:05 pm  Time Out: 4:00 pm  Total 1:1 Treatment Time: 48 min    Treatment Diagnosis:   Encounter Diagnosis   Name Primary?    Abnormality of gait and mobility      Physician: Ever Hall MD    Subjective   Pt reports: He knows he needs to walk more,he has been using his scooter a lot in his house and his wife has been fussing at him for it.      Pain Scale:  2/10 on VAS currently, 2/10 on VAS post treatment  Pain Location: left knee    Objective   Иван received therapeutic exercises to develop strength, endurance, ROM, flexibility and posture for 38 minutes including:    Nustep Lv 2 x 10 min, LE's only   Seated knee extension 1# 2 min  Seated heel-toe raises x 30  Quad sets in fl flex x 2 min  Quad sets with roll under heel x 2 min  Assisted HS on SB with strap x 2 min  TKE's on bolster with 5 sec hold x 2 min  HS sets over bolster with 5 sec hold x 2  Min  Supine hip abd on SB x 15   Supine SLR x 2 min`  HS curls: RTB 2x10  Seated HS stretch with 20 sec hold x 5    Иван received the following manual therapy techniques: Joint mobilizations patellar, passive HS stretch, passive hip flexor stretch, STM to hamstring tendons, and AA/PROM were applied to the left knee for 10 minutes.     Иван received gait training with RW and SBA to navigate clinic requiring verbal cueing for erect posture, step through gait pattern, and continuity of walker advancement.     Patient to apply ice pack at home.    Home Exercises and Education Provided     Education provided re:  ice and elevation of left LE at least 3 times per day for pain and swelling, avoiding use of pillow under knee at rest to facilitate  extension, and use of forward wheeled walker for gait, not rollator. Patient educated in importance of achieving terminal knee extension as soon as possible to prevent contractures and restore normal knee biomechanics during walking and standing.  - progress towards goals   - role of therapy in multi - disciplinary team, goals for therapy  Pt educated on condition, POC, and expectations in therapy.  No spiritual or educational barriers to learning provided    Home exercises: reviewed HEP provided at IE  Pt will be provided HEP during course of treatment with progressions as appropriate. Pt was advised to perform these exercises free of pain, and to stop performing them if pain occurs.   Иван demonstrated good  understanding of the education provided.     Assessment   Иван tolerated treatment without complication or increase pain reported. Patient continues to have significant trunk kyphosis with compensatory hip and knee flexion bilaterally. Encouraged increased ambulation/acitivity at home as well as consistency with home exercises. Patient reporting/demonstrating fatigue at completion of treatment session. Иван is making progress towards his goals with no update to goals at this time.     Pt prognosis is Good. Pt will continue to benefit from skilled outpatient physical therapy to address the deficits listed in the problem list chart on initial evaluation, provide pt/family education and to maximize pt's level of independence in the home and community environment.     Medical necessity is demonstrated by the impairments and functional limitations listed on the Initial Evaluation.     Anticipated barriers to physical therapy: none identified  Pt's spiritual, cultural and educational needs considered and pt agreeable to plan of care and goals.    Plan   Continue with established Plan of Care towards Physical Therapy goals.   Discussed Plan of Care with patient: Yes    Jarrell Cueva, PT  10/17/2019

## 2019-10-28 ENCOUNTER — CLINICAL SUPPORT (OUTPATIENT)
Dept: REHABILITATION | Facility: HOSPITAL | Age: 69
End: 2019-10-28
Payer: MEDICARE

## 2019-10-28 DIAGNOSIS — Z96.659 STATUS POST KNEE REPLACEMENT, UNSPECIFIED LATERALITY: ICD-10-CM

## 2019-10-28 DIAGNOSIS — R26.9 ABNORMALITY OF GAIT AND MOBILITY: ICD-10-CM

## 2019-10-28 PROCEDURE — 97110 THERAPEUTIC EXERCISES: CPT

## 2019-10-28 RX ORDER — OXYCODONE HYDROCHLORIDE 5 MG/1
5 TABLET ORAL EVERY 6 HOURS PRN
Qty: 28 TABLET | Refills: 0 | Status: SHIPPED | OUTPATIENT
Start: 2019-10-28 | End: 2019-11-08 | Stop reason: SDUPTHER

## 2019-10-28 NOTE — PROGRESS NOTES
"                            Physical Therapy Daily Treatment Note   Name: Иван Fuentes 1950  MRN: [id number]    Visit Date: 10/28/2019  Visit #: 11 / 12  Authorization period Expiration: 12/31/19    Plan of Care Expiration: 12/01/19  Precautions: per TKA protocol, fall safety  DOS:  9/03/19    Time In: 3:05 pm  Time Out: 4:00 pm  Total 1:1 Treatment Time: 42 min    Treatment Diagnosis:   Encounter Diagnosis   Name Primary?    Abnormality of gait and mobility      Physician: Ever Hall MD    Subjective   Pt reports: He needs to work on his balance, its been bad since his neck surgery 5 years ago.  Patient stating he missed therapy last week due to the weather one day and a flat tire on the other day. No complaints of pain today but reports he still has some at night for which he takes a pain pill.    Pain Scale:  0/10 on VAS currently, 0/10 on VAS post treatment  Pain Location: left knee    Objective   Иван received therapeutic exercises to develop strength, endurance, ROM, flexibility and posture for 42 minutes including:    Nustep Lv 1 progressing to Lv 2 x 20 min, LE's only   Seated knee extension 2# 2 min  Seated heel-toe raises x 30  HS curls: BTB x 20  Seated HS stretch with 20 sec hold x 5  Seated ball squeeze x 2 min  Seated clams with BTB x 20  Standing hip abd x 20  Toe taps on 6" step with B hand rail support x 10   Seated posture correct with VC for erect trunk and head position and holding 10 sec x 5    Patient did not perform the following exercises today:  Quad sets in fl flex x 2 min  Quad sets with roll under heel x 2 min  Assisted HS on SB with strap x 2 min  TKE's on bolster with 5 sec hold x 2 min  HS sets over bolster with 5 sec hold x 2  Min  Supine hip abd on SB x 15   Supine SLR x 2 min`    Иван received the following manual therapy techniques: Joint mobilizations patellar, passive HS stretch, passive hip flexor stretch, STM to hamstring tendons, and AA/PROM were " applied to the left knee for 10 minutes. DNP    Иван received gait training with RW and SBA to navigate clinic requiring verbal cueing for erect posture, step through gait pattern, and continuity of walker advancement.     Patient to apply ice pack at home as needed.    Home Exercises and Education Provided     Education provided re:  ice and elevation of left LE at least 3 times per day for pain and swelling, avoiding use of pillow under knee at rest to facilitate extension, and use of forward wheeled walker for gait, not rollator. Patient educated in importance of achieving terminal knee extension as soon as possible to prevent contractures and restore normal knee biomechanics during walking and standing.  - progress towards goals   - role of therapy in multi - disciplinary team, goals for therapy  Pt educated on condition, POC, and expectations in therapy.  No spiritual or educational barriers to learning provided    Home exercises: reviewed  Pt will be provided HEP during course of treatment with progressions as appropriate. Pt was advised to perform these exercises free of pain, and to stop performing them if pain occurs.   Иван demonstrated good  understanding of the education provided.     Assessment   Иван presented into clinic using rollator demonstrating significant trunk kyphosis with compensatory hip and knee flexion bilaterally. Patient is unable to achieve or hold trunk erect secondary to weakness and degenerative changes as a result of long standing spinal compensation. Иван tolerated treatment without complication or pain reported. PT continues to encouraged increased ambulation/acitivity at home as well as consistency with home exercises. Patient reporting/demonstrating fatigue at completion of treatment session. Will reassess progress towards goals next visit.     Pt prognosis is Good. Pt will continue to benefit from skilled outpatient physical therapy to address the deficits listed in the  problem list chart on initial evaluation, provide pt/family education and to maximize pt's level of independence in the home and community environment.     Medical necessity is demonstrated by the impairments and functional limitations listed on the Initial Evaluation.     Anticipated barriers to physical therapy: none identified  Pt's spiritual, cultural and educational needs considered and pt agreeable to plan of care and goals.    Plan   Will reassess progress towards Physical Therapy goals next visit.   Discussed Plan of Care with patient: Yes    Jarrell Cueva, PT  10/28/2019

## 2019-10-30 ENCOUNTER — CLINICAL SUPPORT (OUTPATIENT)
Dept: REHABILITATION | Facility: HOSPITAL | Age: 69
End: 2019-10-30
Payer: MEDICARE

## 2019-10-30 DIAGNOSIS — R26.9 ABNORMALITY OF GAIT AND MOBILITY: ICD-10-CM

## 2019-10-30 PROCEDURE — 97750 PHYSICAL PERFORMANCE TEST: CPT

## 2019-10-30 PROCEDURE — 97110 THERAPEUTIC EXERCISES: CPT

## 2019-10-30 NOTE — PROGRESS NOTES
"                            Physical Therapy Daily Treatment Note / Recertification   Name: Иван Fuentes 1950  MRN: [id number]    Visit Date: 10/30/2019  Visit #: 12 / 18  Authorization period Expiration: 12/31/19    Plan of Care Expiration: 12/01/19  Precautions: per TKA protocol, fall safety  DOS:  9/03/19    Time In: 2:05 pm  Time Out: 3:00 pm  Total 1:1 Treatment Time: 50 min    Treatment Diagnosis:   Encounter Diagnosis   Name Primary?    Abnormality of gait and mobility      Physician: Ever Hall MD    Subjective   Pt reports: His knee is feeling pretty good, his right hip is hurting worse then his knee is. Patient also reporting he started back at the gym with his  yesterday    Pain Scale:  0/10 on VAS currently, 0/10 on VAS post treatment  Pain Location: left knee    Objective   Иван received therapeutic exercises to develop strength, endurance, ROM, flexibility and posture for 42 minutes including:    Nustep Lv 1 progressing to Lv 2 x 20 min, LE's only   Seated knee extension 2# 2 min  Seated heel-toe raises x 30  HS curls: BTB x 20  Seated HS stretch with 20 sec hold x 5  Seated ball squeeze x 2 min  Seated clams with BTB x 20  Standing hip abd x 20  Toe taps on 6" step with B hand rail support x 10   Seated posture correct with VC for erect trunk and head position and holding 10 sec x 5    Patient did not perform the following exercises today:  Quad sets in fl flex x 2 min  Quad sets with roll under heel x 2 min  Assisted HS on SB with strap x 2 min  HS sets over bolster with 5 sec hold x 2  Min  Supine hip abd on SB x 15   TKE's on bolster with 5 sec hold 4# x 2 min  Supine SLR with 1.5# 2 x 15    Иван received the following manual therapy techniques: Joint mobilizations patellar, passive HS stretch, passive hip flexor stretch, STM to hamstring tendons, and AA/PROM were applied to the left knee for 10 minutes. DNP    Иван received gait training with RW and SBA to " navigate clinic requiring verbal cueing for erect posture, step through gait pattern, and continuity of walker advancement.     Patient to apply ice pack at home as needed.    Physical performance testing x 8 minutes with following written report. Discussed findings with patient to educate on gains made since IE and residual deficits remaining.    Knee Left active Left Passive   Flexion 117 degrees 120 degrees   Extension -8 degrees -5 degrees     Left Knee IE 10/30/19   Knee extension: 2+/5 5/5   Knee flexion: 3/5 5/5   Hip flexion: 3-/5 4/5   Hip extension: 3/5 3+/5   Hip abduction: 3-/5 3+/5   Hip adduction 4/5 4+/5   Ankle dorsiflexion: 4/5 4+/5   Ankle plantarflexion: 4+/5 5/5     LEFS: 65% impairment  Home Exercises and Education Provided     Education provided re:  ice and elevation of left LE at least 3 times per day for pain and swelling, avoiding use of pillow under knee at rest to facilitate extension, and use of forward wheeled walker for gait, not rollator. Patient educated in importance of achieving terminal knee extension as soon as possible to prevent contractures and restore normal knee biomechanics during walking and standing.  - progress towards goals   - role of therapy in multi - disciplinary team, goals for therapy  Pt educated on condition, POC, and expectations in therapy.  No spiritual or educational barriers to learning provided    Home exercises: reviewed  Pt will be provided HEP during course of treatment with progressions as appropriate. Pt was advised to perform these exercises free of pain, and to stop performing them if pain occurs.   Иван demonstrated good  understanding of the education provided.     Assessment   Иван presented into clinic using rollator demonstrating significant trunk kyphosis with compensatory hip and knee flexion bilaterally. Patient is unable to achieve or hold trunk erect secondary to weakness and degenerative changes as a result of long standing spinal  compensation. Иван tolerated treatment without complication or pain reported. PT continues to encouraged increased ambulation/acitivity at home as well as consistency with home exercises. Patient reporting/demonstrating fatigue at completion of treatment session. Иван is making progress towards goals as noted below and will benefit from additional 3 weeks of physical therapy to further address deficits.     Pt prognosis is Good. Pt will continue to benefit from skilled outpatient physical therapy to address the deficits listed in the problem list chart on initial evaluation, provide pt/family education and to maximize pt's level of independence in the home and community environment.     Medical necessity is demonstrated by the impairments and functional limitations listed on the Initial Evaluation.     Anticipated barriers to physical therapy: none identified  Pt's spiritual, cultural and educational needs considered and pt agreeable to plan of care and goals.    GOALS:   Long Term Goals: 12 weeks  Pain: Decrease pain to 2/10 to allow for improved ADL's and exercise tolerance  Goal met 10/30/19  Strength: Improve strength in left knee to 5/5 for improved LE stability  Goal met 10/30/19  Revised goal: Improve strength in left hip muscles by at least 1/2 muscle grade for improved LE stability  ROM: Improve ROM to 80% of normal limits  Progressing towards goal 10/30/19  Functional scale: Improve score on LEFS to < / = 75%  Goal met 10/30/19, currently 65% LE impairment  Walking: Increase walking distance/duration to > / = 200' with least restrictive AD without increase pain  Progressing towards goal 10/30/19  Postures: Increase sitting and/or standing duration to 20 minutes without increase pain  Progressing towards goal 10/30/19  Transfers: Perform supine <> sit, sit to stand and car transfers without increased pain or limitation   Goal met 10/30/19  Exercise: demonstrate independence with home exercise program to  maintain gains made in therapy.  Goal ongoing 10/30/19     Plan   Continue with established Plan of Care towards Physical Therapy goals for 3 more weeks, anticipate discharge onto [facility] at that time.  Discussed Plan of Care with patient: Yes    Jarrell Cueva, PT  10/30/2019

## 2019-11-04 ENCOUNTER — CLINICAL SUPPORT (OUTPATIENT)
Dept: REHABILITATION | Facility: HOSPITAL | Age: 69
End: 2019-11-04
Payer: MEDICARE

## 2019-11-04 DIAGNOSIS — R26.9 ABNORMALITY OF GAIT AND MOBILITY: ICD-10-CM

## 2019-11-04 PROCEDURE — 97110 THERAPEUTIC EXERCISES: CPT

## 2019-11-04 NOTE — PROGRESS NOTES
"                            Physical Therapy Daily Treatment Note / Recertification   Name: Иван Fuentes 1950  MRN: [id number]    Visit Date: 11/4/2019  Visit #: 13 / 18  Authorization period Expiration: 12/31/19    Plan of Care Expiration: 12/01/19  Precautions: per TKA protocol, fall safety  DOS:  9/03/19    Time In: 2:05 pm  Time Out: 3:00 pm  Total 1:1 Treatment Time: 50 min    Treatment Diagnosis:   Encounter Diagnosis   Name Primary?    Abnormality of gait and mobility      Physician: Ever Hall MD    Subjective   Pt reports: been going to gym and I can tell I am getting stronger     Pain Scale:  0/10 on VAS currently, 0/10 on VAS post treatment  Pain Location: left knee    Objective   Иван received therapeutic exercises to develop strength, endurance, ROM, flexibility and posture for 42 minutes including:    Nustep Lv 1 progressing to Lv 2 x 20 min, LE's only   Seated knee extension 2# 2 min  Seated heel-toe raises x 30  HS curls: BTB x 20  Seated HS stretch with 20 sec hold x 5  Seated ball squeeze x 2 min  Seated clams with BTB x 20  Standing hip abd x 20  Toe taps on 6" step with B hand rail support x 10   Seated posture correct with VC for erect trunk and head position and holding 10 sec x 5  TKE's on bolster with 5 sec hold 4# x 2 min  Supine SLR with 1.5# 2 x 15  Quad sets in fl flex x 2 min  Quad sets with roll under heel x 2 min  Step ups: 6" 2x10    Patient did not perform the following exercises today:    Assisted HS on SB with strap x 2 min  HS sets over bolster with 5 sec hold x 2  Min  Supine hip abd on SB x 15       Иван received the following manual therapy techniques: Joint mobilizations patellar, passive HS stretch, passive hip flexor stretch, STM to hamstring tendons, and AA/PROM were applied to the left knee for 10 minutes. DNP    Patient to apply ice pack at home as needed.    Knee Left active Left Passive   Flexion 117 degrees 120 degrees   Extension -8 degrees -5 " degrees     Left Knee IE 10/30/19   Knee extension: 2+/5 5/5   Knee flexion: 3/5 5/5   Hip flexion: 3-/5 4/5   Hip extension: 3/5 3+/5   Hip abduction: 3-/5 3+/5   Hip adduction 4/5 4+/5   Ankle dorsiflexion: 4/5 4+/5   Ankle plantarflexion: 4+/5 5/5     LEFS: 65% impairment  Home Exercises and Education Provided     Education provided re:  ice and elevation of left LE at least 3 times per day for pain and swelling, avoiding use of pillow under knee at rest to facilitate extension, and use of forward wheeled walker for gait, not rollator. Patient educated in importance of achieving terminal knee extension as soon as possible to prevent contractures and restore normal knee biomechanics during walking and standing.  - progress towards goals   - role of therapy in multi - disciplinary team, goals for therapy  Pt educated on condition, POC, and expectations in therapy.  No spiritual or educational barriers to learning provided    Home exercises: reviewed  Pt will be provided HEP during course of treatment with progressions as appropriate. Pt was advised to perform these exercises free of pain, and to stop performing them if pain occurs.   Иван demonstrated good  understanding of the education provided.     Assessment   Иван presented into clinic using rollator demonstrating significant trunk kyphosis with compensatory hip and knee flexion bilaterally. Constant VC's to assume more erect posture with all standing activity. Continues to lack knee extension with SLR as well as during gait cycle. Should continue with joint mobs next visit to promote increased ROM    Pt prognosis is Good. Pt will continue to benefit from skilled outpatient physical therapy to address the deficits listed in the problem list chart on initial evaluation, provide pt/family education and to maximize pt's level of independence in the home and community environment.     Medical necessity is demonstrated by the impairments and functional  limitations listed on the Initial Evaluation.     Anticipated barriers to physical therapy: none identified  Pt's spiritual, cultural and educational needs considered and pt agreeable to plan of care and goals.    GOALS:   Long Term Goals: 12 weeks  Pain: Decrease pain to 2/10 to allow for improved ADL's and exercise tolerance  Goal met 10/30/19  Strength: Improve strength in left knee to 5/5 for improved LE stability  Goal met 10/30/19  Revised goal: Improve strength in left hip muscles by at least 1/2 muscle grade for improved LE stability  ROM: Improve ROM to 80% of normal limits  Progressing towards goal 10/30/19  Functional scale: Improve score on LEFS to < / = 75%  Goal met 10/30/19, currently 65% LE impairment  Walking: Increase walking distance/duration to > / = 200' with least restrictive AD without increase pain  Progressing towards goal 10/30/19  Postures: Increase sitting and/or standing duration to 20 minutes without increase pain  Progressing towards goal 10/30/19  Transfers: Perform supine <> sit, sit to stand and car transfers without increased pain or limitation   Goal met 10/30/19  Exercise: demonstrate independence with home exercise program to maintain gains made in therapy.  Goal ongoing 10/30/19     Plan   Continue with established Plan of Care towards Physical Therapy goals for 3 more weeks, anticipate discharge onto [facility] at that time.  Discussed Plan of Care with patient: Yes    Shantanu Negretener, PTA  11/4/2019

## 2019-11-06 ENCOUNTER — CLINICAL SUPPORT (OUTPATIENT)
Dept: REHABILITATION | Facility: HOSPITAL | Age: 69
End: 2019-11-06
Payer: MEDICARE

## 2019-11-06 DIAGNOSIS — R26.9 ABNORMALITY OF GAIT AND MOBILITY: ICD-10-CM

## 2019-11-06 PROCEDURE — 97110 THERAPEUTIC EXERCISES: CPT

## 2019-11-06 NOTE — PROGRESS NOTES
"                            Physical Therapy Daily Treatment Note / Recertification   Name: Иван Fuentes 1950  MRN: [id number]    Visit Date: 11/6/2019  Visit #: 14 / 18  Authorization period Expiration: 12/31/19    Plan of Care Expiration: 12/01/19  Precautions: per TKA protocol, fall safety  DOS:  9/03/19    Time In: 2:05 pm  Time Out: 3:00 pm  Total 1:1 Treatment Time: 50 min    Treatment Diagnosis:   Encounter Diagnosis   Name Primary?    Abnormality of gait and mobility      Physician: Ever Hall MD    Subjective   Pt reports: haven't really been working on ROM. Not compliant with HEP and sleep with knee bent up.   Pain Scale:  0/10 on VAS currently, 0/10 on VAS post treatment  Pain Location: left knee    Objective   Иван received therapeutic exercises to develop strength, endurance, ROM, flexibility and posture for 42 minutes including:    Nustep Lv 1 progressing to Lv 2 x 20 min, LE's only   Seated knee extension 2# 2 min  Seated heel-toe raises x 30  HS curls: BTB x 20  Seated HS stretch with 20 sec hold x 5  Seated ball squeeze x 2 min  Seated clams with BTB x 20  Standing hip abd x 20  Toe taps on 6" step with B hand rail support x 10   Seated posture correct with VC for erect trunk and head position and holding 10 sec x 5  TKE's on bolster with 5 sec hold 4# x 2 min  Supine SLR with 1.5# 2 x 15  Quad sets in fl flex x 2 min  Quad sets with roll under heel x 2 min  Step ups: 6" 2x10    Patient did not perform the following exercises today:    Assisted HS on SB with strap x 2 min  HS sets over bolster with 5 sec hold x 2  Min  Supine hip abd on SB x 15       Иван received the following manual therapy techniques: Joint mobilizations patellar, passive HS stretch, passive hip flexor stretch, STM to hamstring tendons, and AA/PROM were applied to the left knee for 10 minutes. DNP    Patient to apply ice pack at home as needed.    Knee Left active Left Passive   Flexion 117 degrees 120 " "degrees   Extension -8 degrees -5 degrees     Left Knee IE 10/30/19   Knee extension: 2+/5 5/5   Knee flexion: 3/5 5/5   Hip flexion: 3-/5 4/5   Hip extension: 3/5 3+/5   Hip abduction: 3-/5 3+/5   Hip adduction 4/5 4+/5   Ankle dorsiflexion: 4/5 4+/5   Ankle plantarflexion: 4+/5 5/5     LEFS: 65% impairment  Home Exercises and Education Provided     Education provided re:  ice and elevation of left LE at least 3 times per day for pain and swelling, avoiding use of pillow under knee at rest to facilitate extension, and use of forward wheeled walker for gait, not rollator. Patient educated in importance of achieving terminal knee extension as soon as possible to prevent contractures and restore normal knee biomechanics during walking and standing.  - progress towards goals   - role of therapy in multi - disciplinary team, goals for therapy  Pt educated on condition, POC, and expectations in therapy.  No spiritual or educational barriers to learning provided    Home exercises: reviewed  Pt will be provided HEP during course of treatment with progressions as appropriate. Pt was advised to perform these exercises free of pain, and to stop performing them if pain occurs.   Иван demonstrated good  understanding of the education provided.     Assessment   Иван presented into clinic using rollator demonstrating significant trunk kyphosis with compensatory hip and knee flexion bilaterally. Constant VC's to assume more erect posture with all standing activity. Verbally reinforced importance of achieving knee extension. Patient states he hasn't been compliant with HEP. Will print out HEP next visit. Educated on leaving knee in extended position during sleep. Gave OP during quad sets to end range however patient reports "that's enough for today" after 9 reps    Pt prognosis is Good. Pt will continue to benefit from skilled outpatient physical therapy to address the deficits listed in the problem list chart on initial " evaluation, provide pt/family education and to maximize pt's level of independence in the home and community environment.     Medical necessity is demonstrated by the impairments and functional limitations listed on the Initial Evaluation.     Anticipated barriers to physical therapy: none identified  Pt's spiritual, cultural and educational needs considered and pt agreeable to plan of care and goals.    GOALS:   Long Term Goals: 12 weeks  Pain: Decrease pain to 2/10 to allow for improved ADL's and exercise tolerance  Goal met 10/30/19  Strength: Improve strength in left knee to 5/5 for improved LE stability  Goal met 10/30/19  Revised goal: Improve strength in left hip muscles by at least 1/2 muscle grade for improved LE stability  ROM: Improve ROM to 80% of normal limits  Progressing towards goal 10/30/19  Functional scale: Improve score on LEFS to < / = 75%  Goal met 10/30/19, currently 65% LE impairment  Walking: Increase walking distance/duration to > / = 200' with least restrictive AD without increase pain  Progressing towards goal 10/30/19  Postures: Increase sitting and/or standing duration to 20 minutes without increase pain  Progressing towards goal 10/30/19  Transfers: Perform supine <> sit, sit to stand and car transfers without increased pain or limitation   Goal met 10/30/19  Exercise: demonstrate independence with home exercise program to maintain gains made in therapy.  Goal ongoing 10/30/19     Plan   Continue with established Plan of Care towards Physical Therapy goals for 3 more weeks, anticipate discharge onto [facility] at that time.  Discussed Plan of Care with patient: Yes    Shantanu Willson, PTA  11/6/2019

## 2019-11-08 DIAGNOSIS — Z96.659 STATUS POST KNEE REPLACEMENT, UNSPECIFIED LATERALITY: ICD-10-CM

## 2019-11-08 RX ORDER — OXYCODONE HYDROCHLORIDE 5 MG/1
5 TABLET ORAL EVERY 6 HOURS PRN
Qty: 28 TABLET | Refills: 0 | Status: SHIPPED | OUTPATIENT
Start: 2019-11-08 | End: 2019-11-20 | Stop reason: SDUPTHER

## 2019-11-11 ENCOUNTER — CLINICAL SUPPORT (OUTPATIENT)
Dept: REHABILITATION | Facility: HOSPITAL | Age: 69
End: 2019-11-11
Payer: MEDICARE

## 2019-11-11 DIAGNOSIS — R26.9 ABNORMALITY OF GAIT AND MOBILITY: ICD-10-CM

## 2019-11-11 PROCEDURE — 97110 THERAPEUTIC EXERCISES: CPT

## 2019-11-11 PROCEDURE — 97140 MANUAL THERAPY 1/> REGIONS: CPT

## 2019-11-11 NOTE — PROGRESS NOTES
"                            Physical Therapy Daily Treatment Note   Name: Иван Fuentes 1950  MRN: [id number]    Visit Date: 11/11/2019  Visit #: 15 / 18  Authorization period Expiration: 12/31/19    Plan of Care Expiration: 12/01/19  Precautions: per TKA protocol, fall safety  DOS:  9/03/19    Time In: 2:05 pm  Time Out: 3:00 pm  Total 1:1 Treatment Time: 50 min    Treatment Diagnosis:   Encounter Diagnosis   Name Primary?    Abnormality of gait and mobility      Physician: Ever Hall MD    Subjective   Pt reports: been trying to sleep with it straight but don't always work. Try to do some stuff to work on it when I can, I need to work a little harder and still have a ways to go.   Pain Scale:  0/10 on VAS currently, 0/10 on VAS post treatment  Pain Location: left knee    Objective   Иван received therapeutic exercises to develop strength, endurance, ROM, flexibility and posture for 42 minutes including:    Nustep Lv 1 progressing to Lv 2 x 20 min, LE's only   Seated knee extension 2# 2 min  Seated heel-toe raises x 30  HS curls: BTB x 20  Seated HS stretch with 20 sec hold x 5  Seated ball squeeze x 2 min  Seated clams with BTB x 20  Standing hip abd x 20  Toe taps on 6" step with B hand rail support x 10   Seated posture correct with VC for erect trunk and head position and holding 10 sec x 5  TKE's on bolster with 5 sec hold 4# x 2 min  Supine SLR with 1.5# 2 x 15  Quad sets in fl flex x 2 min  Quad sets with roll under heel x 2 min  Step ups: 6" 2x10    Patient did not perform the following exercises today:    Assisted HS on SB with strap x 2 min  HS sets over bolster with 5 sec hold x 2  Min  Supine hip abd on SB x 15       Иван received the following manual therapy techniques: Joint mobilizations patellar, passive HS stretch, passive hip flexor stretch, STM to hamstring tendons, and AA/PROM were applied to the left knee for 10 minutes. DNP    Patient to apply ice pack at home as " needed.    Knee Left active Left Passive   Flexion 117 degrees 120 degrees   Extension -8 degrees -5 degrees     Left Knee IE 10/30/19   Knee extension: 2+/5 5/5   Knee flexion: 3/5 5/5   Hip flexion: 3-/5 4/5   Hip extension: 3/5 3+/5   Hip abduction: 3-/5 3+/5   Hip adduction 4/5 4+/5   Ankle dorsiflexion: 4/5 4+/5   Ankle plantarflexion: 4+/5 5/5     LEFS: 65% impairment  Home Exercises and Education Provided     Education provided re:  ice and elevation of left LE at least 3 times per day for pain and swelling, avoiding use of pillow under knee at rest to facilitate extension, and use of forward wheeled walker for gait, not rollator. Patient educated in importance of achieving terminal knee extension as soon as possible to prevent contractures and restore normal knee biomechanics during walking and standing.  - progress towards goals   - role of therapy in multi - disciplinary team, goals for therapy  Pt educated on condition, POC, and expectations in therapy.  No spiritual or educational barriers to learning provided    Home exercises: reviewed  Pt will be provided HEP during course of treatment with progressions as appropriate. Pt was advised to perform these exercises free of pain, and to stop performing them if pain occurs.   Иван demonstrated good  understanding of the education provided.     Assessment   Иван presented into clinic using rollator demonstrating significant trunk kyphosis with compensatory hip and knee flexion bilaterally. Constant VC's to assume more erect posture with all standing activity. Verbally reinforced importance of achieving knee extension. Patient shows very little to no progression with AROM. Pain at end range flexion and extension. Reinforced importance of HEP patient states he will become more consistent. Patient very slow with activity and required multiple seated water/rest breaks.     Pt prognosis is Good. Pt will continue to benefit from skilled outpatient physical  therapy to address the deficits listed in the problem list chart on initial evaluation, provide pt/family education and to maximize pt's level of independence in the home and community environment.     Medical necessity is demonstrated by the impairments and functional limitations listed on the Initial Evaluation.     Anticipated barriers to physical therapy: none identified  Pt's spiritual, cultural and educational needs considered and pt agreeable to plan of care and goals.    GOALS:   Long Term Goals: 12 weeks  Pain: Decrease pain to 2/10 to allow for improved ADL's and exercise tolerance  Goal met 10/30/19  Strength: Improve strength in left knee to 5/5 for improved LE stability  Goal met 10/30/19  Revised goal: Improve strength in left hip muscles by at least 1/2 muscle grade for improved LE stability  ROM: Improve ROM to 80% of normal limits  Progressing towards goal 10/30/19  Functional scale: Improve score on LEFS to < / = 75%  Goal met 10/30/19, currently 65% LE impairment  Walking: Increase walking distance/duration to > / = 200' with least restrictive AD without increase pain  Progressing towards goal 10/30/19  Postures: Increase sitting and/or standing duration to 20 minutes without increase pain  Progressing towards goal 10/30/19  Transfers: Perform supine <> sit, sit to stand and car transfers without increased pain or limitation   Goal met 10/30/19  Exercise: demonstrate independence with home exercise program to maintain gains made in therapy.  Goal ongoing 10/30/19     Plan   Continue with established Plan of Care towards Physical Therapy goals for 3 more weeks, anticipate discharge onto [facility] at that time.  Discussed Plan of Care with patient: Yes    Shantanu Willson, PTA  11/11/2019

## 2019-11-13 ENCOUNTER — OFFICE VISIT (OUTPATIENT)
Dept: ORTHOPEDICS | Facility: CLINIC | Age: 69
End: 2019-11-13
Payer: MEDICARE

## 2019-11-13 VITALS — BODY MASS INDEX: 40.76 KG/M2 | WEIGHT: 268.94 LBS | HEIGHT: 68 IN

## 2019-11-13 DIAGNOSIS — Z96.659 STATUS POST KNEE REPLACEMENT, UNSPECIFIED LATERALITY: Primary | ICD-10-CM

## 2019-11-13 PROCEDURE — 99024 POSTOP FOLLOW-UP VISIT: CPT | Mod: POP,,, | Performed by: ORTHOPAEDIC SURGERY

## 2019-11-13 PROCEDURE — 99024 PR POST-OP FOLLOW-UP VISIT: ICD-10-PCS | Mod: POP,,, | Performed by: ORTHOPAEDIC SURGERY

## 2019-11-13 PROCEDURE — 99999 PR PBB SHADOW E&M-EST. PATIENT-LVL III: ICD-10-PCS | Mod: PBBFAC,,, | Performed by: ORTHOPAEDIC SURGERY

## 2019-11-13 PROCEDURE — 99213 OFFICE O/P EST LOW 20 MIN: CPT | Mod: PBBFAC | Performed by: ORTHOPAEDIC SURGERY

## 2019-11-13 PROCEDURE — 99999 PR PBB SHADOW E&M-EST. PATIENT-LVL III: CPT | Mod: PBBFAC,,, | Performed by: ORTHOPAEDIC SURGERY

## 2019-11-13 NOTE — PROGRESS NOTES
Иван Fuentes is in for 3 month follow up for a  Left TKA.  He is doing very well.  Mild pain in the knee.  He has resumed activities of daily living. He has hip pain which is slowing him down  Exam demonstrates  A well developed male in no distress.  Alert and oriented.  Mood and affect are appropriate.    Knee incision is well healed.  ROM is 0-.  The patella tracks well and there is no instability. The extremity is neurovascularly intact.    Xrays demonstrate a well fixed and positioned prosthesis.      Imp:DOing well    F/u in 3 months with hip  xrays

## 2019-11-14 ENCOUNTER — CLINICAL SUPPORT (OUTPATIENT)
Dept: REHABILITATION | Facility: HOSPITAL | Age: 69
End: 2019-11-14
Payer: MEDICARE

## 2019-11-14 DIAGNOSIS — R26.9 ABNORMALITY OF GAIT AND MOBILITY: ICD-10-CM

## 2019-11-14 PROCEDURE — 97140 MANUAL THERAPY 1/> REGIONS: CPT

## 2019-11-14 PROCEDURE — 97110 THERAPEUTIC EXERCISES: CPT

## 2019-11-14 NOTE — PROGRESS NOTES
"                            Physical Therapy Daily Treatment Note   Name: Иван Fuentes 1950  MRN: [id number]    Visit Date: 11/14/2019  Visit #: 16 / 18  Authorization period Expiration: 12/31/19    Plan of Care Expiration: 12/01/19  Precautions: per TKA protocol, fall safety  DOS:  9/03/19    Time In: 2:10 pm  Time Out: 3:00 pm  Total 1:1 Treatment Time: 50 min    Treatment Diagnosis:   Encounter Diagnosis   Name Primary?    Abnormality of gait and mobility      Physician: Ever Hall MD    Subjective   Pt reports:  Nothing new. Patient not compliant with HEP  Pain Scale:  0/10 on VAS currently, 0/10 on VAS post treatment  Pain Location: left knee    Objective   Иван received therapeutic exercises to develop strength, endurance, ROM, flexibility and posture for 42 minutes including:    Nustep Lv 1 progressing to Lv 2 x 20 min, LE's only   Seated knee extension 2# 2 min  Seated heel-toe raises x 30  HS curls: BTB x 20  Seated HS stretch with 20 sec hold x 5  Seated ball squeeze x 2 min  Seated clams with BTB x 20  Standing hip abd x 20  Toe taps on 6" step with B hand rail support x 10   Seated posture correct with VC for erect trunk and head position and holding 10 sec x 5 dnp  TKE's on bolster with 5 sec hold 4# x 2 min  Supine SLR with 1.5# 2 x 15  Quad sets in fl flex x 2 min  Quad sets with roll under heel x 2 min  Step ups: 6" 2x10    Patient did not perform the following exercises today:    Assisted HS on SB with strap x 2 min  HS sets over bolster with 5 sec hold x 2  Min  Supine hip abd on SB x 15       Иван received the following manual therapy techniques: Joint mobilizations patellar, post femoral glides on tibia to promote extension for 10 minutes.     Patient to apply ice pack at home as needed.    Knee Left active Left Passive   Flexion 117 degrees 120 degrees   Extension -8 degrees -5 degrees     Left Knee IE 10/30/19   Knee extension: 2+/5 5/5   Knee flexion: 3/5 5/5   Hip " flexion: 3-/5 4/5   Hip extension: 3/5 3+/5   Hip abduction: 3-/5 3+/5   Hip adduction 4/5 4+/5   Ankle dorsiflexion: 4/5 4+/5   Ankle plantarflexion: 4+/5 5/5     LEFS: 65% impairment  Home Exercises and Education Provided     Education provided re:  ice and elevation of left LE at least 3 times per day for pain and swelling, avoiding use of pillow under knee at rest to facilitate extension, and use of forward wheeled walker for gait, not rollator. Patient educated in importance of achieving terminal knee extension as soon as possible to prevent contractures and restore normal knee biomechanics during walking and standing.  - progress towards goals   - role of therapy in multi - disciplinary team, goals for therapy  Pt educated on condition, POC, and expectations in therapy.  No spiritual or educational barriers to learning provided    Home exercises: reviewed  Pt will be provided HEP during course of treatment with progressions as appropriate. Pt was advised to perform these exercises free of pain, and to stop performing them if pain occurs.   Иван demonstrated good  understanding of the education provided.     Assessment   Иван presented into clinic using rollator demonstrating significant trunk kyphosis with compensatory hip and knee flexion bilaterally. Constant VC's to assume more erect posture with all standing activity. OP during QS to promote extension, continues to lack 8 degrees actively and roughly 4 degrees passively. Patient continuously asks for breaks during joint mobs secondary to discomfort at end range.   Pt prognosis is Good. Pt will continue to benefit from skilled outpatient physical therapy to address the deficits listed in the problem list chart on initial evaluation, provide pt/family education and to maximize pt's level of independence in the home and community environment.     Medical necessity is demonstrated by the impairments and functional limitations listed on the Initial  Evaluation.     Anticipated barriers to physical therapy: none identified  Pt's spiritual, cultural and educational needs considered and pt agreeable to plan of care and goals.    GOALS:   Long Term Goals: 12 weeks  Pain: Decrease pain to 2/10 to allow for improved ADL's and exercise tolerance  Goal met 10/30/19  Strength: Improve strength in left knee to 5/5 for improved LE stability  Goal met 10/30/19  Revised goal: Improve strength in left hip muscles by at least 1/2 muscle grade for improved LE stability  ROM: Improve ROM to 80% of normal limits  Progressing towards goal 10/30/19  Functional scale: Improve score on LEFS to < / = 75%  Goal met 10/30/19, currently 65% LE impairment  Walking: Increase walking distance/duration to > / = 200' with least restrictive AD without increase pain  Progressing towards goal 10/30/19  Postures: Increase sitting and/or standing duration to 20 minutes without increase pain  Progressing towards goal 10/30/19  Transfers: Perform supine <> sit, sit to stand and car transfers without increased pain or limitation   Goal met 10/30/19  Exercise: demonstrate independence with home exercise program to maintain gains made in therapy.  Goal ongoing 10/30/19     Plan   Continue with established Plan of Care towards Physical Therapy goals for 3 more weeks, anticipate discharge onto [facility] at that time.  Discussed Plan of Care with patient: Yes    Shantanu Demetris, PTA  11/14/2019

## 2019-11-20 ENCOUNTER — PATIENT MESSAGE (OUTPATIENT)
Dept: ORTHOPEDICS | Facility: CLINIC | Age: 69
End: 2019-11-20

## 2019-11-20 DIAGNOSIS — Z96.659 STATUS POST KNEE REPLACEMENT, UNSPECIFIED LATERALITY: ICD-10-CM

## 2019-11-20 RX ORDER — OXYCODONE HYDROCHLORIDE 5 MG/1
5 TABLET ORAL EVERY 6 HOURS PRN
Qty: 28 TABLET | Refills: 0 | Status: SHIPPED | OUTPATIENT
Start: 2019-11-20 | End: 2020-04-29

## 2019-11-21 ENCOUNTER — CLINICAL SUPPORT (OUTPATIENT)
Dept: REHABILITATION | Facility: HOSPITAL | Age: 69
End: 2019-11-21
Payer: MEDICARE

## 2019-11-21 DIAGNOSIS — R26.9 ABNORMALITY OF GAIT AND MOBILITY: ICD-10-CM

## 2019-11-21 PROCEDURE — 97110 THERAPEUTIC EXERCISES: CPT

## 2019-11-21 PROCEDURE — 97750 PHYSICAL PERFORMANCE TEST: CPT

## 2019-11-21 NOTE — PROGRESS NOTES
"                            Physical Therapy Daily Treatment Note / Discharge Note   Name: Иван Fuentes 1950  MRN: [id number]    Visit Date: 11/21/2019  Visit #: 17   Authorization period Expiration: 12/31/19    Plan of Care Expiration: 12/01/19  Precautions: per TKA protocol, fall safety  DOS:  9/03/19    Time In: 2:05 pm  Time Out: 3:05 pm  Total 1:1 Treatment Time: 53 min    Treatment Diagnosis:   Encounter Diagnosis   Name Primary?    Abnormality of gait and mobility      Physician: Ever Hall MD    Subjective   Pt reports:  He just got back from [place] looking at RV's and must have climbed into 25 of them, his right hip is sore today. "I need to start doing my exercises at home"    Pain Scale:  0/10 on VAS currently, 0/10 on VAS post treatment  Pain Location: left knee    Objective   Иван participated in therapeutic exercises to develop strength, endurance, ROM, flexibility and posture for 35 minutes including:    Nustep Lv 1 progressing to Lv 2 x 20 min, LE's only   Seated knee extension 2# 2 min  Seated heel-toe raises x 30  HS curls: BTB x 20  Seated HS stretch with 20 sec hold x 5  Seated ball squeeze x 2 min  Seated clams with BTB x 20  Standing hip abd x 20  Toe taps on 6" step with B hand rail support x 10   Seated posture correct with VC for erect trunk and head position and holding 10 sec x 5 dnp  TKE's on bolster with 5 sec hold 4# x 2 min  Supine SLR with 1.5# 2 x 15  Quad sets in fl flex x 2 min  Quad sets with roll under heel x 2 min  Step ups: 6" 2x10  HS on SB with OP x 10    Иван received the following manual therapy techniques: Joint mobilizations patellar, post femoral glides on tibia to promote extension for 8 minutes.     Physical performance testing x 10 minutes with following written report. Discussed findings with patient to educate on gains made since IE and residual deficits remaining.    Knee Left active Left Passive   Flexion 117 degrees 120 degrees "   Extension -8 degrees -5 degrees     Left Knee IE 10/30/19 11/21/19   Knee extension: 2+/5 5/5 5/5   Knee flexion: 3/5 5/5 5/5   Hip flexion: 3-/5 4/5 4+/5   Hip extension: 3/5 3+/5 4/5   Hip abduction: 3-/5 3+/5 4+/5   Hip adduction 4/5 4+/5 5/5   Ankle dorsiflexion: 4/5 4+/5 5/5   Ankle plantarflexion: 4+/5 5/5 5/5     LEFS: 65% impairment  Home Exercises and Education Provided     Education provided re: D/C instructions   - progress towards goals   - continue with HEP as instructed  -ice/heat as needed   No spiritual or educational barriers to learning provided    Home exercises: reviewed HEP for D/C  Pt was advised to perform these exercises free of pain, and to stop performing them if pain occurs.   Иван demonstrated good  understanding of the education provided.     Assessment   Иван had follow up appt with Dr Hall on 11/13/19 and was discharged for his knee and told to return in 3 months to discuss right hip surgery. Patient has met PT goals set for him at last re certification and has reached max potential at this time and is appropriate for D/C onto HEP. Patient continues to ambulate using rollator demonstrating significant trunk kyphosis with compensatory hip and knee flexion bilaterally. Reinforced importance of compliance with HEP to maintain progress made in physical therapy.     GOALS:   Long Term Goals: 12 weeks  Pain: Decrease pain to 2/10 to allow for improved ADL's and exercise tolerance  Goal met 10/30/19  Strength: Improve strength in left knee to 5/5 for improved LE stability  Goal met 10/30/19  Revised goal: Improve strength in left hip muscles by at least 1/2 muscle grade for improved LE stability  Goal met 11/21/19  ROM: Improve ROM to 80% of normal limits  Progressing towards goal 10/30/19  Goal met 11/21/19  Functional scale: Improve score on LEFS to < / = 75%  Goal met 10/30/19, currently 65% LE impairment  Walking: Increase walking distance/duration to > / = 200' with least  restrictive AD without increase pain  Goal met 11/21/19  Postures: Increase sitting and/or standing duration to 20 minutes without increase pain  Goal met 11/21/19  Transfers: Perform supine <> sit, sit to stand and car transfers without increased pain or limitation   Goal met 10/30/19  Exercise: demonstrate independence with home exercise program to maintain gains made in therapy.  Goal met 11/21/19     Plan   Discharge onto [facility], follow up with referring physician as needed. Patient has appt in 3 months to discuss right hip surgery.  Discharge discussed with and agreed upon with patient: Yes    Jarrell Cueva, PT  11/21/2019

## 2019-12-30 DIAGNOSIS — Z96.659 STATUS POST KNEE REPLACEMENT, UNSPECIFIED LATERALITY: Primary | ICD-10-CM

## 2019-12-30 RX ORDER — AMOXICILLIN 500 MG/1
2000 CAPSULE ORAL
Qty: 4 CAPSULE | Refills: 0 | Status: SHIPPED | OUTPATIENT
Start: 2019-12-30 | End: 2020-11-03

## 2020-03-12 DIAGNOSIS — M25.551 BILATERAL HIP PAIN: Primary | ICD-10-CM

## 2020-03-12 DIAGNOSIS — M25.552 BILATERAL HIP PAIN: Primary | ICD-10-CM

## 2020-04-28 ENCOUNTER — PATIENT MESSAGE (OUTPATIENT)
Dept: ORTHOPEDICS | Facility: CLINIC | Age: 70
End: 2020-04-28

## 2020-04-29 ENCOUNTER — PATIENT MESSAGE (OUTPATIENT)
Dept: ORTHOPEDICS | Facility: CLINIC | Age: 70
End: 2020-04-29

## 2020-04-29 DIAGNOSIS — M16.11 PRIMARY OSTEOARTHRITIS OF RIGHT HIP: Primary | ICD-10-CM

## 2020-04-29 RX ORDER — OXYCODONE HYDROCHLORIDE 5 MG/1
5 TABLET ORAL EVERY 12 HOURS PRN
Qty: 28 TABLET | Refills: 0 | Status: SHIPPED | OUTPATIENT
Start: 2020-04-29 | End: 2021-10-04

## 2020-04-29 NOTE — PROGRESS NOTES
Will get set up for a right hip injection in next two weeks ( he wants to delay a bit due to COVID.  Will cover with two weeks of pain medicine

## 2020-05-27 ENCOUNTER — TELEPHONE (OUTPATIENT)
Dept: INTERVENTIONAL RADIOLOGY/VASCULAR | Facility: HOSPITAL | Age: 70
End: 2020-05-27

## 2020-05-28 ENCOUNTER — TELEPHONE (OUTPATIENT)
Dept: ADMINISTRATIVE | Facility: OTHER | Age: 70
End: 2020-05-28

## 2020-05-29 ENCOUNTER — TELEPHONE (OUTPATIENT)
Dept: INTERVENTIONAL RADIOLOGY/VASCULAR | Facility: HOSPITAL | Age: 70
End: 2020-05-29

## 2020-06-02 ENCOUNTER — OFFICE VISIT (OUTPATIENT)
Dept: UROLOGY | Facility: CLINIC | Age: 70
End: 2020-06-02
Payer: MEDICARE

## 2020-06-02 ENCOUNTER — HOSPITAL ENCOUNTER (OUTPATIENT)
Dept: RADIOLOGY | Facility: HOSPITAL | Age: 70
Discharge: HOME | End: 2020-06-02
Attending: ORTHOPAEDIC SURGERY
Payer: MEDICARE

## 2020-06-02 VITALS
SYSTOLIC BLOOD PRESSURE: 108 MMHG | DIASTOLIC BLOOD PRESSURE: 69 MMHG | WEIGHT: 268 LBS | HEART RATE: 61 BPM | BODY MASS INDEX: 40.62 KG/M2 | HEIGHT: 68 IN

## 2020-06-02 DIAGNOSIS — M16.11 PRIMARY OSTEOARTHRITIS OF RIGHT HIP: ICD-10-CM

## 2020-06-02 DIAGNOSIS — R33.8 BENIGN PROSTATIC HYPERPLASIA WITH URINARY RETENTION: ICD-10-CM

## 2020-06-02 DIAGNOSIS — N40.1 BENIGN PROSTATIC HYPERPLASIA WITH URINARY RETENTION: ICD-10-CM

## 2020-06-02 DIAGNOSIS — Z98.1 S/P CERVICAL SPINAL FUSION: Primary | ICD-10-CM

## 2020-06-02 DIAGNOSIS — N39.41 URGE INCONTINENCE: ICD-10-CM

## 2020-06-02 PROCEDURE — 77002 FL ASPIRATION INJECTION MAJOR JOINT RIGHT WITH FLUORO: ICD-10-PCS | Mod: 26,,, | Performed by: RADIOLOGY

## 2020-06-02 PROCEDURE — 25500020 PHARM REV CODE 255: Performed by: ORTHOPAEDIC SURGERY

## 2020-06-02 PROCEDURE — 99499 NO LOS: ICD-10-PCS | Mod: S$PBB,,, | Performed by: UROLOGY

## 2020-06-02 PROCEDURE — 99499 UNLISTED E&M SERVICE: CPT | Mod: S$PBB,,, | Performed by: UROLOGY

## 2020-06-02 PROCEDURE — 63600175 PHARM REV CODE 636 W HCPCS: Performed by: ORTHOPAEDIC SURGERY

## 2020-06-02 PROCEDURE — 20610 DRAIN/INJ JOINT/BURSA W/O US: CPT | Mod: RT,,, | Performed by: RADIOLOGY

## 2020-06-02 PROCEDURE — 99999 PR PBB SHADOW E&M-EST. PATIENT-LVL III: CPT | Mod: PBBFAC,,, | Performed by: UROLOGY

## 2020-06-02 PROCEDURE — 99213 OFFICE O/P EST LOW 20 MIN: CPT | Mod: PBBFAC,25 | Performed by: UROLOGY

## 2020-06-02 PROCEDURE — 99999 PR PBB SHADOW E&M-EST. PATIENT-LVL III: ICD-10-PCS | Mod: PBBFAC,,, | Performed by: UROLOGY

## 2020-06-02 PROCEDURE — 20610 FL ASPIRATION INJECTION MAJOR JOINT RIGHT WITH FLUORO: ICD-10-PCS | Mod: RT,,, | Performed by: RADIOLOGY

## 2020-06-02 PROCEDURE — 77002 NEEDLE LOCALIZATION BY XRAY: CPT | Mod: 26,,, | Performed by: RADIOLOGY

## 2020-06-02 PROCEDURE — 25000003 PHARM REV CODE 250: Performed by: ORTHOPAEDIC SURGERY

## 2020-06-02 PROCEDURE — 77002 NEEDLE LOCALIZATION BY XRAY: CPT

## 2020-06-02 RX ORDER — BUPIVACAINE HYDROCHLORIDE 2.5 MG/ML
5 INJECTION, SOLUTION EPIDURAL; INFILTRATION; INTRACAUDAL ONCE
Status: COMPLETED | OUTPATIENT
Start: 2020-06-02 | End: 2020-06-02

## 2020-06-02 RX ORDER — METHYLPREDNISOLONE ACETATE 40 MG/ML
80 INJECTION, SUSPENSION INTRA-ARTICULAR; INTRALESIONAL; INTRAMUSCULAR; SOFT TISSUE ONCE
Status: COMPLETED | OUTPATIENT
Start: 2020-06-02 | End: 2020-06-02

## 2020-06-02 RX ORDER — LIDOCAINE HYDROCHLORIDE 10 MG/ML
3 INJECTION INFILTRATION; PERINEURAL ONCE
Status: COMPLETED | OUTPATIENT
Start: 2020-06-02 | End: 2020-06-02

## 2020-06-02 RX ADMIN — BUPIVACAINE HYDROCHLORIDE 12.5 MG: 2.5 INJECTION, SOLUTION EPIDURAL; INFILTRATION; INTRACAUDAL; PERINEURAL at 01:06

## 2020-06-02 RX ADMIN — LIDOCAINE HYDROCHLORIDE 3 ML: 10 INJECTION, SOLUTION INFILTRATION; PERINEURAL at 01:06

## 2020-06-02 RX ADMIN — METHYLPREDNISOLONE ACETATE 80 MG: 40 INJECTION, SUSPENSION INTRA-ARTICULAR; INTRALESIONAL; INTRAMUSCULAR; SOFT TISSUE at 01:06

## 2020-06-02 RX ADMIN — IOHEXOL 1 ML: 300 INJECTION, SOLUTION INTRAVENOUS at 01:06

## 2020-06-02 NOTE — PROGRESS NOTES
Urology - [facility]  Staff - Aren Armenta    SUBJECTIVE:     Chief Complaint: Urinary incontinence    History of Present Illness:  Иван Fuentes is a 69 y.o. male with a history back and neck pain s/p T2-T6 fusion who presents to clinic for incontinence. He is New  to our clinic. Referral from Self, Aaareferral. Patient had T2-T6 fusion in 2015 which required the patient to undergo extensive care at [facility] and physical therapy. Immediately post-operatively, the patient went into urinary retention and was discharged with a chronic indwelling blankenship catheter. He followed up with Dr. Razo at [facility] who performed a TURP for BPH which alleviated his retention symptoms. Approximately 3 years ago, the patient began having symptoms of urgency and frequency (8-10 times per day) with some urgency incontinence requiring 2-3 adult diapers per day due to incontinence episodes. He has been on anticholinergic therapy for this with inadequate results, and followed up with urology in [place], [place] who performed urodynamics testing although he is not sure of the results. He ultimately underwent for botox therapy to the bladder while in [place]. He reports partial improvement following botox that only lasted 4-5 months. He presents today looking for a more definitive treatment for his problems.    He denies any recent episodes of UTI, dysuria, suprapubic/flank pain, hematuria. He feels that he empties his bladder well.     Review of patient's allergies indicates:  No Known Allergies    Past Medical History:   Diagnosis Date    Arthritis of both knees     BPH (benign prostatic hyperplasia)     CVA (cerebral vascular accident)     Deep vein thrombosis     Diabetes mellitus     Diabetes mellitus, type 2     High cholesterol     Hypertension     Left-sided weakness     Obese     Sleep apnea     Thyroid disease     Urosepsis      Past Surgical History:   Procedure Laterality Date    BACK SURGERY      HIP  ARTHROPLASTY      JOINT REPLACEMENT      PERCUTANEOUS CRYOTHERAPY OF PERIPHERAL NERVE USING LIQUID NITROUS OXIDE IN CLOSED NEEDLE DEVICE Left 8/26/2019    Procedure: CRYOTHERAPY, NERVE, PERIPHERAL, PERCUTANEOUS, USING LIQUID NITROUS OXIDE IN CLOSED NEEDLE DEVICE LEFT KNEE SIMON;  Surgeon: MENG Beckwith;  Location: [facility] CATH LAB;  Service: Pain Management;  Laterality: Left;    PROSTATE SURGERY  2015    SPINAL FUSION  09/2014    TONSILLECTOMY      TOTAL KNEE ARTHROPLASTY Left 9/3/2019    Procedure: ARTHROPLASTY, KNEE, TOTAL-SANDIP;  Surgeon: Ever Hall MD;  Location: [facility] OR [address] [place] [address];  Service: Orthopedics;  Laterality: Left;     Family History   Problem Relation Age of Onset    Hypertension Brother     Diabetes Mellitus Mother     Heart attack Neg Hx     Heart disease Neg Hx      Social History     Tobacco Use    Smoking status: Former Smoker     Packs/day: 0.50     Types: Cigarettes    Smokeless tobacco: Never Used    Tobacco comment: pt quit 3 weeks ago   Substance Use Topics    Alcohol use: Not Currently     Comment: history of alcohol excess until 2011    Drug use: No        Review of Systems   Constitutional: Negative for appetite change, chills, fatigue, fever and unexpected weight change.   HENT: Negative.    Eyes: Negative.    Respiratory: Negative for cough, chest tightness and shortness of breath.    Cardiovascular: Negative for chest pain, palpitations and leg swelling.   Gastrointestinal: Negative for abdominal pain, constipation, diarrhea, nausea and vomiting.   Genitourinary: Positive for frequency, nocturia and urgency. Negative for difficulty urinating, dysuria, flank pain, hematuria and penile pain.   Musculoskeletal: Negative for back pain.   Skin: Negative for rash.   Neurological: Negative for dizziness, syncope, numbness and headaches.   Hematological: Does not bruise/bleed easily.   Psychiatric/Behavioral: Negative.        OBJECTIVE:     Anticoagulation:   "Yes - ASA 81    Estimated body mass index is 40.75 kg/m² as calculated from the following:    Height as of this encounter: 5' 8" (1.727 m).    Weight as of this encounter: 121.6 kg (268 lb).    Vital Signs (Most Recent)  Pulse: 61 (06/02/20 0926)  BP: 108/69 (06/02/20 0926)    Physical Exam   Constitutional: He is oriented to person, place, and time. He appears well-developed and well-nourished. No distress.   Obese. Patient with walking cane. Kyphosis of the spine.   Cardiovascular: Normal rate and intact distal pulses.    Pulmonary/Chest: Effort normal. No respiratory distress.   Abdominal: Soft. He exhibits no distension. There is no tenderness.   Genitourinary:   Genitourinary Comments: Uncircumsized penis. Orthotopic meatus, no stenosis. No plaques, ulcers, or lesions along penis. Testes palpated bilaterally, no masses. Normal cord structures. Scrotum normal without lesions. Prostate 35 grams, smooth without nodularity.   Musculoskeletal: Normal range of motion. He exhibits no tenderness.   Neurological: He is alert and oriented to person, place, and time.   Skin: Skin is warm and dry. No rash noted.     Psychiatric: He has a normal mood and affect. Judgment normal.       BMP  Lab Results   Component Value Date     09/10/2019    K 4.9 09/10/2019     09/10/2019    CO2 24 09/10/2019    BUN 27 (H) 09/10/2019    CREATININE 0.9 09/10/2019    CALCIUM 9.9 09/10/2019    ANIONGAP 10 09/10/2019    ESTGFRAFRICA >60.0 09/10/2019    EGFRNONAA >60.0 09/10/2019       Lab Results   Component Value Date    WBC 13.57 (H) 09/10/2019    HGB 15.7 09/10/2019    HCT 50.1 09/10/2019     (H) 09/10/2019     09/10/2019       Lab Results   Component Value Date    PSA 2.5 09/24/2004       Imaging:    ASSESSMENT     1. S/P cervical spinal fusion    2. Benign prostatic hyperplasia with urinary retention    3. Urge incontinence        PLAN:     - Discussed options for treating urgency incontinence including first and " second line therapies. Discussed options for third line therapy including repeat botox or sacral neuromodulation.  - Referral to Dr. Mgaana for a formal incontinence workup    Armando Damon MD

## 2020-06-22 ENCOUNTER — OFFICE VISIT (OUTPATIENT)
Dept: UROLOGY | Facility: CLINIC | Age: 70
End: 2020-06-22
Payer: MEDICARE

## 2020-06-22 VITALS
WEIGHT: 268 LBS | SYSTOLIC BLOOD PRESSURE: 124 MMHG | DIASTOLIC BLOOD PRESSURE: 72 MMHG | HEART RATE: 61 BPM | HEIGHT: 68 IN | BODY MASS INDEX: 40.62 KG/M2

## 2020-06-22 DIAGNOSIS — N39.41 URGE INCONTINENCE: Primary | ICD-10-CM

## 2020-06-22 PROCEDURE — 99999 PR PBB SHADOW E&M-EST. PATIENT-LVL IV: ICD-10-PCS | Mod: PBBFAC,,, | Performed by: UROLOGY

## 2020-06-22 PROCEDURE — 87081 CULTURE SCREEN ONLY: CPT

## 2020-06-22 PROCEDURE — 99999 PR PBB SHADOW E&M-EST. PATIENT-LVL IV: CPT | Mod: PBBFAC,,, | Performed by: UROLOGY

## 2020-06-22 PROCEDURE — 99214 PR OFFICE/OUTPT VISIT, EST, LEVL IV, 30-39 MIN: ICD-10-PCS | Mod: S$PBB,,, | Performed by: UROLOGY

## 2020-06-22 PROCEDURE — 99214 OFFICE O/P EST MOD 30 MIN: CPT | Mod: PBBFAC | Performed by: UROLOGY

## 2020-06-22 PROCEDURE — 99214 OFFICE O/P EST MOD 30 MIN: CPT | Mod: S$PBB,,, | Performed by: UROLOGY

## 2020-06-22 RX ORDER — DEXTROMETHORPHAN HYDROBROMIDE, GUAIFENESIN 5; 100 MG/5ML; MG/5ML
650 LIQUID ORAL DAILY PRN
COMMUNITY
End: 2021-12-15 | Stop reason: CLARIF

## 2020-06-22 NOTE — PROGRESS NOTES
Urology - [facility]       SUBJECTIVE:      Chief Complaint: Refractory Urgency Incontinence     History of Present Illness:  Иван Fuentes is a 69 y.o. male with a history back and neck pain s/p T2-T6 fusion who presents to clinic for incontinence. He is New  to our clinic. Referral from Self, Aaareferral. Patient had T2-T6 fusion in 2015 which required the patient to undergo extensive care at [facility] and physical therapy. Immediately post-operatively, the patient went into urinary retention and was discharged with a chronic indwelling blankenship catheter. He followed up with Dr. Razo at [facility] who performed a TURP for BPH which alleviated his retention symptoms. Approximately 3 years ago, the patient began having symptoms of urgency and frequency (8-10 times per day) with some urgency incontinence requiring 2-3 adult diapers per day due to incontinence episodes. He has been on anticholinergic therapy for this with inadequate results, and followed up with urology in [place], [place] who performed urodynamics testing although he is not sure of the results. He ultimately underwent for botox therapy to the bladder while in [place]. He reports partial improvement following Botox (100 units) that only lasted 4-5 months with only modest improvement. He presents today looking for a more definitive treatment for his problems.     He denies any recent episodes of UTI, dysuria, suprapubic/flank pain, hematuria. He feels that he empties his bladder well.      Review of patient's allergies indicates:  No Known Allergies          Past Medical History:   Diagnosis Date    Arthritis of both knees      BPH (benign prostatic hyperplasia)      CVA (cerebral vascular accident)      Deep vein thrombosis      Diabetes mellitus      Diabetes mellitus, type 2      High cholesterol      Hypertension      Left-sided weakness      Obese      Sleep apnea      Thyroid disease      Urosepsis             Past Surgical  History:   Procedure Laterality Date    BACK SURGERY        HIP ARTHROPLASTY        JOINT REPLACEMENT        PERCUTANEOUS CRYOTHERAPY OF PERIPHERAL NERVE USING LIQUID NITROUS OXIDE IN CLOSED NEEDLE DEVICE Left 8/26/2019     Procedure: CRYOTHERAPY, NERVE, PERIPHERAL, PERCUTANEOUS, USING LIQUID NITROUS OXIDE IN CLOSED NEEDLE DEVICE LEFT KNEE SIMON;  Surgeon: MENG Beckwith;  Location: [facility] CATH LAB;  Service: Pain Management;  Laterality: Left;    PROSTATE SURGERY   2015    SPINAL FUSION   09/2014    TONSILLECTOMY        TOTAL KNEE ARTHROPLASTY Left 9/3/2019     Procedure: ARTHROPLASTY, KNEE, TOTAL-SANDIP;  Surgeon: Ever Hall MD;  Location: [facility] OR [address] [place] [address];  Service: Orthopedics;  Laterality: Left;           Family History   Problem Relation Age of Onset    Hypertension Brother      Diabetes Mellitus Mother      Heart attack Neg Hx      Heart disease Neg Hx       Social History            Tobacco Use    Smoking status: Former Smoker       Packs/day: 0.50       Types: Cigarettes    Smokeless tobacco: Never Used    Tobacco comment: pt quit 3 weeks ago   Substance Use Topics    Alcohol use: Not Currently       Comment: history of alcohol excess until 2011    Drug use: No         Review of Systems   Constitutional: Negative for appetite change, chills, fatigue, fever and unexpected weight change.   HENT: Negative.    Eyes: Negative.    Respiratory: Negative for cough, chest tightness and shortness of breath.    Cardiovascular: Negative for chest pain, palpitations and leg swelling.   Gastrointestinal: Negative for abdominal pain, constipation, diarrhea, nausea and vomiting.   Genitourinary: Positive for frequency, nocturia and urgency. Negative for difficulty urinating, dysuria, flank pain, hematuria and penile pain.   Musculoskeletal: Negative for back pain.   Skin: Negative for rash.   Neurological: Negative for dizziness, syncope, numbness and headaches.   Hematological: Does  "not bruise/bleed easily.   Psychiatric/Behavioral: Negative.          OBJECTIVE:      Anticoagulation:  Yes - ASA 81     Estimated body mass index is 40.75 kg/m² as calculated from the following:    Height as of this encounter: 5' 8" (1.727 m).    Weight as of this encounter: 121.6 kg (268 lb).     Vital Signs (Most Recent)  Pulse: 61 (06/02/20 0926)  BP: 108/69 (06/02/20 0926)     Physical Exam   Constitutional: He is oriented to person, place, and time. He appears well-developed and well-nourished. No distress.   Obese. Patient with walking cane. Kyphosis of the spine.   Cardiovascular: Normal rate and intact distal pulses.    Pulmonary/Chest: Effort normal. No respiratory distress.   Abdominal: Soft. He exhibits no distension. There is no tenderness.   Genitourinary:   Genitourinary Comments: Uncircumsized penis. Orthotopic meatus, no stenosis. No plaques, ulcers, or lesions along penis. Testes palpated bilaterally, no masses. Normal cord structures. Scrotum normal without lesions. Prostate 35 grams, smooth without nodularity.   Musculoskeletal: Normal range of motion. He exhibits no tenderness.   Neurological: He is alert and oriented to person, place, and time.   Skin: Skin is warm and dry. No rash noted.     Psychiatric: He has a normal mood and affect. Judgment normal.         BMP        Lab Results   Component Value Date      09/10/2019     K 4.9 09/10/2019      09/10/2019     CO2 24 09/10/2019     BUN 27 (H) 09/10/2019     CREATININE 0.9 09/10/2019     CALCIUM 9.9 09/10/2019     ANIONGAP 10 09/10/2019     ESTGFRAFRICA >60.0 09/10/2019     EGFRNONAA >60.0 09/10/2019              Lab Results   Component Value Date     WBC 13.57 (H) 09/10/2019     HGB 15.7 09/10/2019     HCT 50.1 09/10/2019      (H) 09/10/2019      09/10/2019              Lab Results   Component Value Date     PSA 2.5 09/24/2004        Imaging:     ASSESSMENT      1. S/P cervical spinal fusion    2. Benign prostatic " hyperplasia with urinary retention    3. Urge incontinence         PLAN:      - Discussed options for treating urgency incontinence including first and second line therapies. Discussed options for third line therapy including repeat botox or sacral neuromodulation. He would like to proceed with staged testing of SNM.

## 2020-06-23 ENCOUNTER — TELEPHONE (OUTPATIENT)
Dept: UROLOGY | Facility: CLINIC | Age: 70
End: 2020-06-23

## 2020-06-23 DIAGNOSIS — N39.41 URGE INCONTINENCE: Primary | ICD-10-CM

## 2020-06-24 LAB — MRSA SPEC QL CULT: NORMAL

## 2020-07-08 ENCOUNTER — TELEPHONE (OUTPATIENT)
Dept: UROLOGY | Facility: CLINIC | Age: 70
End: 2020-07-08

## 2020-07-08 DIAGNOSIS — N39.41 URGE INCONTINENCE: Primary | ICD-10-CM

## 2020-08-17 ENCOUNTER — TELEPHONE (OUTPATIENT)
Dept: UROLOGY | Facility: CLINIC | Age: 70
End: 2020-08-17

## 2020-08-17 NOTE — TELEPHONE ENCOUNTER
Contacted pt to schedule procedure, pt states he's just getting over covid and he would call back in a month to schedule

## 2020-10-17 ENCOUNTER — PATIENT MESSAGE (OUTPATIENT)
Dept: ORTHOPEDICS | Facility: CLINIC | Age: 70
End: 2020-10-17

## 2020-10-17 ENCOUNTER — PATIENT MESSAGE (OUTPATIENT)
Dept: PAIN MEDICINE | Facility: CLINIC | Age: 70
End: 2020-10-17

## 2020-10-26 ENCOUNTER — TELEPHONE (OUTPATIENT)
Dept: PAIN MEDICINE | Facility: CLINIC | Age: 70
End: 2020-10-26
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
## 2020-10-28 ENCOUNTER — TELEPHONE (OUTPATIENT)
Dept: PAIN MEDICINE | Facility: CLINIC | Age: 70
End: 2020-10-28
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
## 2020-10-28 NOTE — TELEPHONE ENCOUNTER
----- Message from Debra Wilson sent at 10/28/2020 11:27 AM CDT -----  Name of Who is Calling: MADI MALIN [[id number]] What is the request in detail: The patient is calling to speak to the nurse in regards to finding out what happened for his virtual visit that was scheduled for  10:45 am. Please advise Can the clinic reply by [facility]: No What Number to Call Back if not in [facility]: [phone]

## 2020-10-30 ENCOUNTER — TELEPHONE (OUTPATIENT)
Dept: PAIN MEDICINE | Facility: CLINIC | Age: 70
End: 2020-10-30

## 2020-10-30 NOTE — TELEPHONE ENCOUNTER
Staff called to confirm 11/03/20 1 pm virtual video visit with Ita Doan Np. Patient informed of instructions.     Patient confirmed and verbalized understanding and expressed thanks.     You will need a smart device that is audio/visual capable..Ipad, IPhone, Android device..etc  You will need to have the My Chart mary downloaded on to this device.   Please e-pre check in 15 minutes prior to your scheduled appointment  A claim will be submitted to your insurance company for this appointment.   If you do not have a smart device with this capability, please contact us at [phone] to discuss another visit type option.  Thank you for your ongoing patience and understanding while we work to ensure every one is safe.   For set up or technical issues you may call [phone].

## 2020-11-03 ENCOUNTER — OFFICE VISIT (OUTPATIENT)
Dept: PAIN MEDICINE | Facility: CLINIC | Age: 70
End: 2020-11-03
Payer: MEDICARE

## 2020-11-03 DIAGNOSIS — G89.4 CHRONIC PAIN DISORDER: ICD-10-CM

## 2020-11-03 DIAGNOSIS — M16.11 PRIMARY OSTEOARTHRITIS OF RIGHT HIP: Primary | ICD-10-CM

## 2020-11-03 PROCEDURE — 99213 OFFICE O/P EST LOW 20 MIN: CPT | Mod: 95,,, | Performed by: NURSE PRACTITIONER

## 2020-11-03 PROCEDURE — 99213 PR OFFICE/OUTPT VISIT, EST, LEVL III, 20-29 MIN: ICD-10-PCS | Mod: 95,,, | Performed by: NURSE PRACTITIONER

## 2020-11-03 NOTE — PROGRESS NOTES
Chronic Pain-Tele-Medicine-Established Note (Follow up visit)        The patient location is: Home  The chief complaint leading to consultation is: pain  Visit type: Virtual visit with synchronous audio and video  Total time spent with patient: 15 min  Each patient to whom he or she provides medical services by telemedicine is:  (1) informed of the relationship between the physician and patient and the respective role of any other health care provider with respect to management of the patient; and (2) notified that he or she may decline to receive medical services by telemedicine and may withdraw from such care at any time.     SUBJECTIVE: Disclaimer: This note has been generated using voice-recognition software. There may be typographical errors that have been missed during proof-reading    Interval History 11/3/2020:  The patient has a virtual visit to discuss chronic right hip pain.  He had a hip steroid injection by IR on 6/4/20 which provided moderate benefit for about one month.  He would like to know what other procedures we do for hip pain.  He is planning for hip replacement in the future but there is concern about anesthesia given Covid a few months ago. He says that he still has SOB with exercise and is concerned with this.  He is doing home PT and working out with a  at home.  Of note, since previous encounter he underwent left TKA by Dr. Hall on 9/3/19. His pain today is 5/10.     Interval History 2/22/2019:  The patient returns to clinic today for follow up. He reports that his left knee pain returned over the last month. He describes this pain as sharp in nature. His pain is worse with prolonged standing and walking. He reports that previous procedure provided significant relief for several months. He would like to repeat. He denies any right knee pain. He continues to participate in a home exercise routine. His pain today is 7/10.    Interval History 5/23/2018:  The patient returns to  clinic today for follow up. He is s/p left genicular nerve block on 5/10/2018. He reports 90% relief of his left knee pain. He continues to report benefit at this time. He reports that he is able to walk for longer distances and periods of time since procedure. He denies any other health changes. His pain today is 0/10.    Initial encounter:    Иван Fuentes presents to the clinic for the evaluation of left knee and right hip pain. The pain started 3 years ago following arthritis and symptoms have been worsening.    Brief history:  Patient being sent for evaluation for knee RFA    Pain Description:    The pain is located in the left knee and right hip area.      At BEST  2/10     At WORST  9/10 on the WORST day.      On average pain is rated as 7/10.     Today the pain is rated as 6/10    The pain is described as sharp      Symptoms interfere with daily activity.     Exacerbating factors: Standing, Walking and Extension.      Mitigating factors medications.     Patient denies night fever/night sweats, urinary incontinence, bowel incontinence, significant weight loss, significant motor weakness and loss of sensations.  Patient denies any suicidal or homicidal ideations    Pain Medications:  Current:  Tylenol PRN  Oxycodone 5 mg- has from Dr. Hall but does not take often    Tried in Past:  NSAIDs - Ibuprofen  TCA -Never  SNRI -Never  Anti-convulsants -Never  Muscle Relaxants -Never  Opioids- Percocet    Physical Therapy/Home Exercise: yes at home     report:  Reviewed and consistent with medication use as prescribed.    Pain Procedures: previous hip injections with steroid with significant relief for approximately 8 months at a time (last injection 2 months ago, still getting relief)  Previous knee joint injections without sustained relief.   5/10/2018- Left genicular nerve block with steroid-90% relief     Chiropractor -never  Acupuncture - never  TENS unit -never  Spinal decompression - posterior  cervical fusion  Joint replacement -left hip replacement, left knee replacement    Imaging:      Hip Xray 3/9/2018:  2 views: There is severe DJD of the right hip. There is a left NATALIYA in place. No fracture dislocation bone destruction seen.        Electronically signed by: ELICIA FRANCO MD  Date: 03/09/18  Time: 15:46        Xray Knee 3/9/2018:  Left knee orthopedic views with flexion includes 5 total views.  The study includes AP standing and flexion views.  There is slight medial subluxation of the femurs in relation to the tibia bilaterally, more on the left.  There is mild genu varus deformity on the left     The left knee has severe joint space loss with complete obliteration of the tibiofemoral joints and lateral patellofemoral joint space.  There is some lateral subluxation of patella.      Right knee also has narrowing of the lateral patellar joint space, lateral subluxation of the patella, and moderate to severe medial tibiofemoral joint space narrowing.  There is a 6 mm exostosis from the medial proximal tibia metaphysis.   Impression      Extensive degenerative changes, as above.      Electronically signed by: OLESYA OCONNOR MD  Date: 03/09/18  Time: 15:18            Past Medical History:   Diagnosis Date    Arthritis of both knees     BPH (benign prostatic hyperplasia)     CVA (cerebral vascular accident)     Deep vein thrombosis     Diabetes mellitus     Diabetes mellitus, type 2     High cholesterol     Hypertension     Left-sided weakness     Obese     Sleep apnea     Thyroid disease     Urosepsis      Past Surgical History:   Procedure Laterality Date    BACK SURGERY      HIP ARTHROPLASTY      JOINT REPLACEMENT      PERCUTANEOUS CRYOTHERAPY OF PERIPHERAL NERVE USING LIQUID NITROUS OXIDE IN CLOSED NEEDLE DEVICE Left 8/26/2019    Procedure: CRYOTHERAPY, NERVE, PERIPHERAL, PERCUTANEOUS, USING LIQUID NITROUS OXIDE IN CLOSED NEEDLE DEVICE LEFT KNEE SIMON;  Surgeon: Ita Manning,  FNP;  Location: [facility] CATH LAB;  Service: Pain Management;  Laterality: Left;    PROSTATE SURGERY  2015    SPINAL FUSION  09/2014    TONSILLECTOMY      TOTAL KNEE ARTHROPLASTY Left 9/3/2019    Procedure: ARTHROPLASTY, KNEE, TOTAL-SANDIP;  Surgeon: Ever Hall MD;  Location: [facility] OR [address] [place] [address];  Service: Orthopedics;  Laterality: Left;     Social History     Socioeconomic History    Marital status:      Spouse name: Not on file    Number of children: Not on file    Years of education: Not on file    Highest education level: Not on file   Occupational History    Not on file   Social Needs    Financial resource strain: Not on file    Food insecurity     Worry: Not on file     Inability: Not on file    Transportation needs     Medical: Not on file     Non-medical: Not on file   Tobacco Use    Smoking status: Former Smoker     Packs/day: 0.50     Types: Cigarettes    Smokeless tobacco: Never Used    Tobacco comment: pt quit 3 weeks ago   Substance and Sexual Activity    Alcohol use: Not Currently     Comment: history of alcohol excess until 2011    Drug use: No    Sexual activity: Not on file   Lifestyle    Physical activity     Days per week: Not on file     Minutes per session: Not on file    Stress: Not on file   Relationships    Social connections     Talks on phone: Not on file     Gets together: Not on file     Attends Buddhism service: Not on file     Active member of club or organization: Not on file     Attends meetings of clubs or organizations: Not on file     Relationship status: Not on file   Other Topics Concern    Not on file   Social History Narrative    Not on file     Family History   Problem Relation Age of Onset    Hypertension Brother     Diabetes Mellitus Mother     Heart attack Neg Hx     Heart disease Neg Hx        Review of patient's allergies indicates:  No Known Allergies    Current Outpatient Medications   Medication Sig    acetaminophen (TYLENOL) 650  MG TbSR Take 650 mg by mouth every 8 (eight) hours.    albuterol (VENTOLIN HFA) 90 mcg/actuation inhaler Inhale 2 puffs into the lungs every 6 (six) hours as needed for Wheezing. Rescue (Patient not taking: Reported on 6/22/2020)    amoxicillin (AMOXIL) 500 MG capsule Take 4 capsules (2,000 mg total) by mouth On call Procedure. (Patient not taking: Reported on 6/2/2020)    aspirin (ECOTRIN) 81 MG EC tablet Take 1 tablet (81 mg total) by mouth 2 (two) times daily.    atorvastatin (LIPITOR) 80 MG tablet Take by mouth once daily.     augmented betamethasone dipropionate (DIPROLENE-AF) 0.05 % cream     diclofenac sodium (VOLTAREN) 1 % Gel Apply 2 g topically 3 (three) times daily. (Patient not taking: Reported on 6/2/2020)    diphenhydrAMINE (SOMINEX) 25 mg tablet Take 25 mg by mouth nightly as needed for Insomnia.    diphenhydrAMINE-acetaminophen (TYLENOL PM)  mg Tab Take 1 tablet by mouth nightly as needed.    docusate sodium (STOOL SOFTENER ORAL) Take by mouth every evening.    escitalopram oxalate (LEXAPRO) 20 MG tablet Take 20 mg by mouth once daily.     eszopiclone 3 mg Tab Take 3 mg by mouth every evening.    fluticasone (FLONASE) 50 mcg/actuation nasal spray 1 spray every evening.     furosemide (LASIX) 40 MG tablet 40 mg daily as needed.     levothyroxine (SYNTHROID) 50 MCG tablet Take 50 mcg by mouth once daily.    lisinopril (PRINIVIL,ZESTRIL) 40 MG tablet Take 40 mg by mouth once daily.     LYRICA 100 mg capsule Take 100 mg by mouth 2 (two) times daily.    magnesium 250 mg Tab Take by mouth every evening.    melatonin 10 mg Cap Take by mouth every evening.    metformin (GLUCOPHAGE) 500 MG tablet Take 500 mg by mouth 2 (two) times daily with meals.    oxyCODONE (ROXICODONE) 5 MG immediate release tablet Take 1 tablet (5 mg total) by mouth every 12 (twelve) hours as needed for Pain. (Patient not taking: Reported on 6/22/2020)     No current facility-administered medications for this  visit.        REVIEW OF SYSTEMS:    GENERAL:  No weight loss, malaise or fevers.  HEENT:   No recent changes in vision or hearing  NECK:  Negative for lumps, no difficulty with swallowing.  RESPIRATORY:  Negative for cough, wheezing or shortness of breath, patient denies any recent URI.  CARDIOVASCULAR:  Negative for chest pain, leg swelling or palpitations. Hx PFO, takes prophylactic abx prior to dental procedures.  GI:  Negative for abdominal discomfort, blood in stools or black stools or change in bowel habits.  :  Not currently on abx.  MUSCULOSKELETAL:  See HPI.  SKIN:  Negative for lesions, rash, and itching.  PSYCH:  No mood disorder or recent psychosocial stressors.  Patient's sleep is disturbed secondary to pain.  HEMATOLOGY/LYMPHOLOGY:  Negative for prolonged bleeding, bruising easily or swollen nodes.  Patient is not currently taking any anti-coagulants  ENDO: DM2, hypothyroidism on stable dose of synthroid  NEURO:   Hx of CVA, Hx of cervical fusion and meylopathy  All other reviewed and negative other than HPI.    OBJECTIVE:    PREVIOUS PHYSICAL EXAMINATION:    GENERAL: Well appearing, in no acute distress, alert and oriented x3.  PSYCH:  Mood and affect appropriate.  SKIN: Skin color, texture, turgor normal, no rashes or lesions.  HEAD/FACE:  Normocephalic, atraumatic.   CV: RRR with palpation of the radial artery.  PULM: No evidence of respiratory difficulty, symmetric chest rise.    EXTREMITIES: bilateral lower extremity edema, 1 + pitting.  MUSCULOSKELETAL: There is pain with palpation over medial and lateral joint line of left knee. Full ROM with pain on flexion and extension of left knee. Crepitus noted to left knee. There is no pain to palpation over the greater trochanteric bursa bilaterally.  FABERs test is positive.  FADIRs test is negative.   Bilateral lower extremity strength is normal and symmetric.  No atrophy or tone abnormalities are noted.  NEURO: Cranial nerves grossly intact.  GAIT:  Antalgic, ambulates with cane, presented in wheelchair        ASSESSMENT: 69 y.o. year old male with right hip pain, consistent with the following diagnoses:    Encounter Diagnoses   Name Primary?    Primary osteoarthritis of right hip Yes    Chronic pain disorder        PLAN:     - Previous imaging was reviewed and discussed with the patient today.    - Schedule for right femoral and lateral nerve blocks.  The patient will call with relief and if diagnostic, we can schedule cooled radiofrequency ablation of affected nerves.     - Continue home PT.    - Can continue current medications.    - RTC after completion of procedures.        The above plan and management options were discussed at length with patient. Patient is in agreement with the above and verbalized understanding.    MENG Gama  11/03/2020

## 2020-11-03 NOTE — H&P (VIEW-ONLY)
Chronic Pain-Tele-Medicine-Established Note (Follow up visit)        The patient location is: Home  The chief complaint leading to consultation is: pain  Visit type: Virtual visit with synchronous audio and video  Total time spent with patient: 15 min  Each patient to whom he or she provides medical services by telemedicine is:  (1) informed of the relationship between the physician and patient and the respective role of any other health care provider with respect to management of the patient; and (2) notified that he or she may decline to receive medical services by telemedicine and may withdraw from such care at any time.     SUBJECTIVE: Disclaimer: This note has been generated using voice-recognition software. There may be typographical errors that have been missed during proof-reading    Interval History 11/3/2020:  The patient has a virtual visit to discuss chronic right hip pain.  He had a hip steroid injection by IR on 6/4/20 which provided moderate benefit for about one month.  He would like to know what other procedures we do for hip pain.  He is planning for hip replacement in the future but there is concern about anesthesia given Covid a few months ago. He says that he still has SOB with exercise and is concerned with this.  He is doing home PT and working out with a  at home.  Of note, since previous encounter he underwent left TKA by Dr. Hall on 9/3/19. His pain today is 5/10.     Interval History 2/22/2019:  The patient returns to clinic today for follow up. He reports that his left knee pain returned over the last month. He describes this pain as sharp in nature. His pain is worse with prolonged standing and walking. He reports that previous procedure provided significant relief for several months. He would like to repeat. He denies any right knee pain. He continues to participate in a home exercise routine. His pain today is 7/10.    Interval History 5/23/2018:  The patient returns to  clinic today for follow up. He is s/p left genicular nerve block on 5/10/2018. He reports 90% relief of his left knee pain. He continues to report benefit at this time. He reports that he is able to walk for longer distances and periods of time since procedure. He denies any other health changes. His pain today is 0/10.    Initial encounter:    Иван Fuentes presents to the clinic for the evaluation of left knee and right hip pain. The pain started 3 years ago following arthritis and symptoms have been worsening.    Brief history:  Patient being sent for evaluation for knee RFA    Pain Description:    The pain is located in the left knee and right hip area.      At BEST  2/10     At WORST  9/10 on the WORST day.      On average pain is rated as 7/10.     Today the pain is rated as 6/10    The pain is described as sharp      Symptoms interfere with daily activity.     Exacerbating factors: Standing, Walking and Extension.      Mitigating factors medications.     Patient denies night fever/night sweats, urinary incontinence, bowel incontinence, significant weight loss, significant motor weakness and loss of sensations.  Patient denies any suicidal or homicidal ideations    Pain Medications:  Current:  Tylenol PRN  Oxycodone 5 mg- has from Dr. Hall but does not take often    Tried in Past:  NSAIDs - Ibuprofen  TCA -Never  SNRI -Never  Anti-convulsants -Never  Muscle Relaxants -Never  Opioids- Percocet    Physical Therapy/Home Exercise: yes at home     report:  Reviewed and consistent with medication use as prescribed.    Pain Procedures: previous hip injections with steroid with significant relief for approximately 8 months at a time (last injection 2 months ago, still getting relief)  Previous knee joint injections without sustained relief.   5/10/2018- Left genicular nerve block with steroid-90% relief     Chiropractor -never  Acupuncture - never  TENS unit -never  Spinal decompression - posterior  cervical fusion  Joint replacement -left hip replacement, left knee replacement    Imaging:      Hip Xray 3/9/2018:  2 views: There is severe DJD of the right hip. There is a left NATALIYA in place. No fracture dislocation bone destruction seen.        Electronically signed by: ELICIA FRANCO MD  Date: 03/09/18  Time: 15:46        Xray Knee 3/9/2018:  Left knee orthopedic views with flexion includes 5 total views.  The study includes AP standing and flexion views.  There is slight medial subluxation of the femurs in relation to the tibia bilaterally, more on the left.  There is mild genu varus deformity on the left     The left knee has severe joint space loss with complete obliteration of the tibiofemoral joints and lateral patellofemoral joint space.  There is some lateral subluxation of patella.      Right knee also has narrowing of the lateral patellar joint space, lateral subluxation of the patella, and moderate to severe medial tibiofemoral joint space narrowing.  There is a 6 mm exostosis from the medial proximal tibia metaphysis.   Impression      Extensive degenerative changes, as above.      Electronically signed by: OLESYA OCONNOR MD  Date: 03/09/18  Time: 15:18            Past Medical History:   Diagnosis Date    Arthritis of both knees     BPH (benign prostatic hyperplasia)     CVA (cerebral vascular accident)     Deep vein thrombosis     Diabetes mellitus     Diabetes mellitus, type 2     High cholesterol     Hypertension     Left-sided weakness     Obese     Sleep apnea     Thyroid disease     Urosepsis      Past Surgical History:   Procedure Laterality Date    BACK SURGERY      HIP ARTHROPLASTY      JOINT REPLACEMENT      PERCUTANEOUS CRYOTHERAPY OF PERIPHERAL NERVE USING LIQUID NITROUS OXIDE IN CLOSED NEEDLE DEVICE Left 8/26/2019    Procedure: CRYOTHERAPY, NERVE, PERIPHERAL, PERCUTANEOUS, USING LIQUID NITROUS OXIDE IN CLOSED NEEDLE DEVICE LEFT KNEE SIMON;  Surgeon: Ita Manning,  FNP;  Location: [facility] CATH LAB;  Service: Pain Management;  Laterality: Left;    PROSTATE SURGERY  2015    SPINAL FUSION  09/2014    TONSILLECTOMY      TOTAL KNEE ARTHROPLASTY Left 9/3/2019    Procedure: ARTHROPLASTY, KNEE, TOTAL-SANDIP;  Surgeon: Ever Hall MD;  Location: [facility] OR [address] [place] [address];  Service: Orthopedics;  Laterality: Left;     Social History     Socioeconomic History    Marital status:      Spouse name: Not on file    Number of children: Not on file    Years of education: Not on file    Highest education level: Not on file   Occupational History    Not on file   Social Needs    Financial resource strain: Not on file    Food insecurity     Worry: Not on file     Inability: Not on file    Transportation needs     Medical: Not on file     Non-medical: Not on file   Tobacco Use    Smoking status: Former Smoker     Packs/day: 0.50     Types: Cigarettes    Smokeless tobacco: Never Used    Tobacco comment: pt quit 3 weeks ago   Substance and Sexual Activity    Alcohol use: Not Currently     Comment: history of alcohol excess until 2011    Drug use: No    Sexual activity: Not on file   Lifestyle    Physical activity     Days per week: Not on file     Minutes per session: Not on file    Stress: Not on file   Relationships    Social connections     Talks on phone: Not on file     Gets together: Not on file     Attends Jewish service: Not on file     Active member of club or organization: Not on file     Attends meetings of clubs or organizations: Not on file     Relationship status: Not on file   Other Topics Concern    Not on file   Social History Narrative    Not on file     Family History   Problem Relation Age of Onset    Hypertension Brother     Diabetes Mellitus Mother     Heart attack Neg Hx     Heart disease Neg Hx        Review of patient's allergies indicates:  No Known Allergies    Current Outpatient Medications   Medication Sig    acetaminophen (TYLENOL) 650  MG TbSR Take 650 mg by mouth every 8 (eight) hours.    albuterol (VENTOLIN HFA) 90 mcg/actuation inhaler Inhale 2 puffs into the lungs every 6 (six) hours as needed for Wheezing. Rescue (Patient not taking: Reported on 6/22/2020)    amoxicillin (AMOXIL) 500 MG capsule Take 4 capsules (2,000 mg total) by mouth On call Procedure. (Patient not taking: Reported on 6/2/2020)    aspirin (ECOTRIN) 81 MG EC tablet Take 1 tablet (81 mg total) by mouth 2 (two) times daily.    atorvastatin (LIPITOR) 80 MG tablet Take by mouth once daily.     augmented betamethasone dipropionate (DIPROLENE-AF) 0.05 % cream     diclofenac sodium (VOLTAREN) 1 % Gel Apply 2 g topically 3 (three) times daily. (Patient not taking: Reported on 6/2/2020)    diphenhydrAMINE (SOMINEX) 25 mg tablet Take 25 mg by mouth nightly as needed for Insomnia.    diphenhydrAMINE-acetaminophen (TYLENOL PM)  mg Tab Take 1 tablet by mouth nightly as needed.    docusate sodium (STOOL SOFTENER ORAL) Take by mouth every evening.    escitalopram oxalate (LEXAPRO) 20 MG tablet Take 20 mg by mouth once daily.     eszopiclone 3 mg Tab Take 3 mg by mouth every evening.    fluticasone (FLONASE) 50 mcg/actuation nasal spray 1 spray every evening.     furosemide (LASIX) 40 MG tablet 40 mg daily as needed.     levothyroxine (SYNTHROID) 50 MCG tablet Take 50 mcg by mouth once daily.    lisinopril (PRINIVIL,ZESTRIL) 40 MG tablet Take 40 mg by mouth once daily.     LYRICA 100 mg capsule Take 100 mg by mouth 2 (two) times daily.    magnesium 250 mg Tab Take by mouth every evening.    melatonin 10 mg Cap Take by mouth every evening.    metformin (GLUCOPHAGE) 500 MG tablet Take 500 mg by mouth 2 (two) times daily with meals.    oxyCODONE (ROXICODONE) 5 MG immediate release tablet Take 1 tablet (5 mg total) by mouth every 12 (twelve) hours as needed for Pain. (Patient not taking: Reported on 6/22/2020)     No current facility-administered medications for this  visit.        REVIEW OF SYSTEMS:    GENERAL:  No weight loss, malaise or fevers.  HEENT:   No recent changes in vision or hearing  NECK:  Negative for lumps, no difficulty with swallowing.  RESPIRATORY:  Negative for cough, wheezing or shortness of breath, patient denies any recent URI.  CARDIOVASCULAR:  Negative for chest pain, leg swelling or palpitations. Hx PFO, takes prophylactic abx prior to dental procedures.  GI:  Negative for abdominal discomfort, blood in stools or black stools or change in bowel habits.  :  Not currently on abx.  MUSCULOSKELETAL:  See HPI.  SKIN:  Negative for lesions, rash, and itching.  PSYCH:  No mood disorder or recent psychosocial stressors.  Patient's sleep is disturbed secondary to pain.  HEMATOLOGY/LYMPHOLOGY:  Negative for prolonged bleeding, bruising easily or swollen nodes.  Patient is not currently taking any anti-coagulants  ENDO: DM2, hypothyroidism on stable dose of synthroid  NEURO:   Hx of CVA, Hx of cervical fusion and meylopathy  All other reviewed and negative other than HPI.    OBJECTIVE:    PREVIOUS PHYSICAL EXAMINATION:    GENERAL: Well appearing, in no acute distress, alert and oriented x3.  PSYCH:  Mood and affect appropriate.  SKIN: Skin color, texture, turgor normal, no rashes or lesions.  HEAD/FACE:  Normocephalic, atraumatic.   CV: RRR with palpation of the radial artery.  PULM: No evidence of respiratory difficulty, symmetric chest rise.    EXTREMITIES: bilateral lower extremity edema, 1 + pitting.  MUSCULOSKELETAL: There is pain with palpation over medial and lateral joint line of left knee. Full ROM with pain on flexion and extension of left knee. Crepitus noted to left knee. There is no pain to palpation over the greater trochanteric bursa bilaterally.  FABERs test is positive.  FADIRs test is negative.   Bilateral lower extremity strength is normal and symmetric.  No atrophy or tone abnormalities are noted.  NEURO: Cranial nerves grossly intact.  GAIT:  Antalgic, ambulates with cane, presented in wheelchair        ASSESSMENT: 69 y.o. year old male with right hip pain, consistent with the following diagnoses:    Encounter Diagnoses   Name Primary?    Primary osteoarthritis of right hip Yes    Chronic pain disorder        PLAN:     - Previous imaging was reviewed and discussed with the patient today.    - Schedule for right femoral and lateral nerve blocks.  The patient will call with relief and if diagnostic, we can schedule cooled radiofrequency ablation of affected nerves.     - Continue home PT.    - Can continue current medications.    - RTC after completion of procedures.        The above plan and management options were discussed at length with patient. Patient is in agreement with the above and verbalized understanding.    MENG Gama  11/03/2020

## 2020-11-04 ENCOUNTER — TELEPHONE (OUTPATIENT)
Dept: ADMINISTRATIVE | Facility: OTHER | Age: 70
End: 2020-11-04

## 2020-11-04 NOTE — TELEPHONE ENCOUNTER
----- Message from MENG Beckwith sent at 11/3/2020  2:14 PM CST -----  Regarding: procedure  Please call and schedule for right femoral and obturator blocks under fluoro with [facility].  No F/U can call with relief.  If positive will send to [facility] for RFA since [facility] only does block. He is diabetic.  No abx, Just baby aspirin (does not need to hold).  Thanks.    
No

## 2020-11-18 ENCOUNTER — PATIENT MESSAGE (OUTPATIENT)
Dept: ORTHOPEDICS | Facility: CLINIC | Age: 70
End: 2020-11-18

## 2020-11-18 ENCOUNTER — PATIENT MESSAGE (OUTPATIENT)
Dept: PAIN MEDICINE | Facility: OTHER | Age: 70
End: 2020-11-18

## 2020-11-19 ENCOUNTER — HOSPITAL ENCOUNTER (OUTPATIENT)
Facility: OTHER | Age: 70
Discharge: HOME | End: 2020-11-19
Attending: ANESTHESIOLOGY | Admitting: ANESTHESIOLOGY
Payer: MEDICARE

## 2020-11-19 VITALS
WEIGHT: 275 LBS | TEMPERATURE: 99 F | HEIGHT: 69 IN | DIASTOLIC BLOOD PRESSURE: 83 MMHG | BODY MASS INDEX: 40.73 KG/M2 | HEART RATE: 69 BPM | OXYGEN SATURATION: 95 % | SYSTOLIC BLOOD PRESSURE: 175 MMHG | RESPIRATION RATE: 16 BRPM

## 2020-11-19 DIAGNOSIS — M16.9 OSTEOARTHRITIS OF HIP, UNSPECIFIED LATERALITY, UNSPECIFIED OSTEOARTHRITIS TYPE: Primary | ICD-10-CM

## 2020-11-19 DIAGNOSIS — G89.29 CHRONIC PAIN: ICD-10-CM

## 2020-11-19 LAB — POCT GLUCOSE: 101 MG/DL (ref 70–110)

## 2020-11-19 PROCEDURE — 64450 NJX AA&/STRD OTHER PN/BRANCH: CPT | Mod: RT | Performed by: ANESTHESIOLOGY

## 2020-11-19 PROCEDURE — 64447 NJX AA&/STRD FEMORAL NRV IMG: CPT | Mod: RT | Performed by: ANESTHESIOLOGY

## 2020-11-19 PROCEDURE — 64450 NJX AA&/STRD OTHER PN/BRANCH: CPT | Mod: RT,,, | Performed by: ANESTHESIOLOGY

## 2020-11-19 PROCEDURE — 63600175 PHARM REV CODE 636 W HCPCS: Performed by: ANESTHESIOLOGY

## 2020-11-19 PROCEDURE — 82947 ASSAY GLUCOSE BLOOD QUANT: CPT | Performed by: ANESTHESIOLOGY

## 2020-11-19 PROCEDURE — 25000003 PHARM REV CODE 250: Performed by: ANESTHESIOLOGY

## 2020-11-19 PROCEDURE — 64450 PR NERVE BLOCK INJ, ANES/STEROID, OTHER PERIPHERAL: ICD-10-PCS | Mod: RT,,, | Performed by: ANESTHESIOLOGY

## 2020-11-19 RX ORDER — LIDOCAINE HYDROCHLORIDE 10 MG/ML
INJECTION INFILTRATION; PERINEURAL
Status: DISCONTINUED | OUTPATIENT
Start: 2020-11-19 | End: 2020-11-19 | Stop reason: HOSPADM

## 2020-11-19 RX ORDER — SODIUM CHLORIDE 9 MG/ML
500 INJECTION, SOLUTION INTRAVENOUS CONTINUOUS
Status: DISCONTINUED | OUTPATIENT
Start: 2020-11-19 | End: 2020-11-19 | Stop reason: HOSPADM

## 2020-11-19 RX ORDER — BUPIVACAINE HYDROCHLORIDE 2.5 MG/ML
INJECTION, SOLUTION EPIDURAL; INFILTRATION; INTRACAUDAL
Status: DISCONTINUED | OUTPATIENT
Start: 2020-11-19 | End: 2020-11-19 | Stop reason: HOSPADM

## 2020-11-19 RX ORDER — UMECLIDINIUM 62.5 UG/1
62.5 AEROSOL, POWDER ORAL 2 TIMES DAILY
Status: ON HOLD | COMMUNITY
End: 2023-04-06 | Stop reason: CLARIF

## 2020-11-19 RX ORDER — CANAGLIFLOZIN 100 MG/1
100 TABLET, FILM COATED ORAL DAILY
COMMUNITY

## 2020-11-19 RX ORDER — METHYLPREDNISOLONE ACETATE 40 MG/ML
INJECTION, SUSPENSION INTRA-ARTICULAR; INTRALESIONAL; INTRAMUSCULAR; SOFT TISSUE
Status: DISCONTINUED | OUTPATIENT
Start: 2020-11-19 | End: 2020-11-19 | Stop reason: HOSPADM

## 2020-11-19 RX ORDER — BUPIVACAINE HYDROCHLORIDE 5 MG/ML
INJECTION, SOLUTION EPIDURAL; INTRACAUDAL
Status: DISCONTINUED | OUTPATIENT
Start: 2020-11-19 | End: 2020-11-19 | Stop reason: HOSPADM

## 2020-11-19 RX ORDER — DEXAMETHASONE SODIUM PHOSPHATE 10 MG/ML
INJECTION INTRAMUSCULAR; INTRAVENOUS
Status: DISCONTINUED | OUTPATIENT
Start: 2020-11-19 | End: 2020-11-19 | Stop reason: HOSPADM

## 2020-11-19 NOTE — DISCHARGE INSTRUCTIONS

## 2020-11-19 NOTE — DISCHARGE SUMMARY
Discharge Note  Short Stay      SUMMARY     Admit Date: 11/19/2020    Attending Physician: Dania Garcia      Discharge Physician: Dania Garcia      Discharge Date: 11/19/2020 3:43 PM    Procedure(s) (LRB):  BLOCK, NERVE, FEMORAL AND OBTURATOR (Right)    Final Diagnosis: Right hip pain [M25.551]    Disposition: Home or self care    Patient Instructions:   Current Discharge Medication List      CONTINUE these medications which have NOT CHANGED    Details   apixaban (ELIQUIS) 2.5 mg Tab Take 2.5 mg by mouth 2 (two) times daily.      canagliflozin (INVOKANA) 100 mg Tab tablet Take 100 mg by mouth once daily.      fluticasone-umeclidin-vilanter (TRELEGY ELLIPTA) 100-62.5-25 mcg DsDv Inhale 1 puff into the lungs once daily.      umeclidinium (INCRUSE ELLIPTA) 62.5 mcg/actuation inhalation capsule Inhale 62.5 mcg into the lungs. Controller      acetaminophen (TYLENOL) 650 MG TbSR Take 650 mg by mouth every 8 (eight) hours.      albuterol (VENTOLIN HFA) 90 mcg/actuation inhaler Inhale 2 puffs into the lungs every 6 (six) hours as needed for Wheezing. Rescue  Qty: 18 g, Refills: 0      aspirin (ECOTRIN) 81 MG EC tablet Take 1 tablet (81 mg total) by mouth 2 (two) times daily.  Qty: 30 tablet, Refills: 0      atorvastatin (LIPITOR) 80 MG tablet Take by mouth once daily.       augmented betamethasone dipropionate (DIPROLENE-AF) 0.05 % cream       diclofenac sodium (VOLTAREN) 1 % Gel Apply 2 g topically 3 (three) times daily.  Qty: 1 Tube, Refills: 2    Associated Diagnoses: Chronic pain of left knee; Osteoarthritis of left knee, unspecified osteoarthritis type      diphenhydrAMINE (SOMINEX) 25 mg tablet Take 25 mg by mouth nightly as needed for Insomnia.      diphenhydrAMINE-acetaminophen (TYLENOL PM)  mg Tab Take 1 tablet by mouth nightly as needed.      docusate sodium (STOOL SOFTENER ORAL) Take by mouth every evening.      escitalopram oxalate (LEXAPRO) 20 MG tablet Take 20 mg by mouth once daily.        eszopiclone 3 mg Tab Take 3 mg by mouth every evening.      fluticasone (FLONASE) 50 mcg/actuation nasal spray 1 spray every evening.       furosemide (LASIX) 40 MG tablet 40 mg daily as needed.       levothyroxine (SYNTHROID) 50 MCG tablet Take 50 mcg by mouth once daily.      lisinopril (PRINIVIL,ZESTRIL) 40 MG tablet Take 40 mg by mouth once daily.       LYRICA 100 mg capsule Take 100 mg by mouth 2 (two) times daily.  Refills: 5      magnesium 250 mg Tab Take by mouth every evening.      melatonin 10 mg Cap Take by mouth every evening.      metformin (GLUCOPHAGE) 500 MG tablet Take 500 mg by mouth 2 (two) times daily with meals.      oxyCODONE (ROXICODONE) 5 MG immediate release tablet Take 1 tablet (5 mg total) by mouth every 12 (twelve) hours as needed for Pain.  Qty: 28 tablet, Refills: 0                 Discharge Diagnosis: Right hip pain [M25.551]  Condition on Discharge: Stable with no complications to procedure   Diet on Discharge: Same as before.  Activity: as per instruction sheet.  Discharge to: Home with a responsible adult.  Follow up: 2-4 weeks       Please call my office or pager at [phone] if experienced any weakness or loss of sensation, fever > 101.5, pain uncontrolled with oral medications, persistent nausea/vomiting/or diarrhea, redness or drainage from the incisions, or any other worrisome concerns. If physician on call was not reached or could not communicate with our office for any reason please go to the nearest emergency department

## 2020-11-19 NOTE — INTERVAL H&P NOTE
The patient has been examined and the H&P has been reviewed:    I concur with the findings and changes have been noted since the H&P was written: Pt is currently taking Eliquis.     Anesthesia risks, benefits and alternative options discussed and understood by patient/family.          Active Hospital Problems    Diagnosis  POA    Chronic pain [G89.29]  Yes      Resolved Hospital Problems   No resolved problems to display.

## 2020-11-19 NOTE — OP NOTE
Femoral and obturator sensory branch block    Date of procedure 11/19/2020    Time-out taken to identify patient and procedure side prior to starting the procedure.                                                                 PROCEDURE:  right femoral and obturator sensory branch block under fluoroscopy and ultrasound.    REASON FOR PROCEDURE:  Right hip pain [M25.551], Primary osteoarthritis of the right hip    PHYSICIAN: Dania Garcia MD    ASSISTANTS: Millie Werner DO    MEDICATIONS INJECTED:  3mL Bupivacaine 0.5% at each site    LOCAL ANESTHETIC USED: Xylocaine 1% 5mL    SEDATION MEDICATIONS: None    ESTIMATED BLOOD LOSS:  None.    COMPLICATIONS:  None.    TECHNIQUE:   Laying in the supine position, the patient was prepped and draped in the usual sterile fashion using ChloraPrep and fenestrated drape.  The area was determined under fluoroscopy.  Local Xylocaine was injected by raising a wheel and going down to the periosteum using a 27-gauge hypodermic needle.  The 3.5 inch 22-gauge spinal needle was introduced into the approximate areas of the sensory branch of the femoral nerve to the hip superior medially to the femoral head and the sensory branch of the obturator nerves to the hip at the incisura.  Out of plane advancement of the needle under ultrasound guidance with a sterilely sheathed ultrasound probe was used in order to avoid the neurovascular bundle.  Once os was contacted and the final needle tip position confirmed on fluoroscopy, negative pressure was applied to confirm no intravascular placement. The medication was then injected slowly.  The patient tolerated the procedure well.      Patient was then observed for hemodynamic stability in the recovery room for 30 minutes prior to discharge.

## 2020-11-20 ENCOUNTER — TELEPHONE (OUTPATIENT)
Dept: PAIN MEDICINE | Facility: CLINIC | Age: 70
End: 2020-11-20

## 2020-11-20 ENCOUNTER — PATIENT MESSAGE (OUTPATIENT)
Dept: PAIN MEDICINE | Facility: CLINIC | Age: 70
End: 2020-11-20

## 2020-11-20 NOTE — TELEPHONE ENCOUNTER
----- Message from Rachel Yao sent at 11/20/2020  1:17 PM CST -----  Regarding: Patient call back  Who called:MADI MALIN [[id number]]    What is the request in detail: Patient is requesting a call back.  He states he is ready to report his pain diary. He would like to further discuss.   Please advise.    Can the clinic reply by [facility]? No    Best call back number: [phone]    Additional Information: N/A

## 2020-11-20 NOTE — TELEPHONE ENCOUNTER
Patient was contacted the following is his pain diary from his right femoral and obturator sensory branch block under fluoroscopy and ultrasound.      1 hour pain scale 6    2 hour pain scale 6    3 hour pain scale 6    4 hour pain scale 6    5 hour pain scale 6    6 hour pain scale 6    12 hour pain scale 5    24 hour pain scale 4    Patient did not have percentage of relief

## 2020-11-20 NOTE — TELEPHONE ENCOUNTER
----- Message from Rachel Yao sent at 11/20/2020  1:17 PM CST -----  Regarding: Patient call back  Who called:MADI AMLIN [[id number]]    What is the request in detail: Patient is requesting a call back.  He states he is ready to report his pain diary. He would like to further discuss.   Please advise.    Can the clinic reply by [facility]? No    Best call back number: [phone]    Additional Information: N/A

## 2020-11-20 NOTE — TELEPHONE ENCOUNTER
----- Message from Madie Blankenship sent at 11/20/2020  2:17 PM CST -----  Contact: MADI MALIN [[id number]]  Type: Patient Call Back Who called:MADI MALIN [[id number]] What is the request in detail:Patient would like a call back in regards to give a update on his procedure from yesterday. Patient states that he is feeling a little better. He rates it as a 4.  Can the clinic reply by [facility]?no Would the patient rather a call back or a response via [facility]? Call back Best call back number:[phone] (mobile)   Additional Information:

## 2020-11-23 ENCOUNTER — PATIENT MESSAGE (OUTPATIENT)
Dept: PAIN MEDICINE | Facility: CLINIC | Age: 70
End: 2020-11-23

## 2020-12-05 ENCOUNTER — PATIENT MESSAGE (OUTPATIENT)
Dept: PAIN MEDICINE | Facility: CLINIC | Age: 70
End: 2020-12-05

## 2020-12-09 ENCOUNTER — PATIENT MESSAGE (OUTPATIENT)
Dept: PAIN MEDICINE | Facility: CLINIC | Age: 70
End: 2020-12-09

## 2020-12-10 NOTE — TELEPHONE ENCOUNTER
Patient had a femoral and obturator nerve block 11/19/20 with steroids and had 80% relief. Patient would like to know how would the provider like to proceed with the next treatment option?

## 2020-12-21 ENCOUNTER — TELEPHONE (OUTPATIENT)
Dept: PAIN MEDICINE | Facility: CLINIC | Age: 70
End: 2020-12-21

## 2020-12-21 NOTE — TELEPHONE ENCOUNTER
----- Message from Veronique Camargo MA sent at 12/18/2020  4:55 PM CST -----  Please contact and schedule right hip RFA and follow up with .     Veronique   ----- Message -----  From: Isidoro Torres  Sent: 12/18/2020  11:56 AM CST  To: [facility] Staff        Name of Who is Calling: MADI MALIN [[id number]]      What is the request in detail: Pt called to ask if the nerve block worked.Please contact to further discuss and advise.          Can the clinic reply by [facility]: N      What Number to Call Back if not in [facility]: [phone]

## 2020-12-22 ENCOUNTER — TELEPHONE (OUTPATIENT)
Dept: PAIN MEDICINE | Facility: CLINIC | Age: 70
End: 2020-12-22

## 2020-12-22 NOTE — TELEPHONE ENCOUNTER
"----- Message from Veronique Camargo MA sent at 12/21/2020  2:05 PM CST -----  Regarding: FW: Returning Call  Patient is returning your call to get scheduled for his procedure.     Veronique   ----- Message -----  From: Janina Llanos  Sent: 12/21/2020   1:54 PM CST  To: [facility] Staff  Subject: Returning Call                                   Name of Who is Calling: Иван Fuentes      What is the request in detail:    Patient called stating, "he's returning the office's call and would like you to lease call again."    Please further advise      Reply by [facility]: no      Call Back :   [phone] (I)                                          "

## 2020-12-31 ENCOUNTER — TELEPHONE (OUTPATIENT)
Dept: ADMINISTRATIVE | Facility: OTHER | Age: 70
End: 2020-12-31

## 2020-12-31 NOTE — TELEPHONE ENCOUNTER
----- Message from Veronique Camargo MA sent at 12/30/2020  5:32 PM CST -----  Regarding: FW: procedure  Patient is calling back to schedule his procedure.     Veronique   ----- Message -----  From: Miriam Avila  Sent: 12/30/2020   2:40 PM CST  To: [facility] Staff  Subject: procedure                                        Name of Who is Calling: MADI MALIN [[id number]] What is the request in detail: Patient is requesting a call back to see when can he schedule his procedure. Can the clinic reply by [facility]: No What Number to Call Back if not in [facility]: [phone]

## 2021-01-07 ENCOUNTER — PATIENT MESSAGE (OUTPATIENT)
Dept: PAIN MEDICINE | Facility: OTHER | Age: 71
End: 2021-01-07

## 2021-01-12 ENCOUNTER — HOSPITAL ENCOUNTER (OUTPATIENT)
Facility: OTHER | Age: 71
Discharge: HOME | End: 2021-01-12
Attending: ANESTHESIOLOGY | Admitting: ANESTHESIOLOGY
Payer: MEDICARE

## 2021-01-12 VITALS
HEIGHT: 69 IN | DIASTOLIC BLOOD PRESSURE: 58 MMHG | HEART RATE: 56 BPM | SYSTOLIC BLOOD PRESSURE: 116 MMHG | RESPIRATION RATE: 16 BRPM | OXYGEN SATURATION: 91 % | TEMPERATURE: 98 F | WEIGHT: 275 LBS | BODY MASS INDEX: 40.73 KG/M2

## 2021-01-12 DIAGNOSIS — G89.29 CHRONIC PAIN: ICD-10-CM

## 2021-01-12 DIAGNOSIS — M16.9 OSTEOARTHRITIS OF HIP, UNSPECIFIED LATERALITY, UNSPECIFIED OSTEOARTHRITIS TYPE: Primary | ICD-10-CM

## 2021-01-12 LAB — POCT GLUCOSE: 101 MG/DL (ref 70–110)

## 2021-01-12 PROCEDURE — 64640 PR DESTRUCT BY NEURO AGENT; OTHER PERIPH NERVE: ICD-10-PCS | Mod: RT,,, | Performed by: ANESTHESIOLOGY

## 2021-01-12 PROCEDURE — 64640 INJECTION TREATMENT OF NERVE: CPT | Mod: RT | Performed by: ANESTHESIOLOGY

## 2021-01-12 PROCEDURE — 25000003 PHARM REV CODE 250: Performed by: STUDENT IN AN ORGANIZED HEALTH CARE EDUCATION/TRAINING PROGRAM

## 2021-01-12 PROCEDURE — 25000003 PHARM REV CODE 250: Performed by: ANESTHESIOLOGY

## 2021-01-12 PROCEDURE — A4649 SURGICAL SUPPLIES: HCPCS | Performed by: ANESTHESIOLOGY

## 2021-01-12 PROCEDURE — 25500020 PHARM REV CODE 255: Performed by: ANESTHESIOLOGY

## 2021-01-12 PROCEDURE — 82947 ASSAY GLUCOSE BLOOD QUANT: CPT | Performed by: ANESTHESIOLOGY

## 2021-01-12 PROCEDURE — 63600175 PHARM REV CODE 636 W HCPCS: Performed by: ANESTHESIOLOGY

## 2021-01-12 PROCEDURE — 64640 INJECTION TREATMENT OF NERVE: CPT | Mod: RT,,, | Performed by: ANESTHESIOLOGY

## 2021-01-12 RX ORDER — LIDOCAINE HYDROCHLORIDE 10 MG/ML
INJECTION INFILTRATION; PERINEURAL
Status: DISCONTINUED | OUTPATIENT
Start: 2021-01-12 | End: 2021-01-12 | Stop reason: HOSPADM

## 2021-01-12 RX ORDER — BUPIVACAINE HYDROCHLORIDE 2.5 MG/ML
INJECTION, SOLUTION EPIDURAL; INFILTRATION; INTRACAUDAL
Status: DISCONTINUED | OUTPATIENT
Start: 2021-01-12 | End: 2021-01-12 | Stop reason: HOSPADM

## 2021-01-12 RX ORDER — FENTANYL CITRATE 50 UG/ML
INJECTION, SOLUTION INTRAMUSCULAR; INTRAVENOUS
Status: DISCONTINUED | OUTPATIENT
Start: 2021-01-12 | End: 2021-01-12 | Stop reason: HOSPADM

## 2021-01-12 RX ORDER — MIDAZOLAM HYDROCHLORIDE 1 MG/ML
INJECTION INTRAMUSCULAR; INTRAVENOUS
Status: DISCONTINUED | OUTPATIENT
Start: 2021-01-12 | End: 2021-01-12 | Stop reason: HOSPADM

## 2021-01-12 RX ORDER — SODIUM CHLORIDE 9 MG/ML
INJECTION, SOLUTION INTRAVENOUS CONTINUOUS
Status: DISCONTINUED | OUTPATIENT
Start: 2021-01-12 | End: 2021-01-12 | Stop reason: HOSPADM

## 2021-01-12 RX ADMIN — SODIUM CHLORIDE 25 ML/HR: 0.9 INJECTION, SOLUTION INTRAVENOUS at 01:01

## 2021-02-01 ENCOUNTER — TELEPHONE (OUTPATIENT)
Dept: PAIN MEDICINE | Facility: CLINIC | Age: 71
End: 2021-02-01

## 2021-02-02 ENCOUNTER — OFFICE VISIT (OUTPATIENT)
Dept: PAIN MEDICINE | Facility: CLINIC | Age: 71
End: 2021-02-02
Payer: MEDICARE

## 2021-02-02 VITALS
BODY MASS INDEX: 40.73 KG/M2 | TEMPERATURE: 98 F | DIASTOLIC BLOOD PRESSURE: 77 MMHG | WEIGHT: 275 LBS | HEART RATE: 58 BPM | SYSTOLIC BLOOD PRESSURE: 128 MMHG | HEIGHT: 69 IN

## 2021-02-02 DIAGNOSIS — G89.29 OTHER CHRONIC PAIN: ICD-10-CM

## 2021-02-02 DIAGNOSIS — M16.9 OSTEOARTHRITIS OF HIP, UNSPECIFIED LATERALITY, UNSPECIFIED OSTEOARTHRITIS TYPE: Primary | ICD-10-CM

## 2021-02-02 DIAGNOSIS — M16.11 PRIMARY OSTEOARTHRITIS OF RIGHT HIP: ICD-10-CM

## 2021-02-02 PROCEDURE — 99213 OFFICE O/P EST LOW 20 MIN: CPT | Mod: S$PBB,,, | Performed by: NURSE PRACTITIONER

## 2021-02-02 PROCEDURE — 99215 OFFICE O/P EST HI 40 MIN: CPT | Mod: PBBFAC | Performed by: NURSE PRACTITIONER

## 2021-02-02 PROCEDURE — 99999 PR PBB SHADOW E&M-EST. PATIENT-LVL V: ICD-10-PCS | Mod: PBBFAC,,, | Performed by: NURSE PRACTITIONER

## 2021-02-02 PROCEDURE — 99213 PR OFFICE/OUTPT VISIT, EST, LEVL III, 20-29 MIN: ICD-10-PCS | Mod: S$PBB,,, | Performed by: NURSE PRACTITIONER

## 2021-02-02 PROCEDURE — 99999 PR PBB SHADOW E&M-EST. PATIENT-LVL V: CPT | Mod: PBBFAC,,, | Performed by: NURSE PRACTITIONER

## 2021-03-01 ENCOUNTER — TELEPHONE (OUTPATIENT)
Dept: PAIN MEDICINE | Facility: CLINIC | Age: 71
End: 2021-03-01

## 2021-03-09 ENCOUNTER — PATIENT MESSAGE (OUTPATIENT)
Dept: PAIN MEDICINE | Facility: CLINIC | Age: 71
End: 2021-03-09

## 2021-03-11 ENCOUNTER — TELEPHONE (OUTPATIENT)
Dept: PAIN MEDICINE | Facility: CLINIC | Age: 71
End: 2021-03-11

## 2021-03-12 ENCOUNTER — OFFICE VISIT (OUTPATIENT)
Dept: PAIN MEDICINE | Facility: CLINIC | Age: 71
End: 2021-03-12
Payer: MEDICARE

## 2021-03-12 DIAGNOSIS — M16.11 PRIMARY OSTEOARTHRITIS OF RIGHT HIP: ICD-10-CM

## 2021-03-12 DIAGNOSIS — G89.29 CHRONIC PAIN OF RIGHT HIP: ICD-10-CM

## 2021-03-12 DIAGNOSIS — G89.4 CHRONIC PAIN DISORDER: Primary | ICD-10-CM

## 2021-03-12 DIAGNOSIS — M25.551 CHRONIC PAIN OF RIGHT HIP: ICD-10-CM

## 2021-03-12 PROCEDURE — 99213 PR OFFICE/OUTPT VISIT, EST, LEVL III, 20-29 MIN: ICD-10-PCS | Mod: 95,,, | Performed by: NURSE PRACTITIONER

## 2021-03-12 PROCEDURE — 99213 OFFICE O/P EST LOW 20 MIN: CPT | Mod: 95,,, | Performed by: NURSE PRACTITIONER

## 2021-03-17 ENCOUNTER — PATIENT MESSAGE (OUTPATIENT)
Dept: PAIN MEDICINE | Facility: CLINIC | Age: 71
End: 2021-03-17

## 2021-03-23 ENCOUNTER — HOSPITAL ENCOUNTER (OUTPATIENT)
Facility: OTHER | Age: 71
Discharge: HOME | End: 2021-03-23
Attending: ANESTHESIOLOGY | Admitting: ANESTHESIOLOGY
Payer: MEDICARE

## 2021-03-23 VITALS
OXYGEN SATURATION: 93 % | BODY MASS INDEX: 42.44 KG/M2 | RESPIRATION RATE: 16 BRPM | SYSTOLIC BLOOD PRESSURE: 111 MMHG | DIASTOLIC BLOOD PRESSURE: 70 MMHG | HEIGHT: 68 IN | TEMPERATURE: 98 F | WEIGHT: 280 LBS | HEART RATE: 64 BPM

## 2021-03-23 DIAGNOSIS — M16.11 PRIMARY OSTEOARTHRITIS OF RIGHT HIP: Primary | ICD-10-CM

## 2021-03-23 DIAGNOSIS — G89.29 CHRONIC PAIN: ICD-10-CM

## 2021-03-23 LAB — POCT GLUCOSE: 107 MG/DL (ref 70–110)

## 2021-03-23 PROCEDURE — 63600175 PHARM REV CODE 636 W HCPCS: Performed by: ANESTHESIOLOGY

## 2021-03-23 PROCEDURE — 82947 ASSAY GLUCOSE BLOOD QUANT: CPT | Performed by: ANESTHESIOLOGY

## 2021-03-23 PROCEDURE — 20610 DRAIN/INJ JOINT/BURSA W/O US: CPT | Mod: RT,,, | Performed by: ANESTHESIOLOGY

## 2021-03-23 PROCEDURE — 25000003 PHARM REV CODE 250: Performed by: ANESTHESIOLOGY

## 2021-03-23 PROCEDURE — 25500020 PHARM REV CODE 255: Performed by: ANESTHESIOLOGY

## 2021-03-23 PROCEDURE — 20610 PR DRAIN/INJECT LARGE JOINT/BURSA: ICD-10-PCS | Mod: RT,,, | Performed by: ANESTHESIOLOGY

## 2021-03-23 PROCEDURE — 20610 DRAIN/INJ JOINT/BURSA W/O US: CPT | Mod: RT | Performed by: ANESTHESIOLOGY

## 2021-03-23 RX ORDER — BUPIVACAINE HYDROCHLORIDE 2.5 MG/ML
INJECTION, SOLUTION EPIDURAL; INFILTRATION; INTRACAUDAL
Status: DISCONTINUED | OUTPATIENT
Start: 2021-03-23 | End: 2021-03-23 | Stop reason: HOSPADM

## 2021-03-23 RX ORDER — LIDOCAINE HYDROCHLORIDE 10 MG/ML
INJECTION INFILTRATION; PERINEURAL
Status: DISCONTINUED | OUTPATIENT
Start: 2021-03-23 | End: 2021-03-23 | Stop reason: HOSPADM

## 2021-03-23 RX ORDER — SODIUM CHLORIDE 9 MG/ML
INJECTION, SOLUTION INTRAVENOUS CONTINUOUS
Status: DISCONTINUED | OUTPATIENT
Start: 2021-03-23 | End: 2021-03-23 | Stop reason: HOSPADM

## 2021-03-23 RX ORDER — METHYLPREDNISOLONE ACETATE 40 MG/ML
INJECTION, SUSPENSION INTRA-ARTICULAR; INTRALESIONAL; INTRAMUSCULAR; SOFT TISSUE
Status: DISCONTINUED | OUTPATIENT
Start: 2021-03-23 | End: 2021-03-23 | Stop reason: HOSPADM

## 2021-04-07 ENCOUNTER — TELEPHONE (OUTPATIENT)
Dept: PAIN MEDICINE | Facility: CLINIC | Age: 71
End: 2021-04-07

## 2021-04-12 ENCOUNTER — PATIENT MESSAGE (OUTPATIENT)
Dept: ORTHOPEDICS | Facility: CLINIC | Age: 71
End: 2021-04-12

## 2021-04-14 ENCOUNTER — PATIENT MESSAGE (OUTPATIENT)
Dept: ORTHOPEDICS | Facility: CLINIC | Age: 71
End: 2021-04-14

## 2021-04-16 DIAGNOSIS — M25.551 BILATERAL HIP PAIN: Primary | ICD-10-CM

## 2021-04-16 DIAGNOSIS — M25.552 BILATERAL HIP PAIN: Primary | ICD-10-CM

## 2021-04-19 ENCOUNTER — HOSPITAL ENCOUNTER (OUTPATIENT)
Dept: RADIOLOGY | Facility: HOSPITAL | Age: 71
Discharge: HOME | End: 2021-04-19
Attending: ORTHOPAEDIC SURGERY
Payer: MEDICARE

## 2021-04-19 ENCOUNTER — OFFICE VISIT (OUTPATIENT)
Dept: ORTHOPEDICS | Facility: CLINIC | Age: 71
End: 2021-04-19
Payer: MEDICARE

## 2021-04-19 VITALS — HEIGHT: 68 IN | BODY MASS INDEX: 42.44 KG/M2 | WEIGHT: 280 LBS

## 2021-04-19 DIAGNOSIS — E66.01 MORBID OBESITY WITH BMI OF 40.0-44.9, ADULT: ICD-10-CM

## 2021-04-19 DIAGNOSIS — M25.552 BILATERAL HIP PAIN: ICD-10-CM

## 2021-04-19 DIAGNOSIS — E11.9 TYPE 2 DIABETES MELLITUS WITHOUT COMPLICATION, WITHOUT LONG-TERM CURRENT USE OF INSULIN: ICD-10-CM

## 2021-04-19 DIAGNOSIS — M25.551 BILATERAL HIP PAIN: ICD-10-CM

## 2021-04-19 DIAGNOSIS — T84.89XS ENCOUNTER FOR SURVEILLANCE OF RECALLED TOTAL HIP ARTHROPLASTY HARDWARE, SEQUELA: ICD-10-CM

## 2021-04-19 DIAGNOSIS — M16.11 PRIMARY OSTEOARTHRITIS OF RIGHT HIP: Primary | ICD-10-CM

## 2021-04-19 DIAGNOSIS — Z96.649 ENCOUNTER FOR SURVEILLANCE OF RECALLED TOTAL HIP ARTHROPLASTY HARDWARE, SEQUELA: ICD-10-CM

## 2021-04-19 DIAGNOSIS — M16.10 UNILATERAL PRIMARY OSTEOARTHRITIS, UNSPECIFIED HIP: ICD-10-CM

## 2021-04-19 PROCEDURE — 99214 PR OFFICE/OUTPT VISIT, EST, LEVL IV, 30-39 MIN: ICD-10-PCS | Mod: S$PBB,,, | Performed by: ORTHOPAEDIC SURGERY

## 2021-04-19 PROCEDURE — 99214 OFFICE O/P EST MOD 30 MIN: CPT | Mod: S$PBB,,, | Performed by: ORTHOPAEDIC SURGERY

## 2021-04-19 PROCEDURE — 73521 XR HIPS BILATERAL 2 VIEW INCL AP PELVIS: ICD-10-PCS | Mod: 26,,, | Performed by: RADIOLOGY

## 2021-04-19 PROCEDURE — 99999 PR PBB SHADOW E&M-EST. PATIENT-LVL III: ICD-10-PCS | Mod: PBBFAC,,, | Performed by: ORTHOPAEDIC SURGERY

## 2021-04-19 PROCEDURE — 73521 X-RAY EXAM HIPS BI 2 VIEWS: CPT | Mod: TC

## 2021-04-19 PROCEDURE — 73521 X-RAY EXAM HIPS BI 2 VIEWS: CPT | Mod: 26,,, | Performed by: RADIOLOGY

## 2021-04-19 PROCEDURE — 99999 PR PBB SHADOW E&M-EST. PATIENT-LVL III: CPT | Mod: PBBFAC,,, | Performed by: ORTHOPAEDIC SURGERY

## 2021-04-19 PROCEDURE — 99213 OFFICE O/P EST LOW 20 MIN: CPT | Mod: PBBFAC,25 | Performed by: ORTHOPAEDIC SURGERY

## 2021-04-20 ENCOUNTER — TELEPHONE (OUTPATIENT)
Dept: ORTHOPEDICS | Facility: CLINIC | Age: 71
End: 2021-04-20

## 2021-04-20 DIAGNOSIS — R06.02 SOB (SHORTNESS OF BREATH): Primary | ICD-10-CM

## 2021-04-21 ENCOUNTER — HOSPITAL ENCOUNTER (OUTPATIENT)
Dept: PULMONOLOGY | Facility: CLINIC | Age: 71
Discharge: HOME | End: 2021-04-21
Payer: MEDICARE

## 2021-04-21 ENCOUNTER — OFFICE VISIT (OUTPATIENT)
Dept: PULMONOLOGY | Facility: CLINIC | Age: 71
End: 2021-04-21
Payer: MEDICARE

## 2021-04-21 VITALS
OXYGEN SATURATION: 96 % | BODY MASS INDEX: 43.95 KG/M2 | HEART RATE: 58 BPM | WEIGHT: 290 LBS | HEIGHT: 68 IN | DIASTOLIC BLOOD PRESSURE: 69 MMHG | SYSTOLIC BLOOD PRESSURE: 110 MMHG

## 2021-04-21 DIAGNOSIS — R06.02 SOB (SHORTNESS OF BREATH): ICD-10-CM

## 2021-04-21 DIAGNOSIS — Z13.9 SCREENING PROCEDURE: Primary | ICD-10-CM

## 2021-04-21 DIAGNOSIS — R60.0 LOWER EXTREMITY EDEMA: Primary | ICD-10-CM

## 2021-04-21 DIAGNOSIS — J44.9 CHRONIC OBSTRUCTIVE PULMONARY DISEASE, UNSPECIFIED COPD TYPE: ICD-10-CM

## 2021-04-21 DIAGNOSIS — Z72.0 TOBACCO ABUSE: ICD-10-CM

## 2021-04-21 DIAGNOSIS — G47.33 OSA (OBSTRUCTIVE SLEEP APNEA): ICD-10-CM

## 2021-04-21 DIAGNOSIS — Z01.811 ENCOUNTER FOR PREOPERATIVE PULMONARY EXAMINATION: ICD-10-CM

## 2021-04-21 DIAGNOSIS — Z12.2 ENCOUNTER FOR SCREENING FOR MALIGNANT NEOPLASM OF RESPIRATORY ORGANS: ICD-10-CM

## 2021-04-21 LAB — SARS-COV-2 RDRP RESP QL NAA+PROBE: NEGATIVE

## 2021-04-21 PROCEDURE — 94010 BREATHING CAPACITY TEST: CPT | Mod: PBBFAC | Performed by: INTERNAL MEDICINE

## 2021-04-21 PROCEDURE — 99999 PR PBB SHADOW E&M-EST. PATIENT-LVL IV: CPT | Mod: PBBFAC,,, | Performed by: INTERNAL MEDICINE

## 2021-04-21 PROCEDURE — 94727 PR PULM FUNCTION TEST BY GAS: ICD-10-PCS | Mod: 26,S$PBB,, | Performed by: INTERNAL MEDICINE

## 2021-04-21 PROCEDURE — 94729 DIFFUSING CAPACITY: CPT | Mod: PBBFAC | Performed by: INTERNAL MEDICINE

## 2021-04-21 PROCEDURE — 99204 OFFICE O/P NEW MOD 45 MIN: CPT | Mod: 25,S$PBB,, | Performed by: INTERNAL MEDICINE

## 2021-04-21 PROCEDURE — 99204 PR OFFICE/OUTPT VISIT, NEW, LEVL IV, 45-59 MIN: ICD-10-PCS | Mod: 25,S$PBB,, | Performed by: INTERNAL MEDICINE

## 2021-04-21 PROCEDURE — 99999 PR PBB SHADOW E&M-EST. PATIENT-LVL IV: ICD-10-PCS | Mod: PBBFAC,,, | Performed by: INTERNAL MEDICINE

## 2021-04-21 PROCEDURE — 94729 PR C02/MEMBANE DIFFUSE CAPACITY: ICD-10-PCS | Mod: 26,S$PBB,, | Performed by: INTERNAL MEDICINE

## 2021-04-21 PROCEDURE — 99214 OFFICE O/P EST MOD 30 MIN: CPT | Mod: PBBFAC,25 | Performed by: INTERNAL MEDICINE

## 2021-04-21 PROCEDURE — 94729 DIFFUSING CAPACITY: CPT | Mod: 26,S$PBB,, | Performed by: INTERNAL MEDICINE

## 2021-04-21 PROCEDURE — 94010 BREATHING CAPACITY TEST: CPT | Mod: 26,S$PBB,, | Performed by: INTERNAL MEDICINE

## 2021-04-21 PROCEDURE — 94010 BREATHING CAPACITY TEST: ICD-10-PCS | Mod: 26,S$PBB,, | Performed by: INTERNAL MEDICINE

## 2021-04-21 PROCEDURE — U0002 COVID-19 LAB TEST NON-CDC: HCPCS | Performed by: INTERNAL MEDICINE

## 2021-04-21 PROCEDURE — 94727 GAS DIL/WSHOT DETER LNG VOL: CPT | Mod: 26,S$PBB,, | Performed by: INTERNAL MEDICINE

## 2021-04-21 PROCEDURE — 94727 GAS DIL/WSHOT DETER LNG VOL: CPT | Mod: PBBFAC | Performed by: INTERNAL MEDICINE

## 2021-04-24 ENCOUNTER — PATIENT MESSAGE (OUTPATIENT)
Dept: ORTHOPEDICS | Facility: CLINIC | Age: 71
End: 2021-04-24

## 2021-04-27 ENCOUNTER — TELEPHONE (OUTPATIENT)
Dept: ORTHOPEDICS | Facility: CLINIC | Age: 71
End: 2021-04-27

## 2021-04-28 ENCOUNTER — PATIENT MESSAGE (OUTPATIENT)
Dept: ORTHOPEDICS | Facility: CLINIC | Age: 71
End: 2021-04-28

## 2021-05-01 ENCOUNTER — PATIENT MESSAGE (OUTPATIENT)
Dept: PAIN MEDICINE | Facility: CLINIC | Age: 71
End: 2021-05-01

## 2021-05-03 PROBLEM — J44.9 COPD (CHRONIC OBSTRUCTIVE PULMONARY DISEASE): Status: ACTIVE | Noted: 2021-05-03

## 2021-05-06 ENCOUNTER — PATIENT MESSAGE (OUTPATIENT)
Dept: PAIN MEDICINE | Facility: CLINIC | Age: 71
End: 2021-05-06

## 2021-05-23 ENCOUNTER — PATIENT MESSAGE (OUTPATIENT)
Dept: PAIN MEDICINE | Facility: CLINIC | Age: 71
End: 2021-05-23

## 2021-05-25 DIAGNOSIS — M16.11 PRIMARY OSTEOARTHRITIS OF RIGHT HIP: Primary | ICD-10-CM

## 2021-05-27 ENCOUNTER — PATIENT MESSAGE (OUTPATIENT)
Dept: ORTHOPEDICS | Facility: CLINIC | Age: 71
End: 2021-05-27

## 2021-06-02 ENCOUNTER — OFFICE VISIT (OUTPATIENT)
Dept: PAIN MEDICINE | Facility: CLINIC | Age: 71
End: 2021-06-02
Payer: MEDICARE

## 2021-06-02 VITALS
HEART RATE: 64 BPM | DIASTOLIC BLOOD PRESSURE: 92 MMHG | WEIGHT: 290 LBS | SYSTOLIC BLOOD PRESSURE: 142 MMHG | RESPIRATION RATE: 20 BRPM | HEIGHT: 68 IN | BODY MASS INDEX: 43.95 KG/M2 | TEMPERATURE: 98 F

## 2021-06-02 DIAGNOSIS — M16.11 PRIMARY OSTEOARTHRITIS OF RIGHT HIP: Primary | ICD-10-CM

## 2021-06-02 DIAGNOSIS — G89.4 CHRONIC PAIN DISORDER: ICD-10-CM

## 2021-06-02 DIAGNOSIS — M16.9 OSTEOARTHRITIS OF HIP, UNSPECIFIED LATERALITY, UNSPECIFIED OSTEOARTHRITIS TYPE: ICD-10-CM

## 2021-06-02 PROCEDURE — 99214 PR OFFICE/OUTPT VISIT, EST, LEVL IV, 30-39 MIN: ICD-10-PCS | Mod: S$PBB,,, | Performed by: ANESTHESIOLOGY

## 2021-06-02 PROCEDURE — 99213 OFFICE O/P EST LOW 20 MIN: CPT | Mod: PBBFAC | Performed by: ANESTHESIOLOGY

## 2021-06-02 PROCEDURE — 99214 OFFICE O/P EST MOD 30 MIN: CPT | Mod: S$PBB,,, | Performed by: ANESTHESIOLOGY

## 2021-06-02 PROCEDURE — 99999 PR PBB SHADOW E&M-EST. PATIENT-LVL III: ICD-10-PCS | Mod: PBBFAC,,, | Performed by: ANESTHESIOLOGY

## 2021-06-02 PROCEDURE — 99999 PR PBB SHADOW E&M-EST. PATIENT-LVL III: CPT | Mod: PBBFAC,,, | Performed by: ANESTHESIOLOGY

## 2021-06-02 RX ORDER — OXYCODONE AND ACETAMINOPHEN 5; 325 MG/1; MG/1
1 TABLET ORAL EVERY 6 HOURS PRN
Qty: 60 TABLET | Refills: 0 | Status: SHIPPED | OUTPATIENT
Start: 2021-06-02 | End: 2021-07-02

## 2021-06-03 PROBLEM — M16.9 OSTEOARTHRITIS OF HIP: Status: ACTIVE | Noted: 2021-06-03

## 2021-06-03 PROBLEM — M16.11 PRIMARY OSTEOARTHRITIS OF RIGHT HIP: Status: ACTIVE | Noted: 2021-06-03

## 2021-06-03 PROBLEM — G89.4 CHRONIC PAIN DISORDER: Status: ACTIVE | Noted: 2020-11-19

## 2021-06-07 ENCOUNTER — PATIENT MESSAGE (OUTPATIENT)
Dept: ADMINISTRATIVE | Facility: OTHER | Age: 71
End: 2021-06-07

## 2021-06-21 ENCOUNTER — PATIENT MESSAGE (OUTPATIENT)
Dept: ADMINISTRATIVE | Facility: OTHER | Age: 71
End: 2021-06-21

## 2021-06-22 ENCOUNTER — PATIENT MESSAGE (OUTPATIENT)
Dept: ORTHOPEDICS | Facility: CLINIC | Age: 71
End: 2021-06-22

## 2021-06-22 DIAGNOSIS — M16.11 PRIMARY OSTEOARTHRITIS OF RIGHT HIP: Primary | ICD-10-CM

## 2021-06-23 ENCOUNTER — PATIENT MESSAGE (OUTPATIENT)
Dept: ADMINISTRATIVE | Facility: OTHER | Age: 71
End: 2021-06-23

## 2021-06-23 ENCOUNTER — OFFICE VISIT (OUTPATIENT)
Dept: ORTHOPEDICS | Facility: CLINIC | Age: 71
End: 2021-06-23
Payer: MEDICARE

## 2021-06-23 ENCOUNTER — HOSPITAL ENCOUNTER (OUTPATIENT)
Dept: RADIOLOGY | Facility: HOSPITAL | Age: 71
Discharge: HOME | End: 2021-06-23
Attending: NURSE PRACTITIONER
Payer: MEDICARE

## 2021-06-23 VITALS — WEIGHT: 303.13 LBS | BODY MASS INDEX: 45.94 KG/M2 | HEIGHT: 68 IN

## 2021-06-23 DIAGNOSIS — E66.01 MORBID OBESITY: ICD-10-CM

## 2021-06-23 DIAGNOSIS — M16.11 OSTEOARTHRITIS OF RIGHT HIP, UNSPECIFIED OSTEOARTHRITIS TYPE: Primary | ICD-10-CM

## 2021-06-23 DIAGNOSIS — M16.11 OSTEOARTHRITIS OF RIGHT HIP, UNSPECIFIED OSTEOARTHRITIS TYPE: ICD-10-CM

## 2021-06-23 PROCEDURE — 99999 PR PBB SHADOW E&M-EST. PATIENT-LVL IV: CPT | Mod: PBBFAC,,, | Performed by: NURSE PRACTITIONER

## 2021-06-23 PROCEDURE — 73502 X-RAY EXAM HIP UNI 2-3 VIEWS: CPT | Mod: TC,RT

## 2021-06-23 PROCEDURE — 73502 X-RAY EXAM HIP UNI 2-3 VIEWS: CPT | Mod: 26,RT,, | Performed by: RADIOLOGY

## 2021-06-23 PROCEDURE — 99499 NO LOS: ICD-10-PCS | Mod: S$PBB,,, | Performed by: NURSE PRACTITIONER

## 2021-06-23 PROCEDURE — 73502 XR HIP WITH PELVIS WHEN PERFORMED, 2 OR 3  VIEWS RIGHT: ICD-10-PCS | Mod: 26,RT,, | Performed by: RADIOLOGY

## 2021-06-23 PROCEDURE — 99499 UNLISTED E&M SERVICE: CPT | Mod: S$PBB,,, | Performed by: NURSE PRACTITIONER

## 2021-06-23 PROCEDURE — 99999 PR PBB SHADOW E&M-EST. PATIENT-LVL IV: ICD-10-PCS | Mod: PBBFAC,,, | Performed by: NURSE PRACTITIONER

## 2021-06-23 PROCEDURE — 99214 OFFICE O/P EST MOD 30 MIN: CPT | Mod: PBBFAC,25 | Performed by: NURSE PRACTITIONER

## 2021-06-27 DIAGNOSIS — E11.9 DM TYPE 2, GOAL A1C TO BE DETERMINED: ICD-10-CM

## 2021-06-27 DIAGNOSIS — I10 HYPERTENSION, UNSPECIFIED TYPE: Primary | ICD-10-CM

## 2021-06-27 DIAGNOSIS — M79.604 PAIN OF RIGHT LOWER EXTREMITY: ICD-10-CM

## 2021-06-28 ENCOUNTER — PATIENT MESSAGE (OUTPATIENT)
Dept: PULMONOLOGY | Facility: CLINIC | Age: 71
End: 2021-06-28

## 2021-06-29 ENCOUNTER — TELEPHONE (OUTPATIENT)
Dept: PULMONOLOGY | Facility: CLINIC | Age: 71
End: 2021-06-29

## 2021-07-01 ENCOUNTER — TELEPHONE (OUTPATIENT)
Dept: PAIN MEDICINE | Facility: CLINIC | Age: 71
End: 2021-07-01

## 2021-07-01 ENCOUNTER — CLINICAL SUPPORT (OUTPATIENT)
Dept: REHABILITATION | Facility: HOSPITAL | Age: 71
End: 2021-07-01
Payer: MEDICARE

## 2021-07-01 DIAGNOSIS — Z74.09 IMPAIRED FUNCTIONAL MOBILITY, BALANCE, GAIT, AND ENDURANCE: Primary | ICD-10-CM

## 2021-07-01 DIAGNOSIS — M25.551 RIGHT HIP PAIN: ICD-10-CM

## 2021-07-01 PROBLEM — R26.9 ABNORMALITY OF GAIT AND MOBILITY: Status: RESOLVED | Noted: 2019-09-05 | Resolved: 2021-07-01

## 2021-07-01 PROCEDURE — 97162 PT EVAL MOD COMPLEX 30 MIN: CPT | Mod: PN

## 2021-07-01 PROCEDURE — 97110 THERAPEUTIC EXERCISES: CPT | Mod: PN

## 2021-07-06 ENCOUNTER — PATIENT MESSAGE (OUTPATIENT)
Dept: REHABILITATION | Facility: HOSPITAL | Age: 71
End: 2021-07-06

## 2021-07-06 ENCOUNTER — PATIENT MESSAGE (OUTPATIENT)
Dept: ORTHOPEDICS | Facility: CLINIC | Age: 71
End: 2021-07-06

## 2021-07-07 ENCOUNTER — HOSPITAL ENCOUNTER (OUTPATIENT)
Dept: RADIOLOGY | Facility: HOSPITAL | Age: 71
Discharge: HOME | End: 2021-07-07
Attending: INTERNAL MEDICINE
Payer: MEDICARE

## 2021-07-07 ENCOUNTER — HOSPITAL ENCOUNTER (OUTPATIENT)
Dept: CARDIOLOGY | Facility: HOSPITAL | Age: 71
Discharge: HOME | End: 2021-07-07
Attending: INTERNAL MEDICINE
Payer: MEDICARE

## 2021-07-07 VITALS
SYSTOLIC BLOOD PRESSURE: 138 MMHG | DIASTOLIC BLOOD PRESSURE: 80 MMHG | HEART RATE: 68 BPM | BODY MASS INDEX: 45.92 KG/M2 | WEIGHT: 303 LBS | HEIGHT: 68 IN

## 2021-07-07 DIAGNOSIS — Z12.2 ENCOUNTER FOR SCREENING FOR MALIGNANT NEOPLASM OF RESPIRATORY ORGANS: ICD-10-CM

## 2021-07-07 DIAGNOSIS — G47.33 OSA (OBSTRUCTIVE SLEEP APNEA): ICD-10-CM

## 2021-07-07 DIAGNOSIS — R60.0 LOWER EXTREMITY EDEMA: ICD-10-CM

## 2021-07-07 LAB
ASCENDING AORTA: 3.33 CM
AV INDEX (PROSTH): 0.9
AV MEAN GRADIENT: 5 MMHG
AV PEAK GRADIENT: 10 MMHG
AV VALVE AREA: 3.32 CM2
AV VELOCITY RATIO: 0.86
BSA FOR ECHO PROCEDURE: 2.57 M2
CV ECHO LV RWT: 0.31 CM
DOP CALC AO PEAK VEL: 1.55 M/S
DOP CALC AO VTI: 30.71 CM
DOP CALC LVOT AREA: 3.7 CM2
DOP CALC LVOT DIAMETER: 2.17 CM
DOP CALC LVOT PEAK VEL: 1.33 M/S
DOP CALC LVOT STROKE VOLUME: 102.02 CM3
DOP CALCLVOT PEAK VEL VTI: 27.6 CM
E WAVE DECELERATION TIME: 262.01 MSEC
E/A RATIO: 1
E/E' RATIO: 13.6 M/S
ECHO LV POSTERIOR WALL: 0.8 CM (ref 0.6–1.1)
EJECTION FRACTION: 60 %
FRACTIONAL SHORTENING: 23 % (ref 28–44)
INTERVENTRICULAR SEPTUM: 0.7 CM (ref 0.6–1.1)
IVRT: 82.78 MSEC
LA MAJOR: 5.77 CM
LA MINOR: 5.83 CM
LA WIDTH: 4.15 CM
LEFT ATRIUM SIZE: 3.85 CM
LEFT ATRIUM VOLUME INDEX MOD: 26.3 ML/M2
LEFT ATRIUM VOLUME INDEX: 32.3 ML/M2
LEFT ATRIUM VOLUME MOD: 64.27 CM3
LEFT ATRIUM VOLUME: 78.77 CM3
LEFT INTERNAL DIMENSION IN SYSTOLE: 4 CM (ref 2.1–4)
LEFT VENTRICLE DIASTOLIC VOLUME INDEX: 26.73 ML/M2
LEFT VENTRICLE DIASTOLIC VOLUME: 65.22 ML
LEFT VENTRICLE MASS INDEX: 55 G/M2
LEFT VENTRICLE SYSTOLIC VOLUME INDEX: 11.7 ML/M2
LEFT VENTRICLE SYSTOLIC VOLUME: 28.49 ML
LEFT VENTRICULAR INTERNAL DIMENSION IN DIASTOLE: 5.2 CM (ref 3.5–6)
LEFT VENTRICULAR MASS: 133.85 G
LV LATERAL E/E' RATIO: 17 M/S
LV SEPTAL E/E' RATIO: 11.33 M/S
MV A" WAVE DURATION": 16.27 MSEC
MV PEAK A VEL: 1.02 M/S
MV PEAK E VEL: 1.02 M/S
MV STENOSIS PRESSURE HALF TIME: 75.98 MS
MV VALVE AREA P 1/2 METHOD: 2.9 CM2
PISA TR MAX VEL: 1.68 M/S
PULM VEIN S/D RATIO: 1.47
PV PEAK D VEL: 0.32 M/S
PV PEAK S VEL: 0.47 M/S
RA MAJOR: 5.55 CM
RA PRESSURE: 3 MMHG
RA WIDTH: 3.74 CM
RIGHT VENTRICULAR END-DIASTOLIC DIMENSION: 3.18 CM
RV TISSUE DOPPLER FREE WALL SYSTOLIC VELOCITY 1 (APICAL 4 CHAMBER VIEW): 16.58 CM/S
SINUS: 3.13 CM
STJ: 3.23 CM
TDI LATERAL: 0.06 M/S
TDI SEPTAL: 0.09 M/S
TDI: 0.08 M/S
TR MAX PG: 11 MMHG
TRICUSPID ANNULAR PLANE SYSTOLIC EXCURSION: 2.88 CM
TV REST PULMONARY ARTERY PRESSURE: 14 MMHG

## 2021-07-07 PROCEDURE — 71271 CT THORAX LUNG CANCER SCR C-: CPT | Mod: TC

## 2021-07-07 PROCEDURE — 93306 ECHO (CUPID ONLY): ICD-10-PCS | Mod: 26,,, | Performed by: INTERNAL MEDICINE

## 2021-07-07 PROCEDURE — 93306 TTE W/DOPPLER COMPLETE: CPT

## 2021-07-07 PROCEDURE — 71271 CT THORAX LUNG CANCER SCR C-: CPT | Mod: 26,,, | Performed by: RADIOLOGY

## 2021-07-07 PROCEDURE — 71271 CT CHEST LUNG SCREENING LOW DOSE: ICD-10-PCS | Mod: 26,,, | Performed by: RADIOLOGY

## 2021-07-07 PROCEDURE — 93306 TTE W/DOPPLER COMPLETE: CPT | Mod: 26,,, | Performed by: INTERNAL MEDICINE

## 2021-07-09 ENCOUNTER — PATIENT MESSAGE (OUTPATIENT)
Dept: PAIN MEDICINE | Facility: CLINIC | Age: 71
End: 2021-07-09

## 2021-07-12 ENCOUNTER — OFFICE VISIT (OUTPATIENT)
Dept: PAIN MEDICINE | Facility: CLINIC | Age: 71
End: 2021-07-12
Payer: MEDICARE

## 2021-07-12 DIAGNOSIS — G89.29 CHRONIC PAIN OF RIGHT HIP: Primary | ICD-10-CM

## 2021-07-12 DIAGNOSIS — M25.551 CHRONIC PAIN OF RIGHT HIP: Primary | ICD-10-CM

## 2021-07-12 DIAGNOSIS — G89.29 OTHER CHRONIC PAIN: ICD-10-CM

## 2021-07-12 DIAGNOSIS — M16.9 OSTEOARTHRITIS OF HIP, UNSPECIFIED LATERALITY, UNSPECIFIED OSTEOARTHRITIS TYPE: ICD-10-CM

## 2021-07-12 PROCEDURE — 99213 PR OFFICE/OUTPT VISIT, EST, LEVL III, 20-29 MIN: ICD-10-PCS | Mod: 95,,, | Performed by: NURSE PRACTITIONER

## 2021-07-12 PROCEDURE — 99213 OFFICE O/P EST LOW 20 MIN: CPT | Mod: 95,,, | Performed by: NURSE PRACTITIONER

## 2021-07-13 ENCOUNTER — DOCUMENTATION ONLY (OUTPATIENT)
Dept: BARIATRICS | Facility: CLINIC | Age: 71
End: 2021-07-13

## 2021-07-14 ENCOUNTER — TELEPHONE (OUTPATIENT)
Dept: BARIATRICS | Facility: CLINIC | Age: 71
End: 2021-07-14

## 2021-07-15 ENCOUNTER — OFFICE VISIT (OUTPATIENT)
Dept: BARIATRICS | Facility: CLINIC | Age: 71
End: 2021-07-15
Payer: MEDICARE

## 2021-07-15 ENCOUNTER — LAB VISIT (OUTPATIENT)
Dept: LAB | Facility: HOSPITAL | Age: 71
End: 2021-07-15
Payer: MEDICARE

## 2021-07-15 ENCOUNTER — OFFICE VISIT (OUTPATIENT)
Dept: CARDIOLOGY | Facility: CLINIC | Age: 71
End: 2021-07-15
Payer: MEDICARE

## 2021-07-15 VITALS
DIASTOLIC BLOOD PRESSURE: 82 MMHG | OXYGEN SATURATION: 90 % | SYSTOLIC BLOOD PRESSURE: 136 MMHG | WEIGHT: 295.44 LBS | HEIGHT: 68 IN | HEART RATE: 55 BPM | BODY MASS INDEX: 44.78 KG/M2

## 2021-07-15 VITALS
WEIGHT: 296.94 LBS | DIASTOLIC BLOOD PRESSURE: 70 MMHG | SYSTOLIC BLOOD PRESSURE: 124 MMHG | OXYGEN SATURATION: 84 % | HEART RATE: 88 BPM | BODY MASS INDEX: 45 KG/M2 | HEIGHT: 68 IN

## 2021-07-15 DIAGNOSIS — M16.11 OSTEOARTHRITIS OF RIGHT HIP, UNSPECIFIED OSTEOARTHRITIS TYPE: ICD-10-CM

## 2021-07-15 DIAGNOSIS — Z01.810 PREOPERATIVE CARDIOVASCULAR EXAMINATION: ICD-10-CM

## 2021-07-15 DIAGNOSIS — R23.0 CYANOSIS OF SKIN: ICD-10-CM

## 2021-07-15 DIAGNOSIS — E78.5 HYPERLIPIDEMIA, UNSPECIFIED HYPERLIPIDEMIA TYPE: ICD-10-CM

## 2021-07-15 DIAGNOSIS — I10 ESSENTIAL HYPERTENSION: ICD-10-CM

## 2021-07-15 DIAGNOSIS — E11.40 TYPE 2 DIABETES MELLITUS WITH DIABETIC NEUROPATHY, WITHOUT LONG-TERM CURRENT USE OF INSULIN: ICD-10-CM

## 2021-07-15 DIAGNOSIS — I83.893 VARICOSE VEINS OF BILATERAL LOWER EXTREMITIES WITH OTHER COMPLICATIONS: ICD-10-CM

## 2021-07-15 DIAGNOSIS — Q21.12 PFO (PATENT FORAMEN OVALE): Chronic | ICD-10-CM

## 2021-07-15 DIAGNOSIS — R23.0 TOE CYANOSIS: Primary | ICD-10-CM

## 2021-07-15 DIAGNOSIS — E66.01 CLASS 3 SEVERE OBESITY DUE TO EXCESS CALORIES WITH SERIOUS COMORBIDITY AND BODY MASS INDEX (BMI) OF 45.0 TO 49.9 IN ADULT: Primary | ICD-10-CM

## 2021-07-15 LAB
CHOLEST SERPL-MCNC: 162 MG/DL (ref 120–199)
CHOLEST/HDLC SERPL: 4.9 {RATIO} (ref 2–5)
HDLC SERPL-MCNC: 33 MG/DL (ref 40–75)
HDLC SERPL: 20.4 % (ref 20–50)
LDLC SERPL CALC-MCNC: 70.4 MG/DL (ref 63–159)
NONHDLC SERPL-MCNC: 129 MG/DL
TRIGL SERPL-MCNC: 293 MG/DL (ref 30–150)

## 2021-07-15 PROCEDURE — 36415 COLL VENOUS BLD VENIPUNCTURE: CPT | Performed by: INTERNAL MEDICINE

## 2021-07-15 PROCEDURE — 99999 PR PBB SHADOW E&M-EST. PATIENT-LVL V: ICD-10-PCS | Mod: PBBFAC,GC,, | Performed by: INTERNAL MEDICINE

## 2021-07-15 PROCEDURE — 99215 OFFICE O/P EST HI 40 MIN: CPT | Mod: S$PBB,,, | Performed by: INTERNAL MEDICINE

## 2021-07-15 PROCEDURE — 80061 LIPID PANEL: CPT | Performed by: INTERNAL MEDICINE

## 2021-07-15 PROCEDURE — 99215 OFFICE O/P EST HI 40 MIN: CPT | Mod: PBBFAC,27 | Performed by: INTERNAL MEDICINE

## 2021-07-15 PROCEDURE — 99214 OFFICE O/P EST MOD 30 MIN: CPT | Mod: S$PBB,GC,, | Performed by: INTERNAL MEDICINE

## 2021-07-15 PROCEDURE — 99999 PR PBB SHADOW E&M-EST. PATIENT-LVL V: ICD-10-PCS | Mod: PBBFAC,,, | Performed by: INTERNAL MEDICINE

## 2021-07-15 PROCEDURE — 99999 PR PBB SHADOW E&M-EST. PATIENT-LVL V: CPT | Mod: PBBFAC,,, | Performed by: INTERNAL MEDICINE

## 2021-07-15 PROCEDURE — 99214 PR OFFICE/OUTPT VISIT, EST, LEVL IV, 30-39 MIN: ICD-10-PCS | Mod: S$PBB,GC,, | Performed by: INTERNAL MEDICINE

## 2021-07-15 PROCEDURE — 99999 PR PBB SHADOW E&M-EST. PATIENT-LVL V: CPT | Mod: PBBFAC,GC,, | Performed by: INTERNAL MEDICINE

## 2021-07-15 PROCEDURE — 99215 PR OFFICE/OUTPT VISIT, EST, LEVL V, 40-54 MIN: ICD-10-PCS | Mod: S$PBB,,, | Performed by: INTERNAL MEDICINE

## 2021-07-15 PROCEDURE — 99215 OFFICE O/P EST HI 40 MIN: CPT | Mod: PBBFAC | Performed by: INTERNAL MEDICINE

## 2021-07-15 RX ORDER — SEMAGLUTIDE 1.34 MG/ML
0.5 INJECTION, SOLUTION SUBCUTANEOUS
Qty: 1 PEN | Refills: 0 | Status: SHIPPED | OUTPATIENT
Start: 2021-07-15 | End: 2021-07-15

## 2021-07-15 RX ORDER — SEMAGLUTIDE 1.34 MG/ML
0.5 INJECTION, SOLUTION SUBCUTANEOUS
Qty: 1 PEN | Refills: 0 | Status: SHIPPED | OUTPATIENT
Start: 2021-07-15 | End: 2022-02-14 | Stop reason: SDUPTHER

## 2021-07-16 ENCOUNTER — CLINICAL SUPPORT (OUTPATIENT)
Dept: REHABILITATION | Facility: HOSPITAL | Age: 71
End: 2021-07-16
Payer: MEDICARE

## 2021-07-16 DIAGNOSIS — Z74.09 IMPAIRED FUNCTIONAL MOBILITY, BALANCE, GAIT, AND ENDURANCE: Primary | ICD-10-CM

## 2021-07-16 DIAGNOSIS — M25.551 RIGHT HIP PAIN: ICD-10-CM

## 2021-07-16 PROCEDURE — 97110 THERAPEUTIC EXERCISES: CPT | Mod: PN

## 2021-07-19 NOTE — Clinical Note
Prepped with: ChloraPrep. Left knee cleaned with chloraprep. 32885 - Inpatient Low Complexity 45038 - Hospital Discharge Day Management; 30 min or less

## 2021-07-20 ENCOUNTER — PATIENT MESSAGE (OUTPATIENT)
Dept: PAIN MEDICINE | Facility: CLINIC | Age: 71
End: 2021-07-20

## 2021-07-20 RX ORDER — OXYCODONE AND ACETAMINOPHEN 5; 325 MG/1; MG/1
1 TABLET ORAL EVERY 6 HOURS PRN
Qty: 60 TABLET | Refills: 0 | Status: SHIPPED | OUTPATIENT
Start: 2021-07-20 | End: 2021-08-23 | Stop reason: SDUPTHER

## 2021-07-21 ENCOUNTER — CLINICAL SUPPORT (OUTPATIENT)
Dept: REHABILITATION | Facility: HOSPITAL | Age: 71
End: 2021-07-21
Payer: MEDICARE

## 2021-07-21 DIAGNOSIS — M25.551 RIGHT HIP PAIN: ICD-10-CM

## 2021-07-21 DIAGNOSIS — Z74.09 IMPAIRED FUNCTIONAL MOBILITY, BALANCE, GAIT, AND ENDURANCE: Primary | ICD-10-CM

## 2021-07-21 PROCEDURE — 97110 THERAPEUTIC EXERCISES: CPT | Mod: PN,CQ

## 2021-07-25 ENCOUNTER — PATIENT MESSAGE (OUTPATIENT)
Dept: ORTHOPEDICS | Facility: CLINIC | Age: 71
End: 2021-07-25

## 2021-07-26 ENCOUNTER — PATIENT MESSAGE (OUTPATIENT)
Dept: ORTHOPEDICS | Facility: CLINIC | Age: 71
End: 2021-07-26

## 2021-07-28 ENCOUNTER — CLINICAL SUPPORT (OUTPATIENT)
Dept: REHABILITATION | Facility: HOSPITAL | Age: 71
End: 2021-07-28
Payer: MEDICARE

## 2021-07-28 DIAGNOSIS — Z74.09 IMPAIRED FUNCTIONAL MOBILITY, BALANCE, GAIT, AND ENDURANCE: Primary | ICD-10-CM

## 2021-07-28 DIAGNOSIS — M25.551 RIGHT HIP PAIN: ICD-10-CM

## 2021-07-28 PROCEDURE — 97110 THERAPEUTIC EXERCISES: CPT | Mod: PN,CQ

## 2021-08-03 ENCOUNTER — HOSPITAL ENCOUNTER (OUTPATIENT)
Dept: RADIOLOGY | Facility: HOSPITAL | Age: 71
Discharge: HOME | End: 2021-08-03
Attending: INTERNAL MEDICINE
Payer: MEDICARE

## 2021-08-03 DIAGNOSIS — R23.0 TOE CYANOSIS: ICD-10-CM

## 2021-08-03 DIAGNOSIS — I83.893 VARICOSE VEINS OF BILATERAL LOWER EXTREMITIES WITH OTHER COMPLICATIONS: ICD-10-CM

## 2021-08-03 PROCEDURE — 93925 US ARTERIAL LOWER EXTREMITY BILAT WITH ABI (XPD): ICD-10-PCS | Mod: 26,,, | Performed by: RADIOLOGY

## 2021-08-03 PROCEDURE — 93970 EXTREMITY STUDY: CPT | Mod: 26,GC,, | Performed by: RADIOLOGY

## 2021-08-03 PROCEDURE — 93970 EXTREMITY STUDY: CPT | Mod: TC

## 2021-08-03 PROCEDURE — 93970 US LOWER EXTREMITY VEINS BILATERAL: ICD-10-PCS | Mod: 26,GC,, | Performed by: RADIOLOGY

## 2021-08-03 PROCEDURE — 93922 US ARTERIAL LOWER EXTREMITY BILAT WITH ABI (XPD): ICD-10-PCS | Mod: 26,,, | Performed by: RADIOLOGY

## 2021-08-03 PROCEDURE — 93925 LOWER EXTREMITY STUDY: CPT | Mod: 26,,, | Performed by: RADIOLOGY

## 2021-08-03 PROCEDURE — 93922 UPR/L XTREMITY ART 2 LEVELS: CPT | Mod: TC

## 2021-08-03 PROCEDURE — 93922 UPR/L XTREMITY ART 2 LEVELS: CPT | Mod: 26,,, | Performed by: RADIOLOGY

## 2021-08-06 ENCOUNTER — TELEPHONE (OUTPATIENT)
Dept: ORTHOPEDICS | Facility: CLINIC | Age: 71
End: 2021-08-06

## 2021-08-16 ENCOUNTER — PATIENT MESSAGE (OUTPATIENT)
Dept: ORTHOPEDICS | Facility: CLINIC | Age: 71
End: 2021-08-16

## 2021-08-23 RX ORDER — OXYCODONE AND ACETAMINOPHEN 5; 325 MG/1; MG/1
1 TABLET ORAL EVERY 6 HOURS PRN
Qty: 60 TABLET | Refills: 0 | Status: SHIPPED | OUTPATIENT
Start: 2021-08-23 | End: 2021-09-17 | Stop reason: SDUPTHER

## 2021-09-04 ENCOUNTER — TELEPHONE (OUTPATIENT)
Dept: BARIATRICS | Facility: CLINIC | Age: 71
End: 2021-09-04

## 2021-09-04 ENCOUNTER — PATIENT MESSAGE (OUTPATIENT)
Dept: BARIATRICS | Facility: CLINIC | Age: 71
End: 2021-09-04

## 2021-09-05 ENCOUNTER — TELEPHONE (OUTPATIENT)
Dept: BARIATRICS | Facility: CLINIC | Age: 71
End: 2021-09-05

## 2021-09-17 RX ORDER — OXYCODONE AND ACETAMINOPHEN 5; 325 MG/1; MG/1
1 TABLET ORAL EVERY 6 HOURS PRN
Qty: 60 TABLET | Refills: 0 | Status: SHIPPED | OUTPATIENT
Start: 2021-09-17 | End: 2021-10-04 | Stop reason: SDUPTHER

## 2021-09-21 ENCOUNTER — PATIENT MESSAGE (OUTPATIENT)
Dept: ORTHOPEDICS | Facility: CLINIC | Age: 71
End: 2021-09-21

## 2021-10-04 ENCOUNTER — PATIENT MESSAGE (OUTPATIENT)
Dept: PAIN MEDICINE | Facility: CLINIC | Age: 71
End: 2021-10-04

## 2021-10-13 ENCOUNTER — TELEPHONE (OUTPATIENT)
Dept: ORTHOPEDICS | Facility: CLINIC | Age: 71
End: 2021-10-13

## 2021-10-14 ENCOUNTER — PATIENT MESSAGE (OUTPATIENT)
Dept: ORTHOPEDICS | Facility: CLINIC | Age: 71
End: 2021-10-14
Payer: MEDICARE

## 2021-10-14 DIAGNOSIS — M25.551 RIGHT HIP PAIN: Primary | ICD-10-CM

## 2021-10-18 ENCOUNTER — HOSPITAL ENCOUNTER (OUTPATIENT)
Dept: RADIOLOGY | Facility: HOSPITAL | Age: 71
Discharge: HOME | End: 2021-10-18
Attending: ORTHOPAEDIC SURGERY
Payer: MEDICARE

## 2021-10-18 ENCOUNTER — OFFICE VISIT (OUTPATIENT)
Dept: ORTHOPEDICS | Facility: CLINIC | Age: 71
End: 2021-10-18
Payer: MEDICARE

## 2021-10-18 ENCOUNTER — PATIENT MESSAGE (OUTPATIENT)
Dept: ADMINISTRATIVE | Facility: OTHER | Age: 71
End: 2021-10-18
Payer: MEDICARE

## 2021-10-18 ENCOUNTER — EDUCATION (OUTPATIENT)
Dept: ORTHOPEDICS | Facility: CLINIC | Age: 71
End: 2021-10-18

## 2021-10-18 VITALS — WEIGHT: 279.75 LBS | BODY MASS INDEX: 42.4 KG/M2 | HEIGHT: 68 IN

## 2021-10-18 VITALS — HEIGHT: 68 IN | WEIGHT: 279 LBS | BODY MASS INDEX: 42.28 KG/M2

## 2021-10-18 DIAGNOSIS — M16.11 PRIMARY OSTEOARTHRITIS OF RIGHT HIP: Primary | ICD-10-CM

## 2021-10-18 DIAGNOSIS — M16.11 PRIMARY OSTEOARTHRITIS OF RIGHT HIP: ICD-10-CM

## 2021-10-18 DIAGNOSIS — E66.01 MORBID OBESITY WITH BMI OF 40.0-44.9, ADULT: ICD-10-CM

## 2021-10-18 DIAGNOSIS — M25.551 RIGHT HIP PAIN: ICD-10-CM

## 2021-10-18 DIAGNOSIS — Z96.641 STATUS POST TOTAL HIP REPLACEMENT, RIGHT: ICD-10-CM

## 2021-10-18 DIAGNOSIS — M16.11 OSTEOARTHRITIS OF RIGHT HIP, UNSPECIFIED OSTEOARTHRITIS TYPE: ICD-10-CM

## 2021-10-18 DIAGNOSIS — M25.551 RIGHT HIP PAIN: Primary | ICD-10-CM

## 2021-10-18 PROCEDURE — 99214 PR OFFICE/OUTPT VISIT, EST, LEVL IV, 30-39 MIN: ICD-10-PCS | Mod: S$PBB,,, | Performed by: ORTHOPAEDIC SURGERY

## 2021-10-18 PROCEDURE — 99999 PR PBB SHADOW E&M-EST. PATIENT-LVL IV: CPT | Mod: PBBFAC,,, | Performed by: ORTHOPAEDIC SURGERY

## 2021-10-18 PROCEDURE — 73502 X-RAY EXAM HIP UNI 2-3 VIEWS: CPT | Mod: 26,RT,, | Performed by: RADIOLOGY

## 2021-10-18 PROCEDURE — 99999 PR PBB SHADOW E&M-EST. PATIENT-LVL IV: CPT | Mod: PBBFAC,,, | Performed by: PHYSICIAN ASSISTANT

## 2021-10-18 PROCEDURE — 72170 XR PELVIS ROUTINE AP: ICD-10-PCS | Mod: 26,,, | Performed by: RADIOLOGY

## 2021-10-18 PROCEDURE — 99499 UNLISTED E&M SERVICE: CPT | Mod: S$PBB,,, | Performed by: PHYSICIAN ASSISTANT

## 2021-10-18 PROCEDURE — 73502 X-RAY EXAM HIP UNI 2-3 VIEWS: CPT | Mod: TC,RT

## 2021-10-18 PROCEDURE — 72170 X-RAY EXAM OF PELVIS: CPT | Mod: 26,,, | Performed by: RADIOLOGY

## 2021-10-18 PROCEDURE — 99214 OFFICE O/P EST MOD 30 MIN: CPT | Mod: PBBFAC | Performed by: ORTHOPAEDIC SURGERY

## 2021-10-18 PROCEDURE — 72170 X-RAY EXAM OF PELVIS: CPT | Mod: TC

## 2021-10-18 PROCEDURE — 99999 PR PBB SHADOW E&M-EST. PATIENT-LVL IV: ICD-10-PCS | Mod: PBBFAC,,, | Performed by: ORTHOPAEDIC SURGERY

## 2021-10-18 PROCEDURE — 99214 OFFICE O/P EST MOD 30 MIN: CPT | Mod: S$PBB,,, | Performed by: ORTHOPAEDIC SURGERY

## 2021-10-18 PROCEDURE — 73502 XR HIP WITH PELVIS WHEN PERFORMED, 2 OR 3  VIEWS RIGHT: ICD-10-PCS | Mod: 26,RT,, | Performed by: RADIOLOGY

## 2021-10-18 PROCEDURE — 99499 NO LOS: ICD-10-PCS | Mod: S$PBB,,, | Performed by: PHYSICIAN ASSISTANT

## 2021-10-18 PROCEDURE — 99999 PR PBB SHADOW E&M-EST. PATIENT-LVL IV: ICD-10-PCS | Mod: PBBFAC,,, | Performed by: PHYSICIAN ASSISTANT

## 2021-10-18 PROCEDURE — 99214 OFFICE O/P EST MOD 30 MIN: CPT | Mod: PBBFAC,27 | Performed by: PHYSICIAN ASSISTANT

## 2021-10-19 DIAGNOSIS — E03.9 HYPOTHYROIDISM (ACQUIRED): Primary | ICD-10-CM

## 2021-10-20 ENCOUNTER — PATIENT MESSAGE (OUTPATIENT)
Dept: ORTHOPEDICS | Facility: CLINIC | Age: 71
End: 2021-10-20
Payer: MEDICARE

## 2021-10-20 RX ORDER — ACETAMINOPHEN 500 MG
1000 TABLET ORAL EVERY 6 HOURS
Status: CANCELLED | OUTPATIENT
Start: 2021-10-20 | End: 2021-10-22

## 2021-10-20 RX ORDER — FENTANYL CITRATE 50 UG/ML
25 INJECTION, SOLUTION INTRAMUSCULAR; INTRAVENOUS EVERY 5 MIN PRN
Status: CANCELLED | OUTPATIENT
Start: 2021-10-20

## 2021-10-20 RX ORDER — MORPHINE SULFATE 2 MG/ML
2 INJECTION, SOLUTION INTRAMUSCULAR; INTRAVENOUS
Status: CANCELLED | OUTPATIENT
Start: 2021-10-20

## 2021-10-20 RX ORDER — ASPIRIN 81 MG/1
81 TABLET ORAL 2 TIMES DAILY
Status: CANCELLED | OUTPATIENT
Start: 2021-10-20

## 2021-10-20 RX ORDER — PROCHLORPERAZINE EDISYLATE 5 MG/ML
5 INJECTION INTRAMUSCULAR; INTRAVENOUS EVERY 6 HOURS PRN
Status: CANCELLED | OUTPATIENT
Start: 2021-10-20

## 2021-10-20 RX ORDER — FAMOTIDINE 20 MG/1
20 TABLET, FILM COATED ORAL 2 TIMES DAILY
Status: CANCELLED | OUTPATIENT
Start: 2021-10-20

## 2021-10-20 RX ORDER — LIDOCAINE HYDROCHLORIDE 10 MG/ML
1 INJECTION, SOLUTION EPIDURAL; INFILTRATION; INTRACAUDAL; PERINEURAL
Status: CANCELLED | OUTPATIENT
Start: 2021-10-20

## 2021-10-20 RX ORDER — POLYETHYLENE GLYCOL 3350 17 G/17G
17 POWDER, FOR SOLUTION ORAL DAILY
Status: CANCELLED | OUTPATIENT
Start: 2021-10-20

## 2021-10-20 RX ORDER — NALOXONE HCL 0.4 MG/ML
0.02 VIAL (ML) INJECTION
Status: CANCELLED | OUTPATIENT
Start: 2021-10-20

## 2021-10-20 RX ORDER — AMOXICILLIN 250 MG
1 CAPSULE ORAL 2 TIMES DAILY
Status: CANCELLED | OUTPATIENT
Start: 2021-10-20

## 2021-10-20 RX ORDER — MUPIROCIN 20 MG/G
1 OINTMENT TOPICAL
Status: CANCELLED | OUTPATIENT
Start: 2021-10-20

## 2021-10-20 RX ORDER — SODIUM CHLORIDE 9 MG/ML
INJECTION, SOLUTION INTRAVENOUS
Status: CANCELLED | OUTPATIENT
Start: 2021-10-20

## 2021-10-20 RX ORDER — MUPIROCIN 20 MG/G
1 OINTMENT TOPICAL 2 TIMES DAILY
Status: CANCELLED | OUTPATIENT
Start: 2021-10-20 | End: 2021-10-25

## 2021-10-20 RX ORDER — SODIUM CHLORIDE 9 MG/ML
INJECTION, SOLUTION INTRAVENOUS CONTINUOUS
Status: CANCELLED | OUTPATIENT
Start: 2021-10-20 | End: 2021-10-21

## 2021-10-20 RX ORDER — OXYCODONE HYDROCHLORIDE 5 MG/1
10 TABLET ORAL
Status: CANCELLED | OUTPATIENT
Start: 2021-10-20

## 2021-10-20 RX ORDER — OXYCODONE HYDROCHLORIDE 5 MG/1
5 TABLET ORAL
Status: CANCELLED | OUTPATIENT
Start: 2021-10-20

## 2021-10-20 RX ORDER — METHOCARBAMOL 750 MG/1
750 TABLET, FILM COATED ORAL 3 TIMES DAILY
Status: CANCELLED | OUTPATIENT
Start: 2021-10-20

## 2021-10-20 RX ORDER — TALC
6 POWDER (GRAM) TOPICAL NIGHTLY PRN
Status: CANCELLED | OUTPATIENT
Start: 2021-10-20

## 2021-10-20 RX ORDER — PREGABALIN 75 MG/1
75 CAPSULE ORAL NIGHTLY
Status: CANCELLED | OUTPATIENT
Start: 2021-10-20

## 2021-10-20 RX ORDER — ACETAMINOPHEN 500 MG
1000 TABLET ORAL
Status: CANCELLED | OUTPATIENT
Start: 2021-10-20

## 2021-10-20 RX ORDER — CEFAZOLIN SODIUM 2 G/50ML
2 SOLUTION INTRAVENOUS
Status: CANCELLED | OUTPATIENT
Start: 2021-10-20 | End: 2021-10-21

## 2021-10-20 RX ORDER — ONDANSETRON 2 MG/ML
4 INJECTION INTRAMUSCULAR; INTRAVENOUS EVERY 8 HOURS PRN
Status: CANCELLED | OUTPATIENT
Start: 2021-10-20

## 2021-10-20 RX ORDER — CELECOXIB 200 MG/1
200 CAPSULE ORAL DAILY
Status: CANCELLED | OUTPATIENT
Start: 2021-10-20

## 2021-10-20 RX ORDER — PREGABALIN 75 MG/1
75 CAPSULE ORAL
Status: CANCELLED | OUTPATIENT
Start: 2021-10-20

## 2021-10-20 RX ORDER — CELECOXIB 200 MG/1
400 CAPSULE ORAL
Status: CANCELLED | OUTPATIENT
Start: 2021-10-20

## 2021-10-20 RX ORDER — MIDAZOLAM HYDROCHLORIDE 1 MG/ML
1 INJECTION INTRAMUSCULAR; INTRAVENOUS EVERY 5 MIN PRN
Status: CANCELLED | OUTPATIENT
Start: 2021-10-20

## 2021-10-20 RX ORDER — BISACODYL 10 MG
10 SUPPOSITORY, RECTAL RECTAL EVERY 12 HOURS PRN
Status: CANCELLED | OUTPATIENT
Start: 2021-10-20

## 2021-10-21 ENCOUNTER — OFFICE VISIT (OUTPATIENT)
Dept: INTERNAL MEDICINE | Facility: CLINIC | Age: 71
End: 2021-10-21
Payer: MEDICARE

## 2021-10-21 ENCOUNTER — OFFICE VISIT (OUTPATIENT)
Dept: UROLOGY | Facility: CLINIC | Age: 71
End: 2021-10-21
Payer: MEDICARE

## 2021-10-21 ENCOUNTER — HOSPITAL ENCOUNTER (OUTPATIENT)
Dept: CARDIOLOGY | Facility: CLINIC | Age: 71
Discharge: HOME | End: 2021-10-21
Payer: MEDICARE

## 2021-10-21 ENCOUNTER — TELEPHONE (OUTPATIENT)
Dept: UROLOGY | Facility: CLINIC | Age: 71
End: 2021-10-21

## 2021-10-21 ENCOUNTER — ANESTHESIA EVENT (OUTPATIENT)
Dept: SURGERY | Facility: HOSPITAL | Age: 71
DRG: 470 | End: 2021-10-21
Payer: MEDICARE

## 2021-10-21 ENCOUNTER — HOSPITAL ENCOUNTER (OUTPATIENT)
Dept: PREADMISSION TESTING | Facility: HOSPITAL | Age: 71
Discharge: HOME | End: 2021-10-21
Attending: ORTHOPAEDIC SURGERY
Payer: MEDICARE

## 2021-10-21 VITALS — BODY MASS INDEX: 42.3 KG/M2 | WEIGHT: 279.13 LBS | HEIGHT: 68 IN

## 2021-10-21 DIAGNOSIS — R80.9 PROTEINURIA, UNSPECIFIED TYPE: ICD-10-CM

## 2021-10-21 DIAGNOSIS — Z01.818 PREOP EXAMINATION: Primary | ICD-10-CM

## 2021-10-21 DIAGNOSIS — Z87.891 FORMER SMOKER: ICD-10-CM

## 2021-10-21 DIAGNOSIS — Z98.1 S/P CERVICAL SPINAL FUSION: ICD-10-CM

## 2021-10-21 DIAGNOSIS — Z86.73 H/O: CVA (CEREBROVASCULAR ACCIDENT): ICD-10-CM

## 2021-10-21 DIAGNOSIS — E78.5 HYPERLIPIDEMIA, UNSPECIFIED HYPERLIPIDEMIA TYPE: ICD-10-CM

## 2021-10-21 DIAGNOSIS — K59.00 CONSTIPATION, UNSPECIFIED CONSTIPATION TYPE: ICD-10-CM

## 2021-10-21 DIAGNOSIS — Z99.81 ON SUPPLEMENTAL OXYGEN THERAPY: ICD-10-CM

## 2021-10-21 DIAGNOSIS — E03.9 HYPOTHYROIDISM, UNSPECIFIED TYPE: ICD-10-CM

## 2021-10-21 DIAGNOSIS — I10 PRIMARY HYPERTENSION: ICD-10-CM

## 2021-10-21 DIAGNOSIS — Z79.01 CHRONIC ANTICOAGULATION: ICD-10-CM

## 2021-10-21 DIAGNOSIS — N39.41 URGE INCONTINENCE: ICD-10-CM

## 2021-10-21 DIAGNOSIS — I10 HYPERTENSION, UNSPECIFIED TYPE: ICD-10-CM

## 2021-10-21 DIAGNOSIS — F32.A DEPRESSION, UNSPECIFIED DEPRESSION TYPE: ICD-10-CM

## 2021-10-21 DIAGNOSIS — Q21.12 PFO (PATENT FORAMEN OVALE): Chronic | ICD-10-CM

## 2021-10-21 DIAGNOSIS — N39.41 URGE INCONTINENCE: Primary | ICD-10-CM

## 2021-10-21 DIAGNOSIS — I83.892 VARICOSE VEINS OF LEFT LOWER EXTREMITY WITH OTHER COMPLICATIONS: ICD-10-CM

## 2021-10-21 DIAGNOSIS — M79.604 PAIN OF RIGHT LOWER EXTREMITY: ICD-10-CM

## 2021-10-21 DIAGNOSIS — G89.4 CHRONIC PAIN DISORDER: ICD-10-CM

## 2021-10-21 DIAGNOSIS — L40.9 PSORIASIS: ICD-10-CM

## 2021-10-21 DIAGNOSIS — E66.01 MORBID OBESITY: ICD-10-CM

## 2021-10-21 DIAGNOSIS — E11.40 TYPE 2 DIABETES MELLITUS WITH DIABETIC NEUROPATHY, WITHOUT LONG-TERM CURRENT USE OF INSULIN: Chronic | ICD-10-CM

## 2021-10-21 DIAGNOSIS — Z86.16 HISTORY OF COVID-19: ICD-10-CM

## 2021-10-21 DIAGNOSIS — N40.1 BENIGN PROSTATIC HYPERPLASIA WITH LOWER URINARY TRACT SYMPTOMS, SYMPTOM DETAILS UNSPECIFIED: ICD-10-CM

## 2021-10-21 DIAGNOSIS — Z86.718 HISTORY OF THROMBOSIS: ICD-10-CM

## 2021-10-21 DIAGNOSIS — N39.41 URGENCY INCONTINENCE: Primary | ICD-10-CM

## 2021-10-21 DIAGNOSIS — M10.9 GOUT, UNSPECIFIED CAUSE, UNSPECIFIED CHRONICITY, UNSPECIFIED SITE: ICD-10-CM

## 2021-10-21 DIAGNOSIS — N28.9 RENAL IMPAIRMENT: ICD-10-CM

## 2021-10-21 DIAGNOSIS — R06.2 WHEEZING: ICD-10-CM

## 2021-10-21 DIAGNOSIS — G47.30 SLEEP APNEA, UNSPECIFIED TYPE: ICD-10-CM

## 2021-10-21 PROBLEM — M17.12 PRIMARY OSTEOARTHRITIS OF LEFT KNEE: Status: RESOLVED | Noted: 2018-04-30 | Resolved: 2021-10-21

## 2021-10-21 PROBLEM — M25.562 CHRONIC PAIN OF LEFT KNEE: Status: RESOLVED | Noted: 2018-04-30 | Resolved: 2021-10-21

## 2021-10-21 PROBLEM — G89.29 CHRONIC PAIN OF LEFT KNEE: Status: RESOLVED | Noted: 2018-04-30 | Resolved: 2021-10-21

## 2021-10-21 PROBLEM — M17.12 OSTEOARTHRITIS OF LEFT KNEE: Status: RESOLVED | Noted: 2019-08-26 | Resolved: 2021-10-21

## 2021-10-21 PROBLEM — R09.02 HYPOXEMIA: Status: ACTIVE | Noted: 2021-10-21

## 2021-10-21 PROCEDURE — 93005 ELECTROCARDIOGRAM TRACING: CPT | Mod: PBBFAC | Performed by: INTERNAL MEDICINE

## 2021-10-21 PROCEDURE — 99214 PR OFFICE/OUTPT VISIT, EST, LEVL IV, 30-39 MIN: ICD-10-PCS | Mod: S$PBB,,, | Performed by: HOSPITALIST

## 2021-10-21 PROCEDURE — 87086 URINE CULTURE/COLONY COUNT: CPT | Performed by: UROLOGY

## 2021-10-21 PROCEDURE — 99999 PR PBB SHADOW E&M-EST. PATIENT-LVL I: CPT | Mod: PBBFAC,,, | Performed by: HOSPITALIST

## 2021-10-21 PROCEDURE — 87186 SC STD MICRODIL/AGAR DIL: CPT | Performed by: UROLOGY

## 2021-10-21 PROCEDURE — 99214 OFFICE O/P EST MOD 30 MIN: CPT | Mod: PBBFAC | Performed by: UROLOGY

## 2021-10-21 PROCEDURE — 99214 PR OFFICE/OUTPT VISIT, EST, LEVL IV, 30-39 MIN: ICD-10-PCS | Mod: S$PBB,,, | Performed by: UROLOGY

## 2021-10-21 PROCEDURE — 99214 OFFICE O/P EST MOD 30 MIN: CPT | Mod: S$PBB,,, | Performed by: UROLOGY

## 2021-10-21 PROCEDURE — 99214 OFFICE O/P EST MOD 30 MIN: CPT | Mod: S$PBB,,, | Performed by: HOSPITALIST

## 2021-10-21 PROCEDURE — 87081 CULTURE SCREEN ONLY: CPT | Performed by: UROLOGY

## 2021-10-21 PROCEDURE — 93010 EKG 12-LEAD: ICD-10-PCS | Mod: S$PBB,,, | Performed by: INTERNAL MEDICINE

## 2021-10-21 PROCEDURE — 93010 ELECTROCARDIOGRAM REPORT: CPT | Mod: S$PBB,,, | Performed by: INTERNAL MEDICINE

## 2021-10-21 PROCEDURE — 87077 CULTURE AEROBIC IDENTIFY: CPT | Performed by: UROLOGY

## 2021-10-21 PROCEDURE — 87088 URINE BACTERIA CULTURE: CPT | Performed by: UROLOGY

## 2021-10-21 PROCEDURE — 99999 PR PBB SHADOW E&M-EST. PATIENT-LVL I: ICD-10-PCS | Mod: PBBFAC,,, | Performed by: HOSPITALIST

## 2021-10-21 PROCEDURE — 99211 OFF/OP EST MAY X REQ PHY/QHP: CPT | Mod: PBBFAC,27,25 | Performed by: HOSPITALIST

## 2021-10-21 PROCEDURE — 99999 PR PBB SHADOW E&M-EST. PATIENT-LVL IV: ICD-10-PCS | Mod: PBBFAC,,, | Performed by: UROLOGY

## 2021-10-21 PROCEDURE — 99999 PR PBB SHADOW E&M-EST. PATIENT-LVL IV: CPT | Mod: PBBFAC,,, | Performed by: UROLOGY

## 2021-10-21 RX ORDER — SULFAMETHOXAZOLE AND TRIMETHOPRIM 800; 160 MG/1; MG/1
1 TABLET ORAL 2 TIMES DAILY
Qty: 14 TABLET | Refills: 0 | Status: SHIPPED | OUTPATIENT
Start: 2021-10-21 | End: 2021-10-28

## 2021-10-23 LAB — MRSA SPEC QL CULT: NORMAL

## 2021-10-24 LAB — BACTERIA UR CULT: ABNORMAL

## 2021-10-25 ENCOUNTER — PATIENT MESSAGE (OUTPATIENT)
Dept: PREADMISSION TESTING | Facility: HOSPITAL | Age: 71
End: 2021-10-25
Payer: MEDICARE

## 2021-10-27 ENCOUNTER — TELEPHONE (OUTPATIENT)
Dept: ORTHOPEDICS | Facility: CLINIC | Age: 71
End: 2021-10-27
Payer: MEDICARE

## 2021-10-27 ENCOUNTER — LAB VISIT (OUTPATIENT)
Dept: LAB | Facility: HOSPITAL | Age: 71
End: 2021-10-27
Payer: MEDICARE

## 2021-10-27 DIAGNOSIS — N28.9 RENAL IMPAIRMENT: ICD-10-CM

## 2021-10-27 LAB
ANION GAP SERPL CALC-SCNC: 11 MMOL/L (ref 8–16)
BUN SERPL-MCNC: 35 MG/DL (ref 8–23)
CALCIUM SERPL-MCNC: 10 MG/DL (ref 8.7–10.5)
CHLORIDE SERPL-SCNC: 104 MMOL/L (ref 95–110)
CO2 SERPL-SCNC: 23 MMOL/L (ref 23–29)
CREAT SERPL-MCNC: 1.3 MG/DL (ref 0.5–1.4)
EST. GFR  (AFRICAN AMERICAN): >60 ML/MIN/1.73 M^2
EST. GFR  (NON AFRICAN AMERICAN): 54.9 ML/MIN/1.73 M^2
GLUCOSE SERPL-MCNC: 125 MG/DL (ref 70–110)
POTASSIUM SERPL-SCNC: 4.4 MMOL/L (ref 3.5–5.1)
SODIUM SERPL-SCNC: 138 MMOL/L (ref 136–145)

## 2021-10-27 PROCEDURE — 36415 COLL VENOUS BLD VENIPUNCTURE: CPT | Performed by: HOSPITALIST

## 2021-10-27 PROCEDURE — 80048 BASIC METABOLIC PNL TOTAL CA: CPT | Performed by: HOSPITALIST

## 2021-10-27 RX ORDER — ESCITALOPRAM OXALATE 20 MG/1
20 TABLET ORAL DAILY
COMMUNITY
Start: 2021-10-23

## 2021-10-27 RX ORDER — ATORVASTATIN CALCIUM 80 MG/1
80 TABLET, FILM COATED ORAL DAILY
Status: ON HOLD | COMMUNITY
Start: 2021-10-23 | End: 2023-04-06 | Stop reason: CLARIF

## 2021-10-28 ENCOUNTER — HOSPITAL ENCOUNTER (INPATIENT)
Facility: HOSPITAL | Age: 71
LOS: 2 days | Discharge: HOME HEALTH | DRG: 470 | End: 2021-10-30
Attending: ORTHOPAEDIC SURGERY | Admitting: ORTHOPAEDIC SURGERY
Payer: MEDICARE

## 2021-10-28 ENCOUNTER — ANESTHESIA (OUTPATIENT)
Dept: SURGERY | Facility: HOSPITAL | Age: 71
DRG: 470 | End: 2021-10-28
Payer: MEDICARE

## 2021-10-28 DIAGNOSIS — Z74.09 IMPAIRED FUNCTIONAL MOBILITY, BALANCE, GAIT, AND ENDURANCE: ICD-10-CM

## 2021-10-28 DIAGNOSIS — G89.4 CHRONIC PAIN DISORDER: ICD-10-CM

## 2021-10-28 DIAGNOSIS — N39.41 URGE INCONTINENCE: ICD-10-CM

## 2021-10-28 DIAGNOSIS — M25.551 RIGHT HIP PAIN: ICD-10-CM

## 2021-10-28 DIAGNOSIS — G47.30 SLEEP APNEA, UNSPECIFIED TYPE: ICD-10-CM

## 2021-10-28 DIAGNOSIS — G95.9 CERVICAL MYELOPATHY: ICD-10-CM

## 2021-10-28 DIAGNOSIS — Z96.641 STATUS POST TOTAL HIP REPLACEMENT, RIGHT: ICD-10-CM

## 2021-10-28 DIAGNOSIS — M16.11 PRIMARY OSTEOARTHRITIS OF RIGHT HIP: ICD-10-CM

## 2021-10-28 DIAGNOSIS — R60.9 EDEMA, UNSPECIFIED TYPE: ICD-10-CM

## 2021-10-28 DIAGNOSIS — J44.9 CHRONIC OBSTRUCTIVE PULMONARY DISEASE, UNSPECIFIED COPD TYPE: ICD-10-CM

## 2021-10-28 DIAGNOSIS — E66.01 MORBID OBESITY: ICD-10-CM

## 2021-10-28 DIAGNOSIS — Z86.73 H/O: CVA (CEREBROVASCULAR ACCIDENT): ICD-10-CM

## 2021-10-28 DIAGNOSIS — N28.9 RENAL IMPAIRMENT: Primary | ICD-10-CM

## 2021-10-28 DIAGNOSIS — Z98.1 S/P CERVICAL SPINAL FUSION: ICD-10-CM

## 2021-10-28 LAB
POCT GLUCOSE: 149 MG/DL (ref 70–110)
POCT GLUCOSE: 162 MG/DL (ref 70–110)
POCT GLUCOSE: 192 MG/DL (ref 70–110)
POCT GLUCOSE: 195 MG/DL (ref 70–110)

## 2021-10-28 PROCEDURE — 63600175 PHARM REV CODE 636 W HCPCS: Performed by: STUDENT IN AN ORGANIZED HEALTH CARE EDUCATION/TRAINING PROGRAM

## 2021-10-28 PROCEDURE — 36000711: Performed by: ORTHOPAEDIC SURGERY

## 2021-10-28 PROCEDURE — 76942 LFCN SINGLE INJECTION BLOCK: ICD-10-PCS | Mod: 26,,, | Performed by: ANESTHESIOLOGY

## 2021-10-28 PROCEDURE — 82962 GLUCOSE BLOOD TEST: CPT | Performed by: ORTHOPAEDIC SURGERY

## 2021-10-28 PROCEDURE — 27130 TOTAL HIP ARTHROPLASTY: CPT | Mod: 22,RT,GC, | Performed by: ORTHOPAEDIC SURGERY

## 2021-10-28 PROCEDURE — 25000242 PHARM REV CODE 250 ALT 637 W/ HCPCS: Performed by: STUDENT IN AN ORGANIZED HEALTH CARE EDUCATION/TRAINING PROGRAM

## 2021-10-28 PROCEDURE — 97165 OT EVAL LOW COMPLEX 30 MIN: CPT

## 2021-10-28 PROCEDURE — 25000003 PHARM REV CODE 250: Performed by: NURSE ANESTHETIST, CERTIFIED REGISTERED

## 2021-10-28 PROCEDURE — 97535 SELF CARE MNGMENT TRAINING: CPT

## 2021-10-28 PROCEDURE — 25000003 PHARM REV CODE 250: Performed by: PHYSICIAN ASSISTANT

## 2021-10-28 PROCEDURE — 25000003 PHARM REV CODE 250: Performed by: ANESTHESIOLOGY

## 2021-10-28 PROCEDURE — 25000003 PHARM REV CODE 250

## 2021-10-28 PROCEDURE — D9220A PRA ANESTHESIA: ICD-10-PCS | Mod: CRNA,,, | Performed by: NURSE ANESTHETIST, CERTIFIED REGISTERED

## 2021-10-28 PROCEDURE — 63600175 PHARM REV CODE 636 W HCPCS: Performed by: PHYSICIAN ASSISTANT

## 2021-10-28 PROCEDURE — 71000033 HC RECOVERY, INTIAL HOUR: Performed by: ORTHOPAEDIC SURGERY

## 2021-10-28 PROCEDURE — D9220A PRA ANESTHESIA: Mod: ANES,,, | Performed by: ANESTHESIOLOGY

## 2021-10-28 PROCEDURE — 76942 ECHO GUIDE FOR BIOPSY: CPT | Performed by: STUDENT IN AN ORGANIZED HEALTH CARE EDUCATION/TRAINING PROGRAM

## 2021-10-28 PROCEDURE — 27000221 HC OXYGEN, UP TO 24 HOURS

## 2021-10-28 PROCEDURE — 37000009 HC ANESTHESIA EA ADD 15 MINS: Performed by: ORTHOPAEDIC SURGERY

## 2021-10-28 PROCEDURE — 63600175 PHARM REV CODE 636 W HCPCS: Performed by: ANESTHESIOLOGY

## 2021-10-28 PROCEDURE — 64450 NJX AA&/STRD OTHER PN/BRANCH: CPT | Mod: 59,RT,, | Performed by: ANESTHESIOLOGY

## 2021-10-28 PROCEDURE — 97161 PT EVAL LOW COMPLEX 20 MIN: CPT

## 2021-10-28 PROCEDURE — 36000710: Performed by: ORTHOPAEDIC SURGERY

## 2021-10-28 PROCEDURE — 11000001 HC ACUTE MED/SURG PRIVATE ROOM

## 2021-10-28 PROCEDURE — 64450 PENG SINGLE INJECTION BLOCK: ICD-10-PCS | Mod: 59,RT,, | Performed by: ANESTHESIOLOGY

## 2021-10-28 PROCEDURE — D9220A PRA ANESTHESIA: Mod: CRNA,,, | Performed by: NURSE ANESTHETIST, CERTIFIED REGISTERED

## 2021-10-28 PROCEDURE — 94660 CPAP INITIATION&MGMT: CPT

## 2021-10-28 PROCEDURE — 27201423 OPTIME MED/SURG SUP & DEVICES STERILE SUPPLY: Performed by: ORTHOPAEDIC SURGERY

## 2021-10-28 PROCEDURE — 71000016 HC POSTOP RECOV ADDL HR: Performed by: ORTHOPAEDIC SURGERY

## 2021-10-28 PROCEDURE — 76942 ECHO GUIDE FOR BIOPSY: CPT | Mod: 26,,, | Performed by: ANESTHESIOLOGY

## 2021-10-28 PROCEDURE — 94761 N-INVAS EAR/PLS OXIMETRY MLT: CPT

## 2021-10-28 PROCEDURE — C1713 ANCHOR/SCREW BN/BN,TIS/BN: HCPCS | Performed by: ORTHOPAEDIC SURGERY

## 2021-10-28 PROCEDURE — 27130 PR TOTAL HIP ARTHROPLASTY: ICD-10-PCS | Mod: 22,RT,GC, | Performed by: ORTHOPAEDIC SURGERY

## 2021-10-28 PROCEDURE — 25000003 PHARM REV CODE 250: Performed by: STUDENT IN AN ORGANIZED HEALTH CARE EDUCATION/TRAINING PROGRAM

## 2021-10-28 PROCEDURE — C1776 JOINT DEVICE (IMPLANTABLE): HCPCS | Performed by: ORTHOPAEDIC SURGERY

## 2021-10-28 PROCEDURE — 37000008 HC ANESTHESIA 1ST 15 MINUTES: Performed by: ORTHOPAEDIC SURGERY

## 2021-10-28 PROCEDURE — 97116 GAIT TRAINING THERAPY: CPT

## 2021-10-28 PROCEDURE — 63600175 PHARM REV CODE 636 W HCPCS: Performed by: NURSE ANESTHETIST, CERTIFIED REGISTERED

## 2021-10-28 PROCEDURE — 71000015 HC POSTOP RECOV 1ST HR: Performed by: ORTHOPAEDIC SURGERY

## 2021-10-28 PROCEDURE — D9220A PRA ANESTHESIA: ICD-10-PCS | Mod: ANES,,, | Performed by: ANESTHESIOLOGY

## 2021-10-28 PROCEDURE — 99900035 HC TECH TIME PER 15 MIN (STAT)

## 2021-10-28 DEVICE — INSERT ADM X3 28MM CUP OD 52MM: Type: IMPLANTABLE DEVICE | Site: HIP | Status: FUNCTIONAL

## 2021-10-28 DEVICE — SHELL TRIDENT II ACET F 58MM: Type: IMPLANTABLE DEVICE | Site: HIP | Status: FUNCTIONAL

## 2021-10-28 DEVICE — LINER ACET SZ F 46MM COCR: Type: IMPLANTABLE DEVICE | Site: HIP | Status: FUNCTIONAL

## 2021-10-28 DEVICE — HEAD FEMORAL V40 TAPER +4X28MM: Type: IMPLANTABLE DEVICE | Site: HIP | Status: FUNCTIONAL

## 2021-10-28 DEVICE — SCREW TRIDENT II LP HEX 6.5X30: Type: IMPLANTABLE DEVICE | Site: HIP | Status: FUNCTIONAL

## 2021-10-28 DEVICE — STEM FEM ACCOLADE2 SZ6 35X111: Type: IMPLANTABLE DEVICE | Site: HIP | Status: FUNCTIONAL

## 2021-10-28 DEVICE — SCREW TRIDENT II LP HEX 6.5X25: Type: IMPLANTABLE DEVICE | Site: HIP | Status: FUNCTIONAL

## 2021-10-28 RX ORDER — KETAMINE HCL IN 0.9 % NACL 50 MG/5 ML
SYRINGE (ML) INTRAVENOUS
Status: DISCONTINUED | OUTPATIENT
Start: 2021-10-28 | End: 2021-10-28

## 2021-10-28 RX ORDER — SODIUM CHLORIDE 9 MG/ML
INJECTION, SOLUTION INTRAVENOUS
Status: COMPLETED | OUTPATIENT
Start: 2021-10-28 | End: 2021-10-28

## 2021-10-28 RX ORDER — ONDANSETRON 2 MG/ML
4 INJECTION INTRAMUSCULAR; INTRAVENOUS ONCE AS NEEDED
Status: DISCONTINUED | OUTPATIENT
Start: 2021-10-28 | End: 2021-10-28

## 2021-10-28 RX ORDER — INSULIN ASPART 100 [IU]/ML
1-10 INJECTION, SOLUTION INTRAVENOUS; SUBCUTANEOUS
Status: DISCONTINUED | OUTPATIENT
Start: 2021-10-28 | End: 2021-10-30 | Stop reason: HOSPADM

## 2021-10-28 RX ORDER — MORPHINE SULFATE 2 MG/ML
2 INJECTION, SOLUTION INTRAMUSCULAR; INTRAVENOUS
Status: DISCONTINUED | OUTPATIENT
Start: 2021-10-28 | End: 2021-10-30 | Stop reason: HOSPADM

## 2021-10-28 RX ORDER — PROPOFOL 10 MG/ML
VIAL (ML) INTRAVENOUS CONTINUOUS PRN
Status: DISCONTINUED | OUTPATIENT
Start: 2021-10-28 | End: 2021-10-28

## 2021-10-28 RX ORDER — PHENYLEPHRINE HCL IN 0.9% NACL 1 MG/10 ML
SYRINGE (ML) INTRAVENOUS
Status: DISCONTINUED | OUTPATIENT
Start: 2021-10-28 | End: 2021-10-28

## 2021-10-28 RX ORDER — FENTANYL CITRATE 50 UG/ML
25 INJECTION, SOLUTION INTRAMUSCULAR; INTRAVENOUS EVERY 5 MIN PRN
Status: DISCONTINUED | OUTPATIENT
Start: 2021-10-28 | End: 2021-10-28 | Stop reason: HOSPADM

## 2021-10-28 RX ORDER — CELECOXIB 200 MG/1
400 CAPSULE ORAL
Status: COMPLETED | OUTPATIENT
Start: 2021-10-28 | End: 2021-10-28

## 2021-10-28 RX ORDER — GLUCAGON 1 MG
1 KIT INJECTION
Status: DISCONTINUED | OUTPATIENT
Start: 2021-10-28 | End: 2021-10-30 | Stop reason: HOSPADM

## 2021-10-28 RX ORDER — METHOCARBAMOL 750 MG/1
750 TABLET, FILM COATED ORAL 3 TIMES DAILY
Status: DISCONTINUED | OUTPATIENT
Start: 2021-10-28 | End: 2021-10-28

## 2021-10-28 RX ORDER — DEXTROMETHORPHAN HYDROBROMIDE, GUAIFENESIN 5; 100 MG/5ML; MG/5ML
650 LIQUID ORAL EVERY 8 HOURS
Qty: 125 TABLET | Refills: 0 | Status: ON HOLD | OUTPATIENT
Start: 2021-10-28 | End: 2023-04-06 | Stop reason: CLARIF

## 2021-10-28 RX ORDER — OXYCODONE HYDROCHLORIDE 10 MG/1
10 TABLET ORAL
Status: DISCONTINUED | OUTPATIENT
Start: 2021-10-28 | End: 2021-10-30 | Stop reason: HOSPADM

## 2021-10-28 RX ORDER — CEFAZOLIN SODIUM 1 G/3ML
2 INJECTION, POWDER, FOR SOLUTION INTRAMUSCULAR; INTRAVENOUS
Status: COMPLETED | OUTPATIENT
Start: 2021-10-28 | End: 2021-10-29

## 2021-10-28 RX ORDER — MUPIROCIN 20 MG/G
1 OINTMENT TOPICAL 2 TIMES DAILY
Status: DISCONTINUED | OUTPATIENT
Start: 2021-10-28 | End: 2021-10-30 | Stop reason: HOSPADM

## 2021-10-28 RX ORDER — OXYCODONE HYDROCHLORIDE 5 MG/1
5 TABLET ORAL
Status: DISCONTINUED | OUTPATIENT
Start: 2021-10-28 | End: 2021-10-30 | Stop reason: HOSPADM

## 2021-10-28 RX ORDER — NALOXONE HCL 0.4 MG/ML
0.02 VIAL (ML) INJECTION
Status: DISCONTINUED | OUTPATIENT
Start: 2021-10-28 | End: 2021-10-30 | Stop reason: HOSPADM

## 2021-10-28 RX ORDER — LISINOPRIL 20 MG/1
40 TABLET ORAL DAILY
Status: DISCONTINUED | OUTPATIENT
Start: 2021-10-28 | End: 2021-10-30 | Stop reason: HOSPADM

## 2021-10-28 RX ORDER — POLYETHYLENE GLYCOL 3350 17 G/17G
17 POWDER, FOR SOLUTION ORAL DAILY
Status: DISCONTINUED | OUTPATIENT
Start: 2021-10-28 | End: 2021-10-30 | Stop reason: HOSPADM

## 2021-10-28 RX ORDER — PREGABALIN 75 MG/1
75 CAPSULE ORAL
Status: DISCONTINUED | OUTPATIENT
Start: 2021-10-28 | End: 2021-10-28

## 2021-10-28 RX ORDER — FENTANYL CITRATE 50 UG/ML
INJECTION, SOLUTION INTRAMUSCULAR; INTRAVENOUS
Status: DISCONTINUED | OUTPATIENT
Start: 2021-10-28 | End: 2021-10-28

## 2021-10-28 RX ORDER — TRANEXAMIC ACID 100 MG/ML
1000 INJECTION, SOLUTION INTRAVENOUS
Status: COMPLETED | OUTPATIENT
Start: 2021-10-28 | End: 2021-10-28

## 2021-10-28 RX ORDER — MUPIROCIN 20 MG/G
1 OINTMENT TOPICAL
Status: DISCONTINUED | OUTPATIENT
Start: 2021-10-28 | End: 2021-10-28 | Stop reason: HOSPADM

## 2021-10-28 RX ORDER — SODIUM CHLORIDE 9 MG/ML
INJECTION, SOLUTION INTRAVENOUS CONTINUOUS
Status: ACTIVE | OUTPATIENT
Start: 2021-10-28 | End: 2021-10-29

## 2021-10-28 RX ORDER — FAMOTIDINE 20 MG/1
20 TABLET, FILM COATED ORAL 2 TIMES DAILY
Status: DISCONTINUED | OUTPATIENT
Start: 2021-10-28 | End: 2021-10-28

## 2021-10-28 RX ORDER — PROPOFOL 10 MG/ML
VIAL (ML) INTRAVENOUS
Status: DISCONTINUED | OUTPATIENT
Start: 2021-10-28 | End: 2021-10-28

## 2021-10-28 RX ORDER — LEVOTHYROXINE SODIUM 100 UG/1
100 TABLET ORAL DAILY
Status: DISCONTINUED | OUTPATIENT
Start: 2021-10-28 | End: 2021-10-30 | Stop reason: HOSPADM

## 2021-10-28 RX ORDER — DEXAMETHASONE SODIUM PHOSPHATE 4 MG/ML
INJECTION, SOLUTION INTRA-ARTICULAR; INTRALESIONAL; INTRAMUSCULAR; INTRAVENOUS; SOFT TISSUE
Status: DISCONTINUED | OUTPATIENT
Start: 2021-10-28 | End: 2021-10-28

## 2021-10-28 RX ORDER — BUPIVACAINE HYDROCHLORIDE AND EPINEPHRINE 2.5; 5 MG/ML; UG/ML
INJECTION, SOLUTION EPIDURAL; INFILTRATION; INTRACAUDAL; PERINEURAL
Status: COMPLETED | OUTPATIENT
Start: 2021-10-28 | End: 2021-10-28

## 2021-10-28 RX ORDER — IBUPROFEN 200 MG
24 TABLET ORAL
Status: DISCONTINUED | OUTPATIENT
Start: 2021-10-28 | End: 2021-10-30 | Stop reason: HOSPADM

## 2021-10-28 RX ORDER — ESCITALOPRAM OXALATE 20 MG/1
20 TABLET ORAL DAILY
Status: DISCONTINUED | OUTPATIENT
Start: 2021-10-28 | End: 2021-10-30 | Stop reason: HOSPADM

## 2021-10-28 RX ORDER — METHOCARBAMOL 750 MG/1
750 TABLET, FILM COATED ORAL 3 TIMES DAILY
Qty: 45 TABLET | Refills: 0 | Status: SHIPPED | OUTPATIENT
Start: 2021-10-28 | End: 2021-11-14

## 2021-10-28 RX ORDER — ACETAMINOPHEN 500 MG
1000 TABLET ORAL EVERY 6 HOURS
Status: DISPENSED | OUTPATIENT
Start: 2021-10-28 | End: 2021-10-30

## 2021-10-28 RX ORDER — AMOXICILLIN 250 MG
1 CAPSULE ORAL 2 TIMES DAILY
Status: DISCONTINUED | OUTPATIENT
Start: 2021-10-28 | End: 2021-10-30 | Stop reason: HOSPADM

## 2021-10-28 RX ORDER — ALBUTEROL SULFATE 90 UG/1
2 AEROSOL, METERED RESPIRATORY (INHALATION) EVERY 6 HOURS PRN
Status: DISCONTINUED | OUTPATIENT
Start: 2021-10-28 | End: 2021-10-30 | Stop reason: HOSPADM

## 2021-10-28 RX ORDER — OXYCODONE HYDROCHLORIDE 5 MG/1
5 TABLET ORAL EVERY 4 HOURS PRN
Qty: 30 TABLET | Refills: 0 | Status: SHIPPED | OUTPATIENT
Start: 2021-10-28 | End: 2021-11-01 | Stop reason: SDUPTHER

## 2021-10-28 RX ORDER — PROCHLORPERAZINE EDISYLATE 5 MG/ML
5 INJECTION INTRAMUSCULAR; INTRAVENOUS EVERY 6 HOURS PRN
Status: DISCONTINUED | OUTPATIENT
Start: 2021-10-28 | End: 2021-10-30 | Stop reason: HOSPADM

## 2021-10-28 RX ORDER — MIDAZOLAM HYDROCHLORIDE 1 MG/ML
INJECTION INTRAMUSCULAR; INTRAVENOUS
Status: DISCONTINUED | OUTPATIENT
Start: 2021-10-28 | End: 2021-10-28

## 2021-10-28 RX ORDER — LEVOTHYROXINE SODIUM 25 UG/1
50 TABLET ORAL DAILY
Status: DISCONTINUED | OUTPATIENT
Start: 2021-10-28 | End: 2021-10-28

## 2021-10-28 RX ORDER — MIDAZOLAM HYDROCHLORIDE 1 MG/ML
1 INJECTION INTRAMUSCULAR; INTRAVENOUS EVERY 5 MIN PRN
Status: DISCONTINUED | OUTPATIENT
Start: 2021-10-28 | End: 2021-10-28 | Stop reason: HOSPADM

## 2021-10-28 RX ORDER — FUROSEMIDE 40 MG/1
40 TABLET ORAL DAILY
Status: DISCONTINUED | OUTPATIENT
Start: 2021-10-29 | End: 2021-10-30 | Stop reason: HOSPADM

## 2021-10-28 RX ORDER — ONDANSETRON 2 MG/ML
INJECTION INTRAMUSCULAR; INTRAVENOUS
Status: DISCONTINUED | OUTPATIENT
Start: 2021-10-28 | End: 2021-10-28

## 2021-10-28 RX ORDER — DIPHENHYDRAMINE HCL 25 MG
25 CAPSULE ORAL NIGHTLY PRN
Status: DISCONTINUED | OUTPATIENT
Start: 2021-10-28 | End: 2021-10-29

## 2021-10-28 RX ORDER — ACETAMINOPHEN 500 MG
1000 TABLET ORAL
Status: COMPLETED | OUTPATIENT
Start: 2021-10-28 | End: 2021-10-28

## 2021-10-28 RX ORDER — ONDANSETRON 2 MG/ML
4 INJECTION INTRAMUSCULAR; INTRAVENOUS EVERY 8 HOURS PRN
Status: DISCONTINUED | OUTPATIENT
Start: 2021-10-28 | End: 2021-10-30 | Stop reason: HOSPADM

## 2021-10-28 RX ORDER — TALC
6 POWDER (GRAM) TOPICAL NIGHTLY PRN
Status: DISCONTINUED | OUTPATIENT
Start: 2021-10-28 | End: 2021-10-29

## 2021-10-28 RX ORDER — IBUPROFEN 200 MG
16 TABLET ORAL
Status: DISCONTINUED | OUTPATIENT
Start: 2021-10-28 | End: 2021-10-30 | Stop reason: HOSPADM

## 2021-10-28 RX ORDER — FLUTICASONE PROPIONATE 50 MCG
1 SPRAY, SUSPENSION (ML) NASAL NIGHTLY
Status: DISCONTINUED | OUTPATIENT
Start: 2021-10-28 | End: 2021-10-30 | Stop reason: HOSPADM

## 2021-10-28 RX ORDER — PREGABALIN 50 MG/1
100 CAPSULE ORAL 2 TIMES DAILY
Status: DISCONTINUED | OUTPATIENT
Start: 2021-10-28 | End: 2021-10-28

## 2021-10-28 RX ORDER — METHOCARBAMOL 750 MG/1
750 TABLET, FILM COATED ORAL 3 TIMES DAILY
Status: DISCONTINUED | OUTPATIENT
Start: 2021-10-28 | End: 2021-10-30 | Stop reason: HOSPADM

## 2021-10-28 RX ORDER — LIDOCAINE HCL/EPINEPHRINE/PF 2%-1:200K
VIAL (ML) INJECTION
Status: DISCONTINUED | OUTPATIENT
Start: 2021-10-28 | End: 2021-10-28

## 2021-10-28 RX ORDER — POLYETHYLENE GLYCOL 3350 17 G/17G
17 POWDER, FOR SOLUTION ORAL DAILY
Status: DISCONTINUED | OUTPATIENT
Start: 2021-10-28 | End: 2021-10-28

## 2021-10-28 RX ORDER — FAMOTIDINE 20 MG/1
20 TABLET, FILM COATED ORAL 2 TIMES DAILY
Status: DISCONTINUED | OUTPATIENT
Start: 2021-10-28 | End: 2021-10-30 | Stop reason: HOSPADM

## 2021-10-28 RX ORDER — ASPIRIN 81 MG/1
81 TABLET ORAL 2 TIMES DAILY
Status: DISCONTINUED | OUTPATIENT
Start: 2021-10-28 | End: 2021-10-28

## 2021-10-28 RX ORDER — BISACODYL 10 MG
10 SUPPOSITORY, RECTAL RECTAL EVERY 12 HOURS PRN
Status: DISCONTINUED | OUTPATIENT
Start: 2021-10-28 | End: 2021-10-30 | Stop reason: HOSPADM

## 2021-10-28 RX ORDER — PREGABALIN 150 MG/1
150 CAPSULE ORAL 2 TIMES DAILY
Status: DISCONTINUED | OUTPATIENT
Start: 2021-10-28 | End: 2021-10-30 | Stop reason: HOSPADM

## 2021-10-28 RX ORDER — ATORVASTATIN CALCIUM 20 MG/1
80 TABLET, FILM COATED ORAL DAILY
Status: DISCONTINUED | OUTPATIENT
Start: 2021-10-29 | End: 2021-10-30 | Stop reason: HOSPADM

## 2021-10-28 RX ORDER — LIDOCAINE HYDROCHLORIDE 10 MG/ML
1 INJECTION, SOLUTION EPIDURAL; INFILTRATION; INTRACAUDAL; PERINEURAL
Status: DISCONTINUED | OUTPATIENT
Start: 2021-10-28 | End: 2021-10-28 | Stop reason: HOSPADM

## 2021-10-28 RX ADMIN — TRANEXAMIC ACID 1000 MG: 1 INJECTION, SOLUTION INTRAVENOUS at 08:10

## 2021-10-28 RX ADMIN — PREGABALIN 150 MG: 150 CAPSULE ORAL at 09:10

## 2021-10-28 RX ADMIN — Medication 10 MG: at 08:10

## 2021-10-28 RX ADMIN — Medication 10 MG: at 07:10

## 2021-10-28 RX ADMIN — SODIUM CHLORIDE: 0.9 INJECTION, SOLUTION INTRAVENOUS at 07:10

## 2021-10-28 RX ADMIN — VANCOMYCIN HYDROCHLORIDE 1500 MG: 1.5 INJECTION, POWDER, LYOPHILIZED, FOR SOLUTION INTRAVENOUS at 06:10

## 2021-10-28 RX ADMIN — DIPHENHYDRAMINE HYDROCHLORIDE 25 MG: 25 CAPSULE ORAL at 09:10

## 2021-10-28 RX ADMIN — MORPHINE SULFATE 2 MG: 2 INJECTION, SOLUTION INTRAMUSCULAR; INTRAVENOUS at 02:10

## 2021-10-28 RX ADMIN — Medication 100 MCG: at 09:10

## 2021-10-28 RX ADMIN — MEPIVACAINE HYDROCHLORIDE 6 ML: 15 INJECTION, SOLUTION EPIDURAL; INFILTRATION at 07:10

## 2021-10-28 RX ADMIN — Medication 10 MG: at 09:10

## 2021-10-28 RX ADMIN — APIXABAN 2.5 MG: 2.5 TABLET, FILM COATED ORAL at 09:10

## 2021-10-28 RX ADMIN — CEFAZOLIN SODIUM 3 ML: 1 INJECTION, POWDER, FOR SOLUTION INTRAMUSCULAR; INTRAVENOUS at 08:10

## 2021-10-28 RX ADMIN — Medication 100 MCG: at 08:10

## 2021-10-28 RX ADMIN — LISINOPRIL 40 MG: 20 TABLET ORAL at 12:10

## 2021-10-28 RX ADMIN — OXYCODONE HYDROCHLORIDE 5 MG: 5 TABLET ORAL at 03:10

## 2021-10-28 RX ADMIN — ONDANSETRON 4 MG: 2 INJECTION INTRAMUSCULAR; INTRAVENOUS at 09:10

## 2021-10-28 RX ADMIN — SODIUM CHLORIDE, SODIUM GLUCONATE, SODIUM ACETATE, POTASSIUM CHLORIDE, MAGNESIUM CHLORIDE, SODIUM PHOSPHATE, DIBASIC, AND POTASSIUM PHOSPHATE: .53; .5; .37; .037; .03; .012; .00082 INJECTION, SOLUTION INTRAVENOUS at 09:10

## 2021-10-28 RX ADMIN — FAMOTIDINE 20 MG: 20 TABLET ORAL at 11:10

## 2021-10-28 RX ADMIN — LEVOTHYROXINE SODIUM 100 MCG: 0.03 TABLET ORAL at 12:10

## 2021-10-28 RX ADMIN — GLYCOPYRROLATE 0.2 MG: 0.2 INJECTION, SOLUTION INTRAMUSCULAR; INTRAVITREAL at 08:10

## 2021-10-28 RX ADMIN — FENTANYL CITRATE 50 MCG: 50 INJECTION INTRAMUSCULAR; INTRAVENOUS at 06:10

## 2021-10-28 RX ADMIN — METHOCARBAMOL 750 MG: 750 TABLET ORAL at 09:10

## 2021-10-28 RX ADMIN — DEXAMETHASONE SODIUM PHOSPHATE 8 MG: 4 INJECTION, SOLUTION INTRAMUSCULAR; INTRAVENOUS at 08:10

## 2021-10-28 RX ADMIN — ESCITALOPRAM OXALATE 20 MG: 20 TABLET ORAL at 12:10

## 2021-10-28 RX ADMIN — OXYCODONE HYDROCHLORIDE 10 MG: 10 TABLET ORAL at 06:10

## 2021-10-28 RX ADMIN — FLUTICASONE PROPIONATE 50 MCG: 50 SPRAY, METERED NASAL at 09:10

## 2021-10-28 RX ADMIN — BUPIVACAINE HYDROCHLORIDE AND EPINEPHRINE BITARTRATE 20 ML: 2.5; .0091 INJECTION, SOLUTION EPIDURAL; INFILTRATION; INTRACAUDAL; PERINEURAL at 06:10

## 2021-10-28 RX ADMIN — Medication 200 MG: at 11:10

## 2021-10-28 RX ADMIN — PROPOFOL 30 MG: 10 INJECTION, EMULSION INTRAVENOUS at 07:10

## 2021-10-28 RX ADMIN — METHOCARBAMOL 750 MG: 750 TABLET ORAL at 02:10

## 2021-10-28 RX ADMIN — PROPOFOL 30 MG: 10 INJECTION, EMULSION INTRAVENOUS at 10:10

## 2021-10-28 RX ADMIN — INSULIN ASPART 2 UNITS: 100 INJECTION, SOLUTION INTRAVENOUS; SUBCUTANEOUS at 06:10

## 2021-10-28 RX ADMIN — SODIUM CHLORIDE: 0.9 INJECTION, SOLUTION INTRAVENOUS at 11:10

## 2021-10-28 RX ADMIN — Medication 6 MG: at 09:10

## 2021-10-28 RX ADMIN — MIDAZOLAM HYDROCHLORIDE 1 MG: 1 INJECTION, SOLUTION INTRAMUSCULAR; INTRAVENOUS at 07:10

## 2021-10-28 RX ADMIN — BUPIVACAINE HYDROCHLORIDE AND EPINEPHRINE 10 ML: 2.5; 5 INJECTION, SOLUTION EPIDURAL; INFILTRATION; INTRACAUDAL; PERINEURAL at 06:10

## 2021-10-28 RX ADMIN — ACETAMINOPHEN 1000 MG: 500 TABLET ORAL at 06:10

## 2021-10-28 RX ADMIN — FENTANYL CITRATE 25 MCG: 50 INJECTION, SOLUTION INTRAMUSCULAR; INTRAVENOUS at 09:10

## 2021-10-28 RX ADMIN — SENNOSIDES AND DOCUSATE SODIUM 1 TABLET: 50; 8.6 TABLET ORAL at 09:10

## 2021-10-28 RX ADMIN — Medication 50 MCG/KG/MIN: at 07:10

## 2021-10-28 RX ADMIN — CELECOXIB 400 MG: 200 CAPSULE ORAL at 06:10

## 2021-10-28 RX ADMIN — LIDOCAINE HYDROCHLORIDE AND EPINEPHRINE 2 ML: 20; 5 INJECTION, SOLUTION EPIDURAL; INFILTRATION; INTRACAUDAL; PERINEURAL at 09:10

## 2021-10-28 RX ADMIN — INSULIN ASPART 2 UNITS: 100 INJECTION, SOLUTION INTRAVENOUS; SUBCUTANEOUS at 11:10

## 2021-10-28 RX ADMIN — PREGABALIN 150 MG: 150 CAPSULE ORAL at 12:10

## 2021-10-28 RX ADMIN — PROPOFOL 30 MG: 10 INJECTION, EMULSION INTRAVENOUS at 08:10

## 2021-10-28 RX ADMIN — OXYCODONE HYDROCHLORIDE 10 MG: 10 TABLET ORAL at 12:10

## 2021-10-28 RX ADMIN — FAMOTIDINE 20 MG: 20 TABLET ORAL at 09:10

## 2021-10-28 RX ADMIN — CEFAZOLIN 2 G: 330 INJECTION, POWDER, FOR SOLUTION INTRAMUSCULAR; INTRAVENOUS at 02:10

## 2021-10-28 RX ADMIN — TRANEXAMIC ACID 1000 MG: 100 INJECTION, SOLUTION INTRAVENOUS at 09:10

## 2021-10-28 RX ADMIN — LIDOCAINE HYDROCHLORIDE AND EPINEPHRINE 3 ML: 20; 5 INJECTION, SOLUTION EPIDURAL; INFILTRATION; INTRACAUDAL; PERINEURAL at 10:10

## 2021-10-28 RX ADMIN — OXYCODONE HYDROCHLORIDE 10 MG: 10 TABLET ORAL at 10:10

## 2021-10-28 RX ADMIN — CEFAZOLIN 2 G: 330 INJECTION, POWDER, FOR SOLUTION INTRAMUSCULAR; INTRAVENOUS at 09:10

## 2021-10-28 RX ADMIN — ACETAMINOPHEN 1000 MG: 500 TABLET ORAL at 11:10

## 2021-10-29 LAB
POCT GLUCOSE: 147 MG/DL (ref 70–110)
POCT GLUCOSE: 178 MG/DL (ref 70–110)
POCT GLUCOSE: 182 MG/DL (ref 70–110)
POCT GLUCOSE: 194 MG/DL (ref 70–110)

## 2021-10-29 PROCEDURE — 97530 THERAPEUTIC ACTIVITIES: CPT | Mod: CQ

## 2021-10-29 PROCEDURE — 27000221 HC OXYGEN, UP TO 24 HOURS

## 2021-10-29 PROCEDURE — 99231 PR SUBSEQUENT HOSPITAL CARE,LEVL I: ICD-10-PCS | Mod: GC,,, | Performed by: ANESTHESIOLOGY

## 2021-10-29 PROCEDURE — 99231 SBSQ HOSP IP/OBS SF/LOW 25: CPT | Mod: GC,,, | Performed by: ANESTHESIOLOGY

## 2021-10-29 PROCEDURE — 94640 AIRWAY INHALATION TREATMENT: CPT

## 2021-10-29 PROCEDURE — 94660 CPAP INITIATION&MGMT: CPT

## 2021-10-29 PROCEDURE — 94761 N-INVAS EAR/PLS OXIMETRY MLT: CPT

## 2021-10-29 PROCEDURE — 99900035 HC TECH TIME PER 15 MIN (STAT)

## 2021-10-29 PROCEDURE — 25000003 PHARM REV CODE 250: Performed by: PHYSICIAN ASSISTANT

## 2021-10-29 PROCEDURE — 25000003 PHARM REV CODE 250

## 2021-10-29 PROCEDURE — 25000003 PHARM REV CODE 250: Performed by: STUDENT IN AN ORGANIZED HEALTH CARE EDUCATION/TRAINING PROGRAM

## 2021-10-29 PROCEDURE — 94799 UNLISTED PULMONARY SVC/PX: CPT

## 2021-10-29 PROCEDURE — 63600175 PHARM REV CODE 636 W HCPCS: Performed by: PHYSICIAN ASSISTANT

## 2021-10-29 PROCEDURE — 97116 GAIT TRAINING THERAPY: CPT | Mod: CQ

## 2021-10-29 PROCEDURE — 97530 THERAPEUTIC ACTIVITIES: CPT | Mod: CO

## 2021-10-29 PROCEDURE — 11000001 HC ACUTE MED/SURG PRIVATE ROOM

## 2021-10-29 PROCEDURE — 25000242 PHARM REV CODE 250 ALT 637 W/ HCPCS: Performed by: STUDENT IN AN ORGANIZED HEALTH CARE EDUCATION/TRAINING PROGRAM

## 2021-10-29 RX ORDER — CELECOXIB 200 MG/1
200 CAPSULE ORAL DAILY
Status: DISCONTINUED | OUTPATIENT
Start: 2021-10-29 | End: 2021-10-29

## 2021-10-29 RX ORDER — TALC
9 POWDER (GRAM) TOPICAL NIGHTLY PRN
Status: DISCONTINUED | OUTPATIENT
Start: 2021-10-29 | End: 2021-10-30 | Stop reason: HOSPADM

## 2021-10-29 RX ORDER — DOCUSATE SODIUM 100 MG/1
100 CAPSULE, LIQUID FILLED ORAL DAILY
Qty: 15 CAPSULE | Refills: 0 | Status: SHIPPED | OUTPATIENT
Start: 2021-10-29 | End: 2021-11-14

## 2021-10-29 RX ORDER — DIPHENHYDRAMINE HCL 25 MG
50 CAPSULE ORAL NIGHTLY PRN
Status: DISCONTINUED | OUTPATIENT
Start: 2021-10-29 | End: 2021-10-30 | Stop reason: HOSPADM

## 2021-10-29 RX ADMIN — CEFAZOLIN 2 G: 330 INJECTION, POWDER, FOR SOLUTION INTRAMUSCULAR; INTRAVENOUS at 02:10

## 2021-10-29 RX ADMIN — DIPHENHYDRAMINE HYDROCHLORIDE 50 MG: 25 CAPSULE ORAL at 08:10

## 2021-10-29 RX ADMIN — OXYCODONE HYDROCHLORIDE 10 MG: 10 TABLET ORAL at 07:10

## 2021-10-29 RX ADMIN — LEVOTHYROXINE SODIUM 100 MCG: 0.03 TABLET ORAL at 10:10

## 2021-10-29 RX ADMIN — METHOCARBAMOL 750 MG: 750 TABLET ORAL at 02:10

## 2021-10-29 RX ADMIN — OXYCODONE HYDROCHLORIDE 10 MG: 10 TABLET ORAL at 04:10

## 2021-10-29 RX ADMIN — ACETAMINOPHEN 1000 MG: 500 TABLET ORAL at 11:10

## 2021-10-29 RX ADMIN — SENNOSIDES AND DOCUSATE SODIUM 1 TABLET: 50; 8.6 TABLET ORAL at 08:10

## 2021-10-29 RX ADMIN — APIXABAN 2.5 MG: 2.5 TABLET, FILM COATED ORAL at 08:10

## 2021-10-29 RX ADMIN — SODIUM CHLORIDE: 0.9 INJECTION, SOLUTION INTRAVENOUS at 12:10

## 2021-10-29 RX ADMIN — FAMOTIDINE 20 MG: 20 TABLET ORAL at 08:10

## 2021-10-29 RX ADMIN — PREGABALIN 150 MG: 150 CAPSULE ORAL at 08:10

## 2021-10-29 RX ADMIN — Medication 9 MG: at 08:10

## 2021-10-29 RX ADMIN — MUPIROCIN 1 G: 20 OINTMENT TOPICAL at 10:10

## 2021-10-29 RX ADMIN — FLUTICASONE PROPIONATE 50 MCG: 50 SPRAY, METERED NASAL at 11:10

## 2021-10-29 RX ADMIN — OXYCODONE HYDROCHLORIDE 10 MG: 10 TABLET ORAL at 11:10

## 2021-10-29 RX ADMIN — METHOCARBAMOL 750 MG: 750 TABLET ORAL at 10:10

## 2021-10-29 RX ADMIN — ALBUTEROL SULFATE 2 PUFF: 108 INHALANT RESPIRATORY (INHALATION) at 05:10

## 2021-10-29 RX ADMIN — ACETAMINOPHEN 1000 MG: 500 TABLET ORAL at 07:10

## 2021-10-29 RX ADMIN — MUPIROCIN 1 G: 20 OINTMENT TOPICAL at 08:10

## 2021-10-29 RX ADMIN — INSULIN ASPART 2 UNITS: 100 INJECTION, SOLUTION INTRAVENOUS; SUBCUTANEOUS at 10:10

## 2021-10-29 RX ADMIN — SENNOSIDES AND DOCUSATE SODIUM 1 TABLET: 50; 8.6 TABLET ORAL at 10:10

## 2021-10-29 RX ADMIN — METHOCARBAMOL 750 MG: 750 TABLET ORAL at 08:10

## 2021-10-29 RX ADMIN — Medication 200 MG: at 08:10

## 2021-10-29 RX ADMIN — FUROSEMIDE 40 MG: 40 TABLET ORAL at 10:10

## 2021-10-29 RX ADMIN — POLYETHYLENE GLYCOL 3350 17 G: 17 POWDER, FOR SOLUTION ORAL at 10:10

## 2021-10-29 RX ADMIN — MORPHINE SULFATE 2 MG: 2 INJECTION, SOLUTION INTRAMUSCULAR; INTRAVENOUS at 12:10

## 2021-10-29 RX ADMIN — ACETAMINOPHEN 1000 MG: 500 TABLET ORAL at 04:10

## 2021-10-29 RX ADMIN — ESCITALOPRAM OXALATE 20 MG: 20 TABLET ORAL at 10:10

## 2021-10-29 RX ADMIN — LISINOPRIL 40 MG: 20 TABLET ORAL at 10:10

## 2021-10-29 RX ADMIN — APIXABAN 2.5 MG: 2.5 TABLET, FILM COATED ORAL at 10:10

## 2021-10-29 RX ADMIN — FAMOTIDINE 20 MG: 20 TABLET ORAL at 10:10

## 2021-10-29 RX ADMIN — PREGABALIN 150 MG: 150 CAPSULE ORAL at 10:10

## 2021-10-29 RX ADMIN — ATORVASTATIN CALCIUM 80 MG: 20 TABLET, FILM COATED ORAL at 10:10

## 2021-10-29 RX ADMIN — MORPHINE SULFATE 2 MG: 2 INJECTION, SOLUTION INTRAMUSCULAR; INTRAVENOUS at 05:10

## 2021-10-30 VITALS
TEMPERATURE: 98 F | HEART RATE: 78 BPM | WEIGHT: 277 LBS | SYSTOLIC BLOOD PRESSURE: 143 MMHG | DIASTOLIC BLOOD PRESSURE: 71 MMHG | BODY MASS INDEX: 42.12 KG/M2 | OXYGEN SATURATION: 93 % | RESPIRATION RATE: 18 BRPM

## 2021-10-30 LAB
HGB BLD-MCNC: 13.3 G/DL (ref 14–18)
POCT GLUCOSE: 181 MG/DL (ref 70–110)
POCT GLUCOSE: 187 MG/DL (ref 70–110)

## 2021-10-30 PROCEDURE — 97530 THERAPEUTIC ACTIVITIES: CPT | Mod: CQ

## 2021-10-30 PROCEDURE — 25000003 PHARM REV CODE 250: Performed by: STUDENT IN AN ORGANIZED HEALTH CARE EDUCATION/TRAINING PROGRAM

## 2021-10-30 PROCEDURE — 99900035 HC TECH TIME PER 15 MIN (STAT)

## 2021-10-30 PROCEDURE — 94761 N-INVAS EAR/PLS OXIMETRY MLT: CPT

## 2021-10-30 PROCEDURE — 36415 COLL VENOUS BLD VENIPUNCTURE: CPT | Performed by: PHYSICIAN ASSISTANT

## 2021-10-30 PROCEDURE — 25000003 PHARM REV CODE 250: Performed by: PHYSICIAN ASSISTANT

## 2021-10-30 PROCEDURE — 27000221 HC OXYGEN, UP TO 24 HOURS

## 2021-10-30 PROCEDURE — 99231 PR SUBSEQUENT HOSPITAL CARE,LEVL I: ICD-10-PCS | Mod: GC,,, | Performed by: ANESTHESIOLOGY

## 2021-10-30 PROCEDURE — 97535 SELF CARE MNGMENT TRAINING: CPT | Mod: CO

## 2021-10-30 PROCEDURE — 25000003 PHARM REV CODE 250

## 2021-10-30 PROCEDURE — 97116 GAIT TRAINING THERAPY: CPT | Mod: CQ

## 2021-10-30 PROCEDURE — 85018 HEMOGLOBIN: CPT | Performed by: PHYSICIAN ASSISTANT

## 2021-10-30 PROCEDURE — 99231 SBSQ HOSP IP/OBS SF/LOW 25: CPT | Mod: GC,,, | Performed by: ANESTHESIOLOGY

## 2021-10-30 RX ADMIN — LISINOPRIL 40 MG: 20 TABLET ORAL at 08:10

## 2021-10-30 RX ADMIN — APIXABAN 2.5 MG: 2.5 TABLET, FILM COATED ORAL at 08:10

## 2021-10-30 RX ADMIN — OXYCODONE HYDROCHLORIDE 10 MG: 10 TABLET ORAL at 05:10

## 2021-10-30 RX ADMIN — OXYCODONE HYDROCHLORIDE 10 MG: 10 TABLET ORAL at 08:10

## 2021-10-30 RX ADMIN — ESCITALOPRAM OXALATE 20 MG: 20 TABLET ORAL at 08:10

## 2021-10-30 RX ADMIN — ACETAMINOPHEN 1000 MG: 500 TABLET ORAL at 01:10

## 2021-10-30 RX ADMIN — ATORVASTATIN CALCIUM 80 MG: 20 TABLET, FILM COATED ORAL at 08:10

## 2021-10-30 RX ADMIN — FUROSEMIDE 40 MG: 40 TABLET ORAL at 08:10

## 2021-10-30 RX ADMIN — MUPIROCIN 1 G: 20 OINTMENT TOPICAL at 08:10

## 2021-10-30 RX ADMIN — LEVOTHYROXINE SODIUM 100 MCG: 0.03 TABLET ORAL at 08:10

## 2021-10-30 RX ADMIN — ACETAMINOPHEN 1000 MG: 500 TABLET ORAL at 05:10

## 2021-10-30 RX ADMIN — PREGABALIN 150 MG: 150 CAPSULE ORAL at 08:10

## 2021-10-30 RX ADMIN — FAMOTIDINE 20 MG: 20 TABLET ORAL at 08:10

## 2021-10-30 RX ADMIN — SENNOSIDES AND DOCUSATE SODIUM 1 TABLET: 50; 8.6 TABLET ORAL at 09:10

## 2021-10-30 RX ADMIN — POLYETHYLENE GLYCOL 3350 17 G: 17 POWDER, FOR SOLUTION ORAL at 08:10

## 2021-11-01 ENCOUNTER — CLINICAL SUPPORT (OUTPATIENT)
Dept: ORTHOPEDICS | Facility: CLINIC | Age: 71
End: 2021-11-01
Payer: MEDICARE

## 2021-11-01 ENCOUNTER — TELEPHONE (OUTPATIENT)
Dept: ORTHOPEDICS | Facility: CLINIC | Age: 71
End: 2021-11-01

## 2021-11-01 DIAGNOSIS — M79.89 RIGHT LEG SWELLING: Primary | ICD-10-CM

## 2021-11-01 DIAGNOSIS — Z96.649 STATUS POST HIP REPLACEMENT, UNSPECIFIED LATERALITY: Primary | ICD-10-CM

## 2021-11-01 DIAGNOSIS — R60.0 LEG EDEMA: Primary | ICD-10-CM

## 2021-11-01 DIAGNOSIS — R60.0 LOCALIZED EDEMA: ICD-10-CM

## 2021-11-01 PROCEDURE — 99499 UNLISTED E&M SERVICE: CPT | Mod: 95,,, | Performed by: ORTHOPAEDIC SURGERY

## 2021-11-01 PROCEDURE — 99499 NO LOS: ICD-10-PCS | Mod: 95,,, | Performed by: ORTHOPAEDIC SURGERY

## 2021-11-01 RX ORDER — OXYCODONE HYDROCHLORIDE 5 MG/1
5 TABLET ORAL EVERY 4 HOURS PRN
Qty: 30 TABLET | Refills: 0 | Status: ON HOLD | OUTPATIENT
Start: 2021-11-01 | End: 2023-04-06 | Stop reason: CLARIF

## 2021-11-02 ENCOUNTER — HOSPITAL ENCOUNTER (OUTPATIENT)
Dept: VASCULAR SURGERY | Facility: CLINIC | Age: 71
Discharge: HOME | End: 2021-11-02
Attending: NURSE PRACTITIONER
Payer: MEDICARE

## 2021-11-02 ENCOUNTER — OFFICE VISIT (OUTPATIENT)
Dept: ORTHOPEDICS | Facility: CLINIC | Age: 71
End: 2021-11-02
Payer: MEDICARE

## 2021-11-02 VITALS — BODY MASS INDEX: 39.65 KG/M2 | HEIGHT: 70 IN | WEIGHT: 277 LBS

## 2021-11-02 DIAGNOSIS — I82.451 ACUTE DEEP VEIN THROMBOSIS (DVT) OF RIGHT PERONEAL VEIN: Primary | ICD-10-CM

## 2021-11-02 DIAGNOSIS — M79.89 RIGHT LEG SWELLING: ICD-10-CM

## 2021-11-02 DIAGNOSIS — R60.0 LOCALIZED EDEMA: ICD-10-CM

## 2021-11-02 DIAGNOSIS — R60.0 LEG EDEMA: ICD-10-CM

## 2021-11-02 PROCEDURE — 99999 PR PBB SHADOW E&M-EST. PATIENT-LVL IV: ICD-10-PCS | Mod: PBBFAC,,, | Performed by: NURSE PRACTITIONER

## 2021-11-02 PROCEDURE — 99999 PR PBB SHADOW E&M-EST. PATIENT-LVL IV: CPT | Mod: PBBFAC,,, | Performed by: NURSE PRACTITIONER

## 2021-11-02 PROCEDURE — 99024 POSTOP FOLLOW-UP VISIT: CPT | Mod: POP,,, | Performed by: NURSE PRACTITIONER

## 2021-11-02 PROCEDURE — 99024 PR POST-OP FOLLOW-UP VISIT: ICD-10-PCS | Mod: POP,,, | Performed by: NURSE PRACTITIONER

## 2021-11-02 PROCEDURE — 99214 OFFICE O/P EST MOD 30 MIN: CPT | Mod: PBBFAC | Performed by: NURSE PRACTITIONER

## 2021-11-05 ENCOUNTER — PATIENT MESSAGE (OUTPATIENT)
Dept: ORTHOPEDICS | Facility: CLINIC | Age: 71
End: 2021-11-05
Payer: MEDICARE

## 2021-11-10 ENCOUNTER — PATIENT MESSAGE (OUTPATIENT)
Dept: ORTHOPEDICS | Facility: CLINIC | Age: 71
End: 2021-11-10
Payer: MEDICARE

## 2021-11-11 ENCOUNTER — OFFICE VISIT (OUTPATIENT)
Dept: ORTHOPEDICS | Facility: CLINIC | Age: 71
End: 2021-11-11
Payer: MEDICARE

## 2021-11-11 VITALS — WEIGHT: 277 LBS | HEIGHT: 70 IN | BODY MASS INDEX: 39.65 KG/M2

## 2021-11-11 DIAGNOSIS — Z96.641 STATUS POST TOTAL HIP REPLACEMENT, RIGHT: Primary | ICD-10-CM

## 2021-11-11 PROCEDURE — 99024 PR POST-OP FOLLOW-UP VISIT: ICD-10-PCS | Mod: POP,,, | Performed by: PHYSICIAN ASSISTANT

## 2021-11-11 PROCEDURE — 99999 PR PBB SHADOW E&M-EST. PATIENT-LVL IV: ICD-10-PCS | Mod: PBBFAC,,, | Performed by: PHYSICIAN ASSISTANT

## 2021-11-11 PROCEDURE — 99024 POSTOP FOLLOW-UP VISIT: CPT | Mod: POP,,, | Performed by: PHYSICIAN ASSISTANT

## 2021-11-11 PROCEDURE — 99214 OFFICE O/P EST MOD 30 MIN: CPT | Mod: PBBFAC | Performed by: PHYSICIAN ASSISTANT

## 2021-11-11 PROCEDURE — 99999 PR PBB SHADOW E&M-EST. PATIENT-LVL IV: CPT | Mod: PBBFAC,,, | Performed by: PHYSICIAN ASSISTANT

## 2021-11-22 ENCOUNTER — TELEPHONE (OUTPATIENT)
Dept: ORTHOPEDICS | Facility: CLINIC | Age: 71
End: 2021-11-22
Payer: MEDICARE

## 2021-11-22 ENCOUNTER — PATIENT MESSAGE (OUTPATIENT)
Dept: ORTHOPEDICS | Facility: CLINIC | Age: 71
End: 2021-11-22
Payer: MEDICARE

## 2021-12-01 ENCOUNTER — TELEPHONE (OUTPATIENT)
Dept: UROLOGY | Facility: CLINIC | Age: 71
End: 2021-12-01
Payer: MEDICARE

## 2021-12-03 ENCOUNTER — TELEPHONE (OUTPATIENT)
Dept: UROLOGY | Facility: CLINIC | Age: 71
End: 2021-12-03
Payer: MEDICARE

## 2021-12-06 ENCOUNTER — TELEPHONE (OUTPATIENT)
Dept: UROLOGY | Facility: CLINIC | Age: 71
End: 2021-12-06
Payer: MEDICARE

## 2021-12-06 ENCOUNTER — ANESTHESIA EVENT (OUTPATIENT)
Dept: SURGERY | Facility: HOSPITAL | Age: 71
End: 2021-12-06
Payer: MEDICARE

## 2021-12-07 ENCOUNTER — ANESTHESIA (OUTPATIENT)
Dept: SURGERY | Facility: HOSPITAL | Age: 71
End: 2021-12-07
Payer: MEDICARE

## 2021-12-07 ENCOUNTER — HOSPITAL ENCOUNTER (OUTPATIENT)
Facility: HOSPITAL | Age: 71
Discharge: HOME | End: 2021-12-07
Attending: UROLOGY | Admitting: UROLOGY
Payer: MEDICARE

## 2021-12-07 VITALS
HEIGHT: 70 IN | DIASTOLIC BLOOD PRESSURE: 64 MMHG | TEMPERATURE: 98 F | BODY MASS INDEX: 41.52 KG/M2 | RESPIRATION RATE: 20 BRPM | WEIGHT: 290 LBS | SYSTOLIC BLOOD PRESSURE: 138 MMHG | HEART RATE: 74 BPM | OXYGEN SATURATION: 95 %

## 2021-12-07 DIAGNOSIS — N39.41 URGE INCONTINENCE: Primary | ICD-10-CM

## 2021-12-07 LAB
POCT GLUCOSE: 127 MG/DL (ref 70–110)
POCT GLUCOSE: 139 MG/DL (ref 70–110)

## 2021-12-07 PROCEDURE — D9220A PRA ANESTHESIA: ICD-10-PCS | Mod: ,,, | Performed by: ANESTHESIOLOGY

## 2021-12-07 PROCEDURE — 71000015 HC POSTOP RECOV 1ST HR: Performed by: UROLOGY

## 2021-12-07 PROCEDURE — 37000009 HC ANESTHESIA EA ADD 15 MINS: Performed by: UROLOGY

## 2021-12-07 PROCEDURE — 63600175 PHARM REV CODE 636 W HCPCS: Performed by: STUDENT IN AN ORGANIZED HEALTH CARE EDUCATION/TRAINING PROGRAM

## 2021-12-07 PROCEDURE — 71000016 HC POSTOP RECOV ADDL HR: Performed by: UROLOGY

## 2021-12-07 PROCEDURE — 36000707: Performed by: UROLOGY

## 2021-12-07 PROCEDURE — 95972 ALYS CPLX SP/PN NPGT W/PRGRM: CPT | Mod: 59,,, | Performed by: UROLOGY

## 2021-12-07 PROCEDURE — 71000033 HC RECOVERY, INTIAL HOUR: Performed by: UROLOGY

## 2021-12-07 PROCEDURE — 82962 GLUCOSE BLOOD TEST: CPT | Performed by: UROLOGY

## 2021-12-07 PROCEDURE — 76000 FLUOROSCOPY <1 HR PHYS/QHP: CPT | Mod: 26,59,, | Performed by: UROLOGY

## 2021-12-07 PROCEDURE — 76000 PR  FLUOROSCOPE EXAMINATION: ICD-10-PCS | Mod: 26,59,, | Performed by: UROLOGY

## 2021-12-07 PROCEDURE — 64581 OPN IMPLTJ NEA SACRAL NERVE: CPT | Mod: ,,, | Performed by: UROLOGY

## 2021-12-07 PROCEDURE — 25000003 PHARM REV CODE 250: Performed by: STUDENT IN AN ORGANIZED HEALTH CARE EDUCATION/TRAINING PROGRAM

## 2021-12-07 PROCEDURE — 95972 PR PRG SPNL GEN CMPLX: ICD-10-PCS | Mod: 59,,, | Performed by: UROLOGY

## 2021-12-07 PROCEDURE — 36000706: Performed by: UROLOGY

## 2021-12-07 PROCEDURE — 64581 PR IMPLANTATION, NEUROSTIM ELECT ARRAY, OPEN, SACRAL NERVE: ICD-10-PCS | Mod: ,,, | Performed by: UROLOGY

## 2021-12-07 PROCEDURE — 25000003 PHARM REV CODE 250: Performed by: UROLOGY

## 2021-12-07 PROCEDURE — C1767 GENERATOR, NEURO NON-RECHARG: HCPCS | Performed by: UROLOGY

## 2021-12-07 PROCEDURE — 37000008 HC ANESTHESIA 1ST 15 MINUTES: Performed by: UROLOGY

## 2021-12-07 PROCEDURE — C1778 LEAD, NEUROSTIMULATOR: HCPCS | Performed by: UROLOGY

## 2021-12-07 PROCEDURE — D9220A PRA ANESTHESIA: Mod: ,,, | Performed by: ANESTHESIOLOGY

## 2021-12-07 DEVICE — LEAD INTERSTIM 2 SURESCAN 33CM: Type: IMPLANTABLE DEVICE | Site: BACK | Status: FUNCTIONAL

## 2021-12-07 RX ORDER — ONDANSETRON 2 MG/ML
INJECTION INTRAMUSCULAR; INTRAVENOUS
Status: DISCONTINUED | OUTPATIENT
Start: 2021-12-07 | End: 2021-12-07

## 2021-12-07 RX ORDER — DEXAMETHASONE SODIUM PHOSPHATE 4 MG/ML
INJECTION, SOLUTION INTRA-ARTICULAR; INTRALESIONAL; INTRAMUSCULAR; INTRAVENOUS; SOFT TISSUE
Status: DISCONTINUED | OUTPATIENT
Start: 2021-12-07 | End: 2021-12-07

## 2021-12-07 RX ORDER — TRAMADOL HYDROCHLORIDE 50 MG/1
50 TABLET ORAL EVERY 6 HOURS PRN
Status: DISCONTINUED | OUTPATIENT
Start: 2021-12-07 | End: 2021-12-07 | Stop reason: HOSPADM

## 2021-12-07 RX ORDER — TRAMADOL HYDROCHLORIDE 50 MG/1
50 TABLET ORAL EVERY 6 HOURS PRN
Qty: 5 TABLET | Refills: 0 | Status: SHIPPED | OUTPATIENT
Start: 2021-12-07 | End: 2021-12-21 | Stop reason: SDUPTHER

## 2021-12-07 RX ORDER — FENTANYL CITRATE 50 UG/ML
25 INJECTION, SOLUTION INTRAMUSCULAR; INTRAVENOUS EVERY 5 MIN PRN
Status: DISCONTINUED | OUTPATIENT
Start: 2021-12-07 | End: 2021-12-07 | Stop reason: HOSPADM

## 2021-12-07 RX ORDER — PHENYLEPHRINE HCL IN 0.9% NACL 1 MG/10 ML
SYRINGE (ML) INTRAVENOUS
Status: DISCONTINUED | OUTPATIENT
Start: 2021-12-07 | End: 2021-12-07

## 2021-12-07 RX ORDER — ROCURONIUM BROMIDE 10 MG/ML
INJECTION, SOLUTION INTRAVENOUS
Status: DISCONTINUED | OUTPATIENT
Start: 2021-12-07 | End: 2021-12-07

## 2021-12-07 RX ORDER — CEFAZOLIN SODIUM 1 G/3ML
2 INJECTION, POWDER, FOR SOLUTION INTRAMUSCULAR; INTRAVENOUS
Status: COMPLETED | OUTPATIENT
Start: 2021-12-07 | End: 2021-12-07

## 2021-12-07 RX ORDER — BUPIVACAINE HYDROCHLORIDE AND EPINEPHRINE 2.5; 5 MG/ML; UG/ML
INJECTION, SOLUTION EPIDURAL; INFILTRATION; INTRACAUDAL; PERINEURAL
Status: DISCONTINUED | OUTPATIENT
Start: 2021-12-07 | End: 2021-12-07 | Stop reason: HOSPADM

## 2021-12-07 RX ORDER — DEXMEDETOMIDINE HYDROCHLORIDE 100 UG/ML
INJECTION, SOLUTION INTRAVENOUS
Status: DISCONTINUED | OUTPATIENT
Start: 2021-12-07 | End: 2021-12-07

## 2021-12-07 RX ORDER — FENTANYL CITRATE 50 UG/ML
INJECTION, SOLUTION INTRAMUSCULAR; INTRAVENOUS
Status: DISCONTINUED | OUTPATIENT
Start: 2021-12-07 | End: 2021-12-07

## 2021-12-07 RX ORDER — SODIUM CHLORIDE 0.9 % (FLUSH) 0.9 %
10 SYRINGE (ML) INJECTION
Status: DISCONTINUED | OUTPATIENT
Start: 2021-12-07 | End: 2021-12-07 | Stop reason: HOSPADM

## 2021-12-07 RX ORDER — ONDANSETRON 2 MG/ML
4 INJECTION INTRAMUSCULAR; INTRAVENOUS ONCE
Status: DISCONTINUED | OUTPATIENT
Start: 2021-12-07 | End: 2021-12-07 | Stop reason: HOSPADM

## 2021-12-07 RX ORDER — PROPOFOL 10 MG/ML
VIAL (ML) INTRAVENOUS
Status: DISCONTINUED | OUTPATIENT
Start: 2021-12-07 | End: 2021-12-07

## 2021-12-07 RX ORDER — ACETAMINOPHEN 500 MG
1000 TABLET ORAL ONCE
Status: COMPLETED | OUTPATIENT
Start: 2021-12-07 | End: 2021-12-07

## 2021-12-07 RX ADMIN — GLYCOPYRROLATE 0.4 MG: 0.2 INJECTION, SOLUTION INTRAMUSCULAR; INTRAVITREAL at 11:12

## 2021-12-07 RX ADMIN — FENTANYL CITRATE 25 MCG: 50 INJECTION INTRAMUSCULAR; INTRAVENOUS at 01:12

## 2021-12-07 RX ADMIN — DEXAMETHASONE SODIUM PHOSPHATE 4 MG: 4 INJECTION INTRA-ARTICULAR; INTRALESIONAL; INTRAMUSCULAR; INTRAVENOUS; SOFT TISSUE at 11:12

## 2021-12-07 RX ADMIN — FENTANYL CITRATE 100 MCG: 50 INJECTION INTRAMUSCULAR; INTRAVENOUS at 11:12

## 2021-12-07 RX ADMIN — SUGAMMADEX 1200 MG: 100 INJECTION, SOLUTION INTRAVENOUS at 11:12

## 2021-12-07 RX ADMIN — CEFAZOLIN 2 G: 330 INJECTION, POWDER, FOR SOLUTION INTRAMUSCULAR; INTRAVENOUS at 11:12

## 2021-12-07 RX ADMIN — SODIUM CHLORIDE: 0.9 INJECTION, SOLUTION INTRAVENOUS at 10:12

## 2021-12-07 RX ADMIN — Medication 200 MCG: at 11:12

## 2021-12-07 RX ADMIN — ACETAMINOPHEN 1000 MG: 500 TABLET ORAL at 10:12

## 2021-12-07 RX ADMIN — ROCURONIUM BROMIDE 100 MG: 10 INJECTION, SOLUTION INTRAVENOUS at 10:12

## 2021-12-07 RX ADMIN — ONDANSETRON 4 MG: 2 INJECTION INTRAMUSCULAR; INTRAVENOUS at 11:12

## 2021-12-07 RX ADMIN — Medication 100 MCG: at 12:12

## 2021-12-07 RX ADMIN — TRAMADOL HYDROCHLORIDE 50 MG: 50 TABLET, COATED ORAL at 01:12

## 2021-12-07 RX ADMIN — PROPOFOL 200 MG: 10 INJECTION, EMULSION INTRAVENOUS at 10:12

## 2021-12-07 RX ADMIN — GLYCOPYRROLATE 0.2 MG: 0.2 INJECTION, SOLUTION INTRAMUSCULAR; INTRAVITREAL at 11:12

## 2021-12-07 RX ADMIN — DEXMEDETOMIDINE HYDROCHLORIDE 16 MCG: 100 INJECTION, SOLUTION INTRAVENOUS at 11:12

## 2021-12-09 ENCOUNTER — HOSPITAL ENCOUNTER (OUTPATIENT)
Dept: RADIOLOGY | Facility: HOSPITAL | Age: 71
Discharge: HOME | End: 2021-12-09
Attending: PHYSICIAN ASSISTANT
Payer: MEDICARE

## 2021-12-09 ENCOUNTER — OFFICE VISIT (OUTPATIENT)
Dept: ORTHOPEDICS | Facility: CLINIC | Age: 71
End: 2021-12-09
Payer: MEDICARE

## 2021-12-09 VITALS — BODY MASS INDEX: 41.52 KG/M2 | HEIGHT: 70 IN | WEIGHT: 290 LBS

## 2021-12-09 DIAGNOSIS — Z96.641 STATUS POST TOTAL HIP REPLACEMENT, RIGHT: Primary | ICD-10-CM

## 2021-12-09 DIAGNOSIS — Z96.641 STATUS POST TOTAL HIP REPLACEMENT, RIGHT: ICD-10-CM

## 2021-12-09 PROCEDURE — 99024 POSTOP FOLLOW-UP VISIT: CPT | Mod: POP,,, | Performed by: PHYSICIAN ASSISTANT

## 2021-12-09 PROCEDURE — 99999 PR PBB SHADOW E&M-EST. PATIENT-LVL IV: CPT | Mod: PBBFAC,,, | Performed by: PHYSICIAN ASSISTANT

## 2021-12-09 PROCEDURE — 99214 OFFICE O/P EST MOD 30 MIN: CPT | Mod: PBBFAC | Performed by: PHYSICIAN ASSISTANT

## 2021-12-09 PROCEDURE — 73502 XR HIP WITH PELVIS WHEN PERFORMED, 2 OR 3  VIEWS RIGHT: ICD-10-PCS | Mod: 26,RT,, | Performed by: RADIOLOGY

## 2021-12-09 PROCEDURE — 99999 PR PBB SHADOW E&M-EST. PATIENT-LVL IV: ICD-10-PCS | Mod: PBBFAC,,, | Performed by: PHYSICIAN ASSISTANT

## 2021-12-09 PROCEDURE — 73502 X-RAY EXAM HIP UNI 2-3 VIEWS: CPT | Mod: 26,RT,, | Performed by: RADIOLOGY

## 2021-12-09 PROCEDURE — 73502 X-RAY EXAM HIP UNI 2-3 VIEWS: CPT | Mod: TC,RT

## 2021-12-09 PROCEDURE — 99024 PR POST-OP FOLLOW-UP VISIT: ICD-10-PCS | Mod: POP,,, | Performed by: PHYSICIAN ASSISTANT

## 2021-12-10 ENCOUNTER — TELEPHONE (OUTPATIENT)
Dept: UROLOGY | Facility: CLINIC | Age: 71
End: 2021-12-10
Payer: MEDICARE

## 2021-12-16 ENCOUNTER — TELEPHONE (OUTPATIENT)
Dept: UROLOGY | Facility: CLINIC | Age: 71
End: 2021-12-16
Payer: MEDICARE

## 2021-12-20 ENCOUNTER — TELEPHONE (OUTPATIENT)
Dept: UROLOGY | Facility: CLINIC | Age: 71
End: 2021-12-20
Payer: MEDICARE

## 2021-12-21 ENCOUNTER — PATIENT MESSAGE (OUTPATIENT)
Dept: UROLOGY | Facility: CLINIC | Age: 71
End: 2021-12-21

## 2021-12-21 ENCOUNTER — PATIENT MESSAGE (OUTPATIENT)
Dept: SURGERY | Facility: HOSPITAL | Age: 71
End: 2021-12-21
Payer: MEDICARE

## 2021-12-21 ENCOUNTER — HOSPITAL ENCOUNTER (OUTPATIENT)
Facility: HOSPITAL | Age: 71
Discharge: HOME | End: 2021-12-21
Attending: UROLOGY | Admitting: UROLOGY
Payer: MEDICARE

## 2021-12-21 ENCOUNTER — ANESTHESIA EVENT (OUTPATIENT)
Dept: SURGERY | Facility: HOSPITAL | Age: 71
End: 2021-12-21
Payer: MEDICARE

## 2021-12-21 ENCOUNTER — ANESTHESIA (OUTPATIENT)
Dept: SURGERY | Facility: HOSPITAL | Age: 71
End: 2021-12-21
Payer: MEDICARE

## 2021-12-21 VITALS
BODY MASS INDEX: 40.8 KG/M2 | WEIGHT: 285 LBS | OXYGEN SATURATION: 92 % | HEIGHT: 70 IN | HEART RATE: 73 BPM | TEMPERATURE: 98 F | SYSTOLIC BLOOD PRESSURE: 164 MMHG | RESPIRATION RATE: 20 BRPM | DIASTOLIC BLOOD PRESSURE: 79 MMHG

## 2021-12-21 DIAGNOSIS — N39.41 URGENCY INCONTINENCE: ICD-10-CM

## 2021-12-21 LAB — POCT GLUCOSE: 161 MG/DL (ref 70–110)

## 2021-12-21 PROCEDURE — 64590 PR IMPLANT PERIPH/GASTRIC NEUROSTIM/RECEIVER: ICD-10-PCS | Mod: 58,,, | Performed by: UROLOGY

## 2021-12-21 PROCEDURE — 95972 PR PRG SPNL GEN CMPLX: ICD-10-PCS | Mod: 59,,, | Performed by: UROLOGY

## 2021-12-21 PROCEDURE — 25000003 PHARM REV CODE 250: Performed by: UROLOGY

## 2021-12-21 PROCEDURE — C1767 GENERATOR, NEURO NON-RECHARG: HCPCS | Performed by: UROLOGY

## 2021-12-21 PROCEDURE — D9220A PRA ANESTHESIA: ICD-10-PCS | Mod: ANES,,, | Performed by: ANESTHESIOLOGY

## 2021-12-21 PROCEDURE — 37000009 HC ANESTHESIA EA ADD 15 MINS: Performed by: UROLOGY

## 2021-12-21 PROCEDURE — 71000015 HC POSTOP RECOV 1ST HR: Performed by: UROLOGY

## 2021-12-21 PROCEDURE — D9220A PRA ANESTHESIA: Mod: ANES,,, | Performed by: ANESTHESIOLOGY

## 2021-12-21 PROCEDURE — 71000044 HC DOSC ROUTINE RECOVERY FIRST HOUR: Performed by: UROLOGY

## 2021-12-21 PROCEDURE — 82962 GLUCOSE BLOOD TEST: CPT | Performed by: UROLOGY

## 2021-12-21 PROCEDURE — 37000008 HC ANESTHESIA 1ST 15 MINUTES: Performed by: UROLOGY

## 2021-12-21 PROCEDURE — 63600175 PHARM REV CODE 636 W HCPCS: Performed by: STUDENT IN AN ORGANIZED HEALTH CARE EDUCATION/TRAINING PROGRAM

## 2021-12-21 PROCEDURE — 25000003 PHARM REV CODE 250: Performed by: NURSE ANESTHETIST, CERTIFIED REGISTERED

## 2021-12-21 PROCEDURE — 36000707: Performed by: UROLOGY

## 2021-12-21 PROCEDURE — 36000706: Performed by: UROLOGY

## 2021-12-21 PROCEDURE — C1787 PATIENT PROGR, NEUROSTIM: HCPCS | Performed by: UROLOGY

## 2021-12-21 PROCEDURE — D9220A PRA ANESTHESIA: Mod: CRNA,,, | Performed by: NURSE ANESTHETIST, CERTIFIED REGISTERED

## 2021-12-21 PROCEDURE — 95972 ALYS CPLX SP/PN NPGT W/PRGRM: CPT | Mod: 59,,, | Performed by: UROLOGY

## 2021-12-21 PROCEDURE — 63600175 PHARM REV CODE 636 W HCPCS: Performed by: NURSE ANESTHETIST, CERTIFIED REGISTERED

## 2021-12-21 PROCEDURE — 64590 INS/RPL PRPH SAC/GSTR NPG/R: CPT | Mod: 58,,, | Performed by: UROLOGY

## 2021-12-21 PROCEDURE — D9220A PRA ANESTHESIA: ICD-10-PCS | Mod: CRNA,,, | Performed by: NURSE ANESTHETIST, CERTIFIED REGISTERED

## 2021-12-21 PROCEDURE — 82962 GLUCOSE BLOOD TEST: CPT | Mod: 91 | Performed by: UROLOGY

## 2021-12-21 DEVICE — SYS INTERSTIM X RECHARGE FREE: Type: IMPLANTABLE DEVICE | Site: BUTTOCKS | Status: FUNCTIONAL

## 2021-12-21 RX ORDER — OXYCODONE AND ACETAMINOPHEN 5; 325 MG/1; MG/1
1 TABLET ORAL
Status: DISCONTINUED | OUTPATIENT
Start: 2021-12-21 | End: 2021-12-21 | Stop reason: HOSPADM

## 2021-12-21 RX ORDER — HALOPERIDOL 5 MG/ML
0.5 INJECTION INTRAMUSCULAR EVERY 10 MIN PRN
Status: DISCONTINUED | OUTPATIENT
Start: 2021-12-21 | End: 2021-12-21 | Stop reason: HOSPADM

## 2021-12-21 RX ORDER — KETAMINE HCL IN 0.9 % NACL 50 MG/5 ML
SYRINGE (ML) INTRAVENOUS
Status: DISCONTINUED | OUTPATIENT
Start: 2021-12-21 | End: 2021-12-21

## 2021-12-21 RX ORDER — PROPOFOL 10 MG/ML
VIAL (ML) INTRAVENOUS CONTINUOUS PRN
Status: DISCONTINUED | OUTPATIENT
Start: 2021-12-21 | End: 2021-12-21

## 2021-12-21 RX ORDER — LIDOCAINE HYDROCHLORIDE 20 MG/ML
INJECTION, SOLUTION EPIDURAL; INFILTRATION; INTRACAUDAL; PERINEURAL
Status: DISCONTINUED | OUTPATIENT
Start: 2021-12-21 | End: 2021-12-21

## 2021-12-21 RX ORDER — BUPIVACAINE HYDROCHLORIDE AND EPINEPHRINE 2.5; 5 MG/ML; UG/ML
INJECTION, SOLUTION EPIDURAL; INFILTRATION; INTRACAUDAL; PERINEURAL
Status: DISCONTINUED | OUTPATIENT
Start: 2021-12-21 | End: 2021-12-21 | Stop reason: HOSPADM

## 2021-12-21 RX ORDER — HYDROMORPHONE HYDROCHLORIDE 1 MG/ML
0.2 INJECTION, SOLUTION INTRAMUSCULAR; INTRAVENOUS; SUBCUTANEOUS EVERY 5 MIN PRN
Status: DISCONTINUED | OUTPATIENT
Start: 2021-12-21 | End: 2021-12-21 | Stop reason: HOSPADM

## 2021-12-21 RX ORDER — DEXAMETHASONE SODIUM PHOSPHATE 4 MG/ML
INJECTION, SOLUTION INTRA-ARTICULAR; INTRALESIONAL; INTRAMUSCULAR; INTRAVENOUS; SOFT TISSUE
Status: DISCONTINUED | OUTPATIENT
Start: 2021-12-21 | End: 2021-12-21

## 2021-12-21 RX ORDER — FAMOTIDINE 10 MG/ML
INJECTION INTRAVENOUS
Status: DISCONTINUED | OUTPATIENT
Start: 2021-12-21 | End: 2021-12-21

## 2021-12-21 RX ORDER — TRAMADOL HYDROCHLORIDE 50 MG/1
50 TABLET ORAL EVERY 6 HOURS PRN
Qty: 8 EACH | Refills: 0 | Status: ON HOLD | OUTPATIENT
Start: 2021-12-21 | End: 2023-04-06 | Stop reason: CLARIF

## 2021-12-21 RX ORDER — ONDANSETRON 2 MG/ML
INJECTION INTRAMUSCULAR; INTRAVENOUS
Status: DISCONTINUED | OUTPATIENT
Start: 2021-12-21 | End: 2021-12-21

## 2021-12-21 RX ORDER — MIDAZOLAM HYDROCHLORIDE 1 MG/ML
INJECTION, SOLUTION INTRAMUSCULAR; INTRAVENOUS
Status: DISCONTINUED | OUTPATIENT
Start: 2021-12-21 | End: 2021-12-21

## 2021-12-21 RX ORDER — KETOROLAC TROMETHAMINE 30 MG/ML
15 INJECTION, SOLUTION INTRAMUSCULAR; INTRAVENOUS EVERY 8 HOURS PRN
Status: DISCONTINUED | OUTPATIENT
Start: 2021-12-21 | End: 2021-12-21 | Stop reason: HOSPADM

## 2021-12-21 RX ORDER — CEFAZOLIN SODIUM 1 G/3ML
2 INJECTION, POWDER, FOR SOLUTION INTRAMUSCULAR; INTRAVENOUS
Status: COMPLETED | OUTPATIENT
Start: 2021-12-21 | End: 2021-12-21

## 2021-12-21 RX ADMIN — ONDANSETRON 4 MG: 2 INJECTION INTRAMUSCULAR; INTRAVENOUS at 09:12

## 2021-12-21 RX ADMIN — MIDAZOLAM 1 MG: 1 INJECTION INTRAMUSCULAR; INTRAVENOUS at 08:12

## 2021-12-21 RX ADMIN — LIDOCAINE HYDROCHLORIDE 100 MG: 20 INJECTION, SOLUTION EPIDURAL; INFILTRATION; INTRACAUDAL at 08:12

## 2021-12-21 RX ADMIN — DEXAMETHASONE SODIUM PHOSPHATE 4 MG: 4 INJECTION, SOLUTION INTRAMUSCULAR; INTRAVENOUS at 09:12

## 2021-12-21 RX ADMIN — Medication 20 MG: at 08:12

## 2021-12-21 RX ADMIN — CEFAZOLIN 3 G: 330 INJECTION, POWDER, FOR SOLUTION INTRAMUSCULAR; INTRAVENOUS at 08:12

## 2021-12-21 RX ADMIN — PROPOFOL 100 MCG/KG/MIN: 10 INJECTION, EMULSION INTRAVENOUS at 08:12

## 2021-12-21 RX ADMIN — GLYCOPYRROLATE 0.2 MG: 0.2 INJECTION, SOLUTION INTRAMUSCULAR; INTRAVITREAL at 08:12

## 2021-12-21 RX ADMIN — FAMOTIDINE 20 MG: 10 INJECTION, SOLUTION INTRAVENOUS at 09:12

## 2021-12-21 RX ADMIN — SODIUM CHLORIDE: 9 INJECTION, SOLUTION INTRAVENOUS at 08:12

## 2021-12-22 LAB — POCT GLUCOSE: 163 MG/DL (ref 70–110)

## 2021-12-26 ENCOUNTER — PATIENT MESSAGE (OUTPATIENT)
Dept: UROLOGY | Facility: CLINIC | Age: 71
End: 2021-12-26
Payer: MEDICARE

## 2021-12-28 ENCOUNTER — DOCUMENTATION ONLY (OUTPATIENT)
Dept: REHABILITATION | Facility: HOSPITAL | Age: 71
End: 2021-12-28
Payer: MEDICARE

## 2021-12-28 DIAGNOSIS — Z74.09 IMPAIRED FUNCTIONAL MOBILITY, BALANCE, GAIT, AND ENDURANCE: ICD-10-CM

## 2021-12-28 DIAGNOSIS — M25.551 RIGHT HIP PAIN: Primary | ICD-10-CM

## 2022-01-26 ENCOUNTER — PATIENT MESSAGE (OUTPATIENT)
Dept: ADMINISTRATIVE | Facility: OTHER | Age: 72
End: 2022-01-26
Payer: MEDICARE

## 2022-01-26 ENCOUNTER — OFFICE VISIT (OUTPATIENT)
Dept: ORTHOPEDICS | Facility: CLINIC | Age: 72
End: 2022-01-26
Payer: MEDICARE

## 2022-01-26 VITALS — BODY MASS INDEX: 42.28 KG/M2 | HEIGHT: 70 IN | WEIGHT: 295.31 LBS

## 2022-01-26 DIAGNOSIS — E66.01 MORBID OBESITY WITH BMI OF 40.0-44.9, ADULT: ICD-10-CM

## 2022-01-26 DIAGNOSIS — Z96.641 STATUS POST TOTAL HIP REPLACEMENT, RIGHT: Primary | ICD-10-CM

## 2022-01-26 PROCEDURE — 99024 PR POST-OP FOLLOW-UP VISIT: ICD-10-PCS | Mod: POP,,, | Performed by: ORTHOPAEDIC SURGERY

## 2022-01-26 PROCEDURE — 99024 POSTOP FOLLOW-UP VISIT: CPT | Mod: POP,,, | Performed by: ORTHOPAEDIC SURGERY

## 2022-01-26 PROCEDURE — 99999 PR PBB SHADOW E&M-EST. PATIENT-LVL IV: ICD-10-PCS | Mod: PBBFAC,,, | Performed by: ORTHOPAEDIC SURGERY

## 2022-01-26 PROCEDURE — 99214 OFFICE O/P EST MOD 30 MIN: CPT | Mod: PBBFAC | Performed by: ORTHOPAEDIC SURGERY

## 2022-01-26 PROCEDURE — 99999 PR PBB SHADOW E&M-EST. PATIENT-LVL IV: CPT | Mod: PBBFAC,,, | Performed by: ORTHOPAEDIC SURGERY

## 2022-01-26 RX ORDER — AMOXICILLIN 500 MG/1
TABLET, FILM COATED ORAL
Qty: 4 TABLET | Refills: 3 | Status: SHIPPED | OUTPATIENT
Start: 2022-01-26 | End: 2022-02-20 | Stop reason: SDUPTHER

## 2022-01-26 NOTE — PROGRESS NOTES
Иван Fuentes is in for 3 month follow up for a right NATALIYA.  He is doing well.  No pain in the hip.  He has resumed activities of daily living.  He is using a scooter due to his balance.  He stated he has neck problems which is related.    Exam demonstrates  A well developed male in no distress.  Alert and oriented.  Mood and affect are appropriate.    Hip incision is well healed.  There is a good, stable range of motion and leg lengths are clinically equal.  The extremity is neurovascularly intact.    Xrays demonstrate a well fixed and positioned prosthesis.  PROMIS-10 Questionnaire Scores 1/26/2022 10/18/2021 6/23/2021 11/13/2019 8/26/2019   Global Physical Health 9 15 14 12 11   Global Mental health Score 9 11 11 15 13     HOOS Jr. Questionnaire Score 1/26/2022 1/26/2022 10/18/2021 6/23/2021   HOOS Jr Score 5 6 15 14     Oxford Hip Questionnaire Score 1/26/2022 1/26/2022 10/18/2021 6/23/2021   Oxford Hip Score 35 28 19 17     Herrera Hip Questionnaire Score  1/26/2022 10/18/2021 6/23/2021   Herrera Hip Score  49 41 47     CMS Risk Assessment Questions 1/26/2022 1/26/2022 10/18/2021 6/23/2021 6/21/2021 11/13/2019 8/26/2019   What amount of pain have you experienced in the last week in your other knee/ hip? 0 1 1 3 3 Moderate Mild   My BACK PAIN at the moment is 1 0 1 0 1 Very mild None   How comfortable are you filling out medical forms by yourself? 4 4 2 4 3 Somewhat Extremely       Imp:Doing well from hip standpoint.  Balance issue limit him  ABX given for dental appointment  Dr. Frausto  F/u in 3 months with xrays

## 2022-02-10 ENCOUNTER — OFFICE VISIT (OUTPATIENT)
Dept: UROLOGY | Facility: CLINIC | Age: 72
End: 2022-02-10
Payer: MEDICARE

## 2022-02-10 VITALS
WEIGHT: 295.44 LBS | HEART RATE: 61 BPM | DIASTOLIC BLOOD PRESSURE: 65 MMHG | HEIGHT: 70 IN | BODY MASS INDEX: 42.3 KG/M2 | SYSTOLIC BLOOD PRESSURE: 133 MMHG

## 2022-02-10 DIAGNOSIS — N32.81 OAB (OVERACTIVE BLADDER): Primary | ICD-10-CM

## 2022-02-10 PROCEDURE — 99024 POSTOP FOLLOW-UP VISIT: CPT | Mod: POP,,, | Performed by: UROLOGY

## 2022-02-10 PROCEDURE — 99999 PR PBB SHADOW E&M-EST. PATIENT-LVL IV: ICD-10-PCS | Mod: PBBFAC,,, | Performed by: UROLOGY

## 2022-02-10 PROCEDURE — 99999 PR PBB SHADOW E&M-EST. PATIENT-LVL IV: CPT | Mod: PBBFAC,,, | Performed by: UROLOGY

## 2022-02-10 PROCEDURE — 99214 OFFICE O/P EST MOD 30 MIN: CPT | Mod: PBBFAC | Performed by: UROLOGY

## 2022-02-10 PROCEDURE — 99024 PR POST-OP FOLLOW-UP VISIT: ICD-10-PCS | Mod: POP,,, | Performed by: UROLOGY

## 2022-02-10 NOTE — PROGRESS NOTES
[facility]      Overactive Bladder Return Note Postoperative Visit SNM    2/10/2022    Patient Name: Иван Fuentes  Referred by:No ref. provider found      Previous Therapies    He followed up with Dr. Razo at [facility] who performed a TURP for BPH which alleviated his retention symptoms. Approximately 3 years ago, the patient began having symptoms of urgency and frequency (8-10 times per day) with some urgency incontinence requiring 2-3 adult diapers per day due to incontinence episodes. He has been on anticholinergic therapy for this with inadequate results, and followed up with urology in [place], [place] who performed urodynamics testing although he is not sure of the results. He ultimately underwent for botox therapy to the bladder while in [place]. He reports partial improvement following Botox (100 units) that only lasted 4-5 months with only modest improvement.     Six weeks ago, he underwent staged testing of SNM. The patient recorded at least a 50% decrease in incontinence episodes averaged daily over a 3-day diary improving from 2-3 episodes to zero per day.  He continues with this improvement, with only occasional stress incontinence. No urgency incontinence.     He has increased to 2.0 mA.     Incision is without evidence of infection.     Return in 6 weeks for continued evaluation.

## 2022-02-11 ENCOUNTER — HOSPITAL ENCOUNTER (OUTPATIENT)
Dept: RADIOLOGY | Facility: HOSPITAL | Age: 72
Discharge: HOME | End: 2022-02-11
Attending: ORTHOPAEDIC SURGERY
Payer: MEDICARE

## 2022-02-11 ENCOUNTER — OFFICE VISIT (OUTPATIENT)
Dept: ORTHOPEDICS | Facility: CLINIC | Age: 72
End: 2022-02-11
Payer: MEDICARE

## 2022-02-11 VITALS — HEIGHT: 70 IN | WEIGHT: 295 LBS | BODY MASS INDEX: 42.23 KG/M2

## 2022-02-11 DIAGNOSIS — M48.02 SPINAL STENOSIS, CERVICAL REGION: ICD-10-CM

## 2022-02-11 DIAGNOSIS — G95.9 CERVICAL MYELOPATHY: Primary | ICD-10-CM

## 2022-02-11 DIAGNOSIS — M51.36 DDD (DEGENERATIVE DISC DISEASE), LUMBAR: ICD-10-CM

## 2022-02-11 PROCEDURE — 99999 PR PBB SHADOW E&M-EST. PATIENT-LVL III: CPT | Mod: PBBFAC,,, | Performed by: ORTHOPAEDIC SURGERY

## 2022-02-11 PROCEDURE — 99213 OFFICE O/P EST LOW 20 MIN: CPT | Mod: PBBFAC | Performed by: ORTHOPAEDIC SURGERY

## 2022-02-11 PROCEDURE — 99999 PR PBB SHADOW E&M-EST. PATIENT-LVL III: ICD-10-PCS | Mod: PBBFAC,,, | Performed by: ORTHOPAEDIC SURGERY

## 2022-02-11 PROCEDURE — 72110 X-RAY EXAM L-2 SPINE 4/>VWS: CPT | Mod: 26,,, | Performed by: RADIOLOGY

## 2022-02-11 PROCEDURE — 72110 XR LUMBAR SPINE AP AND LAT WITH FLEX/EXT: ICD-10-PCS | Mod: 26,,, | Performed by: RADIOLOGY

## 2022-02-11 PROCEDURE — 72110 X-RAY EXAM L-2 SPINE 4/>VWS: CPT | Mod: TC

## 2022-02-11 PROCEDURE — 99214 OFFICE O/P EST MOD 30 MIN: CPT | Mod: S$PBB,,, | Performed by: ORTHOPAEDIC SURGERY

## 2022-02-11 PROCEDURE — 99214 PR OFFICE/OUTPT VISIT, EST, LEVL IV, 30-39 MIN: ICD-10-PCS | Mod: S$PBB,,, | Performed by: ORTHOPAEDIC SURGERY

## 2022-02-14 NOTE — PROGRESS NOTES
DATE: 2/14/2022  PATIENT: Иван Fuentes    Attending Physician: Vernon Frausto M.D.    CHIEF COMPLAINT:  Establish care    HISTORY:  The patient presents for evaluation.  He has a history of a C3-6 posterior cervical spine fusion with Dr. Zarate in 2014 after a episode rapidly progressive paraparesis.  After the surgery he was in a wheelchair for 10 months, and required significant rehabilitation.  Since then, he is able to walk with a walker.  He has a bladder stimulator for chronic urinary incontinence.  He has no fecal incontinence.  Generally he uses mobility scooter but this is required because he reports he is lazy history of complaint today is that his balance is not right.  Of note he does have diabetic neuropathy.  He has not recently been to physical therapy    The Patient denies myelopathic symptoms such as handwriting changes or difficulty with buttons/coins/keys. Denies perineal paresthesias, bowel/bladder dysfunction.    PAST MEDICAL/SURGICAL HISTORY:  Past Medical History:   Diagnosis Date    Anticoagulant long-term use     Arthritis of both knees     BPH (benign prostatic hyperplasia)     COPD (chronic obstructive pulmonary disease) 5/3/2021    CVA (cerebral vascular accident)     Deep vein thrombosis     Diabetes mellitus     Diabetes mellitus, type 2     High cholesterol     Hypertension     Left-sided weakness     Obese     Pneumonia due to COVID-19 virus 07/01/2020    Sleep apnea     Thyroid disease     Urosepsis      Past Surgical History:   Procedure Laterality Date    BACK SURGERY      HIP ARTHROPLASTY      HIP ARTHROPLASTY Right 10/28/2021    Procedure: ARTHROPLASTY, HIP;  Surgeon: Ever Hall MD;  Location: [facility] OR [address] [place] [address];  Service: Orthopedics;  Laterality: Right;    IMPLANTATION OF PERMANENT SACRAL NERVE STIMULATOR Right 12/21/2021    Procedure: INSERTION, NEUROSTIMULATOR, PERMANENT, SACRAL;  Surgeon: Erickson Magana MD;  Location: [facility] OR [address] [place] [address];   Service: Urology;  Laterality: Right;    INJECTION OF ANESTHETIC AGENT AROUND NERVE Right 11/19/2020    Procedure: BLOCK, NERVE, FEMORAL AND OBTURATOR;  Surgeon: Dania Garcia MD;  Location: [facility] PAIN MGT;  Service: Pain Management;  Laterality: Right;    INJECTION OF JOINT Right 3/23/2021    Procedure: INJECTION, JOINT, HIP Pt. can continue Eliquis per provider.;  Surgeon: Dania Garcia MD;  Location: [facility] PAIN MGT;  Service: Pain Management;  Laterality: Right;    JOINT REPLACEMENT      PERCUTANEOUS CRYOTHERAPY OF PERIPHERAL NERVE USING LIQUID NITROUS OXIDE IN CLOSED NEEDLE DEVICE Left 8/26/2019    Procedure: CRYOTHERAPY, NERVE, PERIPHERAL, PERCUTANEOUS, USING LIQUID NITROUS OXIDE IN CLOSED NEEDLE DEVICE LEFT KNEE SIMON;  Surgeon: MENG Beckwith;  Location: [facility] CATH LAB;  Service: Pain Management;  Laterality: Left;    PROSTATE SURGERY  2015    RADIOFREQUENCY ABLATION Right 1/12/2021    Procedure: COOLED OBTURTOR FEMORAL ,needconsent;  Surgeon: Dania Garcia MD;  Location: [facility] PAIN MGT;  Service: Pain Management;  Laterality: Right;    SPINAL FUSION  09/2014    TONSILLECTOMY      TOTAL KNEE ARTHROPLASTY Left 9/3/2019    Procedure: ARTHROPLASTY, KNEE, TOTAL-SANDIP;  Surgeon: Ever Hall MD;  Location: [address];  Service: Orthopedics;  Laterality: Left;       Current Medications:   Current Outpatient Medications:     acetaminophen (TYLENOL) 650 MG TbSR, Take 1 tablet (650 mg total) by mouth every 8 (eight) hours., Disp: 125 tablet, Rfl: 0    albuterol (VENTOLIN HFA) 90 mcg/actuation inhaler, Inhale 2 puffs into the lungs every 6 (six) hours as needed for Wheezing. Rescue, Disp: 18 g, Rfl: 0    amoxicillin (AMOXIL) 500 MG Tab, Take 4 pills one hour prior to appointment, Disp: 4 tablet, Rfl: 3    apixaban (ELIQUIS) 5 mg Tab, Take 1 tablet (5 mg total) by mouth 2 (two) times daily., Disp: 60 tablet, Rfl: 3    atorvastatin (LIPITOR) 80 MG tablet, Take 80 mg by  mouth once daily., Disp: , Rfl:     augmented betamethasone dipropionate (DIPROLENE-AF) 0.05 % cream, , Disp: , Rfl:     canagliflozin (INVOKANA) 100 mg Tab tablet, Take 100 mg by mouth once daily., Disp: , Rfl:     ciprofloxacin HCl (CIPRO ORAL), Take by mouth 2 (two) times a day., Disp: , Rfl:     diclofenac sodium (VOLTAREN) 1 % Gel, Apply 2 g topically 3 (three) times daily., Disp: 1 Tube, Rfl: 2    diphenhydrAMINE-acetaminophen (TYLENOL PM)  mg Tab, Take 1 tablet by mouth nightly as needed. Pain, sleep, Disp: , Rfl:     docusate sodium (STOOL SOFTENER ORAL), Take by mouth every evening., Disp: , Rfl:     EScitalopram oxalate (LEXAPRO) 20 MG tablet, Take 20 mg by mouth once daily., Disp: , Rfl:     fluticasone (FLONASE) 50 mcg/actuation nasal spray, 1 spray every evening. , Disp: , Rfl:     fluticasone-umeclidin-vilanter (TRELEGY ELLIPTA) 100-62.5-25 mcg DsDv, Inhale 1 puff into the lungs once daily., Disp: , Rfl:     furosemide (LASIX) 40 MG tablet, 40 mg daily as needed. , Disp: , Rfl:     levothyroxine (SYNTHROID) 50 MCG tablet, Take 50 mcg by mouth once daily., Disp: , Rfl:     lisinopril (PRINIVIL,ZESTRIL) 40 MG tablet, Take 40 mg by mouth once daily. , Disp: , Rfl:     LYRICA 100 mg capsule, Take 100 mg by mouth 2 (two) times daily., Disp: , Rfl: 5    magnesium 250 mg Tab, Take by mouth every evening., Disp: , Rfl:     melatonin 10 mg Cap, Take by mouth every evening., Disp: , Rfl:     metformin (GLUCOPHAGE) 500 MG tablet, Take 500 mg by mouth 2 (two) times daily with meals., Disp: , Rfl:     oxyCODONE (ROXICODONE) 5 MG immediate release tablet, Take 1 tablet (5 mg total) by mouth every 4 (four) hours as needed for Pain., Disp: 30 tablet, Rfl: 0    semaglutide (OZEMPIC) 0.25 mg or 0.5 mg(2 mg/1.5 mL) pen injector, Inject 0.5 mg into the skin every 7 days., Disp: 1 pen, Rfl: 0    traMADoL (ULTRAM) 50 mg tablet, Take 1 tablet (50 mg total) by mouth every 6 (six) hours as needed for  "Pain., Disp: 8 each, Rfl: 0    umeclidinium (INCRUSE ELLIPTA) 62.5 mcg/actuation inhalation capsule, Inhale 62.5 mcg into the lungs 2 (two) times a day. Controller, Disp: , Rfl:     Social History:   Social History     Socioeconomic History    Marital status:    Tobacco Use    Smoking status: Former Smoker     Packs/day: 0.50     Types: Cigarettes    Smokeless tobacco: Never Used    Tobacco comment: pt quit 3 weeks ago   Substance and Sexual Activity    Alcohol use: Not Currently     Comment: history of alcohol excess until 2011    Drug use: No        EXAM:  Ht 5' 10" (1.778 m)   Wt 133.8 kg (295 lb)   BMI 42.33 kg/m²     Gait: Normal station and gait, no difficulty with toe or heel walk.   Skin: Cervical skin negative for rashes, lesions, hairy patches, there is well-healed posterior midline scar  Range of motion: Cervical range of motion is acceptable. There is no significant  Spinal Balance: Global saggital and coronal spinal balance acceptable, no significant for scoliosis and kyphosis.  Musculoskeletal: No pain with the range of motion of the bilateral shoulders and elbows. Normal bulk and contour of the bilateral hands.  Deep tendon reflexes symmetric brisk 2 + in the bilateral upper and lower extremities.  Positive nverted Radial Reflex and Garcia's bilaterally. Negative Babinski bilaterally.     IMAGING:   Today I personally reviewed AP, Lat and Flex/Ex  upright C-spine films that demonstrate status post C 3-6 posterior spinal fusion.  There is no evidence complication, all these are old films.  Radiographs his toes demonstrate bilateral total hip replacement involving bladder stimulator.    Body mass index is 42.33 kg/m².  Hemoglobin A1C   Date Value Ref Range Status   10/21/2021 6.8 (H) 4.0 - 5.6 % Final     Comment:     ADA Screening Guidelines:  5.7-6.4%  Consistent with prediabetes  >or=6.5%  Consistent with diabetes    High levels of fetal hemoglobin interfere with the HbA1C  assay. " Heterozygous hemoglobin variants (HbS, HgC, etc)do  not significantly interfere with this assay.   However, presence of multiple variants may affect accuracy.     04/19/2021 6.2 (H) 4.0 - 5.6 % Final     Comment:     ADA Screening Guidelines:  5.7-6.4%  Consistent with prediabetes  >or=6.5%  Consistent with diabetes    High levels of fetal hemoglobin interfere with the HbA1C  assay. Heterozygous hemoglobin variants (HbS, HgC, etc)do  not significantly interfere with this assay.   However, presence of multiple variants may affect accuracy.     08/26/2019 5.8 (H) 4.0 - 5.6 % Final     Comment:     ADA Screening Guidelines:  5.7-6.4%  Consistent with prediabetes  >or=6.5%  Consistent with diabetes  High levels of fetal hemoglobin interfere with the HbA1C  assay. Heterozygous hemoglobin variants (HbS, HgC, etc)do  not significantly interfere with this assay.   However, presence of multiple variants may affect accuracy.         ASSESSMENT/PLAN:  Cervical myelopathy  -     Creatinine, serum; Future; Expected date: 02/11/2022    Spinal stenosis, cervical region  -     MRI Cervical Spine W WO Cont; Future; Expected date: 02/11/2022  -     Creatinine, serum; Future; Expected date: 02/11/2022    Today we discussed options, he needs no imaging of cervical spine particularly an MRI with and without contrast, as well as new cervical radiographs.  I will see him back after the results.

## 2022-02-15 ENCOUNTER — PATIENT MESSAGE (OUTPATIENT)
Dept: BARIATRICS | Facility: CLINIC | Age: 72
End: 2022-02-15
Payer: MEDICARE

## 2022-02-16 ENCOUNTER — PATIENT MESSAGE (OUTPATIENT)
Dept: UROLOGY | Facility: CLINIC | Age: 72
End: 2022-02-16
Payer: MEDICARE

## 2022-02-19 ENCOUNTER — PATIENT MESSAGE (OUTPATIENT)
Dept: ORTHOPEDICS | Facility: CLINIC | Age: 72
End: 2022-02-19
Payer: MEDICARE

## 2022-02-20 ENCOUNTER — PATIENT MESSAGE (OUTPATIENT)
Dept: UROLOGY | Facility: CLINIC | Age: 72
End: 2022-02-20
Payer: MEDICARE

## 2022-02-20 DIAGNOSIS — Z96.649 STATUS POST HIP REPLACEMENT, UNSPECIFIED LATERALITY: Primary | ICD-10-CM

## 2022-02-20 RX ORDER — AMOXICILLIN 500 MG/1
TABLET, FILM COATED ORAL
Qty: 4 TABLET | Refills: 3 | Status: SHIPPED | OUTPATIENT
Start: 2022-02-20 | End: 2022-04-17 | Stop reason: SDUPTHER

## 2022-03-08 ENCOUNTER — TELEPHONE (OUTPATIENT)
Dept: ORTHOPEDICS | Facility: CLINIC | Age: 72
End: 2022-03-08
Payer: MEDICARE

## 2022-03-08 NOTE — TELEPHONE ENCOUNTER
----- Message from Ruby Valadez MA sent at 3/8/2022 12:51 PM CST -----  Regarding: FW: Refill  Contact: Ignacia ALVARENGA @ [phone]    ----- Message -----  From: Aracelis Torres  Sent: 3/8/2022  12:46 PM CST  To: Mani [facility] Staff  Subject: Refill                                           Rx Refill/Request    Is this a Refill or New Rx: refill    Rx Name and Strength:apixaban (ELIQUIS) 5 mg Tab    Preferred Pharmacy with phone number:   [facility] - [place], [place] - [address]  [address] [place] [address]  Phone: [phone] Fax: [phone]    Communication Preference: Ignacia ALVARENGA @ [phone]    Additional Information: [facility]

## 2022-03-08 NOTE — TELEPHONE ENCOUNTER
Spoke to [facility], per Charissa advised we only control Eliquis 3 months post op. He will need to reach out to PCP or cardiology for further instruction.     ----- Message from Aracelis Torres sent at 3/8/2022 12:44 PM CST -----  Regarding: Refill  Contact: Ignacia ALVARENGA @ [phone]  Rx Refill/Request    Is this a Refill or New Rx: refill    Rx Name and Strength:apixaban (ELIQUIS) 5 mg Tab    Preferred Pharmacy with phone number:   [facility] - [place], [place] - [address]  [address] [place] [address]  Phone: [phone] Fax: [phone]    Communication Preference: Ignacia ALVARENGA @ [phone]    Additional Information: [facility]

## 2022-03-08 NOTE — TELEPHONE ENCOUNTER
Returned call to Ignacia regarding the eliquis. He has completed the eliquis from our standpoint and can resume previous dosing by Dr. jackson.

## 2022-03-17 DIAGNOSIS — R91.1 PULMONARY NODULE: Primary | ICD-10-CM

## 2022-03-27 ENCOUNTER — PATIENT MESSAGE (OUTPATIENT)
Dept: UROLOGY | Facility: CLINIC | Age: 72
End: 2022-03-27
Payer: MEDICARE

## 2022-04-13 ENCOUNTER — PATIENT MESSAGE (OUTPATIENT)
Dept: ORTHOPEDICS | Facility: CLINIC | Age: 72
End: 2022-04-13
Payer: MEDICARE

## 2022-04-13 ENCOUNTER — HOSPITAL ENCOUNTER (OUTPATIENT)
Dept: RADIOLOGY | Facility: HOSPITAL | Age: 72
Discharge: HOME | End: 2022-04-13
Attending: ORTHOPAEDIC SURGERY
Payer: MEDICARE

## 2022-04-13 ENCOUNTER — HOSPITAL ENCOUNTER (OUTPATIENT)
Dept: RADIOLOGY | Facility: HOSPITAL | Age: 72
Discharge: HOME | End: 2022-04-13
Attending: PHYSICIAN ASSISTANT
Payer: MEDICARE

## 2022-04-13 ENCOUNTER — OFFICE VISIT (OUTPATIENT)
Dept: UROLOGY | Facility: CLINIC | Age: 72
End: 2022-04-13
Payer: MEDICARE

## 2022-04-13 VITALS
HEART RATE: 62 BPM | BODY MASS INDEX: 42.23 KG/M2 | DIASTOLIC BLOOD PRESSURE: 72 MMHG | HEIGHT: 70 IN | SYSTOLIC BLOOD PRESSURE: 104 MMHG | WEIGHT: 295 LBS

## 2022-04-13 DIAGNOSIS — N39.41 URGENCY INCONTINENCE: ICD-10-CM

## 2022-04-13 DIAGNOSIS — M50.30 DDD (DEGENERATIVE DISC DISEASE), CERVICAL: ICD-10-CM

## 2022-04-13 DIAGNOSIS — M48.02 SPINAL STENOSIS, CERVICAL REGION: ICD-10-CM

## 2022-04-13 DIAGNOSIS — N32.81 OAB (OVERACTIVE BLADDER): Primary | ICD-10-CM

## 2022-04-13 DIAGNOSIS — M50.30 DDD (DEGENERATIVE DISC DISEASE), CERVICAL: Primary | ICD-10-CM

## 2022-04-13 PROCEDURE — 99213 OFFICE O/P EST LOW 20 MIN: CPT | Mod: PBBFAC | Performed by: UROLOGY

## 2022-04-13 PROCEDURE — 99214 OFFICE O/P EST MOD 30 MIN: CPT | Mod: S$PBB,,, | Performed by: UROLOGY

## 2022-04-13 PROCEDURE — 99999 PR PBB SHADOW E&M-EST. PATIENT-LVL III: ICD-10-PCS | Mod: PBBFAC,,, | Performed by: UROLOGY

## 2022-04-13 PROCEDURE — 99999 PR PBB SHADOW E&M-EST. PATIENT-LVL III: CPT | Mod: PBBFAC,,, | Performed by: UROLOGY

## 2022-04-13 PROCEDURE — 99214 PR OFFICE/OUTPT VISIT, EST, LEVL IV, 30-39 MIN: ICD-10-PCS | Mod: S$PBB,,, | Performed by: UROLOGY

## 2022-04-13 RX ORDER — IBUPROFEN 100 MG/5ML
1000 SUSPENSION, ORAL (FINAL DOSE FORM) ORAL 3 TIMES DAILY
Qty: 90 TABLET | Refills: 6 | Status: ON HOLD | COMMUNITY
Start: 2022-04-13 | End: 2023-04-06 | Stop reason: CLARIF

## 2022-04-13 RX ORDER — METHENAMINE HIPPURATE 1000 MG/1
1 TABLET ORAL 2 TIMES DAILY
Qty: 60 TABLET | Refills: 6 | Status: ON HOLD | OUTPATIENT
Start: 2022-04-13 | End: 2023-04-06 | Stop reason: CLARIF

## 2022-04-13 NOTE — PROGRESS NOTES
[facility]        Overactive Bladder Return Note Postoperative Visit SNM     4/13/2022     Patient Name: Иван Fuentes  Referred by:No ref. provider found                       He followed up with Dr. Razo at [facility] who performed a TURP for BPH which alleviated his retention symptoms. Approximately 3 years ago, the patient began having symptoms of urgency and frequency (8-10 times per day) with some urgency incontinence requiring 2-3 adult diapers per day due to incontinence episodes. He has been on anticholinergic therapy for this with inadequate results, and followed up with urology in [place], [place] who performed urodynamics testing although he is not sure of the results. He ultimately underwent for botox therapy to the bladder while in [place]. He reports partial improvement following Botox (100 units) that only lasted 4-5 months with only modest improvement.      5 months ago, he underwent staged testing of SNM. The patient recorded at least a 50% decrease in incontinence episodes averaged daily over a 3-day diary improving from 2-3 episodes to zero per day.  He continued with that improvement with only occasional stress incontinence and no urgency incontinence at his 6-8 week visit. However, today, he notes return to several incontinence episodes per day (though he often only changes the pad once per day).      He did not have his  with him today but was at 3.0 on Program 2. He was switched to Program 4 with rectal sensation at 1.3. We will try this new program and he will reach out to me to let me know if it is working better.       He reports recent symptomatic UTI. He is already on D-mannose and is complying with that therapy. We will try switching to Hipprex and vitamin C.     Plan to see him back in 6-8 weeks.

## 2022-04-16 ENCOUNTER — PATIENT MESSAGE (OUTPATIENT)
Dept: UROLOGY | Facility: CLINIC | Age: 72
End: 2022-04-16
Payer: MEDICARE

## 2022-04-16 ENCOUNTER — PATIENT MESSAGE (OUTPATIENT)
Dept: ORTHOPEDICS | Facility: CLINIC | Age: 72
End: 2022-04-16
Payer: MEDICARE

## 2022-04-17 DIAGNOSIS — Z96.649 STATUS POST HIP REPLACEMENT, UNSPECIFIED LATERALITY: ICD-10-CM

## 2022-04-17 RX ORDER — AMOXICILLIN 500 MG/1
TABLET, FILM COATED ORAL
Qty: 4 TABLET | Refills: 3 | Status: ON HOLD | OUTPATIENT
Start: 2022-04-17 | End: 2023-04-06 | Stop reason: CLARIF

## 2022-04-26 DIAGNOSIS — Z96.641 STATUS POST TOTAL HIP REPLACEMENT, RIGHT: Primary | ICD-10-CM

## 2022-04-27 ENCOUNTER — HOSPITAL ENCOUNTER (OUTPATIENT)
Dept: RADIOLOGY | Facility: HOSPITAL | Age: 72
Discharge: HOME | End: 2022-04-27
Attending: ORTHOPAEDIC SURGERY
Payer: MEDICARE

## 2022-04-27 ENCOUNTER — OFFICE VISIT (OUTPATIENT)
Dept: ORTHOPEDICS | Facility: CLINIC | Age: 72
End: 2022-04-27
Payer: MEDICARE

## 2022-04-27 VITALS — WEIGHT: 295.44 LBS | BODY MASS INDEX: 42.3 KG/M2 | HEIGHT: 70 IN

## 2022-04-27 DIAGNOSIS — Z96.641 STATUS POST TOTAL HIP REPLACEMENT, RIGHT: Primary | ICD-10-CM

## 2022-04-27 DIAGNOSIS — M25.551 RIGHT HIP PAIN: ICD-10-CM

## 2022-04-27 DIAGNOSIS — Z96.641 STATUS POST TOTAL HIP REPLACEMENT, RIGHT: ICD-10-CM

## 2022-04-27 PROCEDURE — 73502 XR HIP WITH PELVIS WHEN PERFORMED, 2 OR 3  VIEWS RIGHT: ICD-10-PCS | Mod: 26,RT,, | Performed by: RADIOLOGY

## 2022-04-27 PROCEDURE — 73502 X-RAY EXAM HIP UNI 2-3 VIEWS: CPT | Mod: TC,RT

## 2022-04-27 PROCEDURE — 99999 PR PBB SHADOW E&M-EST. PATIENT-LVL IV: ICD-10-PCS | Mod: PBBFAC,,, | Performed by: ORTHOPAEDIC SURGERY

## 2022-04-27 PROCEDURE — 99212 OFFICE O/P EST SF 10 MIN: CPT | Mod: S$PBB,,, | Performed by: ORTHOPAEDIC SURGERY

## 2022-04-27 PROCEDURE — 99999 PR PBB SHADOW E&M-EST. PATIENT-LVL IV: CPT | Mod: PBBFAC,,, | Performed by: ORTHOPAEDIC SURGERY

## 2022-04-27 PROCEDURE — 99214 OFFICE O/P EST MOD 30 MIN: CPT | Mod: PBBFAC | Performed by: ORTHOPAEDIC SURGERY

## 2022-04-27 PROCEDURE — 99212 PR OFFICE/OUTPT VISIT, EST, LEVL II, 10-19 MIN: ICD-10-PCS | Mod: S$PBB,,, | Performed by: ORTHOPAEDIC SURGERY

## 2022-04-27 PROCEDURE — 73502 X-RAY EXAM HIP UNI 2-3 VIEWS: CPT | Mod: 26,RT,, | Performed by: RADIOLOGY

## 2022-04-27 NOTE — PROGRESS NOTES
Иван Fuentes is in for 6 week follow up for a right NATALIYA.  He is doing well.  No pain in the hip.  He has not been as active as he has been..      Exam demonstrates  A well developed male in no distress.  Alert and oriented.  Mood and affect are appropriate.    Hip incision is well healed.  There is a good, stable range of motion and leg lengths are clinically equal.  The extremity is neurovascularly intact.    Xrays demonstrate a well fixed and positioned prosthesis.    Imp:Doing well      F/u in 6 months with xrays and PROMS

## 2022-05-20 ENCOUNTER — PATIENT MESSAGE (OUTPATIENT)
Dept: UROLOGY | Facility: CLINIC | Age: 72
End: 2022-05-20
Payer: MEDICARE

## 2022-05-25 ENCOUNTER — TELEPHONE (OUTPATIENT)
Dept: PAIN MEDICINE | Facility: CLINIC | Age: 72
End: 2022-05-25
Payer: MEDICARE

## 2022-05-25 NOTE — TELEPHONE ENCOUNTER
Staff contacted Estefania at [facility] and provided her with medical records contact info:     [facility] [address] [place] [address]  Phone: [phone]  Fax: [phone]

## 2022-05-25 NOTE — TELEPHONE ENCOUNTER
----- Message from Madai Harper sent at 5/25/2022  9:26 AM CDT -----  Type: Requesting to speak with nurse         Who Called: Estefania CORONA   Regarding:  [employer] on the line asking about a medical record request that was sent to the office asking if yall received it- sent request sent on 5/17 to [phone] & another request sent on  5/23  to [phone]- please advise   Would the patient rather a call back or a response via [facility]? Call back  Best Call Back Number: [phone]  Additional Information: fax # [phone]

## 2022-06-01 ENCOUNTER — PATIENT MESSAGE (OUTPATIENT)
Dept: UROLOGY | Facility: CLINIC | Age: 72
End: 2022-06-01
Payer: MEDICARE

## 2022-06-05 ENCOUNTER — PATIENT MESSAGE (OUTPATIENT)
Dept: UROLOGY | Facility: CLINIC | Age: 72
End: 2022-06-05
Payer: MEDICARE

## 2022-08-03 ENCOUNTER — PATIENT MESSAGE (OUTPATIENT)
Dept: BARIATRICS | Facility: CLINIC | Age: 72
End: 2022-08-03
Payer: MEDICARE

## 2022-10-06 ENCOUNTER — PATIENT MESSAGE (OUTPATIENT)
Dept: BARIATRICS | Facility: CLINIC | Age: 72
End: 2022-10-06
Payer: MEDICARE

## 2023-02-22 ENCOUNTER — PATIENT MESSAGE (OUTPATIENT)
Dept: BARIATRICS | Facility: CLINIC | Age: 73
End: 2023-02-22
Payer: MEDICARE

## 2023-04-06 ENCOUNTER — HOSPITAL ENCOUNTER (OUTPATIENT)
Facility: HOSPITAL | Age: 73
Discharge: HOME | End: 2023-04-08
Attending: EMERGENCY MEDICINE | Admitting: INTERNAL MEDICINE
Payer: MEDICARE

## 2023-04-06 DIAGNOSIS — E83.52 HYPERCALCEMIA: ICD-10-CM

## 2023-04-06 DIAGNOSIS — R53.1 WEAKNESS: ICD-10-CM

## 2023-04-06 DIAGNOSIS — E11.40 TYPE 2 DIABETES MELLITUS WITH DIABETIC NEUROPATHY, WITHOUT LONG-TERM CURRENT USE OF INSULIN: Primary | ICD-10-CM

## 2023-04-06 DIAGNOSIS — R07.9 CHEST PAIN: ICD-10-CM

## 2023-04-06 DIAGNOSIS — E86.0 DEHYDRATION: ICD-10-CM

## 2023-04-06 DIAGNOSIS — N39.0 URINARY TRACT INFECTION WITHOUT HEMATURIA, SITE UNSPECIFIED: ICD-10-CM

## 2023-04-06 DIAGNOSIS — N17.9 AKI (ACUTE KIDNEY INJURY): ICD-10-CM

## 2023-04-06 PROBLEM — R59.0 MEDIASTINAL LYMPHADENOPATHY: Status: ACTIVE | Noted: 2023-04-06

## 2023-04-06 PROBLEM — I73.9 PERIPHERAL ARTERIAL DISEASE: Status: ACTIVE | Noted: 2023-04-06

## 2023-04-06 PROBLEM — R47.81 SLURRED SPEECH: Status: ACTIVE | Noted: 2023-04-06

## 2023-04-06 PROBLEM — N30.00 ACUTE CYSTITIS WITHOUT HEMATURIA: Status: ACTIVE | Noted: 2023-04-06

## 2023-04-06 PROBLEM — G47.33 OSA ON CPAP: Status: ACTIVE | Noted: 2019-08-26

## 2023-04-06 PROBLEM — S99.921A INJURY OF RIGHT GREAT TOE: Status: ACTIVE | Noted: 2023-04-06

## 2023-04-06 LAB
ALBUMIN SERPL BCP-MCNC: 3.5 G/DL (ref 3.5–5.2)
ALLENS TEST: ABNORMAL
ALP SERPL-CCNC: 81 U/L (ref 55–135)
ALT SERPL W/O P-5'-P-CCNC: 16 U/L (ref 10–44)
ANION GAP SERPL CALC-SCNC: 12 MMOL/L (ref 8–16)
AST SERPL-CCNC: 15 U/L (ref 10–40)
BACTERIA #/AREA URNS HPF: ABNORMAL /HPF
BASOPHILS # BLD AUTO: 0.11 K/UL (ref 0–0.2)
BASOPHILS NFR BLD: 1 % (ref 0–1.9)
BILIRUB SERPL-MCNC: 0.3 MG/DL (ref 0.1–1)
BILIRUB UR QL STRIP: NEGATIVE
BNP SERPL-MCNC: 17 PG/ML (ref 0–99)
BUN SERPL-MCNC: 53 MG/DL (ref 8–23)
CALCIUM SERPL-MCNC: 12.4 MG/DL (ref 8.7–10.5)
CHLORIDE SERPL-SCNC: 104 MMOL/L (ref 95–110)
CLARITY UR: CLEAR
CO2 SERPL-SCNC: 21 MMOL/L (ref 23–29)
COLOR UR: YELLOW
CREAT SERPL-MCNC: 1.9 MG/DL (ref 0.5–1.4)
DELSYS: ABNORMAL
DIFFERENTIAL METHOD: ABNORMAL
EOSINOPHIL # BLD AUTO: 0.5 K/UL (ref 0–0.5)
EOSINOPHIL NFR BLD: 4.8 % (ref 0–8)
ERYTHROCYTE [DISTWIDTH] IN BLOOD BY AUTOMATED COUNT: 14 % (ref 11.5–14.5)
EST. GFR  (NO RACE VARIABLE): 36.8 ML/MIN/1.73 M^2
GLUCOSE SERPL-MCNC: 146 MG/DL (ref 70–110)
GLUCOSE UR QL STRIP: ABNORMAL
HCO3 UR-SCNC: 8.2 MMOL/L (ref 24–28)
HCT VFR BLD AUTO: 50.1 % (ref 40–54)
HGB BLD-MCNC: 16 G/DL (ref 14–18)
HGB UR QL STRIP: NEGATIVE
HYALINE CASTS #/AREA URNS LPF: 3 /LPF
IMM GRANULOCYTES # BLD AUTO: 0.07 K/UL (ref 0–0.04)
IMM GRANULOCYTES NFR BLD AUTO: 0.7 % (ref 0–0.5)
KETONES UR QL STRIP: ABNORMAL
LACTATE SERPL-SCNC: 2.8 MMOL/L (ref 0.5–2.2)
LACTATE SERPL-SCNC: 3.3 MMOL/L (ref 0.5–2.2)
LEUKOCYTE ESTERASE UR QL STRIP: ABNORMAL
LYMPHOCYTES # BLD AUTO: 3.1 K/UL (ref 1–4.8)
LYMPHOCYTES NFR BLD: 29.1 % (ref 18–48)
MAGNESIUM SERPL-MCNC: 1.9 MG/DL (ref 1.6–2.6)
MCH RBC QN AUTO: 31.6 PG (ref 27–31)
MCHC RBC AUTO-ENTMCNC: 31.9 G/DL (ref 32–36)
MCV RBC AUTO: 99 FL (ref 82–98)
MICROSCOPIC COMMENT: ABNORMAL
MONOCYTES # BLD AUTO: 1.1 K/UL (ref 0.3–1)
MONOCYTES NFR BLD: 10.2 % (ref 4–15)
NEUTROPHILS # BLD AUTO: 5.7 K/UL (ref 1.8–7.7)
NEUTROPHILS NFR BLD: 54.2 % (ref 38–73)
NITRITE UR QL STRIP: NEGATIVE
NRBC BLD-RTO: 0 /100 WBC
PCO2 BLDA: 18.5 MMHG (ref 35–45)
PH SMN: 7.26 [PH] (ref 7.35–7.45)
PH UR STRIP: 5 [PH] (ref 5–8)
PHOSPHATE SERPL-MCNC: 5.1 MG/DL (ref 2.7–4.5)
PLATELET # BLD AUTO: 211 K/UL (ref 150–450)
PMV BLD AUTO: 10.7 FL (ref 9.2–12.9)
PO2 BLDA: 35 MMHG (ref 40–60)
POC BE: -19 MMOL/L
POC SATURATED O2: 61 % (ref 95–100)
POC TCO2: 9 MMOL/L (ref 24–29)
POCT GLUCOSE: 184 MG/DL (ref 70–110)
POTASSIUM SERPL-SCNC: 4.7 MMOL/L (ref 3.5–5.1)
PROT SERPL-MCNC: 7.5 G/DL (ref 6–8.4)
PROT UR QL STRIP: ABNORMAL
RBC # BLD AUTO: 5.06 M/UL (ref 4.6–6.2)
RBC #/AREA URNS HPF: 10 /HPF (ref 0–4)
SAMPLE: ABNORMAL
SITE: ABNORMAL
SODIUM SERPL-SCNC: 137 MMOL/L (ref 136–145)
SP GR UR STRIP: 1.02 (ref 1–1.03)
URN SPEC COLLECT METH UR: ABNORMAL
UROBILINOGEN UR STRIP-ACNC: NEGATIVE EU/DL
WBC # BLD AUTO: 10.59 K/UL (ref 3.9–12.7)
WBC #/AREA URNS HPF: 60 /HPF (ref 0–5)

## 2023-04-06 PROCEDURE — 82803 BLOOD GASES ANY COMBINATION: CPT

## 2023-04-06 PROCEDURE — G0378 HOSPITAL OBSERVATION PER HR: HCPCS

## 2023-04-06 PROCEDURE — 36415 COLL VENOUS BLD VENIPUNCTURE: CPT | Performed by: EMERGENCY MEDICINE

## 2023-04-06 PROCEDURE — 71250 CT CHEST WITHOUT CONTRAST: ICD-10-PCS | Mod: 26,,, | Performed by: RADIOLOGY

## 2023-04-06 PROCEDURE — 82550 ASSAY OF CK (CPK): CPT | Performed by: INTERNAL MEDICINE

## 2023-04-06 PROCEDURE — 99223 1ST HOSP IP/OBS HIGH 75: CPT | Mod: AI,95,, | Performed by: INTERNAL MEDICINE

## 2023-04-06 PROCEDURE — 63600175 PHARM REV CODE 636 W HCPCS: Performed by: EMERGENCY MEDICINE

## 2023-04-06 PROCEDURE — 84443 ASSAY THYROID STIM HORMONE: CPT | Performed by: INTERNAL MEDICINE

## 2023-04-06 PROCEDURE — 86803 HEPATITIS C AB TEST: CPT | Performed by: EMERGENCY MEDICINE

## 2023-04-06 PROCEDURE — 83605 ASSAY OF LACTIC ACID: CPT | Mod: 91 | Performed by: EMERGENCY MEDICINE

## 2023-04-06 PROCEDURE — 25000003 PHARM REV CODE 250: Performed by: INTERNAL MEDICINE

## 2023-04-06 PROCEDURE — 70450 CT HEAD WITHOUT CONTRAST: ICD-10-PCS | Mod: 26,,, | Performed by: RADIOLOGY

## 2023-04-06 PROCEDURE — 25000003 PHARM REV CODE 250: Performed by: EMERGENCY MEDICINE

## 2023-04-06 PROCEDURE — 70450 CT HEAD/BRAIN W/O DYE: CPT | Mod: TC

## 2023-04-06 PROCEDURE — 93010 ELECTROCARDIOGRAM REPORT: CPT | Mod: ,,, | Performed by: INTERNAL MEDICINE

## 2023-04-06 PROCEDURE — 71250 CT THORAX DX C-: CPT | Mod: 26,,, | Performed by: RADIOLOGY

## 2023-04-06 PROCEDURE — 93010 EKG 12-LEAD: ICD-10-PCS | Mod: ,,, | Performed by: INTERNAL MEDICINE

## 2023-04-06 PROCEDURE — 82164 ANGIOTENSIN I ENZYME TEST: CPT | Performed by: INTERNAL MEDICINE

## 2023-04-06 PROCEDURE — 87389 HIV-1 AG W/HIV-1&-2 AB AG IA: CPT | Performed by: EMERGENCY MEDICINE

## 2023-04-06 PROCEDURE — 84480 ASSAY TRIIODOTHYRONINE (T3): CPT | Performed by: INTERNAL MEDICINE

## 2023-04-06 PROCEDURE — 82397 CHEMILUMINESCENT ASSAY: CPT | Performed by: INTERNAL MEDICINE

## 2023-04-06 PROCEDURE — 71045 X-RAY EXAM CHEST 1 VIEW: CPT | Mod: TC

## 2023-04-06 PROCEDURE — 81000 URINALYSIS NONAUTO W/SCOPE: CPT | Performed by: EMERGENCY MEDICINE

## 2023-04-06 PROCEDURE — 84100 ASSAY OF PHOSPHORUS: CPT | Performed by: EMERGENCY MEDICINE

## 2023-04-06 PROCEDURE — 87086 URINE CULTURE/COLONY COUNT: CPT | Performed by: EMERGENCY MEDICINE

## 2023-04-06 PROCEDURE — 99285 EMERGENCY DEPT VISIT HI MDM: CPT | Mod: 25

## 2023-04-06 PROCEDURE — 83880 ASSAY OF NATRIURETIC PEPTIDE: CPT | Performed by: EMERGENCY MEDICINE

## 2023-04-06 PROCEDURE — 83970 ASSAY OF PARATHORMONE: CPT | Performed by: INTERNAL MEDICINE

## 2023-04-06 PROCEDURE — 87040 BLOOD CULTURE FOR BACTERIA: CPT | Mod: 59 | Performed by: EMERGENCY MEDICINE

## 2023-04-06 PROCEDURE — 82962 GLUCOSE BLOOD TEST: CPT

## 2023-04-06 PROCEDURE — 99223 PR INITIAL HOSPITAL CARE,LEVL III: ICD-10-PCS | Mod: AI,95,, | Performed by: INTERNAL MEDICINE

## 2023-04-06 PROCEDURE — 83735 ASSAY OF MAGNESIUM: CPT | Performed by: EMERGENCY MEDICINE

## 2023-04-06 PROCEDURE — 84439 ASSAY OF FREE THYROXINE: CPT | Performed by: INTERNAL MEDICINE

## 2023-04-06 PROCEDURE — 80053 COMPREHEN METABOLIC PANEL: CPT | Performed by: EMERGENCY MEDICINE

## 2023-04-06 PROCEDURE — 85025 COMPLETE CBC W/AUTO DIFF WBC: CPT | Performed by: EMERGENCY MEDICINE

## 2023-04-06 PROCEDURE — 99900035 HC TECH TIME PER 15 MIN (STAT)

## 2023-04-06 PROCEDURE — 71045 XR CHEST 1 VIEW: ICD-10-PCS | Mod: 26,,, | Performed by: RADIOLOGY

## 2023-04-06 PROCEDURE — 71250 CT THORAX DX C-: CPT | Mod: TC

## 2023-04-06 PROCEDURE — 93005 ELECTROCARDIOGRAM TRACING: CPT

## 2023-04-06 PROCEDURE — 96366 THER/PROPH/DIAG IV INF ADDON: CPT

## 2023-04-06 PROCEDURE — 70450 CT HEAD/BRAIN W/O DYE: CPT | Mod: 26,,, | Performed by: RADIOLOGY

## 2023-04-06 PROCEDURE — 96365 THER/PROPH/DIAG IV INF INIT: CPT

## 2023-04-06 PROCEDURE — 83605 ASSAY OF LACTIC ACID: CPT | Performed by: EMERGENCY MEDICINE

## 2023-04-06 PROCEDURE — 71045 X-RAY EXAM CHEST 1 VIEW: CPT | Mod: 26,,, | Performed by: RADIOLOGY

## 2023-04-06 RX ORDER — SIMETHICONE 80 MG
1 TABLET,CHEWABLE ORAL 4 TIMES DAILY PRN
Status: DISCONTINUED | OUTPATIENT
Start: 2023-04-06 | End: 2023-04-08 | Stop reason: HOSPADM

## 2023-04-06 RX ORDER — BUDESONIDE 180 UG/1
2 AEROSOL, POWDER RESPIRATORY (INHALATION) 2 TIMES DAILY
COMMUNITY
Start: 2023-01-28

## 2023-04-06 RX ORDER — AMOXICILLIN 875 MG/1
875 TABLET, FILM COATED ORAL 2 TIMES DAILY
Status: ON HOLD | COMMUNITY
Start: 2023-03-27 | End: 2023-04-06 | Stop reason: CLARIF

## 2023-04-06 RX ORDER — GLUCAGON 1 MG
1 KIT INJECTION
Status: DISCONTINUED | OUTPATIENT
Start: 2023-04-06 | End: 2023-04-08 | Stop reason: HOSPADM

## 2023-04-06 RX ORDER — FLUTICASONE FUROATE, UMECLIDINIUM BROMIDE AND VILANTEROL TRIFENATATE 200; 62.5; 25 UG/1; UG/1; UG/1
1 POWDER RESPIRATORY (INHALATION)
Status: ON HOLD | COMMUNITY
Start: 2023-04-04 | End: 2023-04-06 | Stop reason: CLARIF

## 2023-04-06 RX ORDER — DIAZEPAM 2 MG/1
2 TABLET ORAL NIGHTLY
COMMUNITY
Start: 2023-03-15

## 2023-04-06 RX ORDER — IPRATROPIUM BROMIDE AND ALBUTEROL SULFATE 2.5; .5 MG/3ML; MG/3ML
3 SOLUTION RESPIRATORY (INHALATION) EVERY 4 HOURS PRN
Status: DISCONTINUED | OUTPATIENT
Start: 2023-04-06 | End: 2023-04-08 | Stop reason: HOSPADM

## 2023-04-06 RX ORDER — ATORVASTATIN CALCIUM 80 MG/1
TABLET, FILM COATED ORAL
COMMUNITY
Start: 2022-09-07

## 2023-04-06 RX ORDER — NALOXONE HCL 0.4 MG/ML
0.02 VIAL (ML) INJECTION
Status: DISCONTINUED | OUTPATIENT
Start: 2023-04-06 | End: 2023-04-08 | Stop reason: HOSPADM

## 2023-04-06 RX ORDER — AMOXICILLIN 250 MG
1 CAPSULE ORAL 2 TIMES DAILY PRN
Status: DISCONTINUED | OUTPATIENT
Start: 2023-04-06 | End: 2023-04-08 | Stop reason: HOSPADM

## 2023-04-06 RX ORDER — ATORVASTATIN CALCIUM 40 MG/1
80 TABLET, FILM COATED ORAL DAILY
Status: DISCONTINUED | OUTPATIENT
Start: 2023-04-07 | End: 2023-04-08 | Stop reason: HOSPADM

## 2023-04-06 RX ORDER — DEXTROSE 40 %
15 GEL (GRAM) ORAL
Status: DISCONTINUED | OUTPATIENT
Start: 2023-04-06 | End: 2023-04-08 | Stop reason: HOSPADM

## 2023-04-06 RX ORDER — ALBUTEROL SULFATE 90 UG/1
2 AEROSOL, METERED RESPIRATORY (INHALATION) EVERY 6 HOURS PRN
Status: DISCONTINUED | OUTPATIENT
Start: 2023-04-06 | End: 2023-04-06

## 2023-04-06 RX ORDER — INSULIN ASPART 100 [IU]/ML
0-5 INJECTION, SOLUTION INTRAVENOUS; SUBCUTANEOUS
Status: DISCONTINUED | OUTPATIENT
Start: 2023-04-06 | End: 2023-04-08 | Stop reason: HOSPADM

## 2023-04-06 RX ORDER — ACETAMINOPHEN 500 MG
500 TABLET ORAL EVERY 4 HOURS PRN
Status: DISCONTINUED | OUTPATIENT
Start: 2023-04-06 | End: 2023-04-08 | Stop reason: HOSPADM

## 2023-04-06 RX ORDER — PROCHLORPERAZINE EDISYLATE 5 MG/ML
5 INJECTION INTRAMUSCULAR; INTRAVENOUS EVERY 6 HOURS PRN
Status: DISCONTINUED | OUTPATIENT
Start: 2023-04-06 | End: 2023-04-08 | Stop reason: HOSPADM

## 2023-04-06 RX ORDER — ASCORBIC ACID 500 MG
TABLET ORAL
Status: ON HOLD | COMMUNITY
Start: 2022-12-03 | End: 2023-04-06 | Stop reason: CLARIF

## 2023-04-06 RX ORDER — FLUTICASONE FUROATE AND VILANTEROL 100; 25 UG/1; UG/1
1 POWDER RESPIRATORY (INHALATION) DAILY
Status: DISCONTINUED | OUTPATIENT
Start: 2023-04-07 | End: 2023-04-08 | Stop reason: HOSPADM

## 2023-04-06 RX ORDER — MUPIROCIN 20 MG/G
OINTMENT TOPICAL
Status: ON HOLD | COMMUNITY
Start: 2023-03-22 | End: 2023-04-06 | Stop reason: CLARIF

## 2023-04-06 RX ORDER — PANTOPRAZOLE SODIUM 40 MG/1
40 TABLET, DELAYED RELEASE ORAL
Status: ON HOLD | COMMUNITY
Start: 2023-04-04 | End: 2023-04-06 | Stop reason: CLARIF

## 2023-04-06 RX ORDER — FINERENONE 10 MG/1
TABLET, FILM COATED ORAL
COMMUNITY
Start: 2023-03-25

## 2023-04-06 RX ORDER — ESCITALOPRAM OXALATE 10 MG/1
20 TABLET ORAL DAILY
Status: DISCONTINUED | OUTPATIENT
Start: 2023-04-07 | End: 2023-04-08 | Stop reason: HOSPADM

## 2023-04-06 RX ORDER — APIXABAN 2.5 MG/1
2.5 TABLET, FILM COATED ORAL 2 TIMES DAILY
COMMUNITY
Start: 2023-03-25

## 2023-04-06 RX ORDER — SODIUM CHLORIDE 0.9 % (FLUSH) 0.9 %
10 SYRINGE (ML) INJECTION EVERY 12 HOURS PRN
Status: DISCONTINUED | OUTPATIENT
Start: 2023-04-06 | End: 2023-04-08 | Stop reason: HOSPADM

## 2023-04-06 RX ORDER — HYDROCODONE BITARTRATE AND ACETAMINOPHEN 10; 325 MG/1; MG/1
1 TABLET ORAL
Status: ON HOLD | COMMUNITY
Start: 2023-03-27 | End: 2023-04-06 | Stop reason: CLARIF

## 2023-04-06 RX ORDER — POLYETHYLENE GLYCOL 3350 17 G/17G
17 POWDER, FOR SOLUTION ORAL DAILY
Status: DISCONTINUED | OUTPATIENT
Start: 2023-04-07 | End: 2023-04-08 | Stop reason: HOSPADM

## 2023-04-06 RX ORDER — DOXYCYCLINE 100 MG/1
100 CAPSULE ORAL 2 TIMES DAILY
Status: ON HOLD | COMMUNITY
Start: 2023-03-17 | End: 2023-04-06 | Stop reason: CLARIF

## 2023-04-06 RX ORDER — PANTOPRAZOLE SODIUM 40 MG/1
40 TABLET, DELAYED RELEASE ORAL DAILY
Status: DISCONTINUED | OUTPATIENT
Start: 2023-04-07 | End: 2023-04-08 | Stop reason: HOSPADM

## 2023-04-06 RX ORDER — MAG HYDROX/ALUMINUM HYD/SIMETH 200-200-20
30 SUSPENSION, ORAL (FINAL DOSE FORM) ORAL 4 TIMES DAILY PRN
Status: DISCONTINUED | OUTPATIENT
Start: 2023-04-06 | End: 2023-04-08 | Stop reason: HOSPADM

## 2023-04-06 RX ORDER — ONDANSETRON 2 MG/ML
4 INJECTION INTRAMUSCULAR; INTRAVENOUS EVERY 8 HOURS PRN
Status: DISCONTINUED | OUTPATIENT
Start: 2023-04-06 | End: 2023-04-08 | Stop reason: HOSPADM

## 2023-04-06 RX ORDER — LEVOTHYROXINE SODIUM 25 UG/1
100 TABLET ORAL
Status: DISCONTINUED | OUTPATIENT
Start: 2023-04-07 | End: 2023-04-08 | Stop reason: HOSPADM

## 2023-04-06 RX ORDER — ASCORBIC ACID 500 MG
500 TABLET ORAL 2 TIMES DAILY
Status: DISCONTINUED | OUTPATIENT
Start: 2023-04-06 | End: 2023-04-08 | Stop reason: HOSPADM

## 2023-04-06 RX ORDER — LEVOTHYROXINE SODIUM 100 UG/1
100 TABLET ORAL
COMMUNITY
Start: 2023-03-25

## 2023-04-06 RX ORDER — SILDENAFIL 100 MG/1
TABLET, FILM COATED ORAL
COMMUNITY
Start: 2022-12-16

## 2023-04-06 RX ORDER — DEXTROSE 40 %
30 GEL (GRAM) ORAL
Status: DISCONTINUED | OUTPATIENT
Start: 2023-04-06 | End: 2023-04-08 | Stop reason: HOSPADM

## 2023-04-06 RX ADMIN — APIXABAN 2.5 MG: 2.5 TABLET, FILM COATED ORAL at 10:04

## 2023-04-06 RX ADMIN — OXYCODONE HYDROCHLORIDE AND ACETAMINOPHEN 500 MG: 500 TABLET ORAL at 10:04

## 2023-04-06 RX ADMIN — CEFTRIAXONE SODIUM 1 G: 1 INJECTION, POWDER, FOR SOLUTION INTRAMUSCULAR; INTRAVENOUS at 06:04

## 2023-04-06 RX ADMIN — SODIUM CHLORIDE 1000 ML: 9 INJECTION, SOLUTION INTRAVENOUS at 07:04

## 2023-04-06 NOTE — ED PROVIDER NOTES
Encounter Date: 4/6/2023       History     Chief Complaint   Patient presents with    Aphasia     Wife reports intermittent episodes of slurred speech that started at 1100 today. Now resolved.    Fatigue     Generalized weakness since 1100 today     Pleasant morbidly obese male comes emergency room with lethargy.  It was present earlier this morning shortly after waking up.  During the day the wife became concerned and brought the patient in for further evaluation.  There is no neurological deficit.  The patient has no weakness of any upper extremity.  No slurred speech.  No facial droop.  He has had strokes in the past but states this only feels like he has lethargy in his tired.  Has seen this in the past with urinary tract infections.  His wife agrees.  He appears stable otherwise.  Does not appear toxic.      Review of patient's allergies indicates:  No Known Allergies  Past Medical History:   Diagnosis Date    Anticoagulant long-term use     Arthritis of both knees     BPH (benign prostatic hyperplasia)     CHF (congestive heart failure)     COPD (chronic obstructive pulmonary disease) 05/03/2021    CVA (cerebral vascular accident)     Deep vein thrombosis     Diabetes mellitus     Diabetes mellitus, type 2     High cholesterol     Hypertension     Left-sided weakness     Obese     Pneumonia due to COVID-19 virus 07/01/2020    Sleep apnea     Thyroid disease     Urosepsis      Past Surgical History:   Procedure Laterality Date    BACK SURGERY      HIP ARTHROPLASTY      HIP ARTHROPLASTY Right 10/28/2021    Procedure: ARTHROPLASTY, HIP;  Surgeon: Ever Hall MD;  Location: [facility] OR [address] [place] [address];  Service: Orthopedics;  Laterality: Right;    IMPLANTATION OF PERMANENT SACRAL NERVE STIMULATOR Right 12/21/2021    Procedure: INSERTION, NEUROSTIMULATOR, PERMANENT, SACRAL;  Surgeon: Erickson Magana MD;  Location: [facility] OR [address] [place] [address];  Service: Urology;  Laterality: Right;    INJECTION OF ANESTHETIC AGENT AROUND NERVE  Right 11/19/2020    Procedure: BLOCK, NERVE, FEMORAL AND OBTURATOR;  Surgeon: Dania Garcia MD;  Location: [facility] PAIN MGT;  Service: Pain Management;  Laterality: Right;    INJECTION OF JOINT Right 3/23/2021    Procedure: INJECTION, JOINT, HIP Pt. can continue Eliquis per provider.;  Surgeon: Dania Garcia MD;  Location: [facility] PAIN MGT;  Service: Pain Management;  Laterality: Right;    JOINT REPLACEMENT      PERCUTANEOUS CRYOTHERAPY OF PERIPHERAL NERVE USING LIQUID NITROUS OXIDE IN CLOSED NEEDLE DEVICE Left 8/26/2019    Procedure: CRYOTHERAPY, NERVE, PERIPHERAL, PERCUTANEOUS, USING LIQUID NITROUS OXIDE IN CLOSED NEEDLE DEVICE LEFT KNEE SIMON;  Surgeon: MENG Beckwith;  Location: [facility] CATH LAB;  Service: Pain Management;  Laterality: Left;    PROSTATE SURGERY  2015    RADIOFREQUENCY ABLATION Right 1/12/2021    Procedure: COOLED OBTURTOR FEMORAL ,needconsent;  Surgeon: Dania Garcia MD;  Location: [facility] PAIN [facility]GT;  Service: Pain Management;  Laterality: Right;    SPINAL FUSION  09/2014    TONSILLECTOMY      TOTAL KNEE ARTHROPLASTY Left 9/3/2019    Procedure: ARTHROPLASTY, KNEE, TOTAL-SANDIP;  Surgeon: Ever Hall MD;  Location: [facility] OR [address] [place] [address];  Service: Orthopedics;  Laterality: Left;     Family History   Problem Relation Age of Onset    Hypertension Brother     Diabetes Mellitus Mother     Heart attack Neg Hx     Heart disease Neg Hx      Social History     Tobacco Use    Smoking status: Former     Packs/day: 0.50     Types: Cigarettes    Smokeless tobacco: Never    Tobacco comments:     pt quit 3 weeks ago   Substance Use Topics    Alcohol use: Not Currently     Comment: history of alcohol excess until 2011    Drug use: No     Review of Systems   Constitutional:  Negative for chills, diaphoresis, fatigue and fever.   HENT:  Negative for sore throat.    Respiratory:  Negative for apnea, cough, chest tightness and shortness of breath.    Cardiovascular:  Negative for chest pain.    Gastrointestinal:  Negative for nausea.   Genitourinary:  Negative for dysuria, flank pain and frequency.   Musculoskeletal:  Negative for back pain.        Right great toe with poor healing from trauma recently.  Bandage in place.  Bilateral lower extremities 2+ pitting edema.   Skin:  Negative for rash.   Neurological:  Negative for dizziness, weakness and numbness.   Hematological:  Does not bruise/bleed easily.   All other systems reviewed and are negative.    Physical Exam     Initial Vitals   BP Pulse Resp Temp SpO2   04/06/23 1645 04/06/23 1654 04/06/23 1654 04/06/23 1654 04/06/23 1654   118/66 61 20 97.8 °F (36.6 °C) (!) 92 %      MAP       --                Physical Exam    Nursing note and vitals reviewed.  Constitutional: He appears well-developed and well-nourished.   Morbidly obese   HENT:   Head: Normocephalic and atraumatic.   Neck:   Normal range of motion.  Cardiovascular:  Normal rate, regular rhythm and normal heart sounds.           Pulmonary/Chest: Breath sounds normal. He has no wheezes. He has no rales. He exhibits no tenderness.   Abdominal: Abdomen is soft. Bowel sounds are normal.   Musculoskeletal:         General: Edema present. No tenderness. Normal range of motion.      Cervical back: Normal range of motion.      Comments: Insensate feet secondary to diabetic neuropathy     Neurological: He is alert and oriented to person, place, and time. He has normal strength.   Skin: Skin is warm and dry.   Psychiatric: He has a normal mood and affect. His behavior is normal. Judgment and thought content normal.       ED Course   Procedures  Labs Reviewed   CBC W/ AUTO DIFFERENTIAL - Abnormal; Notable for the following components:       Result Value    MCV 99 (*)     MCH 31.6 (*)     MCHC 31.9 (*)     Immature Granulocytes 0.7 (*)     Immature Grans (Abs) 0.07 (*)     Mono # 1.1 (*)     All other components within normal limits    Narrative:     Release to patient->Immediate   COMPREHENSIVE  METABOLIC PANEL - Abnormal; Notable for the following components:    CO2 21 (*)     Glucose 146 (*)     BUN 53 (*)     Creatinine 1.9 (*)     Calcium 12.4 (*)     eGFR 36.8 (*)     All other components within normal limits    Narrative:     Release to patient->Immediate    result(s) called and verbal readback obtained from     Karlene Connor @ [address] by [facility] 04/06/2023 19:18  Recoll. [id number] by CNW at 04/06/2023 18:12, reason: verify results   LACTIC ACID, PLASMA - Abnormal; Notable for the following components:    Lactate (Lactic Acid) 3.3 (*)     All other components within normal limits   PHOSPHORUS - Abnormal; Notable for the following components:    Phosphorus 5.1 (*)     All other components within normal limits    Narrative:     Release to patient->Immediate  Recoll. [id number] by CNW at 04/06/2023 18:12, reason: verify results   URINALYSIS, REFLEX TO URINE CULTURE - Abnormal; Notable for the following components:    Protein, UA 2+ (*)     Glucose, UA 2+ (*)     Ketones, UA Trace (*)     Leukocytes, UA Trace (*)     All other components within normal limits    Narrative:     Preferred Collection Type->Urine, Clean Catch  Specimen Source->Urine   URINALYSIS MICROSCOPIC - Abnormal; Notable for the following components:    RBC, UA 10 (*)     WBC, UA 60 (*)     Bacteria Many (*)     Hyaline Casts, UA 3 (*)     All other components within normal limits    Narrative:     Preferred Collection Type->Urine, Clean Catch  Specimen Source->Urine   POCT GLUCOSE - Abnormal; Notable for the following components:    POCT Glucose 184 (*)     All other components within normal limits   CULTURE, BLOOD   CULTURE, BLOOD   CULTURE, URINE   MAGNESIUM    Narrative:     Release to patient->Immediate  Recoll. [id number] by CNW at 04/06/2023 18:12, reason: verify results   B-TYPE NATRIURETIC PEPTIDE   B-TYPE NATRIURETIC PEPTIDE   HIV 1 / 2 ANTIBODY   HEPATITIS C ANTIBODY   LACTIC ACID, PLASMA   LACTIC ACID, PLASMA     EKG Readings:  (Independently Interpreted)   Rate 61, sinus rhythm with first-degree AV block, MS interval of 224 milliseconds.  Normal QRS otherwise.  Indeterminate axis.  Abnormal EKG.     Imaging Results              CT Head Without Contrast (In process)                      CT Chest Without Contrast (In process)                      X-Ray Chest 1 View (Final result)  Result time 04/06/23 17:38:29      Final result by Buster Carpenter MD (04/06/23 17:38:29)                   Impression:      No acute cardiopulmonary process.      Electronically signed by: Buster Carpenter  Date:    04/06/2023  Time:    17:38               Narrative:    EXAMINATION:  XR CHEST 1 VIEW    CLINICAL HISTORY:  Sepsis;    TECHNIQUE:  Single frontal view of the chest was performed.    COMPARISON:  Chest x-ray 10/30/2021.    FINDINGS:  Cardiomediastinal silhouette is within normal limits.  Suspected bibasilar atelectasis but no focal consolidation, pneumothorax, or large pleural effusion.  There may be a small pleural effusion on the left.  No focal bony abnormality.  Partially imaged degenerative and postsurgical changes of the cervical spine with laminectomies.                                    X-Rays:   Independently Interpreted Readings:   Other Readings:  No acute cardiopulmonary disease process.  Per Radiology.  Medications   sodium chloride 0.9% bolus 500 mL 500 mL (has no administration in time range)   sodium chloride 0.9% bolus 1,000 mL 1,000 mL (1,000 mLs Intravenous New Bag 4/6/23 1942)   cefTRIAXone (ROCEPHIN) 1 g in dextrose 5 % in water (D5W) 5 % 50 mL IVPB (MB+) (1 g Intravenous New Bag 4/6/23 1849)     Medical Decision Making:   Differential Diagnosis:   Anemia, electrolyte abnormality, infection, sepsis, dehydration, urinate check infection, cancerous process, viral infection, hereditary hypokalemia syndrome.  CVA, TIA, hemorrhagic stroke, embolic stroke.    ED Management:  The patient is stable this time.  He does indeed have urinary tract  infection consistent wounds concerns were or.  I will initiate Rocephin the patient at this time.  Final care and disposition this patient will be handed off to Dr. Leiva.    Pt presented with confusion and gen weakness. Seen initially by Dr Solano and signed out to me for dispo. Pt found to have UTI which has caused his presentation in the past. CBC unremarkable. CMP with SEVEN and hypercalcemia. No hx of malignancy. Lactate was elevated and he was given 1.5L NS. Rocephin for his UTI. Phos slightly elevated at 5.1. Mg and BNP normal. CXR with possible left side effusion. CT chest ordered and read pending. CT head also added and read pending. CVA not suspected. Pt will need admission for IVFs, abx and workup of his hypercalcemia.     I PERFORMED NO CRITICAL CARE ON THIS PATIENT           ED Course as of 04/06/23 2016   Thu Apr 06, 2023 1755 Signed out to me by Dr Solano. Pt presented with gen weakness and confusion c/w previous UTI. Plan was to f/u labs and dispo. UA shows infection. Rocephin given.  [DC]   1802 SIRS neg. No sepsis.  [DC]      ED Course User Index  [DC] Lukas Leiva Jr., MD                 Clinical Impression:   Final diagnoses:  [R53.1] Weakness  [E86.0] Dehydration (Primary)  [N17.9] SEVEN (acute kidney injury)  [E83.52] Hypercalcemia  [N39.0] Urinary tract infection without hematuria, site unspecified        ED Disposition Condition    Observation Stable                Lukas Leiva Jr., MD  04/06/23 2016

## 2023-04-07 PROBLEM — N17.9 AKI (ACUTE KIDNEY INJURY): Status: ACTIVE | Noted: 2023-04-07

## 2023-04-07 PROBLEM — N17.9 AKI (ACUTE KIDNEY INJURY): Status: RESOLVED | Noted: 2023-04-06 | Resolved: 2023-04-07

## 2023-04-07 LAB
ALBUMIN SERPL BCP-MCNC: 3.2 G/DL (ref 3.5–5.2)
ALP SERPL-CCNC: 57 U/L (ref 55–135)
ALT SERPL W/O P-5'-P-CCNC: 16 U/L (ref 10–44)
ANION GAP SERPL CALC-SCNC: 9 MMOL/L (ref 8–16)
AST SERPL-CCNC: 15 U/L (ref 10–40)
BILIRUB SERPL-MCNC: 0.4 MG/DL (ref 0.1–1)
BUN SERPL-MCNC: 44 MG/DL (ref 8–23)
CALCIUM SERPL-MCNC: 11 MG/DL (ref 8.7–10.5)
CHLORIDE SERPL-SCNC: 106 MMOL/L (ref 95–110)
CK SERPL-CCNC: 36 U/L (ref 20–200)
CO2 SERPL-SCNC: 21 MMOL/L (ref 23–29)
CREAT SERPL-MCNC: 1.4 MG/DL (ref 0.5–1.4)
CRP SERPL-MCNC: 6.3 MG/L (ref 0–8.2)
ERYTHROCYTE [DISTWIDTH] IN BLOOD BY AUTOMATED COUNT: 13.7 % (ref 11.5–14.5)
ERYTHROCYTE [SEDIMENTATION RATE] IN BLOOD BY WESTERGREN METHOD: 10 MM/HR (ref 0–10)
EST. GFR  (NO RACE VARIABLE): 53.1 ML/MIN/1.73 M^2
ESTIMATED AVG GLUCOSE: 197 MG/DL (ref 68–131)
GLUCOSE SERPL-MCNC: 218 MG/DL (ref 70–110)
HBA1C MFR BLD: 8.5 % (ref 4–5.6)
HCT VFR BLD AUTO: 45.1 % (ref 40–54)
HCV AB SERPL QL IA: NORMAL
HGB BLD-MCNC: 14.6 G/DL (ref 14–18)
HIV 1+2 AB+HIV1 P24 AG SERPL QL IA: NORMAL
LDH SERPL L TO P-CCNC: 112 U/L (ref 110–260)
MAGNESIUM SERPL-MCNC: 1.6 MG/DL (ref 1.6–2.6)
MCH RBC QN AUTO: 31.2 PG (ref 27–31)
MCHC RBC AUTO-ENTMCNC: 32.4 G/DL (ref 32–36)
MCV RBC AUTO: 96 FL (ref 82–98)
PHOSPHATE SERPL-MCNC: 3.3 MG/DL (ref 2.7–4.5)
PLATELET # BLD AUTO: 189 K/UL (ref 150–450)
PMV BLD AUTO: 10.1 FL (ref 9.2–12.9)
POCT GLUCOSE: 175 MG/DL (ref 70–110)
POCT GLUCOSE: 175 MG/DL (ref 70–110)
POCT GLUCOSE: 188 MG/DL (ref 70–110)
POCT GLUCOSE: 200 MG/DL (ref 70–110)
POTASSIUM SERPL-SCNC: 4.2 MMOL/L (ref 3.5–5.1)
PROT SERPL-MCNC: 6.8 G/DL (ref 6–8.4)
PTH-INTACT SERPL-MCNC: <5 PG/ML (ref 9–77)
RBC # BLD AUTO: 4.68 M/UL (ref 4.6–6.2)
SODIUM SERPL-SCNC: 136 MMOL/L (ref 136–145)
T3 SERPL-MCNC: 43 NG/DL (ref 60–180)
T4 FREE SERPL-MCNC: 0.8 NG/DL (ref 0.71–1.51)
TSH SERPL DL<=0.005 MIU/L-ACNC: 2.07 UIU/ML (ref 0.4–4)
WBC # BLD AUTO: 9.05 K/UL (ref 3.9–12.7)

## 2023-04-07 PROCEDURE — 25000003 PHARM REV CODE 250: Performed by: STUDENT IN AN ORGANIZED HEALTH CARE EDUCATION/TRAINING PROGRAM

## 2023-04-07 PROCEDURE — G0378 HOSPITAL OBSERVATION PER HR: HCPCS

## 2023-04-07 PROCEDURE — 85651 RBC SED RATE NONAUTOMATED: CPT | Performed by: INTERNAL MEDICINE

## 2023-04-07 PROCEDURE — 99233 SBSQ HOSP IP/OBS HIGH 50: CPT | Mod: ,,, | Performed by: STUDENT IN AN ORGANIZED HEALTH CARE EDUCATION/TRAINING PROGRAM

## 2023-04-07 PROCEDURE — 94640 AIRWAY INHALATION TREATMENT: CPT

## 2023-04-07 PROCEDURE — 99900035 HC TECH TIME PER 15 MIN (STAT)

## 2023-04-07 PROCEDURE — 84165 PATHOLOGIST INTERPRETATION SPE: ICD-10-PCS | Mod: 26,,, | Performed by: PATHOLOGY

## 2023-04-07 PROCEDURE — 84100 ASSAY OF PHOSPHORUS: CPT | Performed by: INTERNAL MEDICINE

## 2023-04-07 PROCEDURE — 99233 PR SUBSEQUENT HOSPITAL CARE,LEVL III: ICD-10-PCS | Mod: ,,, | Performed by: STUDENT IN AN ORGANIZED HEALTH CARE EDUCATION/TRAINING PROGRAM

## 2023-04-07 PROCEDURE — 25000242 PHARM REV CODE 250 ALT 637 W/ HCPCS: Performed by: INTERNAL MEDICINE

## 2023-04-07 PROCEDURE — 85027 COMPLETE CBC AUTOMATED: CPT | Performed by: INTERNAL MEDICINE

## 2023-04-07 PROCEDURE — 25000003 PHARM REV CODE 250: Performed by: INTERNAL MEDICINE

## 2023-04-07 PROCEDURE — 86140 C-REACTIVE PROTEIN: CPT | Performed by: INTERNAL MEDICINE

## 2023-04-07 PROCEDURE — 83036 HEMOGLOBIN GLYCOSYLATED A1C: CPT | Performed by: INTERNAL MEDICINE

## 2023-04-07 PROCEDURE — 83615 LACTATE (LD) (LDH) ENZYME: CPT | Performed by: INTERNAL MEDICINE

## 2023-04-07 PROCEDURE — 94761 N-INVAS EAR/PLS OXIMETRY MLT: CPT

## 2023-04-07 PROCEDURE — 83735 ASSAY OF MAGNESIUM: CPT | Performed by: INTERNAL MEDICINE

## 2023-04-07 PROCEDURE — 84165 PROTEIN E-PHORESIS SERUM: CPT | Performed by: INTERNAL MEDICINE

## 2023-04-07 PROCEDURE — 27000221 HC OXYGEN, UP TO 24 HOURS

## 2023-04-07 PROCEDURE — 96366 THER/PROPH/DIAG IV INF ADDON: CPT

## 2023-04-07 PROCEDURE — 36415 COLL VENOUS BLD VENIPUNCTURE: CPT | Performed by: INTERNAL MEDICINE

## 2023-04-07 PROCEDURE — 63600175 PHARM REV CODE 636 W HCPCS: Performed by: INTERNAL MEDICINE

## 2023-04-07 PROCEDURE — 80053 COMPREHEN METABOLIC PANEL: CPT | Performed by: INTERNAL MEDICINE

## 2023-04-07 PROCEDURE — 84165 PROTEIN E-PHORESIS SERUM: CPT | Mod: 26,,, | Performed by: PATHOLOGY

## 2023-04-07 PROCEDURE — 96361 HYDRATE IV INFUSION ADD-ON: CPT

## 2023-04-07 RX ORDER — SODIUM CHLORIDE 9 MG/ML
INJECTION, SOLUTION INTRAVENOUS CONTINUOUS
Status: DISCONTINUED | OUTPATIENT
Start: 2023-04-07 | End: 2023-04-08 | Stop reason: HOSPADM

## 2023-04-07 RX ADMIN — APIXABAN 2.5 MG: 2.5 TABLET, FILM COATED ORAL at 08:04

## 2023-04-07 RX ADMIN — ATORVASTATIN CALCIUM 80 MG: 40 TABLET, FILM COATED ORAL at 09:04

## 2023-04-07 RX ADMIN — ESCITALOPRAM OXALATE 20 MG: 10 TABLET ORAL at 09:04

## 2023-04-07 RX ADMIN — SODIUM CHLORIDE 500 ML: 9 INJECTION, SOLUTION INTRAVENOUS at 03:04

## 2023-04-07 RX ADMIN — SODIUM CHLORIDE: 9 INJECTION, SOLUTION INTRAVENOUS at 11:04

## 2023-04-07 RX ADMIN — LEVOTHYROXINE SODIUM 100 MCG: 25 TABLET ORAL at 07:04

## 2023-04-07 RX ADMIN — CEFTRIAXONE SODIUM 1 G: 1 INJECTION, POWDER, FOR SOLUTION INTRAMUSCULAR; INTRAVENOUS at 06:04

## 2023-04-07 RX ADMIN — FLUTICASONE FUROATE AND VILANTEROL TRIFENATATE 1 PUFF: 100; 25 POWDER RESPIRATORY (INHALATION) at 07:04

## 2023-04-07 RX ADMIN — PANTOPRAZOLE SODIUM 40 MG: 40 TABLET, DELAYED RELEASE ORAL at 09:04

## 2023-04-07 RX ADMIN — OXYCODONE HYDROCHLORIDE AND ACETAMINOPHEN 500 MG: 500 TABLET ORAL at 08:04

## 2023-04-07 RX ADMIN — POLYETHYLENE GLYCOL 3350 17 G: 17 POWDER, FOR SOLUTION ORAL at 09:04

## 2023-04-07 RX ADMIN — OXYCODONE HYDROCHLORIDE AND ACETAMINOPHEN 500 MG: 500 TABLET ORAL at 09:04

## 2023-04-07 RX ADMIN — APIXABAN 2.5 MG: 2.5 TABLET, FILM COATED ORAL at 09:04

## 2023-04-07 NOTE — ASSESSMENT & PLAN NOTE
This is quite worrisome given high Ca  He will likely need follow up CT abdomen for completeness, along with referral to Pulmonary  Will defer to primary team if they would like to pursue further CT in vs out patient  Check ESR, CRP, ACE, LDH  He has no fever, chills, etc to suggest lymphoma  Suspect the LLL area is more atelectasis, not pneumonia   -He is on Rocephin for UTI, but not adding atypical coverage with zithro

## 2023-04-07 NOTE — NURSING
Received notice from Dr. Patricio that he is out of town until Wednesday, April 12. Notified Dr. Chamorro.

## 2023-04-07 NOTE — PLAN OF CARE
Problem: Diabetes Comorbidity  Goal: Blood Glucose Level Within Targeted Range  Outcome: Ongoing, Progressing  Intervention: Monitor and Manage Glycemia  Flowsheets (Taken 4/7/2023 1626)  Glycemic Management:   blood glucose monitored   oral hydration promoted     Problem: Fluid and Electrolyte Imbalance (Acute Kidney Injury/Impairment)  Goal: Fluid and Electrolyte Balance  Outcome: Ongoing, Progressing  Intervention: Monitor and Manage Fluid and Electrolyte Balance  Flowsheets (Taken 4/7/2023 1626)  Fluid/Electrolyte Management: fluids provided     Problem: Gas Exchange Impaired  Goal: Optimal Gas Exchange  Outcome: Ongoing, Progressing  Intervention: Optimize Oxygenation and Ventilation  Flowsheets (Taken 4/7/2023 1626)  Airway/Ventilation Management: airway patency maintained     Plan of care reviewed with pt. MARCIN throughout shift. Continuous IV fluids at KVO maintained. Diabetic diet. Continue to Monitor Labs. VSS.  Safety maintained. Will continue to monitor. No complaints at this time.

## 2023-04-07 NOTE — ASSESSMENT & PLAN NOTE
It's hard to parse out exactly what happened with the transient episode of slurred speech  This could be related to many different things: TIA, transient drop in BP, medications, hyper Ca, infection  His CT head had no acute findings  He currently has completely fluent speech with no deficits at all  Will continue to monitor with neuro checks q4 and tele for now  MRI brain in am if recurs  Consider adding ASA 81mg but care with Eliquis  Holding Valium, Lyrica, Tylenol PM's for now to do neuro checks

## 2023-04-07 NOTE — ASSESSMENT & PLAN NOTE
Patient with acute kidney injury likely due to IVVD/dehydration SEVEN is currently improving. Labs reviewed- Renal function/electrolytes with Estimated Creatinine Clearance: 64.7 mL/min (based on SCr of 1.4 mg/dL). according to latest data. Monitor urine output and serial BMP and adjust therapy as needed. Avoid nephrotoxins and renally dose meds for GFR listed above.

## 2023-04-07 NOTE — ASSESSMENT & PLAN NOTE
His wife has gone to retrieve his home CPAP machine with preset settings  The patient states it varies from 12-22 and is at 6L NC O2  VBG shows no hypercarbia

## 2023-04-07 NOTE — ASSESSMENT & PLAN NOTE
Worrisome given transient slurred speech  Consider MRI brain in am, though back to baseline completely now  This was though thromboembolic per notes in Epic

## 2023-04-07 NOTE — ASSESSMENT & PLAN NOTE
Patient's FSGs are controlled on current medication regimen.  Last A1c reviewed-   Lab Results   Component Value Date    HGBA1C 8.5 (H) 04/07/2023     Most recent fingerstick glucose reviewed-   Recent Labs   Lab 04/06/23  1647 04/07/23  0731 04/07/23  1118   POCTGLUCOSE 184* 175* 200*     Current correctional scale  Low  Maintain anti-hyperglycemic dose as follows-   Antihyperglycemics (From admission, onward)    Start     Stop Route Frequency Ordered    04/06/23 2325  insulin aspart U-100 pen 0-5 Units         -- SubQ Before meals & nightly PRN 04/06/23 2226        Hold Oral hypoglycemics while patient is in the hospital.

## 2023-04-07 NOTE — ASSESSMENT & PLAN NOTE
This is quite worrisome given high Ca  He will likely need follow up CT abdomen for completeness, along with referral to Pulmonary  CT abdomen pelvis once renal function is improved  F/U ESR, CRP, ACE, LDH  He has no fever, chills, etc to suggest lymphoma  Suspect the LLL area is more atelectasis, not pneumonia   -He is on Rocephin for UTI, but not adding atypical coverage with zithro

## 2023-04-07 NOTE — SUBJECTIVE & OBJECTIVE
Past Medical History:   Diagnosis Date    Anticoagulant long-term use     Arthritis of both knees     BPH (benign prostatic hyperplasia)     CHF (congestive heart failure)     COPD (chronic obstructive pulmonary disease) 05/03/2021    CVA (cerebral vascular accident)     Deep vein thrombosis     Diabetes mellitus     Diabetes mellitus, type 2     High cholesterol     Hypertension     Left-sided weakness     Obese     Pneumonia due to COVID-19 virus 07/01/2020    Sleep apnea     Thyroid disease     Urosepsis        Past Surgical History:   Procedure Laterality Date    BACK SURGERY      HIP ARTHROPLASTY      HIP ARTHROPLASTY Right 10/28/2021    Procedure: ARTHROPLASTY, HIP;  Surgeon: Ever Hall MD;  Location: [facility] OR [address] [place] [address];  Service: Orthopedics;  Laterality: Right;    IMPLANTATION OF PERMANENT SACRAL NERVE STIMULATOR Right 12/21/2021    Procedure: INSERTION, NEUROSTIMULATOR, PERMANENT, SACRAL;  Surgeon: Erickson Maagna MD;  Location: [facility] OR [address] [place] [address];  Service: Urology;  Laterality: Right;    INJECTION OF ANESTHETIC AGENT AROUND NERVE Right 11/19/2020    Procedure: BLOCK, NERVE, FEMORAL AND OBTURATOR;  Surgeon: Dania Garcia MD;  Location: [facility] PAIN [facility]GT;  Service: Pain Management;  Laterality: Right;    INJECTION OF JOINT Right 3/23/2021    Procedure: INJECTION, JOINT, HIP Pt. can continue Eliquis per provider.;  Surgeon: Dania Garcia MD;  Location: [facility] PAIN [facility];  Service: Pain Management;  Laterality: Right;    JOINT REPLACEMENT      PERCUTANEOUS CRYOTHERAPY OF PERIPHERAL NERVE USING LIQUID NITROUS OXIDE IN CLOSED NEEDLE DEVICE Left 8/26/2019    Procedure: CRYOTHERAPY, NERVE, PERIPHERAL, PERCUTANEOUS, USING LIQUID NITROUS OXIDE IN CLOSED NEEDLE DEVICE LEFT KNEE SIMON;  Surgeon: MENG Beckwith;  Location: [facility] CATH LAB;  Service: Pain Management;  Laterality: Left;    PROSTATE SURGERY  2015    RADIOFREQUENCY ABLATION Right 1/12/2021    Procedure: COOLED OBTURTOR FEMORAL  ,needconsent;  Surgeon: Dania Garcia MD;  Location: [facility];  Service: Pain Management;  Laterality: Right;    SPINAL FUSION  09/2014    TONSILLECTOMY      TOTAL KNEE ARTHROPLASTY Left 9/3/2019    Procedure: ARTHROPLASTY, KNEE, TOTAL-SANDIP;  Surgeon: Ever Hall MD;  Location: [address];  Service: Orthopedics;  Laterality: Left;       Review of patient's allergies indicates:  No Known Allergies    No current facility-administered medications on file prior to encounter.     Current Outpatient Medications on File Prior to Encounter   Medication Sig    atorvastatin (LIPITOR) 80 MG tablet   = 1 tab, Oral, Daily, # 28 tab, 10 Refill(s), Maintenance, Pharmacy: [facility], 178, cm, 08/24/22 6:34:00 CDT, Height/Length Measured, 136.3, kg, 08/17/22 14:56:00 CDT, Weight Dosing    albuterol (VENTOLIN HFA) 90 mcg/actuation inhaler Inhale 2 puffs into the lungs every 6 (six) hours as needed for Wheezing. Rescue    canagliflozin (INVOKANA) 100 mg Tab tablet Take 100 mg by mouth once daily.    diazePAM (VALIUM) 2 MG tablet Take 2 mg by mouth every evening.    diphenhydrAMINE-acetaminophen (TYLENOL PM)  mg Tab Take 1 tablet by mouth nightly as needed. Pain, sleep    ELIQUIS 2.5 mg Tab Take 2.5 mg by mouth 2 (two) times daily.    EScitalopram oxalate (LEXAPRO) 20 MG tablet Take 20 mg by mouth once daily.    furosemide (LASIX) 40 MG tablet 40 mg daily as needed.     KERENDIA 10 mg Tab Take by mouth.    levothyroxine (SYNTHROID) 100 MCG tablet Take 100 mcg by mouth.    lisinopril (PRINIVIL,ZESTRIL) 40 MG tablet Take 40 mg by mouth once daily.     LYRICA 100 mg capsule Take 150 mg by mouth 2 (two) times daily.    melatonin 10 mg Cap Take by mouth every evening.    metformin (GLUCOPHAGE) 500 MG tablet Take 500 mg by mouth 2 (two) times daily with meals.    PULMICORT FLEXHALER 180 mcg/actuation AePB Inhale 2 puffs into the lungs 2 (two) times daily.    semaglutide (OZEMPIC) 0.25 mg or 0.5 mg(2 mg/1.5  mL) pen injector Inject 0.5 mg into the skin every 7 days.    sildenafiL (VIAGRA) 100 MG tablet Take by mouth.    [DISCONTINUED] acetaminophen (TYLENOL) 650 MG TbSR Take 1 tablet (650 mg total) by mouth every 8 (eight) hours.    [DISCONTINUED] amoxicillin (AMOXIL) 500 MG Tab Take 4 pills one hour prior to appointment    [DISCONTINUED] amoxicillin (AMOXIL) 875 MG tablet Take 875 mg by mouth 2 (two) times daily.    [DISCONTINUED] apixaban (ELIQUIS) 5 mg Tab Take 1 tablet (5 mg total) by mouth 2 (two) times daily.    [DISCONTINUED] ascorbic acid, vitamin C, (VITAMIN C) 1000 MG tablet Take 1 tablet (1,000 mg total) by mouth 3 (three) times daily.    [DISCONTINUED] ascorbic acid, vitamin C, (VITAMIN C) 500 MG tablet Take by mouth.    [DISCONTINUED] atorvastatin (LIPITOR) 80 MG tablet Take 80 mg by mouth once daily.    [DISCONTINUED] augmented betamethasone dipropionate (DIPROLENE-AF) 0.05 % cream     [DISCONTINUED] ciprofloxacin HCl (CIPRO ORAL) Take by mouth 2 (two) times a day.    [DISCONTINUED] diclofenac sodium (VOLTAREN) 1 % Gel Apply 2 g topically 3 (three) times daily.    [DISCONTINUED] docusate sodium (STOOL SOFTENER ORAL) Take by mouth every evening.    [DISCONTINUED] doxycycline (VIBRAMYCIN) 100 MG Cap Take 100 mg by mouth 2 (two) times daily.    [DISCONTINUED] fluticasone (FLONASE) 50 mcg/actuation nasal spray 1 spray every evening.     [DISCONTINUED] fluticasone-umeclidin-vilanter (TRELEGY ELLIPTA) 100-62.5-25 mcg DsDv Inhale 1 puff into the lungs once daily.    [DISCONTINUED] HYDROcodone-acetaminophen (NORCO)  mg per tablet Take 1 tablet by mouth.    [DISCONTINUED] levothyroxine (SYNTHROID) 50 MCG tablet Take 50 mcg by mouth once daily.    [DISCONTINUED] magnesium 250 mg Tab Take by mouth every evening.    [DISCONTINUED] methenamine (HIPREX) 1 gram Tab Take 1 tablet (1 g total) by mouth 2 (two) times daily.    [DISCONTINUED] mupirocin (BACTROBAN) 2 % ointment SMARTSI Application Topical 2-3 Times  Daily    [DISCONTINUED] oxyCODONE (ROXICODONE) 5 MG immediate release tablet Take 1 tablet (5 mg total) by mouth every 4 (four) hours as needed for Pain.    [DISCONTINUED] pantoprazole (PROTONIX) 40 MG tablet Take 40 mg by mouth.    [DISCONTINUED] traMADoL (ULTRAM) 50 mg tablet Take 1 tablet (50 mg total) by mouth every 6 (six) hours as needed for Pain.    [DISCONTINUED] TRELEGY ELLIPTA 200-62.5-25 mcg inhaler Inhale 1 puff into the lungs.    [DISCONTINUED] umeclidinium (INCRUSE ELLIPTA) 62.5 mcg/actuation inhalation capsule Inhale 62.5 mcg into the lungs 2 (two) times a day. Controller     Family History       Problem Relation (Age of Onset)    Diabetes Mellitus Mother    Hypertension Brother          Tobacco Use    Smoking status: Former     Packs/day: 0.50     Types: Cigarettes    Smokeless tobacco: Never    Tobacco comments:     pt quit 3 weeks ago   Substance and Sexual Activity    Alcohol use: Not Currently     Comment: history of alcohol excess until 2011    Drug use: No    Sexual activity: Not on file     Review of Systems   Constitutional:  Positive for activity change, fatigue and unexpected weight change. Negative for fever.   Eyes:  Negative for visual disturbance.   Respiratory:  Positive for shortness of breath.    Cardiovascular:  Positive for leg swelling. Negative for chest pain.   Gastrointestinal:  Negative for nausea and vomiting.   Genitourinary:  Negative for difficulty urinating and dysuria.   Neurological:  Positive for speech difficulty.   Hematological:  Bruises/bleeds easily.   Psychiatric/Behavioral:  Positive for decreased concentration.    Objective:     Vital Signs (Most Recent):  Temp: 97.5 °F (36.4 °C) (04/06/23 2045)  Pulse: 65 (04/06/23 2045)  Resp: 15 (04/06/23 2045)  BP: (!) 146/73 (04/06/23 2045)  SpO2: 100 % (04/06/23 2045)   Vital Signs (24h Range):  Temp:  [97.5 °F (36.4 °C)-97.8 °F (36.6 °C)] 97.5 °F (36.4 °C)  Pulse:  [60-65] 65  Resp:  [15-26] 15  SpO2:  [92 %-100 %] 100  %  BP: (118-146)/(66-75) 146/73     Weight: 133.8 kg (295 lb)  Body mass index is 42.33 kg/m².    Physical Exam  Constitutional:       General: He is not in acute distress.     Appearance: He is obese. He is not ill-appearing, toxic-appearing or diaphoretic.   HENT:      Head: Normocephalic and atraumatic.   Pulmonary:      Effort: No tachypnea or bradypnea.   Abdominal:      General: Abdomen is protuberant.      Comments: Truncal obesity   Genitourinary:     Comments: No blankenship in place  Feet:      Comments: Left great toe with small area of chronic injury (see pic)  Lymphadenopathy:      Comments: 1+ pitting edema to skin of lower legs (done by nurse at bedside on camera)   Skin:     Comments: Chronic solar changes to arms, chronic venous htn changes to skin of legs   Neurological:      Mental Status: He is alert and oriented to person, place, and time.      GCS: GCS eye subscore is 4. GCS verbal subscore is 5. GCS motor subscore is 6.      Comments: Conversant, fluent speech, Alert to person, place time (quite sharp)   Psychiatric:         Attention and Perception: Attention normal.         Mood and Affect: Mood and affect normal.         Behavior: Behavior is cooperative.         Cognition and Memory: Cognition and memory normal.           Significant Labs: All pertinent labs within the past 24 hours have been reviewed.  Recent Results (from the past 24 hour(s))   POCT glucose    Collection Time: 04/06/23  4:47 PM   Result Value Ref Range    POCT Glucose 184 (H) 70 - 110 mg/dL   CBC auto differential    Collection Time: 04/06/23  5:11 PM   Result Value Ref Range    WBC 10.59 3.90 - 12.70 K/uL    RBC 5.06 4.60 - 6.20 M/uL    Hemoglobin 16.0 14.0 - 18.0 g/dL    Hematocrit 50.1 40.0 - 54.0 %    MCV 99 (H) 82 - 98 fL    MCH 31.6 (H) 27.0 - 31.0 pg    MCHC 31.9 (L) 32.0 - 36.0 g/dL    RDW 14.0 11.5 - 14.5 %    Platelets 211 150 - 450 K/uL    MPV 10.7 9.2 - 12.9 fL    Immature Granulocytes 0.7 (H) 0.0 - 0.5 %    Gran #  (ANC) 5.7 1.8 - 7.7 K/uL    Immature Grans (Abs) 0.07 (H) 0.00 - 0.04 K/uL    Lymph # 3.1 1.0 - 4.8 K/uL    Mono # 1.1 (H) 0.3 - 1.0 K/uL    Eos # 0.5 0.0 - 0.5 K/uL    Baso # 0.11 0.00 - 0.20 K/uL    nRBC 0 0 /100 WBC    Gran % 54.2 38.0 - 73.0 %    Lymph % 29.1 18.0 - 48.0 %    Mono % 10.2 4.0 - 15.0 %    Eosinophil % 4.8 0.0 - 8.0 %    Basophil % 1.0 0.0 - 1.9 %    Differential Method Automated    Lactic Acid, Plasma #2    Collection Time: 04/06/23  5:11 PM   Result Value Ref Range    Lactate (Lactic Acid) 3.3 (H) 0.5 - 2.2 mmol/L   Urinalysis, Reflex to Urine Culture Urine, Clean Catch    Collection Time: 04/06/23  5:17 PM    Specimen: Urine   Result Value Ref Range    Specimen UA Urine, Unspecified     Color, UA Yellow Yellow, Straw, Yashira    Appearance, UA Clear Clear    pH, UA 5.0 5.0 - 8.0    Specific Gravity, UA 1.025 1.005 - 1.030    Protein, UA 2+ (A) Negative    Glucose, UA 2+ (A) Negative    Ketones, UA Trace (A) Negative    Bilirubin (UA) Negative Negative    Occult Blood UA Negative Negative    Nitrite, UA Negative Negative    Urobilinogen, UA Negative Negative EU/dL    Leukocytes, UA Trace (A) Negative   Urinalysis Microscopic    Collection Time: 04/06/23  5:17 PM   Result Value Ref Range    RBC, UA 10 (H) 0 - 4 /hpf    WBC, UA 60 (H) 0 - 5 /hpf    Bacteria Many (A) None-Occ /hpf    Hyaline Casts, UA 3 (A) 0-1/lpf /lpf    Microscopic Comment SEE COMMENT    BNP    Collection Time: 04/06/23  5:29 PM   Result Value Ref Range    BNP 17 0 - 99 pg/mL   Comprehensive metabolic panel    Collection Time: 04/06/23  6:45 PM   Result Value Ref Range    Sodium 137 136 - 145 mmol/L    Potassium 4.7 3.5 - 5.1 mmol/L    Chloride 104 95 - 110 mmol/L    CO2 21 (L) 23 - 29 mmol/L    Glucose 146 (H) 70 - 110 mg/dL    BUN 53 (H) 8 - 23 mg/dL    Creatinine 1.9 (H) 0.5 - 1.4 mg/dL    Calcium 12.4 (HH) 8.7 - 10.5 mg/dL    Total Protein 7.5 6.0 - 8.4 g/dL    Albumin 3.5 3.5 - 5.2 g/dL    Total Bilirubin 0.3 0.1 - 1.0 mg/dL     Alkaline Phosphatase 81 55 - 135 U/L    AST 15 10 - 40 U/L    ALT 16 10 - 44 U/L    Anion Gap 12 8 - 16 mmol/L    eGFR 36.8 (A) >60 mL/min/1.73 m^2   Magnesium    Collection Time: 04/06/23  6:45 PM   Result Value Ref Range    Magnesium 1.9 1.6 - 2.6 mg/dL   Phosphorus    Collection Time: 04/06/23  6:45 PM   Result Value Ref Range    Phosphorus 5.1 (H) 2.7 - 4.5 mg/dL   ISTAT PROCEDURE    Collection Time: 04/06/23 10:00 PM   Result Value Ref Range    POC PH 7.257 (L) 7.35 - 7.45    POC PCO2 18.5 (L) 35 - 45 mmHg    POC PO2 35 (L) 40 - 60 mmHg    POC HCO3 8.2 (L) 24 - 28 mmol/L    POC BE -19 -2 to 2 mmol/L    POC SATURATED O2 61 (L) 95 - 100 %    POC TCO2 9 (L) 24 - 29 mmol/L    Sample VENOUS     Site Other     Allens Test N/A     DelSys Nasal Can          Significant Imaging: I have reviewed all pertinent imaging results/findings within the past 24 hours.

## 2023-04-07 NOTE — ASSESSMENT & PLAN NOTE
Hopefully this is related to SEVEN and dehydration, and not malignancy  He does take a lot of TUMS  Check PTH and PTH like hormone  Follow up in am after fluids

## 2023-04-07 NOTE — ASSESSMENT & PLAN NOTE
(Ankle-brachial index of 0.83 on the right and 0.79 on the left, indicating bilateral mild-to-moderate peripheral artery disease.  Elevated velocities in the left distal superficial femoral artery suggesting hemodynamically significant focal stenosis of the left distal superficial femoral artery.)     Follow up with Vascular

## 2023-04-07 NOTE — SUBJECTIVE & OBJECTIVE
Interval History: Patient much improved today compared to yesterday. Calcium is improved but remains elevated. Continue IVF. Obtaining xray of foot today. If inconclusive will obtain CT. Cannot obtain MRI or obtain podiatry consult this weekend. Toe does not appear acutely infected and inflammatory markers are not elevated.    Review of Systems   All other systems reviewed and are negative.  Objective:     Vital Signs (Most Recent):  Temp: 97.6 °F (36.4 °C) (04/07/23 1038)  Pulse: 60 (04/07/23 1053)  Resp: 15 (04/07/23 1053)  BP: (!) 157/68 (04/07/23 1038)  SpO2: (!) 92 % (04/07/23 1053)   Vital Signs (24h Range):  Temp:  [97 °F (36.1 °C)-98.6 °F (37 °C)] 97.6 °F (36.4 °C)  Pulse:  [56-66] 60  Resp:  [15-26] 15  SpO2:  [87 %-100 %] 92 %  BP: (108-158)/(57-75) 157/68     Weight: 133.8 kg (295 lb)  Body mass index is 42.33 kg/m².    Intake/Output Summary (Last 24 hours) at 4/7/2023 1435  Last data filed at 4/7/2023 1239  Gross per 24 hour   Intake 530 ml   Output --   Net 530 ml      Physical Exam  Vitals reviewed  General: NAD, Well developed, Well Nourished  Head: NC/AT  Eyes: EOMI, MILAD  Cardiovascular: Pulses intact distally, Regular Rate  Pulmonary: Normal Respiratory Rate, No respiratory distress  Gi: Soft, Non-tender  Extremities: Warm, No edema present, right great toe with wound. Scab overlying. Good capillary refill. No drainage present. Not indurated. No fluctuance. Sensation intact  Skin: Warm, dry  Neuro: Alert, Oriented x3, No focal Deficit  Psych: Appropriate mood and affect      Significant Labs: All pertinent labs within the past 24 hours have been reviewed.    Significant Imaging: I have reviewed all pertinent imaging results/findings within the past 24 hours.

## 2023-04-07 NOTE — H&P
"[facility] Medicine  History & Physical    Patient Name: Иван Fuentes  MRN: [id number]  Admission Date: 4/6/2023  Attending Physician: WILMER Orozco MD   Primary Care Provider: Manuel Wiley MD         Patient information was obtained from patient, past medical records and ER records.       Subjective:     Principal Problem:Slurred speech    Chief Complaint:   Chief Complaint   Patient presents with    Aphasia     Wife reports intermittent episodes of slurred speech that started at 1100 today. Now resolved.    Fatigue     Generalized weakness since 1100 today        HPI:   Mr. Fuentes is a 73yo man with a past medical history of BPH, CHF, COPD, CVA, DVT, DM2, HLD, HTN, obesity with EVONNE, and hypothyroidism.  His last TTE was 7/7/21 and showed an EF of 60%, normal diastolic function, PAP 14 mmHg and normal RV size and function.  He also has PAD (Ankle-brachial index of 0.83 on the right and 0.79 on the left, indicating bilateral mild-to-moderate peripheral artery disease.  Elevated velocities in the left distal superficial femoral artery suggesting hemodynamically significant focal stenosis of the left distal superficial femoral artery.)  He wears CPAP HS with 6L NC at baseline.    Mr. Fuentes is evaluated after arriving to his room, though his wife is no longer present.  He provides the history himself to me.  Per the ED staff, "During the day the wife became concerned and brought the patient in for further evaluation.  There is no neurological deficit.  The patient has no weakness of any upper extremity.  No slurred speech.  No facial droop.  He has had strokes in the past but states this only feels like he has lethargy in his tired.  Has seen this in the past with urinary tract infections.  His wife agrees."     The patient tells me that he has generally been feeling weak and fatigued, but that today his  was helping him and noted, "I had a change in my speech."  He " HPI Comments: Joy Handy is a 58 y.o. male with PMHx of stroke, HTN, A-fib, DM, and CA (splenic lymphoma), presenting per EMS to ED c/o progressive right upper and right lower extremity weakness since 1600 last night that appeared worse upon waking this morning. Pt notes he noticed his symptoms when he was having to drag his right leg yesterday and his RUE was weaker than normal. He reports those symptoms remained constant but seemed to be worse since waking this morning, prompting him to call EMS for further evaluation. Pt reports his BGL was elevated to 420 yesterday. He notes he is intermittently dizzy with history of vertigo but denies current dizziness. Pt endorses history of CVA 10 years ago with spinal meningitis. He reports history of DM and HTN. Pt denies history of cardiac disease or EtOH use. He specifically denies CP, HA, abdominal pain, or SOB. PCP: Anni Craig NP  Social Hx: never smoker; - EtOH; - drug use. There are no other complaints, changes, or physical findings at this time. Written by DONAVAN Alegre, as dictated by Bridgett Collado MD.           The history is provided by the patient and the EMS personnel. Past Medical History:   Diagnosis Date    A-fib [facility]) 2009    Resolved.    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[address]     Cancer ([address]     splenic lymphoma    CKD stage 4 due to type 2 diabetes mellitus ([address]     Diabetes ([address]     Hypertension     Neuropathy ([address]     Other ill-defined conditions     spinal meningitis    Other ill-defined conditions     broken blood vessel to nose    Vitamin D deficiency        Past Surgical History:   Procedure Laterality Date    HX OTHER SURGICAL  02/06/2017    Excisional debridement of abscess right thigh & right lower back     HX OTHER SURGICAL  05/23/2017    I&D & excisional debridement (skin & subcutaneous tissue) back & right thigh         Family "also pointed out that he was, "all swollen up looking."  The patient states he wasn't really aware of any of the speech changes himself.  He also denies any CP, fever, chills, or urinary symptoms.  He has been suffering with a right great toe injury which he points out has been slow to heal since injuring it on a boat 1 month ago.  He has chronic MALONEY and SOB, but states this is normal for him and unchanged.  He denies any other neurologic issues such as focal weakness or numbness or tingling, or other stroke-like symptoms.     Pleasant morbidly obese male comes emergency room with lethargy.  It was present earlier this morning shortly after waking up.   He appears stable otherwise.  Does not appear toxic.  In the ED his VS were BP (!) 146/73   Pulse 65   Temp 97.5 °F (36.4 °C) (Oral)   Resp 15   Ht 5' 10" (1.778 m)   Wt 133.8 kg (295 lb)   SpO2 92% RA -> 100% 2L NC   BMI 42.33 kg/m².  Labs showed normal CBC, Cr 1.9 (baseline 1 on 2/11/22), gluc 146, Ca 12.4, Phos 5.1, normal LFT.  LA 3.3, BNP 17, UA + infection.  CXR showed no acute cardiopulmonary process.    NC CT head showed NAF.   NC CT chest showed: 1. Increased left lower lobe atelectasis versus superimposed infiltrate. Correlate with clinical evidence of pneumonia. 2. New mediastinal lymphadenopathy warrants further evaluation. Consider additional evaluation.  with PET-CT.  EKG showed sinus with 1st degree AVB rate 61.    In the ED he was treated with:  Medications   sodium chloride 0.9% bolus 500 mL 500 mL (has no administration in time range)   cefTRIAXone (ROCEPHIN) 1 g in dextrose 5 % in water (D5W) 5 % 50 mL IVPB (MB+) (0 g Intravenous Stopped 4/6/23 2058)   sodium chloride 0.9% bolus 1,000 mL 1,000 mL (1,000 mLs Intravenous New Bag 4/6/23 1942)           Past Medical History:   Diagnosis Date    Anticoagulant long-term use     Arthritis of both knees     BPH (benign prostatic hyperplasia)     CHF (congestive heart failure)     COPD (chronic " History:   Problem Relation Age of Onset    Diabetes Mother     Hypertension Father     Hypertension Sister        Social History     Social History    Marital status:      Spouse name: N/A    Number of children: N/A    Years of education: N/A     Occupational History    Not on file. Social History Main Topics    Smoking status: Never Smoker    Smokeless tobacco: Never Used    Alcohol use No    Drug use: Yes     Special: Prescription, OTC    Sexual activity: Not on file     Other Topics Concern    Not on file     Social History Narrative    ** Merged History Encounter **              ALLERGIES: Review of patient's allergies indicates no known allergies. Review of Systems   Constitutional: Negative for chills and fever. HENT: Negative. Negative for congestion, rhinorrhea and sinus pressure. Eyes: Negative. Negative for discharge and redness. Respiratory: Negative. Negative for chest tightness and shortness of breath. Cardiovascular: Negative. Negative for chest pain. Gastrointestinal: Negative. Negative for abdominal pain and blood in stool. Endocrine: Negative. Genitourinary: Negative. Negative for flank pain and hematuria. Musculoskeletal: Negative. Negative for back pain. Skin: Negative. Negative for rash. Neurological: Positive for weakness (RU and RL extremities). Negative for dizziness, seizures, numbness and headaches. Hematological: Negative. All other systems reviewed and are negative. Vitals:    07/29/17 1700 07/29/17 1715 07/29/17 1730 07/29/17 1745   BP: 174/84 177/84 162/81 166/88   Pulse: 69 69 71 68   Resp: 20 16 15 18   Temp:       SpO2: 94% 95% 96% 95%   Weight:       Height:                Physical Exam   Constitutional: He is oriented to person, place, and time. He appears well-developed and well-nourished. No distress. HENT:   Head: Normocephalic and atraumatic. Nose: Nose normal.   Mouth/Throat: No oropharyngeal exudate. Eyes: Conjunctivae and EOM are normal. Pupils are equal, round, and reactive to light. No scleral icterus. Neck: Normal range of motion. Neck supple. No JVD present. No thyromegaly present. Cardiovascular: Normal rate, regular rhythm, normal heart sounds, intact distal pulses and normal pulses. PMI is not displaced. Exam reveals no gallop and no friction rub. No murmur heard. Pulmonary/Chest: Effort normal and breath sounds normal. No stridor. No respiratory distress. He has no decreased breath sounds. He has no wheezes. He has no rhonchi. He has no rales. He exhibits no tenderness. Abdominal: Soft. Normal aorta and bowel sounds are normal. He exhibits no distension, no abdominal bruit and no mass. There is no hepatosplenomegaly. There is no tenderness. There is no rebound, no guarding and no CVA tenderness. No hernia. Neurological: He is alert and oriented to person, place, and time. He has normal reflexes. He displays no atrophy and no tremor. No cranial nerve deficit or sensory deficit. He exhibits abnormal muscle tone. He displays no seizure activity. Coordination abnormal. GCS eye subscore is 4. GCS verbal subscore is 5. GCS motor subscore is 6. Reflex Scores:       Patellar reflexes are 2+ on the right side and 2+ on the left side. 4/5 right arm with mild drift   4/5 right leg with mild drift     weak and is right hand dominant   Skin: Skin is warm. No rash noted. He is not diaphoretic. No erythema. Nursing note and vitals reviewed. MDM  Number of Diagnoses or Management Options  Hypertensive urgency:   Transient cerebral ischemia, unspecified type:   Diagnosis management comments: DDx: hyperglycemia, electrolyte abnormality, seizure, CVA, ICH, adverse drug reaction, malignancy. Impression plan: Pt presents with progressive right-sided weakness since yesterday that appeared worse this morning upon waking.  Discussed with neurology who recommended admission for further stroke obstructive pulmonary disease) 05/03/2021    CVA (cerebral vascular accident)     Deep vein thrombosis     Diabetes mellitus     Diabetes mellitus, type 2     High cholesterol     Hypertension     Left-sided weakness     Obese     Pneumonia due to COVID-19 virus 07/01/2020    Sleep apnea     Thyroid disease     Urosepsis        Past Surgical History:   Procedure Laterality Date    BACK SURGERY      HIP ARTHROPLASTY      HIP ARTHROPLASTY Right 10/28/2021    Procedure: ARTHROPLASTY, HIP;  Surgeon: Ever Hall MD;  Location: [facility] OR [address] [place] [address];  Service: Orthopedics;  Laterality: Right;    IMPLANTATION OF PERMANENT SACRAL NERVE STIMULATOR Right 12/21/2021    Procedure: INSERTION, NEUROSTIMULATOR, PERMANENT, SACRAL;  Surgeon: Erickson Magana MD;  Location: [facility] OR [address] [place] [address];  Service: Urology;  Laterality: Right;    INJECTION OF ANESTHETIC AGENT AROUND NERVE Right 11/19/2020    Procedure: BLOCK, NERVE, FEMORAL AND OBTURATOR;  Surgeon: Dania Garcia MD;  Location: [facility] PAIN MGT;  Service: Pain Management;  Laterality: Right;    INJECTION OF JOINT Right 3/23/2021    Procedure: INJECTION, JOINT, HIP Pt. can continue Eliquis per provider.;  Surgeon: Dania Garcia MD;  Location: [facility] PAIN MGT;  Service: Pain Management;  Laterality: Right;    JOINT REPLACEMENT      PERCUTANEOUS CRYOTHERAPY OF PERIPHERAL NERVE USING LIQUID NITROUS OXIDE IN CLOSED NEEDLE DEVICE Left 8/26/2019    Procedure: CRYOTHERAPY, NERVE, PERIPHERAL, PERCUTANEOUS, USING LIQUID NITROUS OXIDE IN CLOSED NEEDLE DEVICE LEFT KNEE SIMON;  Surgeon: MENG Beckwith;  Location: [facility] CATH LAB;  Service: Pain Management;  Laterality: Left;    PROSTATE SURGERY  2015    RADIOFREQUENCY ABLATION Right 1/12/2021    Procedure: COOLED OBTURTOR FEMORAL ,needconsent;  Surgeon: Dania Garcia MD;  Location: [facility] PAIN MGT;  Service: Pain Management;  Laterality: Right;    SPINAL FUSION  09/2014    TONSILLECTOMY      TOTAL KNEE ARTHROPLASTY Left  9/3/2019    Procedure: ARTHROPLASTY, KNEE, TOTAL-SANDIP;  Surgeon: Ever Hall MD;  Location: [facility] OR [address] [place] [address];  Service: Orthopedics;  Laterality: Left;       Review of patient's allergies indicates:  No Known Allergies    No current facility-administered medications on file prior to encounter.     Current Outpatient Medications on File Prior to Encounter   Medication Sig    atorvastatin (LIPITOR) 80 MG tablet   = 1 tab, Oral, Daily, # 28 tab, 10 Refill(s), Maintenance, Pharmacy: [facility], 178, cm, 08/24/22 6:34:00 CDT, Height/Length Measured, 136.3, kg, 08/17/22 14:56:00 CDT, Weight Dosing    albuterol (VENTOLIN HFA) 90 mcg/actuation inhaler Inhale 2 puffs into the lungs every 6 (six) hours as needed for Wheezing. Rescue    canagliflozin (INVOKANA) 100 mg Tab tablet Take 100 mg by mouth once daily.    diazePAM (VALIUM) 2 MG tablet Take 2 mg by mouth every evening.    diphenhydrAMINE-acetaminophen (TYLENOL PM)  mg Tab Take 1 tablet by mouth nightly as needed. Pain, sleep    ELIQUIS 2.5 mg Tab Take 2.5 mg by mouth 2 (two) times daily.    EScitalopram oxalate (LEXAPRO) 20 MG tablet Take 20 mg by mouth once daily.    furosemide (LASIX) 40 MG tablet 40 mg daily as needed.     KERENDIA 10 mg Tab Take by mouth.    levothyroxine (SYNTHROID) 100 MCG tablet Take 100 mcg by mouth.    lisinopril (PRINIVIL,ZESTRIL) 40 MG tablet Take 40 mg by mouth once daily.     LYRICA 100 mg capsule Take 150 mg by mouth 2 (two) times daily.    melatonin 10 mg Cap Take by mouth every evening.    metformin (GLUCOPHAGE) 500 MG tablet Take 500 mg by mouth 2 (two) times daily with meals.    PULMICORT FLEXHALER 180 mcg/actuation AePB Inhale 2 puffs into the lungs 2 (two) times daily.    semaglutide (OZEMPIC) 0.25 mg or 0.5 mg(2 mg/1.5 mL) pen injector Inject 0.5 mg into the skin every 7 days.    sildenafiL (VIAGRA) 100 MG tablet Take by mouth.    [DISCONTINUED] acetaminophen (TYLENOL) 650 MG TbSR Take 1 tablet (650 mg total) by  workup but is not a TPA candidate. Amount and/or Complexity of Data Reviewed  Clinical lab tests: ordered and reviewed  Tests in the radiology section of CPT®: ordered and reviewed  Tests in the medicine section of CPT®: ordered and reviewed  Obtain history from someone other than the patient: yes (EMS)  Review and summarize past medical records: yes  Discuss the patient with other providers: yes (Neurology, Hospitalist)  Independent visualization of images, tracings, or specimens: yes    Risk of Complications, Morbidity, and/or Mortality  Presenting problems: high  Diagnostic procedures: moderate  Management options: moderate    Critical Care  Total time providing critical care: 30-74 minutes    Patient Progress  Patient progress: stable    Procedures    EKG interpretation: (Preliminary) 1625  Rhythm: normal sinus rhythm; and regular . Rate (approx.): 69; Axis: normal; SC interval: normal; QRS interval: normal ; ST/T wave: normal.  Written by Larry Lesches, ED Scribe, as dictated by Tiffanie Fong MD.    CONSULT NOTE:   4:11 PM  Tiffanie Fong MD spoke with Dr. Alejandra Hilliard,   Specialty: Neurology  Discussed pt's hx, disposition, and available diagnostic and imaging results. Reviewed care plans. Consultant agrees with plans as outlined. Dr. Alejandra Hilliard recommends admitting the pt to the Hospitalist for a stroke workup. Written by Larry Lesches, ED Scribe, as dictated by Tiffanie Fong MD.     CONSULT NOTE:   5:18 PM  Tiffanie Fong MD spoke with Dr. Cristian Pascual,   Specialty: Hospitalist  Discussed pt's hx, disposition, and available diagnostic and imaging results. Reviewed care plans. Consultant will evaluate pt for admission.   Written by Larry Lesches, ED Scribe, as dictated by Tiffanie Fong MD.    CRITICAL CARE NOTE :    5:09 PM      IMPENDING DETERIORATION -Cardiovascular and CNS    ASSOCIATED RISK FACTORS - Dysrhythmia and CNS Decompensation    MANAGEMENT- Bedside Assessment and Supervision of Care    INTERPRETATION -  CT Scan, ECG and Blood Pressure    INTERVENTIONS - Neurologic interventions     CASE REVIEW - Hospitalist, Medical Sub-Specialist, Nursing and Family    TREATMENT RESPONSE -Stable    PERFORMED BY - Self        NOTES   :      I have spent 40 minutes of critical care time involved in lab review, consultations with specialist, family decision- making, bedside attention and documentation. During this entire length of time I was immediately available to the patient . Kay Gore MD                                                  LABORATORY TESTS:  Recent Results (from the past 12 hour(s))   GLUCOSE, POC    Collection Time: 07/29/17  3:54 PM   Result Value Ref Range    Glucose (POC) 241 (H) 65 - 100 mg/dL    Performed by Lizette Milks    CBC WITH AUTOMATED DIFF    Collection Time: 07/29/17  4:07 PM   Result Value Ref Range    WBC 5.8 4.1 - 11.1 K/uL    RBC 4.10 4. 10 - 5.70 M/uL    HGB 12.1 12.1 - 17.0 g/dL    HCT 35.1 (L) 36.6 - 50.3 %    MCV 85.6 80.0 - 99.0 FL    MCH 29.5 26.0 - 34.0 PG    MCHC 34.5 30.0 - 36.5 g/dL    RDW 13.8 11.5 - 14.5 %    PLATELET 845 389 - 824 K/uL    NEUTROPHILS 65 32 - 75 %    LYMPHOCYTES 30 12 - 49 %    MONOCYTES 3 (L) 5 - 13 %    EOSINOPHILS 2 0 - 7 %    BASOPHILS 0 0 - 1 %    ABS. NEUTROPHILS 3.8 1.8 - 8.0 K/UL    ABS. LYMPHOCYTES 1.7 0.8 - 3.5 K/UL    ABS. MONOCYTES 0.2 0.0 - 1.0 K/UL    ABS. EOSINOPHILS 0.1 0.0 - 0.4 K/UL    ABS.  BASOPHILS 0.0 0.0 - 0.1 K/UL   METABOLIC PANEL, COMPREHENSIVE    Collection Time: 07/29/17  4:07 PM   Result Value Ref Range    Sodium 137 136 - 145 mmol/L    Potassium 3.9 3.5 - 5.1 mmol/L    Chloride 105 97 - 108 mmol/L    CO2 26 21 - 32 mmol/L    Anion gap 6 5 - 15 mmol/L    Glucose 249 (H) 65 - 100 mg/dL    BUN 30 (H) 6 - 20 MG/DL    Creatinine 3.10 (H) 0.70 - 1.30 MG/DL    BUN/Creatinine ratio 10 (L) 12 - 20      GFR est AA 25 (L) >60 ml/min/1.73m2    GFR est non-AA 20 (L) >60 ml/min/1.73m2    Calcium 8.8 mouth every 8 (eight) hours.    [DISCONTINUED] amoxicillin (AMOXIL) 500 MG Tab Take 4 pills one hour prior to appointment    [DISCONTINUED] amoxicillin (AMOXIL) 875 MG tablet Take 875 mg by mouth 2 (two) times daily.    [DISCONTINUED] apixaban (ELIQUIS) 5 mg Tab Take 1 tablet (5 mg total) by mouth 2 (two) times daily.    [DISCONTINUED] ascorbic acid, vitamin C, (VITAMIN C) 1000 MG tablet Take 1 tablet (1,000 mg total) by mouth 3 (three) times daily.    [DISCONTINUED] ascorbic acid, vitamin C, (VITAMIN C) 500 MG tablet Take by mouth.    [DISCONTINUED] atorvastatin (LIPITOR) 80 MG tablet Take 80 mg by mouth once daily.    [DISCONTINUED] augmented betamethasone dipropionate (DIPROLENE-AF) 0.05 % cream     [DISCONTINUED] ciprofloxacin HCl (CIPRO ORAL) Take by mouth 2 (two) times a day.    [DISCONTINUED] diclofenac sodium (VOLTAREN) 1 % Gel Apply 2 g topically 3 (three) times daily.    [DISCONTINUED] docusate sodium (STOOL SOFTENER ORAL) Take by mouth every evening.    [DISCONTINUED] doxycycline (VIBRAMYCIN) 100 MG Cap Take 100 mg by mouth 2 (two) times daily.    [DISCONTINUED] fluticasone (FLONASE) 50 mcg/actuation nasal spray 1 spray every evening.     [DISCONTINUED] fluticasone-umeclidin-vilanter (TRELEGY ELLIPTA) 100-62.5-25 mcg DsDv Inhale 1 puff into the lungs once daily.    [DISCONTINUED] HYDROcodone-acetaminophen (NORCO)  mg per tablet Take 1 tablet by mouth.    [DISCONTINUED] levothyroxine (SYNTHROID) 50 MCG tablet Take 50 mcg by mouth once daily.    [DISCONTINUED] magnesium 250 mg Tab Take by mouth every evening.    [DISCONTINUED] methenamine (HIPREX) 1 gram Tab Take 1 tablet (1 g total) by mouth 2 (two) times daily.    [DISCONTINUED] mupirocin (BACTROBAN) 2 % ointment SMARTSI Application Topical 2-3 Times Daily    [DISCONTINUED] oxyCODONE (ROXICODONE) 5 MG immediate release tablet Take 1 tablet (5 mg total) by mouth every 4 (four) hours as needed for Pain.    [DISCONTINUED] pantoprazole (PROTONIX) 40  8.5 - 10.1 MG/DL    Bilirubin, total 0.5 0.2 - 1.0 MG/DL    ALT (SGPT) 30 12 - 78 U/L    AST (SGOT) 20 15 - 37 U/L    Alk. phosphatase 186 (H) 45 - 117 U/L    Protein, total 6.4 6.4 - 8.2 g/dL    Albumin 3.2 (L) 3.5 - 5.0 g/dL    Globulin 3.2 2.0 - 4.0 g/dL    A-G Ratio 1.0 (L) 1.1 - 2.2     PROTHROMBIN TIME + INR    Collection Time: 07/29/17  4:07 PM   Result Value Ref Range    INR 1.0 0.9 - 1.1      Prothrombin time 10.5 9.0 - 11.1 sec   PTT    Collection Time: 07/29/17  4:07 PM   Result Value Ref Range    aPTT 27.2 22.1 - 32.5 sec    aPTT, therapeutic range     58.0 - 77.0 SECS   EKG, 12 LEAD, INITIAL    Collection Time: 07/29/17  4:25 PM   Result Value Ref Range    Ventricular Rate 69 BPM    Atrial Rate 69 BPM    P-R Interval 194 ms    QRS Duration 102 ms    Q-T Interval 376 ms    QTC Calculation (Bezet) 402 ms    Calculated P Axis 40 degrees    Calculated R Axis -16 degrees    Calculated T Axis 8 degrees    Diagnosis       Normal sinus rhythm  Normal ECG  When compared with ECG of 22-MAY-2017 14:46,  No significant change was found         IMAGING RESULTS:  INDICATION:  weakness      COMPARISON: 5/22/2017     FINDINGS: Single AP portable view of the chest obtained at 1607 demonstrates a  stable cardiomediastinal silhouette. The lungs are hypoinspiratory but clear  bilaterally. No osseous abnormalities are seen.     IMPRESSION: No evidence of acute cardiopulmonary process.            EXAM:  CT CODE NEURO HEAD WO CONTRAST     INDICATION:   code s     COMPARISON: CT 5/9/2016.     CONTRAST:  None.     TECHNIQUE: Unenhanced CT of the head was performed using 5 mm images. Brain and  bone windows were generated. CT dose reduction was achieved through use of a  standardized protocol tailored for this examination and automatic exposure  control for dose modulation.       FINDINGS:  The ventricles and sulci are normal in size, shape and configuration and  midline.  There is mild periventricular and subcortical white MG tablet Take 40 mg by mouth.    [DISCONTINUED] traMADoL (ULTRAM) 50 mg tablet Take 1 tablet (50 mg total) by mouth every 6 (six) hours as needed for Pain.    [DISCONTINUED] TRELEGY ELLIPTA 200-62.5-25 mcg inhaler Inhale 1 puff into the lungs.    [DISCONTINUED] umeclidinium (INCRUSE ELLIPTA) 62.5 mcg/actuation inhalation capsule Inhale 62.5 mcg into the lungs 2 (two) times a day. Controller     Family History       Problem Relation (Age of Onset)    Diabetes Mellitus Mother    Hypertension Brother          Tobacco Use    Smoking status: Former     Packs/day: 0.50     Types: Cigarettes    Smokeless tobacco: Never    Tobacco comments:     pt quit 3 weeks ago   Substance and Sexual Activity    Alcohol use: Not Currently     Comment: history of alcohol excess until 2011    Drug use: No    Sexual activity: Not on file     Review of Systems   Constitutional:  Positive for activity change, fatigue and unexpected weight change. Negative for fever.   Eyes:  Negative for visual disturbance.   Respiratory:  Positive for shortness of breath.    Cardiovascular:  Positive for leg swelling. Negative for chest pain.   Gastrointestinal:  Negative for nausea and vomiting.   Genitourinary:  Negative for difficulty urinating and dysuria.   Neurological:  Positive for speech difficulty.   Hematological:  Bruises/bleeds easily.   Psychiatric/Behavioral:  Positive for decreased concentration.    Objective:     Vital Signs (Most Recent):  Temp: 97.5 °F (36.4 °C) (04/06/23 2045)  Pulse: 65 (04/06/23 2045)  Resp: 15 (04/06/23 2045)  BP: (!) 146/73 (04/06/23 2045)  SpO2: 100 % (04/06/23 2045)   Vital Signs (24h Range):  Temp:  [97.5 °F (36.4 °C)-97.8 °F (36.6 °C)] 97.5 °F (36.4 °C)  Pulse:  [60-65] 65  Resp:  [15-26] 15  SpO2:  [92 %-100 %] 100 %  BP: (118-146)/(66-75) 146/73     Weight: 133.8 kg (295 lb)  Body mass index is 42.33 kg/m².    Physical Exam  Constitutional:       General: He is not in acute distress.     Appearance: He is  matter disease. There is no intracranial hemorrhage, extra-axial collection, mass, mass effect  or midline shift. The basilar cisterns are open. No acute infarct is  identified. The bone windows demonstrate no abnormalities. The visualized  portions of the paranasal sinuses and mastoid air cells are clear.     IMPRESSION: No acute intracranial abnormality.               MEDICATIONS GIVEN:  Medications   sodium chloride (NS) flush 5-10 mL (not administered)   sodium chloride (NS) flush 5-10 mL (not administered)       IMPRESSION:  1. Transient cerebral ischemia, unspecified type    2. Hypertensive urgency        PLAN:  1. Admit to the Hospitalist.    5:18 PM  Patient is being admitted to the hospital by Dr. Ld Alexander. The results of their tests and

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
## 2023-04-07 NOTE — ASSESSMENT & PLAN NOTE
Acute encephalopathy- metabolic vs sepsis vs toxic  CT head had no acute findings  He currently has completely fluent speech with no deficits at all  Will continue to monitor with neuro checks q4 and tele for now  MRI brain if recurs  Consider adding ASA 81mg but care with Eliquis  Holding Valium, Lyrica, Tylenol PM's for now to do neuro checks- monitor for signs of withdrawal

## 2023-04-07 NOTE — ASSESSMENT & PLAN NOTE
Started on Levemir 10U am and low dose SSI while hospitalized  Holding the following home meds:   -Invokana, Kerendia (not formulary), Ozempic, Metformin

## 2023-04-07 NOTE — ASSESSMENT & PLAN NOTE
BP (!) 146/73   Pulse 65      Recent TTE showed normal EF and no diastolic dysfunction  Holding ACEi due to SEVEN, as well as Lasix  Hopefully the Lisinopril can be restarted  Just monitor of BP meds for now and add Coreg or Norvasc if need if Cr worsens

## 2023-04-07 NOTE — ASSESSMENT & PLAN NOTE
  albuterol-ipratropium 2.5 mg-0.5 mg/3 mL nebulizer solution 3 mL, 3 mL, Nebulization, Q4H PRN     [START ON 4/7/2023] fluticasone furoate-vilanteroL 100-25 mcg/dose diskus inhaler 1 puff, 1 puff, Inhalation, Daily

## 2023-04-07 NOTE — PLAN OF CARE
Problem: Gas Exchange Impaired  Goal: Optimal Gas Exchange  Outcome: Ongoing, Progressing     Problem: Obstructive Sleep Apnea Risk or Actual  Goal: Unobstructed Breathing During Sleep  Outcome: Ongoing, Progressing  Intervention: Monitor and Manage Obstructive Sleep Apnea  Flowsheets (Taken 4/7/2023 7410)  NPPV/CPAP Maintenance: home PAP equipment/settings used     Patient in no apparent distress. Sat's  93 % on room air this AM . He was 87% while eating breakfast. Placed on 1 lpm nasal cannula. He states that he wears home CPAP at night with 6 lpm bleed in. PRN treatments not needed. Breo daily . Will continue to monitor.

## 2023-04-07 NOTE — ASSESSMENT & PLAN NOTE
Holding his home Lasix and Lisinopril 40mg daily for now  Trial of hydration (1.5L NS total ordered)  Check Renal US in the am     Latest Reference Range & Units 10/21/21 13:46 10/27/21 15:45 02/11/22 11:01 04/06/23 18:45   Creatinine 0.5 - 1.4 mg/dL 1.4 1.3 1.0 1.9 (H)

## 2023-04-08 VITALS
HEIGHT: 70 IN | BODY MASS INDEX: 42.23 KG/M2 | TEMPERATURE: 98 F | HEART RATE: 67 BPM | DIASTOLIC BLOOD PRESSURE: 63 MMHG | OXYGEN SATURATION: 92 % | WEIGHT: 295 LBS | RESPIRATION RATE: 13 BRPM | SYSTOLIC BLOOD PRESSURE: 140 MMHG

## 2023-04-08 PROBLEM — R47.81 SLURRED SPEECH: Status: RESOLVED | Noted: 2023-04-06 | Resolved: 2023-04-08

## 2023-04-08 LAB
ALBUMIN SERPL BCP-MCNC: 3.1 G/DL (ref 3.5–5.2)
ALP SERPL-CCNC: 44 U/L (ref 55–135)
ALT SERPL W/O P-5'-P-CCNC: 16 U/L (ref 10–44)
ANION GAP SERPL CALC-SCNC: 9 MMOL/L (ref 8–16)
AST SERPL-CCNC: 14 U/L (ref 10–40)
BACTERIA UR CULT: ABNORMAL
BASOPHILS # BLD AUTO: 0.1 K/UL (ref 0–0.2)
BASOPHILS NFR BLD: 1 % (ref 0–1.9)
BILIRUB SERPL-MCNC: 0.4 MG/DL (ref 0.1–1)
BUN SERPL-MCNC: 30 MG/DL (ref 8–23)
CALCIUM SERPL-MCNC: 10.1 MG/DL (ref 8.7–10.5)
CHLORIDE SERPL-SCNC: 108 MMOL/L (ref 95–110)
CO2 SERPL-SCNC: 18 MMOL/L (ref 23–29)
CREAT SERPL-MCNC: 1.1 MG/DL (ref 0.5–1.4)
DIFFERENTIAL METHOD: ABNORMAL
EOSINOPHIL # BLD AUTO: 0.6 K/UL (ref 0–0.5)
EOSINOPHIL NFR BLD: 5.5 % (ref 0–8)
ERYTHROCYTE [DISTWIDTH] IN BLOOD BY AUTOMATED COUNT: 13.7 % (ref 11.5–14.5)
EST. GFR  (NO RACE VARIABLE): >60 ML/MIN/1.73 M^2
GLUCOSE SERPL-MCNC: 160 MG/DL (ref 70–110)
HCT VFR BLD AUTO: 43.2 % (ref 40–54)
HGB BLD-MCNC: 14.3 G/DL (ref 14–18)
IMM GRANULOCYTES # BLD AUTO: 0.05 K/UL (ref 0–0.04)
IMM GRANULOCYTES NFR BLD AUTO: 0.5 % (ref 0–0.5)
LACTATE SERPL-SCNC: 1.9 MMOL/L (ref 0.5–2.2)
LYMPHOCYTES # BLD AUTO: 2.6 K/UL (ref 1–4.8)
LYMPHOCYTES NFR BLD: 25.4 % (ref 18–48)
MAGNESIUM SERPL-MCNC: 1.4 MG/DL (ref 1.6–2.6)
MCH RBC QN AUTO: 31.6 PG (ref 27–31)
MCHC RBC AUTO-ENTMCNC: 33.1 G/DL (ref 32–36)
MCV RBC AUTO: 95 FL (ref 82–98)
MONOCYTES # BLD AUTO: 1.1 K/UL (ref 0.3–1)
MONOCYTES NFR BLD: 10.4 % (ref 4–15)
NEUTROPHILS # BLD AUTO: 5.9 K/UL (ref 1.8–7.7)
NEUTROPHILS NFR BLD: 57.2 % (ref 38–73)
NRBC BLD-RTO: 0 /100 WBC
PHOSPHATE SERPL-MCNC: 2.5 MG/DL (ref 2.7–4.5)
PLATELET # BLD AUTO: 201 K/UL (ref 150–450)
PMV BLD AUTO: 10.1 FL (ref 9.2–12.9)
POCT GLUCOSE: 170 MG/DL (ref 70–110)
POCT GLUCOSE: 201 MG/DL (ref 70–110)
POTASSIUM SERPL-SCNC: 3.9 MMOL/L (ref 3.5–5.1)
PROT SERPL-MCNC: 6.5 G/DL (ref 6–8.4)
PROT UR-MCNC: 62 MG/DL (ref 0–15)
RBC # BLD AUTO: 4.53 M/UL (ref 4.6–6.2)
SODIUM SERPL-SCNC: 135 MMOL/L (ref 136–145)
WBC # BLD AUTO: 10.29 K/UL (ref 3.9–12.7)

## 2023-04-08 PROCEDURE — 25000003 PHARM REV CODE 250: Performed by: INTERNAL MEDICINE

## 2023-04-08 PROCEDURE — 36415 COLL VENOUS BLD VENIPUNCTURE: CPT | Performed by: HOSPITALIST

## 2023-04-08 PROCEDURE — 84156 ASSAY OF PROTEIN URINE: CPT | Performed by: INTERNAL MEDICINE

## 2023-04-08 PROCEDURE — 85025 COMPLETE CBC W/AUTO DIFF WBC: CPT | Performed by: INTERNAL MEDICINE

## 2023-04-08 PROCEDURE — 84166 PATHOLOGIST INTERPRETATION UPE: ICD-10-PCS | Mod: 26,,, | Performed by: PATHOLOGY

## 2023-04-08 PROCEDURE — 99900035 HC TECH TIME PER 15 MIN (STAT)

## 2023-04-08 PROCEDURE — 94640 AIRWAY INHALATION TREATMENT: CPT

## 2023-04-08 PROCEDURE — 83605 ASSAY OF LACTIC ACID: CPT | Performed by: HOSPITALIST

## 2023-04-08 PROCEDURE — 25000003 PHARM REV CODE 250: Performed by: HOSPITALIST

## 2023-04-08 PROCEDURE — 99238 HOSP IP/OBS DSCHRG MGMT 30/<: CPT | Mod: ,,, | Performed by: HOSPITALIST

## 2023-04-08 PROCEDURE — 83735 ASSAY OF MAGNESIUM: CPT | Performed by: INTERNAL MEDICINE

## 2023-04-08 PROCEDURE — 84166 PROTEIN E-PHORESIS/URINE/CSF: CPT | Mod: 26,,, | Performed by: PATHOLOGY

## 2023-04-08 PROCEDURE — 84166 PROTEIN E-PHORESIS/URINE/CSF: CPT | Performed by: INTERNAL MEDICINE

## 2023-04-08 PROCEDURE — 94761 N-INVAS EAR/PLS OXIMETRY MLT: CPT

## 2023-04-08 PROCEDURE — G0378 HOSPITAL OBSERVATION PER HR: HCPCS

## 2023-04-08 PROCEDURE — 84100 ASSAY OF PHOSPHORUS: CPT | Performed by: INTERNAL MEDICINE

## 2023-04-08 PROCEDURE — 99238 PR HOSPITAL DISCHARGE DAY,<30 MIN: ICD-10-PCS | Mod: ,,, | Performed by: HOSPITALIST

## 2023-04-08 PROCEDURE — 80053 COMPREHEN METABOLIC PANEL: CPT | Performed by: INTERNAL MEDICINE

## 2023-04-08 RX ORDER — CIPROFLOXACIN 500 MG/1
500 TABLET ORAL 2 TIMES DAILY
Qty: 20 TABLET | Refills: 0 | Status: ON HOLD | OUTPATIENT
Start: 2023-04-08 | End: 2023-05-12 | Stop reason: HOSPADM

## 2023-04-08 RX ORDER — LANOLIN ALCOHOL/MO/W.PET/CERES
400 CREAM (GRAM) TOPICAL ONCE
Status: COMPLETED | OUTPATIENT
Start: 2023-04-08 | End: 2023-04-08

## 2023-04-08 RX ORDER — ASPIRIN 81 MG/1
81 TABLET ORAL DAILY
Qty: 90 TABLET | Refills: 1 | Status: SHIPPED | OUTPATIENT
Start: 2023-04-08 | End: 2024-04-07

## 2023-04-08 RX ADMIN — OXYCODONE HYDROCHLORIDE AND ACETAMINOPHEN 500 MG: 500 TABLET ORAL at 09:04

## 2023-04-08 RX ADMIN — ESCITALOPRAM OXALATE 20 MG: 10 TABLET ORAL at 09:04

## 2023-04-08 RX ADMIN — Medication 400 MG: at 09:04

## 2023-04-08 RX ADMIN — ATORVASTATIN CALCIUM 80 MG: 40 TABLET, FILM COATED ORAL at 09:04

## 2023-04-08 RX ADMIN — FLUTICASONE FUROATE AND VILANTEROL TRIFENATATE 1 PUFF: 100; 25 POWDER RESPIRATORY (INHALATION) at 06:04

## 2023-04-08 RX ADMIN — PANTOPRAZOLE SODIUM 40 MG: 40 TABLET, DELAYED RELEASE ORAL at 09:04

## 2023-04-08 RX ADMIN — APIXABAN 2.5 MG: 2.5 TABLET, FILM COATED ORAL at 09:04

## 2023-04-08 RX ADMIN — LEVOTHYROXINE SODIUM 100 MCG: 25 TABLET ORAL at 05:04

## 2023-04-08 NOTE — PLAN OF CARE
Problem: Adult Inpatient Plan of Care  Goal: Plan of Care Review  Outcome: Ongoing, Progressing  Goal: Patient-Specific Goal (Individualized)  Outcome: Ongoing, Progressing  Goal: Absence of Hospital-Acquired Illness or Injury  Outcome: Ongoing, Progressing  Intervention: Identify and Manage Fall Risk  Flowsheets (Taken 4/8/2023 0529)  Safety Promotion/Fall Prevention:   assistive device/personal item within reach   bed alarm set  Intervention: Prevent and Manage VTE (Venous Thromboembolism) Risk  Flowsheets (Taken 4/8/2023 0529)  Activity Management: Rolling - L1  VTE Prevention/Management:   bleeding risk assessed   bleeding precations maintained  Range of Motion: active ROM (range of motion) encouraged  Goal: Optimal Comfort and Wellbeing  Outcome: Ongoing, Progressing  Intervention: Monitor Pain and Promote Comfort  Flowsheets (Taken 4/8/2023 0529)  Pain Management Interventions:   care clustered   medication offered but refused  Goal: Readiness for Transition of Care  Outcome: Ongoing, Progressing

## 2023-04-08 NOTE — DISCHARGE SUMMARY
[facility] Medicine  Discharge Summary      Patient Name: Иван Fuentes  MRN: [id number]  Admission Date: 4/6/2023  Hospital Length of Stay: 0 days  Discharge Date and Time:  04/08/2023 10:03 AM4/8 /2023  Attending Physician: Bebo Chamorro MD   Discharging Provider: Jez Julien MD  Primary Care Provider: Manuel Wiley MD  Admit diagnosis:    1. Slurred speech    2. Acute kidney injury    3. Injury to right great toe     4. COPD    5. Obstructive sleep apnea     6. History of DVT    7. Type 2 diabetes mellitus    8. Hypercalcemia     Final diagnosis:     1. TIA    2. Acute kidney injury    3. Cellulitis of right great toe     4. COPD    5. Obstructive sleep apnea    6. History of DVT    7. Type 2 diabetes mellitus.    8. Hypercalcemia     Reason for hospitalization:  To evaluate cause of slurred speech        HPI:  This 71-year-old male with history of type 2 diabetes mellitus, benign prostatic hypertrophy, congestive heart failure, COPD, DVT, CVA and obstructive sleep apnea was brought to the emergency room for evaluation of slurred speech.  His wife noted that his  was slurred earlier in the day.  He has also been having some weakness all over but no focal weakness.  CT scan of the brain did not show any acute changes.  It did show chronic infarct with microvascular ischemic changes.  He also has a history of injury to his right great toe for about 1 month duration.  This has been followed by his primary care physician.  His urinalysis indicated cystitis without hematuria.    * No surgery found *NONE     Hospital Course:  His slurred speech resolved showed at admission.  It was felt that his source based was probably due to a TIA.  MRI of the brain was not done because of the patient rapid recovery in the emergency room.  He was given IV fluids for his acute kidney injury.  X-rays of his right great toe indicated swelling with possible cellulitis.  This was treated  with IV Rocephin.  He was also given respiratory treatment for his COPD and placed on CPAP.  He improved with treatment with BUN decreasing to 30.  His lactic acid improved with hydration and calcium decreased from 12.4 to10.1.  Patient wanted go home for Easter holiday.  It was felt that patient was stable enough for discharge and can be followed by his primary care physician.    Consults:   Consults (From admission, onward)          Status Ordering Provider     IP consult to case management  Once        Provider:  (Not yet assigned)    Acknowledged WILMER ZEPEDA            Final Active Diagnoses:    Diagnosis Date Noted POA    SEVEN (acute kidney injury) [N17.9] 04/07/2023 Yes    Hypercalcemia [E83.52] 04/06/2023 Yes    Weakness [R53.1] 04/06/2023 Yes    Acute cystitis without hematuria [N30.00] 04/06/2023 Yes    Peripheral arterial disease [I73.9] 04/06/2023 Yes    Mediastinal lymphadenopathy [R59.0] 04/06/2023 Yes    Injury of right great toe [S99.921A] 04/06/2023 Yes    COPD (chronic obstructive pulmonary disease) [J44.9] 05/03/2021 Yes    EVONNE on CPAP [G47.33, Z99.89] 08/26/2019 Not Applicable    Personal history of DVT (deep vein thrombosis) [Z86.718] 08/26/2019 Not Applicable    Severe obesity (BMI >= 40) [E66.01] 10/07/2014 Yes    Type 2 diabetes mellitus, without long-term current use of insulin [E11.9] 10/07/2014 Yes    Primary hypertension [I10] 10/01/2014 Yes    H/O: CVA (cerebrovascular accident) [Z86.73] 10/01/2014 Not Applicable    Hypothyroid [E03.9] 10/01/2014 Yes      Problems Resolved During this Admission:    Diagnosis Date Noted Date Resolved POA    PRINCIPAL PROBLEM:  Slurred speech [R47.81] 04/06/2023 04/08/2023 Yes    Dehydration [E86.0] 04/06/2023 04/06/2023 Unknown    SEVEN (acute kidney injury) [N17.9] 04/06/2023 04/07/2023 Yes      Discharged Condition: goodGood    Disposition: Home or Self Care    Follow Up: PCP IN 3-5 DAYS    Patient Instructions:  TAKE ENTERIC COATED ASPIRIN 81 MG  DAILY.  TAKES CIPRO 500 MG TWICE A DAY 10 DAYS.  CONTINUE OTHER HOME MEDICATIONS.     Diet diabetic     Medications:  Reconciled Home Medications:      Medication List        START taking these medications      aspirin 81 MG EC tablet  Commonly known as: ECOTRIN  Take 1 tablet (81 mg total) by mouth once daily.     ciprofloxacin HCl 500 MG tablet  Commonly known as: CIPRO  Take 1 tablet (500 mg total) by mouth 2 (two) times daily.            CONTINUE taking these medications      albuterol 90 mcg/actuation inhaler  Commonly known as: VENTOLIN HFA  Inhale 2 puffs into the lungs every 6 (six) hours as needed for Wheezing. Rescue     atorvastatin 80 MG tablet  Commonly known as: LIPITOR  = 1 tab, Oral, Daily, # 28 tab, 10 Refill(s), Maintenance, Pharmacy: [facility], 178, cm, 08/24/22 6:34:00 CDT, Height/Length Measured, 136.3, kg, 08/17/22 14:56:00 CDT, Weight Dosing     diazePAM 2 MG tablet  Commonly known as: VALIUM  Take 2 mg by mouth every evening.     diphenhydrAMINE-acetaminophen  mg Tab  Commonly known as: TYLENOL PM  Take 1 tablet by mouth nightly as needed. Pain, sleep     ELIQUIS 2.5 mg Tab  Generic drug: apixaban  Take 2.5 mg by mouth 2 (two) times daily.     EScitalopram oxalate 20 MG tablet  Commonly known as: LEXAPRO  Take 20 mg by mouth once daily.     furosemide 40 MG tablet  Commonly known as: LASIX  40 mg daily as needed.     INVOKANA 100 mg Tab tablet  Generic drug: canagliflozin  Take 100 mg by mouth once daily.     KERENDIA 10 mg Tab  Generic drug: finerenone  Take by mouth.     levothyroxine 100 MCG tablet  Commonly known as: SYNTHROID  Take 100 mcg by mouth.     lisinopriL 40 MG tablet  Commonly known as: PRINIVIL,ZESTRIL  Take 40 mg by mouth once daily.     LYRICA 100 MG capsule  Generic drug: pregabalin  Take 150 mg by mouth 2 (two) times daily.     melatonin 10 mg Cap  Take by mouth every evening.     metFORMIN 500 MG tablet  Commonly known as: GLUCOPHAGE  Take 500 mg by mouth 2  (two) times daily with meals.     OZEMPIC 0.25 mg or 0.5 mg(2 mg/1.5 mL) pen injector  Generic drug: semaglutide  Inject 0.5 mg into the skin every 7 days.     PULMICORT FLEXHALER 180 mcg/actuation Aepb  Generic drug: budesonide 180mcg  Inhale 2 puffs into the lungs 2 (two) times daily.     sildenafiL 100 MG tablet  Commonly known as: VIAGRA  Take by mouth.              Significant Diagnostic Studies: Labs: BMP:   Recent Labs   Lab 04/06/23  1845 04/07/23  1000 04/08/23  0437   * 218* 160*    136 135*   K 4.7 4.2 3.9    106 108   CO2 21* 21* 18*   BUN 53* 44* 30*   CREATININE 1.9* 1.4 1.1   CALCIUM 12.4* 11.0* 10.1   MG 1.9 1.6 1.4*   , CMP   Recent Labs   Lab 04/06/23  1845 04/07/23  1000 04/08/23  0437    136 135*   K 4.7 4.2 3.9    106 108   CO2 21* 21* 18*   * 218* 160*   BUN 53* 44* 30*   CREATININE 1.9* 1.4 1.1   CALCIUM 12.4* 11.0* 10.1   PROT 7.5 6.8 6.5   ALBUMIN 3.5 3.2* 3.1*   BILITOT 0.3 0.4 0.4   ALKPHOS 81 57 44*   AST 15 15 14   ALT 16 16 16   ANIONGAP 12 9 9   , and CBC   Recent Labs   Lab 04/06/23  1711 04/07/23  1000 04/08/23  0437   WBC 10.59 9.05 10.29   HGB 16.0 14.6 14.3   HCT 50.1 45.1 43.2    189 201     Microbiology: Blood Culture   Lab Results   Component Value Date    LABBLOO No Growth to date 04/06/2023    and Urine Culture    Lab Results   Component Value Date    LABURIN MORGANELLA MORGANII  >100,000 cfu/ml   (A) 10/21/2021     Radiology: X-Ray: CXR: X-Ray Chest 1 View (CXR):   Results for orders placed or performed during the hospital encounter of 04/06/23   X-Ray Chest 1 View    Narrative    EXAMINATION:  XR CHEST 1 VIEW    CLINICAL HISTORY:  Sepsis;    TECHNIQUE:  Single frontal view of the chest was performed.    COMPARISON:  Chest x-ray 10/30/2021.    FINDINGS:  Cardiomediastinal silhouette is within normal limits.  Suspected bibasilar atelectasis but no focal consolidation, pneumothorax, or large pleural effusion.  There may be a small  pleural effusion on the left.  No focal bony abnormality.  Partially imaged degenerative and postsurgical changes of the cervical spine with laminectomies.      Impression    No acute cardiopulmonary process.      Electronically signed by: Buster Carpenter  Date:    04/06/2023  Time:    17:38     CT scan: CT ABDOMEN PELVIS WITHOUT CONTRAST: No results found for this visit on 04/06/23.    Pending Diagnostic Studies:       Procedure Component Value Units Date/Time    Angiotensin converting enzyme [[id number]] Collected: 04/06/23 2330    Order Status: Sent Lab Status: In process Updated: 04/07/23 1626    Specimen: Blood     Lactic acid, plasma [[id number]] Collected: 04/08/23 0920    Order Status: Sent Lab Status: In process Updated: 04/08/23 0920    Specimen: Blood     PTH-related peptide [[id number]] Collected: 04/06/23 2330    Order Status: Sent Lab Status: In process Updated: 04/07/23 1626    Specimen: Blood     Protein Electrophoresis, Random Urine [[id number]] Collected: 04/08/23 0248    Order Status: Sent Lab Status: In process Updated: 04/08/23 0252    Specimen: Urine, Clean Catch           Indwelling Lines/Drains at time of discharge:   Lines/Drains/Airways       None                   Time spent on the discharge of patient: 22 minutes         Jez Julien MD  Department of [facility] Medicine  [facility]

## 2023-04-10 LAB
ACE SERPL-CCNC: <5 U/L (ref 16–85)
ALBUMIN SERPL ELPH-MCNC: 3.68 G/DL (ref 3.35–5.55)
ALPHA1 GLOB SERPL ELPH-MCNC: 0.33 G/DL (ref 0.17–0.41)
ALPHA2 GLOB SERPL ELPH-MCNC: 0.92 G/DL (ref 0.43–0.99)
B-GLOBULIN SERPL ELPH-MCNC: 0.86 G/DL (ref 0.5–1.1)
GAMMA GLOB SERPL ELPH-MCNC: 1.2 G/DL (ref 0.67–1.58)
PROT PATTERN UR ELPH-IMP: NORMAL
PROT SERPL-MCNC: 7 G/DL (ref 6–8.4)

## 2023-04-10 NOTE — PLAN OF CARE
[facility] - Intensive Care  Discharge Final Note    Primary Care Provider: Manuel Wiley MD    Expected Discharge Date: 4/8/2023  Patient was discharged over weekend. Follow up appointment scheduled with Dr. Manuel Wiley on April 26th at 1:40 pm. Information was given to patient via phone call.  Final Discharge Note (most recent)       Final Note - 04/10/23 1147          Final Note    Assessment Type Final Discharge Note     Anticipated Discharge Disposition Home or Self Care     What phone number can be called within the next 1-3 days to see how you are doing after discharge? [phone]                     Important Message from Medicare             Contact Info       Manuel Wiley    [address] [facility], [place] [id number] [phone]       Next Steps: Go on 4/26/2023    Instructions: Follow up appointment scheduled for April 26th at 1:40 pm.

## 2023-04-11 ENCOUNTER — TELEPHONE (OUTPATIENT)
Dept: NEUROLOGY | Facility: CLINIC | Age: 73
End: 2023-04-11
Payer: MEDICARE

## 2023-04-11 LAB
PATHOLOGIST INTERPRETATION SPE: NORMAL
PATHOLOGIST INTERPRETATION UPE: NORMAL

## 2023-04-11 NOTE — TELEPHONE ENCOUNTER
Called pt regarding a message I received that pt would like to reschedule appt. Pt's wife stated that they were looking someone with 15-20 years of experience. I explained to pt that these providers are not available as often and thus will not have appts until May, however, Dr. Wodoruff has 7 years of experience and I can schedule with him. Pt's wife was agreeable. I scheduled pt for 4/18 with Dr. Woodruff at 9am and pt's wife confirmed date/time

## 2023-04-12 LAB
BACTERIA BLD CULT: NORMAL
BACTERIA BLD CULT: NORMAL
PTH RELATED PROT SERPL-SCNC: 0.7 PMOL/L

## 2023-05-05 ENCOUNTER — PATIENT MESSAGE (OUTPATIENT)
Dept: ORTHOPEDICS | Facility: CLINIC | Age: 73
End: 2023-05-05
Payer: MEDICARE

## 2023-05-06 ENCOUNTER — HOSPITAL ENCOUNTER (INPATIENT)
Facility: HOSPITAL | Age: 73
LOS: 6 days | Discharge: HOME HEALTH | DRG: 871 | End: 2023-05-12
Attending: EMERGENCY MEDICINE | Admitting: INTERNAL MEDICINE
Payer: MEDICARE

## 2023-05-06 DIAGNOSIS — R06.02 SOB (SHORTNESS OF BREATH): ICD-10-CM

## 2023-05-06 DIAGNOSIS — R09.02 HYPOXEMIA: ICD-10-CM

## 2023-05-06 DIAGNOSIS — A41.9 SEPSIS: ICD-10-CM

## 2023-05-06 DIAGNOSIS — J15.9 COMMUNITY ACQUIRED BACTERIAL PNEUMONIA: ICD-10-CM

## 2023-05-06 DIAGNOSIS — J18.9 PNEUMONIA OF RIGHT MIDDLE LOBE DUE TO INFECTIOUS ORGANISM: Primary | ICD-10-CM

## 2023-05-06 DIAGNOSIS — J44.0 CHRONIC OBSTRUCTIVE PULMONARY DISEASE WITH ACUTE LOWER RESPIRATORY INFECTION: ICD-10-CM

## 2023-05-06 LAB
ALBUMIN SERPL BCP-MCNC: 3 G/DL (ref 3.5–5.2)
ALLENS TEST: ABNORMAL
ALP SERPL-CCNC: 50 U/L (ref 55–135)
ALT SERPL W/O P-5'-P-CCNC: 14 U/L (ref 10–44)
ANION GAP SERPL CALC-SCNC: 15 MMOL/L (ref 8–16)
APAP SERPL-MCNC: 11.8 UG/ML (ref 10–20)
AST SERPL-CCNC: 10 U/L (ref 10–40)
BACTERIA #/AREA URNS HPF: ABNORMAL /HPF
BASOPHILS # BLD AUTO: 0.13 K/UL (ref 0–0.2)
BASOPHILS NFR BLD: 0.5 % (ref 0–1.9)
BILIRUB SERPL-MCNC: 0.5 MG/DL (ref 0.1–1)
BILIRUB UR QL STRIP: NEGATIVE
BNP SERPL-MCNC: 100 PG/ML (ref 0–99)
BUN SERPL-MCNC: 26 MG/DL (ref 8–23)
CALCIUM SERPL-MCNC: 10 MG/DL (ref 8.7–10.5)
CHLORIDE SERPL-SCNC: 102 MMOL/L (ref 95–110)
CLARITY UR: ABNORMAL
CO2 SERPL-SCNC: 15 MMOL/L (ref 23–29)
COLOR UR: YELLOW
CREAT SERPL-MCNC: 1.4 MG/DL (ref 0.5–1.4)
DELSYS: ABNORMAL
DIFFERENTIAL METHOD: ABNORMAL
EOSINOPHIL # BLD AUTO: 0 K/UL (ref 0–0.5)
EOSINOPHIL NFR BLD: 0 % (ref 0–8)
ERYTHROCYTE [DISTWIDTH] IN BLOOD BY AUTOMATED COUNT: 13.5 % (ref 11.5–14.5)
EST. GFR  (NO RACE VARIABLE): 53.1 ML/MIN/1.73 M^2
FIO2: 36
FLOW: 4
GLUCOSE SERPL-MCNC: 230 MG/DL (ref 70–110)
GLUCOSE UR QL STRIP: ABNORMAL
HCO3 UR-SCNC: 16.4 MMOL/L (ref 24–28)
HCT VFR BLD AUTO: 42.7 % (ref 40–54)
HGB BLD-MCNC: 14 G/DL (ref 14–18)
HGB UR QL STRIP: ABNORMAL
HYALINE CASTS #/AREA URNS LPF: 4 /LPF
IMM GRANULOCYTES # BLD AUTO: 0.3 K/UL (ref 0–0.04)
IMM GRANULOCYTES NFR BLD AUTO: 1.2 % (ref 0–0.5)
KETONES UR QL STRIP: NEGATIVE
LACTATE SERPL-SCNC: 1.5 MMOL/L (ref 0.5–2.2)
LEUKOCYTE ESTERASE UR QL STRIP: NEGATIVE
LYMPHOCYTES # BLD AUTO: 1.9 K/UL (ref 1–4.8)
LYMPHOCYTES NFR BLD: 7.8 % (ref 18–48)
MCH RBC QN AUTO: 30.2 PG (ref 27–31)
MCHC RBC AUTO-ENTMCNC: 32.8 G/DL (ref 32–36)
MCV RBC AUTO: 92 FL (ref 82–98)
MICROSCOPIC COMMENT: ABNORMAL
MODE: ABNORMAL
MONOCYTES # BLD AUTO: 2.6 K/UL (ref 0.3–1)
MONOCYTES NFR BLD: 10.6 % (ref 4–15)
NEUTROPHILS # BLD AUTO: 19.4 K/UL (ref 1.8–7.7)
NEUTROPHILS NFR BLD: 79.9 % (ref 38–73)
NITRITE UR QL STRIP: NEGATIVE
NRBC BLD-RTO: 0 /100 WBC
PCO2 BLDA: 22.4 MMHG (ref 35–45)
PH SMN: 7.47 [PH] (ref 7.35–7.45)
PH UR STRIP: 6 [PH] (ref 5–8)
PLATELET # BLD AUTO: 224 K/UL (ref 150–450)
PMV BLD AUTO: 9.8 FL (ref 9.2–12.9)
PO2 BLDA: 89 MMHG (ref 80–100)
POC BE: -7 MMOL/L
POC SATURATED O2: 98 % (ref 95–100)
POTASSIUM SERPL-SCNC: 4.4 MMOL/L (ref 3.5–5.1)
PROT SERPL-MCNC: 7.7 G/DL (ref 6–8.4)
PROT UR QL STRIP: ABNORMAL
RBC # BLD AUTO: 4.63 M/UL (ref 4.6–6.2)
RBC #/AREA URNS HPF: 17 /HPF (ref 0–4)
SAMPLE: ABNORMAL
SITE: ABNORMAL
SODIUM SERPL-SCNC: 132 MMOL/L (ref 136–145)
SP GR UR STRIP: 1.02 (ref 1–1.03)
SP02: 89
SQUAMOUS #/AREA URNS HPF: 8 /HPF
TROPONIN I SERPL DL<=0.01 NG/ML-MCNC: 0.02 NG/ML (ref 0–0.03)
URN SPEC COLLECT METH UR: ABNORMAL
UROBILINOGEN UR STRIP-ACNC: NEGATIVE EU/DL
WBC # BLD AUTO: 24.28 K/UL (ref 3.9–12.7)
WBC #/AREA URNS HPF: 55 /HPF (ref 0–5)
YEAST URNS QL MICRO: ABNORMAL

## 2023-05-06 PROCEDURE — 71045 X-RAY EXAM CHEST 1 VIEW: CPT | Mod: TC

## 2023-05-06 PROCEDURE — 83880 ASSAY OF NATRIURETIC PEPTIDE: CPT | Performed by: EMERGENCY MEDICINE

## 2023-05-06 PROCEDURE — 87088 URINE BACTERIA CULTURE: CPT | Performed by: EMERGENCY MEDICINE

## 2023-05-06 PROCEDURE — 71045 XR CHEST AP PORTABLE: ICD-10-PCS | Mod: 26,,, | Performed by: RADIOLOGY

## 2023-05-06 PROCEDURE — 87040 BLOOD CULTURE FOR BACTERIA: CPT | Mod: 59 | Performed by: EMERGENCY MEDICINE

## 2023-05-06 PROCEDURE — 87086 URINE CULTURE/COLONY COUNT: CPT | Performed by: EMERGENCY MEDICINE

## 2023-05-06 PROCEDURE — 83605 ASSAY OF LACTIC ACID: CPT | Performed by: EMERGENCY MEDICINE

## 2023-05-06 PROCEDURE — 80053 COMPREHEN METABOLIC PANEL: CPT | Performed by: EMERGENCY MEDICINE

## 2023-05-06 PROCEDURE — 87106 FUNGI IDENTIFICATION YEAST: CPT | Performed by: EMERGENCY MEDICINE

## 2023-05-06 PROCEDURE — 85025 COMPLETE CBC W/AUTO DIFF WBC: CPT | Performed by: EMERGENCY MEDICINE

## 2023-05-06 PROCEDURE — 80143 DRUG ASSAY ACETAMINOPHEN: CPT | Performed by: EMERGENCY MEDICINE

## 2023-05-06 PROCEDURE — 84484 ASSAY OF TROPONIN QUANT: CPT | Performed by: EMERGENCY MEDICINE

## 2023-05-06 PROCEDURE — 99285 EMERGENCY DEPT VISIT HI MDM: CPT | Mod: 25

## 2023-05-06 PROCEDURE — 12000002 HC ACUTE/MED SURGE SEMI-PRIVATE ROOM

## 2023-05-06 PROCEDURE — 81000 URINALYSIS NONAUTO W/SCOPE: CPT | Performed by: EMERGENCY MEDICINE

## 2023-05-06 PROCEDURE — 63600175 PHARM REV CODE 636 W HCPCS: Performed by: EMERGENCY MEDICINE

## 2023-05-06 PROCEDURE — 99900035 HC TECH TIME PER 15 MIN (STAT)

## 2023-05-06 PROCEDURE — 96374 THER/PROPH/DIAG INJ IV PUSH: CPT

## 2023-05-06 PROCEDURE — 82803 BLOOD GASES ANY COMBINATION: CPT

## 2023-05-06 PROCEDURE — 93010 EKG 12-LEAD: ICD-10-PCS | Mod: ,,, | Performed by: INTERNAL MEDICINE

## 2023-05-06 PROCEDURE — 93010 ELECTROCARDIOGRAM REPORT: CPT | Mod: ,,, | Performed by: INTERNAL MEDICINE

## 2023-05-06 PROCEDURE — 27000190 HC CPAP FULL FACE MASK W/VALVE

## 2023-05-06 PROCEDURE — 99900031 HC PATIENT EDUCATION (STAT)

## 2023-05-06 PROCEDURE — 93005 ELECTROCARDIOGRAM TRACING: CPT

## 2023-05-06 PROCEDURE — 27000221 HC OXYGEN, UP TO 24 HOURS

## 2023-05-06 PROCEDURE — 71045 X-RAY EXAM CHEST 1 VIEW: CPT | Mod: 26,,, | Performed by: RADIOLOGY

## 2023-05-06 PROCEDURE — 36600 WITHDRAWAL OF ARTERIAL BLOOD: CPT

## 2023-05-06 PROCEDURE — 94640 AIRWAY INHALATION TREATMENT: CPT

## 2023-05-06 PROCEDURE — 25000242 PHARM REV CODE 250 ALT 637 W/ HCPCS: Performed by: EMERGENCY MEDICINE

## 2023-05-06 RX ORDER — LEVOFLOXACIN 5 MG/ML
750 INJECTION, SOLUTION INTRAVENOUS
Status: COMPLETED | OUTPATIENT
Start: 2023-05-06 | End: 2023-05-07

## 2023-05-06 RX ORDER — IPRATROPIUM BROMIDE AND ALBUTEROL SULFATE 2.5; .5 MG/3ML; MG/3ML
3 SOLUTION RESPIRATORY (INHALATION)
Status: COMPLETED | OUTPATIENT
Start: 2023-05-06 | End: 2023-05-06

## 2023-05-06 RX ADMIN — IPRATROPIUM BROMIDE AND ALBUTEROL SULFATE 3 ML: .5; 3 SOLUTION RESPIRATORY (INHALATION) at 11:05

## 2023-05-06 RX ADMIN — LEVOFLOXACIN 750 MG: 750 INJECTION, SOLUTION INTRAVENOUS at 11:05

## 2023-05-06 NOTE — Clinical Note
Diagnosis: Sepsis [866159]   Admitting Provider:: WILMER ZEPEDA [[id number]]   Future Attending Provider: WILMER ZEPEDA [[id number]]   Reason for IP Medical Treatment  (Clinical interventions that can only be accomplished in the IP setting? ) :: sepsis, pneumonia, copd   I certify that Inpatient services for greater than or equal to 2 midnights are medically necessary:: Yes   Plans for Post-Acute care--if anticipated (pick the single best option):: A. No post acute care anticipated at this time

## 2023-05-06 NOTE — LETTER
May 8, 2023         [address] [place] [id number]  Phone: [phone]  Fax: [phone]       Patient: Иван Fuentes   YOB: 1950  Date of Visit: 05/08/2023    To Whom It May Concern:    Carlton Fuentes  was admitted to [facility] on 05/07/23 and remains a patient as of today, 05/08/23. If you have any questions or concerns, or if I can be of further assistance, please do not hesitate to contact me.    Sincerely,    Honey Reese RN

## 2023-05-07 PROBLEM — A41.9 SEPSIS: Status: ACTIVE | Noted: 2023-05-07

## 2023-05-07 PROBLEM — Z71.89 ACP (ADVANCE CARE PLANNING): Status: ACTIVE | Noted: 2023-05-07

## 2023-05-07 PROBLEM — J96.01 ACUTE RESPIRATORY FAILURE WITH HYPOXIA: Status: ACTIVE | Noted: 2023-05-07

## 2023-05-07 PROBLEM — N18.32 CHRONIC KIDNEY DISEASE, STAGE 3B: Status: ACTIVE | Noted: 2023-05-07

## 2023-05-07 PROBLEM — J15.9 COMMUNITY ACQUIRED BACTERIAL PNEUMONIA: Status: ACTIVE | Noted: 2023-05-07

## 2023-05-07 LAB
ANION GAP SERPL CALC-SCNC: 15 MMOL/L (ref 8–16)
BASOPHILS # BLD AUTO: 0.09 K/UL (ref 0–0.2)
BASOPHILS NFR BLD: 0.4 % (ref 0–1.9)
BUN SERPL-MCNC: 27 MG/DL (ref 8–23)
CALCIUM SERPL-MCNC: 9.7 MG/DL (ref 8.7–10.5)
CHLORIDE SERPL-SCNC: 101 MMOL/L (ref 95–110)
CO2 SERPL-SCNC: 17 MMOL/L (ref 23–29)
CREAT SERPL-MCNC: 1.4 MG/DL (ref 0.5–1.4)
DIFFERENTIAL METHOD: ABNORMAL
EOSINOPHIL # BLD AUTO: 0 K/UL (ref 0–0.5)
EOSINOPHIL NFR BLD: 0 % (ref 0–8)
ERYTHROCYTE [DISTWIDTH] IN BLOOD BY AUTOMATED COUNT: 13.5 % (ref 11.5–14.5)
EST. GFR  (NO RACE VARIABLE): 53.1 ML/MIN/1.73 M^2
GLUCOSE SERPL-MCNC: 184 MG/DL (ref 70–110)
HCT VFR BLD AUTO: 40.3 % (ref 40–54)
HGB BLD-MCNC: 13.1 G/DL (ref 14–18)
IMM GRANULOCYTES # BLD AUTO: 0.3 K/UL (ref 0–0.04)
IMM GRANULOCYTES NFR BLD AUTO: 1.2 % (ref 0–0.5)
LYMPHOCYTES # BLD AUTO: 2.1 K/UL (ref 1–4.8)
LYMPHOCYTES NFR BLD: 8.5 % (ref 18–48)
MAGNESIUM SERPL-MCNC: 1.7 MG/DL (ref 1.6–2.6)
MCH RBC QN AUTO: 30.3 PG (ref 27–31)
MCHC RBC AUTO-ENTMCNC: 32.5 G/DL (ref 32–36)
MCV RBC AUTO: 93 FL (ref 82–98)
MONOCYTES # BLD AUTO: 2.7 K/UL (ref 0.3–1)
MONOCYTES NFR BLD: 11 % (ref 4–15)
NEUTROPHILS # BLD AUTO: 19.3 K/UL (ref 1.8–7.7)
NEUTROPHILS NFR BLD: 78.9 % (ref 38–73)
NRBC BLD-RTO: 0 /100 WBC
PHOSPHATE SERPL-MCNC: 2.6 MG/DL (ref 2.7–4.5)
PLATELET # BLD AUTO: 206 K/UL (ref 150–450)
PMV BLD AUTO: 9.8 FL (ref 9.2–12.9)
POCT GLUCOSE: 168 MG/DL (ref 70–110)
POCT GLUCOSE: 210 MG/DL (ref 70–110)
POCT GLUCOSE: 267 MG/DL (ref 70–110)
POTASSIUM SERPL-SCNC: 4.7 MMOL/L (ref 3.5–5.1)
RBC # BLD AUTO: 4.33 M/UL (ref 4.6–6.2)
SODIUM SERPL-SCNC: 133 MMOL/L (ref 136–145)
WBC # BLD AUTO: 24.48 K/UL (ref 3.9–12.7)

## 2023-05-07 PROCEDURE — 25000242 PHARM REV CODE 250 ALT 637 W/ HCPCS: Performed by: INTERNAL MEDICINE

## 2023-05-07 PROCEDURE — 99223 1ST HOSP IP/OBS HIGH 75: CPT | Mod: AI,95,, | Performed by: INTERNAL MEDICINE

## 2023-05-07 PROCEDURE — 83735 ASSAY OF MAGNESIUM: CPT | Performed by: INTERNAL MEDICINE

## 2023-05-07 PROCEDURE — 99900031 HC PATIENT EDUCATION (STAT)

## 2023-05-07 PROCEDURE — 27000221 HC OXYGEN, UP TO 24 HOURS

## 2023-05-07 PROCEDURE — 94640 AIRWAY INHALATION TREATMENT: CPT

## 2023-05-07 PROCEDURE — 63600175 PHARM REV CODE 636 W HCPCS: Performed by: INTERNAL MEDICINE

## 2023-05-07 PROCEDURE — 99223 PR INITIAL HOSPITAL CARE,LEVL III: ICD-10-PCS | Mod: AI,95,, | Performed by: INTERNAL MEDICINE

## 2023-05-07 PROCEDURE — 99233 SBSQ HOSP IP/OBS HIGH 50: CPT | Mod: ,,, | Performed by: INTERNAL MEDICINE

## 2023-05-07 PROCEDURE — 25000003 PHARM REV CODE 250: Performed by: INTERNAL MEDICINE

## 2023-05-07 PROCEDURE — 99900035 HC TECH TIME PER 15 MIN (STAT)

## 2023-05-07 PROCEDURE — 84100 ASSAY OF PHOSPHORUS: CPT | Performed by: INTERNAL MEDICINE

## 2023-05-07 PROCEDURE — 80048 BASIC METABOLIC PNL TOTAL CA: CPT | Performed by: INTERNAL MEDICINE

## 2023-05-07 PROCEDURE — 20000000 HC ICU ROOM

## 2023-05-07 PROCEDURE — 27000646 HC AEROBIKA DEVICE

## 2023-05-07 PROCEDURE — 85025 COMPLETE CBC W/AUTO DIFF WBC: CPT | Performed by: INTERNAL MEDICINE

## 2023-05-07 PROCEDURE — 99233 PR SUBSEQUENT HOSPITAL CARE,LEVL III: ICD-10-PCS | Mod: ,,, | Performed by: INTERNAL MEDICINE

## 2023-05-07 PROCEDURE — 94664 DEMO&/EVAL PT USE INHALER: CPT

## 2023-05-07 PROCEDURE — 94761 N-INVAS EAR/PLS OXIMETRY MLT: CPT

## 2023-05-07 RX ORDER — ACETAMINOPHEN 500 MG
500 TABLET ORAL EVERY 6 HOURS PRN
Status: DISCONTINUED | OUTPATIENT
Start: 2023-05-07 | End: 2023-05-12 | Stop reason: HOSPADM

## 2023-05-07 RX ORDER — IPRATROPIUM BROMIDE AND ALBUTEROL SULFATE 2.5; .5 MG/3ML; MG/3ML
3 SOLUTION RESPIRATORY (INHALATION) EVERY 4 HOURS PRN
Status: DISCONTINUED | OUTPATIENT
Start: 2023-05-07 | End: 2023-05-07

## 2023-05-07 RX ORDER — GLUCAGON 1 MG
1 KIT INJECTION
Status: DISCONTINUED | OUTPATIENT
Start: 2023-05-07 | End: 2023-05-12 | Stop reason: HOSPADM

## 2023-05-07 RX ORDER — AMOXICILLIN 250 MG
1 CAPSULE ORAL 2 TIMES DAILY PRN
Status: DISCONTINUED | OUTPATIENT
Start: 2023-05-07 | End: 2023-05-09

## 2023-05-07 RX ORDER — ASPIRIN 81 MG/1
81 TABLET ORAL DAILY
Status: DISCONTINUED | OUTPATIENT
Start: 2023-05-07 | End: 2023-05-12 | Stop reason: HOSPADM

## 2023-05-07 RX ORDER — DEXTROSE 40 %
15 GEL (GRAM) ORAL
Status: DISCONTINUED | OUTPATIENT
Start: 2023-05-07 | End: 2023-05-12 | Stop reason: HOSPADM

## 2023-05-07 RX ORDER — GUAIFENESIN/DEXTROMETHORPHAN 100-10MG/5
10 SYRUP ORAL EVERY 6 HOURS
Status: DISPENSED | OUTPATIENT
Start: 2023-05-07 | End: 2023-05-08

## 2023-05-07 RX ORDER — LEVOTHYROXINE SODIUM 25 UG/1
100 TABLET ORAL
Status: DISCONTINUED | OUTPATIENT
Start: 2023-05-07 | End: 2023-05-12 | Stop reason: HOSPADM

## 2023-05-07 RX ORDER — ESCITALOPRAM OXALATE 10 MG/1
20 TABLET ORAL DAILY
Status: DISCONTINUED | OUTPATIENT
Start: 2023-05-07 | End: 2023-05-12 | Stop reason: HOSPADM

## 2023-05-07 RX ORDER — POLYETHYLENE GLYCOL 3350 17 G/17G
17 POWDER, FOR SOLUTION ORAL DAILY
Status: DISCONTINUED | OUTPATIENT
Start: 2023-05-07 | End: 2023-05-10

## 2023-05-07 RX ORDER — FLUTICASONE FUROATE AND VILANTEROL 100; 25 UG/1; UG/1
1 POWDER RESPIRATORY (INHALATION) DAILY
Status: DISCONTINUED | OUTPATIENT
Start: 2023-05-07 | End: 2023-05-12 | Stop reason: HOSPADM

## 2023-05-07 RX ORDER — DIAZEPAM 2 MG/1
2 TABLET ORAL NIGHTLY
Status: DISCONTINUED | OUTPATIENT
Start: 2023-05-08 | End: 2023-05-12 | Stop reason: HOSPADM

## 2023-05-07 RX ORDER — PROCHLORPERAZINE EDISYLATE 5 MG/ML
5 INJECTION INTRAMUSCULAR; INTRAVENOUS EVERY 6 HOURS PRN
Status: DISCONTINUED | OUTPATIENT
Start: 2023-05-07 | End: 2023-05-12 | Stop reason: HOSPADM

## 2023-05-07 RX ORDER — SODIUM CHLORIDE 0.9 % (FLUSH) 0.9 %
10 SYRINGE (ML) INJECTION EVERY 12 HOURS PRN
Status: DISCONTINUED | OUTPATIENT
Start: 2023-05-07 | End: 2023-05-12 | Stop reason: HOSPADM

## 2023-05-07 RX ORDER — ATORVASTATIN CALCIUM 40 MG/1
80 TABLET, FILM COATED ORAL NIGHTLY
Status: DISCONTINUED | OUTPATIENT
Start: 2023-05-07 | End: 2023-05-12 | Stop reason: HOSPADM

## 2023-05-07 RX ORDER — HYDROCODONE BITARTRATE AND ACETAMINOPHEN 5; 325 MG/1; MG/1
1 TABLET ORAL EVERY 6 HOURS PRN
Status: DISCONTINUED | OUTPATIENT
Start: 2023-05-07 | End: 2023-05-12 | Stop reason: HOSPADM

## 2023-05-07 RX ORDER — IPRATROPIUM BROMIDE AND ALBUTEROL SULFATE 2.5; .5 MG/3ML; MG/3ML
SOLUTION RESPIRATORY (INHALATION)
Status: COMPLETED
Start: 2023-05-07 | End: 2023-05-09

## 2023-05-07 RX ORDER — DEXTROSE 40 %
30 GEL (GRAM) ORAL
Status: DISCONTINUED | OUTPATIENT
Start: 2023-05-07 | End: 2023-05-12 | Stop reason: HOSPADM

## 2023-05-07 RX ORDER — ONDANSETRON 2 MG/ML
4 INJECTION INTRAMUSCULAR; INTRAVENOUS EVERY 8 HOURS PRN
Status: DISCONTINUED | OUTPATIENT
Start: 2023-05-07 | End: 2023-05-12 | Stop reason: HOSPADM

## 2023-05-07 RX ORDER — INSULIN ASPART 100 [IU]/ML
1-10 INJECTION, SOLUTION INTRAVENOUS; SUBCUTANEOUS
Status: DISCONTINUED | OUTPATIENT
Start: 2023-05-07 | End: 2023-05-12 | Stop reason: HOSPADM

## 2023-05-07 RX ORDER — IPRATROPIUM BROMIDE AND ALBUTEROL SULFATE 2.5; .5 MG/3ML; MG/3ML
3 SOLUTION RESPIRATORY (INHALATION) EVERY 4 HOURS
Status: DISCONTINUED | OUTPATIENT
Start: 2023-05-07 | End: 2023-05-12 | Stop reason: HOSPADM

## 2023-05-07 RX ORDER — BENZONATATE 100 MG/1
100 CAPSULE ORAL 3 TIMES DAILY PRN
Status: DISCONTINUED | OUTPATIENT
Start: 2023-05-07 | End: 2023-05-12 | Stop reason: HOSPADM

## 2023-05-07 RX ORDER — TALC
6 POWDER (GRAM) TOPICAL NIGHTLY PRN
Status: DISCONTINUED | OUTPATIENT
Start: 2023-05-07 | End: 2023-05-12 | Stop reason: HOSPADM

## 2023-05-07 RX ORDER — LISINOPRIL 20 MG/1
40 TABLET ORAL DAILY
Status: DISCONTINUED | OUTPATIENT
Start: 2023-05-07 | End: 2023-05-08

## 2023-05-07 RX ORDER — ACETAMINOPHEN 325 MG/1
650 TABLET ORAL EVERY 4 HOURS PRN
Status: DISCONTINUED | OUTPATIENT
Start: 2023-05-07 | End: 2023-05-07

## 2023-05-07 RX ADMIN — IPRATROPIUM BROMIDE AND ALBUTEROL SULFATE 3 ML: 2.5; .5 SOLUTION RESPIRATORY (INHALATION) at 03:05

## 2023-05-07 RX ADMIN — ATORVASTATIN CALCIUM 80 MG: 40 TABLET, FILM COATED ORAL at 08:05

## 2023-05-07 RX ADMIN — FLUTICASONE FUROATE AND VILANTEROL TRIFENATATE 1 PUFF: 100; 25 POWDER RESPIRATORY (INHALATION) at 09:05

## 2023-05-07 RX ADMIN — ACETAMINOPHEN 500 MG: 500 TABLET ORAL at 06:05

## 2023-05-07 RX ADMIN — HYDROCODONE BITARTRATE AND ACETAMINOPHEN 1 TABLET: 5; 325 TABLET ORAL at 12:05

## 2023-05-07 RX ADMIN — IPRATROPIUM BROMIDE AND ALBUTEROL SULFATE 3 ML: 2.5; .5 SOLUTION RESPIRATORY (INHALATION) at 11:05

## 2023-05-07 RX ADMIN — ATORVASTATIN CALCIUM 80 MG: 40 TABLET, FILM COATED ORAL at 02:05

## 2023-05-07 RX ADMIN — GUAIFENESIN AND DEXTROMETHORPHAN 10 ML: 100; 10 SYRUP ORAL at 11:05

## 2023-05-07 RX ADMIN — GUAIFENESIN AND DEXTROMETHORPHAN 10 ML: 100; 10 SYRUP ORAL at 05:05

## 2023-05-07 RX ADMIN — AZITHROMYCIN MONOHYDRATE 500 MG: 500 INJECTION, POWDER, LYOPHILIZED, FOR SOLUTION INTRAVENOUS at 09:05

## 2023-05-07 RX ADMIN — PREGABALIN 150 MG: 25 CAPSULE ORAL at 08:05

## 2023-05-07 RX ADMIN — LISINOPRIL 40 MG: 20 TABLET ORAL at 09:05

## 2023-05-07 RX ADMIN — INSULIN ASPART 3 UNITS: 100 INJECTION, SOLUTION INTRAVENOUS; SUBCUTANEOUS at 10:05

## 2023-05-07 RX ADMIN — IPRATROPIUM BROMIDE AND ALBUTEROL SULFATE 3 ML: .5; 3 SOLUTION RESPIRATORY (INHALATION) at 09:05

## 2023-05-07 RX ADMIN — IPRATROPIUM BROMIDE AND ALBUTEROL SULFATE 3 ML: 2.5; .5 SOLUTION RESPIRATORY (INHALATION) at 08:05

## 2023-05-07 RX ADMIN — INSULIN ASPART 2 UNITS: 100 INJECTION, SOLUTION INTRAVENOUS; SUBCUTANEOUS at 09:05

## 2023-05-07 RX ADMIN — ASPIRIN 81 MG: 81 TABLET, COATED ORAL at 09:05

## 2023-05-07 RX ADMIN — ACETAMINOPHEN 500 MG: 500 TABLET ORAL at 12:05

## 2023-05-07 RX ADMIN — GUAIFENESIN AND DEXTROMETHORPHAN 10 ML: 100; 10 SYRUP ORAL at 12:05

## 2023-05-07 RX ADMIN — GUAIFENESIN AND DEXTROMETHORPHAN 10 ML: 100; 10 SYRUP ORAL at 02:05

## 2023-05-07 RX ADMIN — PREGABALIN 150 MG: 25 CAPSULE ORAL at 12:05

## 2023-05-07 RX ADMIN — LEVOTHYROXINE SODIUM 100 MCG: 25 TABLET ORAL at 08:05

## 2023-05-07 RX ADMIN — ESCITALOPRAM OXALATE 20 MG: 10 TABLET ORAL at 09:05

## 2023-05-07 RX ADMIN — APIXABAN 2.5 MG: 2.5 TABLET, FILM COATED ORAL at 08:05

## 2023-05-07 RX ADMIN — INSULIN ASPART 4 UNITS: 100 INJECTION, SOLUTION INTRAVENOUS; SUBCUTANEOUS at 04:05

## 2023-05-07 RX ADMIN — IPRATROPIUM BROMIDE AND ALBUTEROL SULFATE 3 ML: .5; 3 SOLUTION RESPIRATORY (INHALATION) at 11:05

## 2023-05-07 RX ADMIN — CEFTRIAXONE SODIUM 2 G: 2 INJECTION, POWDER, FOR SOLUTION INTRAMUSCULAR; INTRAVENOUS at 02:05

## 2023-05-07 RX ADMIN — APIXABAN 2.5 MG: 2.5 TABLET, FILM COATED ORAL at 09:05

## 2023-05-07 NOTE — HPI
"Mr. Fuentes is a 73yo man with a past medical history of BPH, CHF, CVA, COPD, DM2, HLD, PNA, EVONNE, hypothyroidism, and DVT on Eliquis 2.5mg po BID.  He uses CPAP at home, but cannot remember his exact settings (thinks maybe, " 20/something with 6L NC").    He now comes to the ED with complaints of worsening shortness of breath over the past 2 days.  He started having fever to 103F today, so he became increasingly concerned.  He notes trouble even talking over the past few days.  He denies any cough.  He has been having rigors and chills.  He also notes some increased wheezing and leg swelling.    In the ED his VS were BP (!) 109/58   Pulse 72   Temp max 99.5 °F (37.5 °C) (Oral)   Resp (!) 38 -> 33   Ht 5' 10" (1.778 m)   Wt 136.1 kg (300 lb)   SpO2 88% "NC" -> 95% BiPAP 40% O2  BMI 43.05 kg/m².  Labs showed WBC 24, Cr 1.4, HCO3 15, Gluc 230, , Trop 0.018, UA with mod yeast, few bacteria, WBC 55.  ABG 4L NC: pH 7.47, PCO2 22.    CXR showed cardiomegaly, stable LLL effusion, and new RML infiltrate c/w pneumonia.  EKG showed sinus 91 with PAC's, QTc 425 ms, no ST-T changes.    In the ED he was treated with:  Medications   piperacillin-tazobactam (ZOSYN) 4.5 g in dextrose 5 % in water (D5W) 5 % 100 mL IVPB (MB+) (has no administration in time range) - NEVER GIVEN.   albuterol-ipratropium 2.5 mg-0.5 mg/3 mL nebulizer solution 3 mL (3 mLs Nebulization Given 5/6/23 2336)   levoFLOXacin 750 mg/150 mL IVPB 750 mg (750 mg Intravenous New Bag 5/6/23 2309)           "

## 2023-05-07 NOTE — ED PROVIDER NOTES
Encounter Date: 5/6/2023       History     Chief Complaint   Patient presents with    Shortness of Breath     Pt with hx of COPD, CHF, DVT on eliquis, DM and HTN here with c/o SOB the past few days but worse today. He is on home O2 but he feels like he needs more. He reports fever to 103 earlier today but has been taking tylenol for pain. Family says he has taken 8 500mg tabs today. He has no CP. He reports right hip pain the past few weeks but worse the past 2days. No trauma. Pt thinks he may have a UTI again.     The history is provided by the patient and the spouse.   Shortness of Breath  This is a recurrent problem. The current episode started more than 2 days ago. The problem has been gradually worsening. Associated symptoms include a fever, cough, wheezing and orthopnea. Pertinent negatives include no headaches, no coryza, no rhinorrhea, no sore throat, no swollen glands, no ear pain, no neck pain, no sputum production, no hemoptysis, no PND, no chest pain, no syncope, no vomiting, no abdominal pain, no rash, no leg pain, no leg swelling and no claudication. It is unknown what precipitated the problem. He has had Prior hospitalizations. He has had Prior ED visits. He has had No prior ICU admissions. Associated medical issues include COPD, pneumonia, heart failure and DVT.   Review of patient's allergies indicates:  No Known Allergies  Past Medical History:   Diagnosis Date    Anticoagulant long-term use     Arthritis of both knees     BPH (benign prostatic hyperplasia)     CHF (congestive heart failure)     COPD (chronic obstructive pulmonary disease) 05/03/2021    CVA (cerebral vascular accident)     Deep vein thrombosis     Diabetes mellitus     Diabetes mellitus, type 2     High cholesterol     Hypertension     Left-sided weakness     Obese     Pneumonia due to COVID-19 virus 07/01/2020    Sleep apnea     Thyroid disease     Urosepsis      Past Surgical History:   Procedure Laterality Date    BACK SURGERY       HIP ARTHROPLASTY      HIP ARTHROPLASTY Right 10/28/2021    Procedure: ARTHROPLASTY, HIP;  Surgeon: Ever Hall MD;  Location: [facility] OR [address] [place] [address];  Service: Orthopedics;  Laterality: Right;    IMPLANTATION OF PERMANENT SACRAL NERVE STIMULATOR Right 12/21/2021    Procedure: INSERTION, NEUROSTIMULATOR, PERMANENT, SACRAL;  Surgeon: Erickson Magana MD;  Location: [address];  Service: Urology;  Laterality: Right;    INJECTION OF ANESTHETIC AGENT AROUND NERVE Right 11/19/2020    Procedure: BLOCK, NERVE, FEMORAL AND OBTURATOR;  Surgeon: Dania Garcia MD;  Location: [facility] PAIN MGT;  Service: Pain Management;  Laterality: Right;    INJECTION OF JOINT Right 3/23/2021    Procedure: INJECTION, JOINT, HIP Pt. can continue Eliquis per provider.;  Surgeon: Dania Garcia MD;  Location: [facility] PAIN MGT;  Service: Pain Management;  Laterality: Right;    JOINT REPLACEMENT      PERCUTANEOUS CRYOTHERAPY OF PERIPHERAL NERVE USING LIQUID NITROUS OXIDE IN CLOSED NEEDLE DEVICE Left 8/26/2019    Procedure: CRYOTHERAPY, NERVE, PERIPHERAL, PERCUTANEOUS, USING LIQUID NITROUS OXIDE IN CLOSED NEEDLE DEVICE LEFT KNEE SIMON;  Surgeon: MENG Beckwith;  Location: [facility] CATH LAB;  Service: Pain Management;  Laterality: Left;    PROSTATE SURGERY  2015    RADIOFREQUENCY ABLATION Right 1/12/2021    Procedure: COOLED OBTURTOR FEMORAL ,needconsent;  Surgeon: Dania Garcia MD;  Location: [facility] PAIN MGT;  Service: Pain Management;  Laterality: Right;    SPINAL FUSION  09/2014    TONSILLECTOMY      TOTAL KNEE ARTHROPLASTY Left 9/3/2019    Procedure: ARTHROPLASTY, KNEE, TOTAL-SANDIP;  Surgeon: Ever Hall MD;  Location: [address];  Service: Orthopedics;  Laterality: Left;     Family History   Problem Relation Age of Onset    Hypertension Brother     Diabetes Mellitus Mother     Heart attack Neg Hx     Heart disease Neg Hx      Social History     Tobacco Use    Smoking status: Former     Packs/day: 0.50      Types: Cigarettes    Smokeless tobacco: Never    Tobacco comments:     pt quit 3 weeks ago   Substance Use Topics    Alcohol use: Not Currently     Comment: history of alcohol excess until 2011    Drug use: No     Review of Systems   Constitutional:  Positive for chills and fever.   HENT:  Negative for ear pain, rhinorrhea and sore throat.    Respiratory:  Positive for cough, shortness of breath and wheezing. Negative for hemoptysis and sputum production.    Cardiovascular:  Positive for orthopnea. Negative for chest pain, claudication, leg swelling, syncope and PND.   Gastrointestinal:  Negative for abdominal pain and vomiting.   Musculoskeletal:  Negative for neck pain.   Skin:  Negative for rash.   Neurological:  Negative for headaches.   All other systems reviewed and are negative.    Physical Exam     Initial Vitals [05/06/23 2225]   BP Pulse Resp Temp SpO2   (!) 128/97 98 (!) 25 99.5 °F (37.5 °C) (!) 88 %      MAP       --         Physical Exam    Nursing note and vitals reviewed.  Constitutional: He is not diaphoretic.   Obese WM in mod distress 2/2 pain and sob. HABITUS LIMITS EXAM   HENT:   Head: Normocephalic and atraumatic.   Mouth/Throat: Oropharynx is clear and moist.   Eyes: Conjunctivae and EOM are normal. No scleral icterus.   Neck: Neck supple. No JVD present.   Normal range of motion.  Cardiovascular:  Normal rate, regular rhythm, normal heart sounds and intact distal pulses.           Pulmonary/Chest: He exhibits no tenderness.   Diminished throughout but R>L. He has coarse BS on right. Mildly increased WoB. RA sats 78%.    Abdominal: Abdomen is soft. There is no abdominal tenderness.   Musculoskeletal:      Cervical back: Normal range of motion and neck supple.      Comments: Mild brawny edema BLE. Feet warm and well perfused. Pain with ROM right hip. No crepitus or deformity.      Neurological: He is alert and oriented to person, place, and time. He has normal strength. No cranial nerve deficit  or sensory deficit. GCS score is 15. GCS eye subscore is 4. GCS verbal subscore is 5. GCS motor subscore is 6.   Skin: Skin is warm and dry. Capillary refill takes less than 2 seconds. No rash noted. No erythema. No pallor.       ED Course   Critical Care    Date/Time: 5/6/2023 11:35 PM  Performed by: Lukas Leiva Jr., MD  Authorized by: Lukas Leiva Jr., MD   Direct patient critical care time: 10 minutes  Ordering / reviewing critical care time: 10 minutes  Documentation critical care time: 10 minutes  Total critical care time (exclusive of procedural time) : 30 minutes  Critical care was necessary to treat or prevent imminent or life-threatening deterioration of the following conditions: sepsis and respiratory failure.  Critical care was time spent personally by me on the following activities: evaluation of patient's response to treatment, examination of patient, ordering and performing treatments and interventions, ordering and review of laboratory studies, ordering and review of radiographic studies, pulse oximetry, re-evaluation of patient's condition and review of old charts.      Labs Reviewed   B-TYPE NATRIURETIC PEPTIDE - Abnormal; Notable for the following components:       Result Value     (*)     All other components within normal limits   CBC W/ AUTO DIFFERENTIAL - Abnormal; Notable for the following components:    WBC 24.28 (*)     Immature Granulocytes 1.2 (*)     Gran # (ANC) 19.4 (*)     Immature Grans (Abs) 0.30 (*)     Mono # 2.6 (*)     Gran % 79.9 (*)     Lymph % 7.8 (*)     All other components within normal limits   COMPREHENSIVE METABOLIC PANEL - Abnormal; Notable for the following components:    Sodium 132 (*)     CO2 15 (*)     Glucose 230 (*)     BUN 26 (*)     Albumin 3.0 (*)     Alkaline Phosphatase 50 (*)     eGFR 53.1 (*)     All other components within normal limits   URINALYSIS, REFLEX TO URINE CULTURE - Abnormal; Notable for the following components:    Appearance, UA  Hazy (*)     Protein, UA 3+ (*)     Glucose, UA 2+ (*)     Occult Blood UA 1+ (*)     All other components within normal limits    Narrative:     Preferred Collection Type->Urine, Clean Catch  Specimen Source->Urine   URINALYSIS MICROSCOPIC - Abnormal; Notable for the following components:    RBC, UA 17 (*)     WBC, UA 55 (*)     Bacteria Few (*)     Yeast, UA Moderate (*)     Hyaline Casts, UA 4 (*)     All other components within normal limits    Narrative:     Preferred Collection Type->Urine, Clean Catch  Specimen Source->Urine   ISTAT PROCEDURE - Abnormal; Notable for the following components:    POC PH 7.473 (*)     POC PCO2 22.4 (*)     POC HCO3 16.4 (*)     All other components within normal limits   CULTURE, BLOOD   CULTURE, BLOOD   CULTURE, URINE   TROPONIN I   LACTIC ACID, PLASMA   ACETAMINOPHEN LEVEL   ACETAMINOPHEN LEVEL     EKG Readings: (Independently Interpreted)   Rhythm: Normal Sinus Rhythm. Heart Rate: 91. Ectopy: PACs. Conduction: Normal. ST Segments: Normal ST Segments. T Waves: Normal. Axis: Normal. Clinical Impression: Normal Sinus Rhythm with PACs     Imaging Results              X-Ray Chest AP Portable (In process)                      Medications   levoFLOXacin 750 mg/150 mL IVPB 750 mg (750 mg Intravenous New Bag 5/6/23 2300)   albuterol-ipratropium 2.5 mg-0.5 mg/3 mL nebulizer solution 3 mL (3 mLs Nebulization Given 5/6/23 3313)     Medical Decision Making:   Differential Diagnosis:   Sepsis, UTI, pneumonia, COPD, CHF  Clinical Tests:   Lab Tests: Ordered and Reviewed  Radiological Study: Ordered and Reviewed  Medical Tests: Ordered and Reviewed  ED Management:  Pt presented with sob with increased WoB and hypoxia on his home O2. Febrile pta. Exam c/w pneumonia. CXR confirmed RML infiltrate. Pt placed on bipap for comfort and oxygenation and given neb. ABG unremarkable. CBC with leukocytosis of 24k. Blood Cxs and lactate sent. Lactate was normal. CMP unremarkable. UA with some WBC and  bacteria but neg nitrite and LE. Pt given zosyn and levaquin for his pneumonia which will also cover an infection in his urinary tract. BNP and troponin also unremarkable. Pt will need admission for sepsis/pneumonia.            ED Course as of 05/06/23 2342   Sat May 06, 2023   7521 CXR c/w pneumonia. Will give levaquin and zosyn. [DC]      ED Course User Index  [DC] Lukas Leiva Jr., MD                 Clinical Impression:   Final diagnoses:  [R06.02] SOB (shortness of breath)  [A41.9] Sepsis  [J18.9] Pneumonia of right middle lobe due to infectious organism (Primary)  [J44.0] Chronic obstructive pulmonary disease with acute lower respiratory infection  [R09.02] Hypoxemia        ED Disposition Condition    Admit Stable                Lukas Leiva Jr., MD  05/06/23 9245

## 2023-05-07 NOTE — NURSING
Received patient from ER via stretcher.  Awake and oriented.  Respirations even and slightly labored.  Placed on 4 liters NC per RT.  External blankenship cath in place.  Placed to LWS.   Pads clean and dry.  Son is at bedside.  Plan of care reviewed with both.  Orientation to unit provided.  Call light within reach.  See flowsheet for further assessment.

## 2023-05-07 NOTE — ASSESSMENT & PLAN NOTE
He had to be placed on BiPAP with O2 in the ED  Now he is much improved    Patient with Hypoxic Respiratory failure which is Acute.  he is on home oxygen at 6 LPM. Supplemental oxygen was provided and noted- Oxygen Concentration (%):  [40] 40    Signs/symptoms of respiratory failure include- tachypnea, increased work of breathing and wheezing. Contributing diagnoses includes - COPD, Obesity Hypoventilation and Pneumonia Labs and images were reviewed. Patient Has recent ABG, which has been reviewed. Will treat underlying causes and adjust management of respiratory failure as follows- on CPAP from home now.

## 2023-05-07 NOTE — ED NOTES
Patient arrives to ER via wheelchair with c/o exacerbation of SOB. Patient on chronic O2 use at home and reports 02 @ 4-6 liters per nasal canula. On arrival initial o2 saturation at 78% on room air. Diminished breath sounds more so throughout right lung fields and dependent edema to bilateral lower extremities. Reports fever of 102-103 at home but voices has taken 8 tylenol prior to arrival within last few hours due to chronic right hip pain. Skin warm and dry, denies any chest pain. EKG with sinus tach without ectopy. Wife at bedside. Patient placed on 4 liters of nasal canula and saturation quickly up to 88-90%.

## 2023-05-07 NOTE — SUBJECTIVE & OBJECTIVE
Interval History:  Patient is still in the emergency room awaiting floor transfer.  Patient used CPAP last night, currently on 4 liters/minute supplemental oxygen via nasal cannula.  Patient speaks short sentences.  Son is present at bedside.  Denies any chest pain or palpitations.  Patient requesting transfer to [facility], [address] where most of his doctors including pulmonologist work.    Review of Systems   Constitutional:  Positive for activity change, chills, fatigue and fever.   HENT:  Negative for congestion.    Respiratory:  Positive for shortness of breath and wheezing. Negative for cough.    Cardiovascular:  Positive for leg swelling.   Gastrointestinal:  Negative for nausea and vomiting.   Endocrine: Positive for cold intolerance.   Musculoskeletal:  Positive for myalgias.   Neurological:  Negative for headaches.   Psychiatric/Behavioral:  Positive for decreased concentration.    Objective:     Vital Signs (Most Recent):  Temp: (!) 102 °F (38.9 °C) (05/07/23 0612)  Pulse: 80 (05/07/23 0649)  Resp: (!) 28 (05/07/23 0600)  BP: 131/80 (05/07/23 0649)  SpO2: (!) 90 % (05/07/23 0600) Vital Signs (24h Range):  Temp:  [99.5 °F (37.5 °C)-102 °F (38.9 °C)] 102 °F (38.9 °C)  Pulse:  [72-98] 80  Resp:  [22-38] 28  SpO2:  [88 %-95 %] 90 %  BP: (103-141)/(58-97) 131/80     Weight: 136.1 kg (300 lb)  Body mass index is 43.05 kg/m².    Intake/Output Summary (Last 24 hours) at 5/7/2023 0798  Last data filed at 5/7/2023 0525  Gross per 24 hour   Intake 200 ml   Output --   Net 200 ml         Physical Exam  Constitutional:       General: He is awake. He is not in acute distress.     Appearance: He is morbidly obese. He is ill-appearing. He is not toxic-appearing or diaphoretic.      Comments: Wearing BiPAP when initially evaluated.   HENT:      Head: Normocephalic and atraumatic.   Pulmonary:      Effort: No tachypnea or bradypnea.   Genitourinary:     Comments: No blankenship in place  Neurological:       Mental Status: He is oriented to person, place, and time. He is lethargic.      GCS: GCS eye subscore is 4. GCS verbal subscore is 5. GCS motor subscore is 6.   Psychiatric:         Attention and Perception: Attention and perception normal.         Mood and Affect: Mood normal.         Cognition and Memory: Cognition and memory normal.           Significant Labs: All pertinent labs within the past 24 hours have been reviewed.  CBC:   Recent Labs   Lab 05/06/23 2230 05/07/23 0602   WBC 24.28* 24.48*   HGB 14.0 13.1*   HCT 42.7 40.3    206     CMP:   Recent Labs   Lab 05/06/23 2230 05/07/23 0602   * 133*   K 4.4 4.7    101   CO2 15* 17*   * 184*   BUN 26* 27*   CREATININE 1.4 1.4   CALCIUM 10.0 9.7   PROT 7.7  --    ALBUMIN 3.0*  --    BILITOT 0.5  --    ALKPHOS 50*  --    AST 10  --    ALT 14  --    ANIONGAP 15 15     Urine Studies:   Recent Labs   Lab 05/06/23 2258   COLORU Yellow   APPEARANCEUA Hazy*   PHUR 6.0   SPECGRAV 1.025   PROTEINUA 3+*   GLUCUA 2+*   KETONESU Negative   BILIRUBINUA Negative   OCCULTUA 1+*   NITRITE Negative   UROBILINOGEN Negative   LEUKOCYTESUR Negative   RBCUA 17*   WBCUA 55*   BACTERIA Few*   SQUAMEPITHEL 8   HYALINECASTS 4*     Microbiology Results (last 7 days)       Procedure Component Value Units Date/Time    Culture, Respiratory with Gram Stain [[id number]]     Order Status: No result Specimen: Respiratory     Urine culture [[id number]] Collected: 05/06/23 2258    Order Status: No result Specimen: Urine Updated: 05/06/23 2324    Blood culture #2 **CANNOT BE ORDERED STAT** [[id number]] Collected: 05/06/23 2258    Order Status: Sent Specimen: Blood from Peripheral, Forearm, Left Updated: 05/06/23 2258    Blood culture #1 **CANNOT BE ORDERED STAT** [[id number]] Collected: 05/06/23 2230    Order Status: Sent Specimen: Blood from Peripheral, Forearm, Right Updated: 05/06/23 2230          Significant Imaging:   CXR:Interval development of dense consolidation in  the right upper lobe, consistent with pneumonia.     Unchanged findings in the lung bases which may be secondary to atelectasis

## 2023-05-07 NOTE — ASSESSMENT & PLAN NOTE
Advance Care Planning     Date: 05/07/2023    Living Will  During this visit, I engaged the patient  in the advance care planning process.  The patient and I reviewed the role for advance directives and their purpose in directing future healthcare if the patient's unable to speak for him.  At this point in time, the patient does have full decision-making capacity.  We discussed different extreme health states that he could experience, and reviewed what kind of medical care he would want in those situations.  The patient communicated that if he were comatose and had little chance of a meaningful recovery, he would want machines/life-sustaining treatments used.  I spent a total of 16 minutes engaging the patient in this advance care planning discussion.

## 2023-05-07 NOTE — PROGRESS NOTES
"[facility]  [facility] Medicine  Progress Note    Patient Name: Иван Fuentes Sr.  MRN: [id number]  Patient Class: IP- Inpatient   Admission Date: 5/6/2023  Length of Stay: 1 days  Attending Physician: WILMER Orozco MD  Primary Care Provider: Manuel Wiley MD        Subjective:     Principal Problem:Community acquired bacterial pneumonia        HPI:  Mr. Fuentes is a 73yo man with a past medical history of BPH, CHF, CVA, COPD, DM2, HLD, PNA, EVONNE, hypothyroidism, and DVT on Eliquis 2.5mg po BID.  He uses CPAP at home, but cannot remember his exact settings (thinks maybe, " 20/something with 6L NC").    He now comes to the ED with complaints of worsening shortness of breath over the past 2 days.  He started having fever to 103F today, so he became increasingly concerned.  He notes trouble even talking over the past few days.  He denies any cough.  He has been having rigors and chills.  He also notes some increased wheezing and leg swelling.    In the ED his VS were BP (!) 109/58   Pulse 72   Temp max 99.5 °F (37.5 °C) (Oral)   Resp (!) 38 -> 33   Ht 5' 10" (1.778 m)   Wt 136.1 kg (300 lb)   SpO2 88% "NC" -> 95% BiPAP 40% O2  BMI 43.05 kg/m².  Labs showed WBC 24, Cr 1.4, HCO3 15, Gluc 230, , Trop 0.018, UA with mod yeast, few bacteria, WBC 55.  ABG 4L NC: pH 7.47, PCO2 22.    CXR showed cardiomegaly, stable LLL effusion, and new RML infiltrate c/w pneumonia.  EKG showed sinus 91 with PAC's, QTc 425 ms, no ST-T changes.    In the ED he was treated with:  Medications   piperacillin-tazobactam (ZOSYN) 4.5 g in dextrose 5 % in water (D5W) 5 % 100 mL IVPB (MB+) (has no administration in time range) - NEVER GIVEN.   albuterol-ipratropium 2.5 mg-0.5 mg/3 mL nebulizer solution 3 mL (3 mLs Nebulization Given 5/6/23 1093)   levoFLOXacin 750 mg/150 mL IVPB 750 mg (750 mg Intravenous New Bag 5/6/23 2711)               Overview/Hospital Course:  No notes on file    Interval History:  " Patient is still in the emergency room awaiting floor transfer.  Patient used CPAP last night, currently on 4 liters/minute supplemental oxygen via nasal cannula.  Patient speaks short sentences.  Son is present at bedside.  Denies any chest pain or palpitations.  Patient requesting transfer to [facility], [address] where most of his doctors including pulmonologist work.    Review of Systems   Constitutional:  Positive for activity change, chills, fatigue and fever.   HENT:  Negative for congestion.    Respiratory:  Positive for shortness of breath and wheezing. Negative for cough.    Cardiovascular:  Positive for leg swelling.   Gastrointestinal:  Negative for nausea and vomiting.   Endocrine: Positive for cold intolerance.   Musculoskeletal:  Positive for myalgias.   Neurological:  Negative for headaches.   Psychiatric/Behavioral:  Positive for decreased concentration.    Objective:     Vital Signs (Most Recent):  Temp: (!) 102 °F (38.9 °C) (05/07/23 0612)  Pulse: 80 (05/07/23 0649)  Resp: (!) 28 (05/07/23 0600)  BP: 131/80 (05/07/23 0649)  SpO2: (!) 90 % (05/07/23 0600) Vital Signs (24h Range):  Temp:  [99.5 °F (37.5 °C)-102 °F (38.9 °C)] 102 °F (38.9 °C)  Pulse:  [72-98] 80  Resp:  [22-38] 28  SpO2:  [88 %-95 %] 90 %  BP: (103-141)/(58-97) 131/80     Weight: 136.1 kg (300 lb)  Body mass index is 43.05 kg/m².    Intake/Output Summary (Last 24 hours) at 5/7/2023 0734  Last data filed at 5/7/2023 0525  Gross per 24 hour   Intake 200 ml   Output --   Net 200 ml         Physical Exam  Constitutional:       General: He is awake. He is not in acute distress.     Appearance: He is morbidly obese. He is ill-appearing. He is not toxic-appearing or diaphoretic.      Comments: Wearing BiPAP when initially evaluated.   HENT:      Head: Normocephalic and atraumatic.   Pulmonary:      Effort: No tachypnea or bradypnea.   Genitourinary:     Comments: No blankenship in place  Neurological:      Mental Status: He is  oriented to person, place, and time. He is lethargic.      GCS: GCS eye subscore is 4. GCS verbal subscore is 5. GCS motor subscore is 6.   Psychiatric:         Attention and Perception: Attention and perception normal.         Mood and Affect: Mood normal.         Cognition and Memory: Cognition and memory normal.           Significant Labs: All pertinent labs within the past 24 hours have been reviewed.  CBC:   Recent Labs   Lab 05/06/23 2230 05/07/23 0602   WBC 24.28* 24.48*   HGB 14.0 13.1*   HCT 42.7 40.3    206     CMP:   Recent Labs   Lab 05/06/23 2230 05/07/23 0602   * 133*   K 4.4 4.7    101   CO2 15* 17*   * 184*   BUN 26* 27*   CREATININE 1.4 1.4   CALCIUM 10.0 9.7   PROT 7.7  --    ALBUMIN 3.0*  --    BILITOT 0.5  --    ALKPHOS 50*  --    AST 10  --    ALT 14  --    ANIONGAP 15 15     Urine Studies:   Recent Labs   Lab 05/06/23 2258   COLORU Yellow   APPEARANCEUA Hazy*   PHUR 6.0   SPECGRAV 1.025   PROTEINUA 3+*   GLUCUA 2+*   KETONESU Negative   BILIRUBINUA Negative   OCCULTUA 1+*   NITRITE Negative   UROBILINOGEN Negative   LEUKOCYTESUR Negative   RBCUA 17*   WBCUA 55*   BACTERIA Few*   SQUAMEPITHEL 8   HYALINECASTS 4*     Microbiology Results (last 7 days)       Procedure Component Value Units Date/Time    Culture, Respiratory with Gram Stain [[id number]]     Order Status: No result Specimen: Respiratory     Urine culture [[id number]] Collected: 05/06/23 2258    Order Status: No result Specimen: Urine Updated: 05/06/23 2324    Blood culture #2 **CANNOT BE ORDERED STAT** [[id number]] Collected: 05/06/23 2258    Order Status: Sent Specimen: Blood from Peripheral, Forearm, Left Updated: 05/06/23 2258    Blood culture #1 **CANNOT BE ORDERED STAT** [[id number]] Collected: 05/06/23 2230    Order Status: Sent Specimen: Blood from Peripheral, Forearm, Right Updated: 05/06/23 2230          Significant Imaging:   CXR:Interval development of dense consolidation in the right upper lobe,  consistent with pneumonia.     Unchanged findings in the lung bases which may be secondary to atelectasis      Assessment/Plan:      * Community acquired bacterial pneumonia  He was given Levaquin in the ED  Will change to Rocephin and Zithro  He has no cough or sputum currently  Ordered sputum gram statin and Legionella work up         azithromycin (ZITHROMAX) 500 mg in dextrose 5 % (D5W) 250 mL IVPB (Vial-Mate), 500 mg, Intravenous, Q24H     benzonatate capsule 100 mg, 100 mg, Oral, TID PRN, cough    cefTRIAXone (ROCEPHIN) 2 g in dextrose 5 % in water (D5W) 5 % 50 mL IVPB (MB+), 2 g, Intravenous, Q24H    dextromethorphan-guaiFENesin  mg/5 ml liquid 10 mL, 10 mL, Oral, Q6H    Sepsis  This patient does have evidence of infective focus  My overall impression is sepsis.  Source: Respiratory  Antibiotics given-   Antibiotics (72h ago, onward)    Start     Stop Route Frequency Ordered    05/07/23 0900  azithromycin (ZITHROMAX) 500 mg in dextrose 5 % (D5W) 250 mL IVPB (Vial-Mate)  (Community Acquired Pneumonia (CAP) - Low MDR Risk)         05/12 0859 IV Every 24 hours (non-standard times) 05/07/23 0114    05/07/23 0134  cefTRIAXone (ROCEPHIN) 2 g in dextrose 5 % in water (D5W) 5 % 50 mL IVPB (MB+)  (Community Acquired Pneumonia (CAP) - Low MDR Risk)         05/12 0144 IV Every 24 hours (non-standard times) 05/07/23 0134        Latest lactate reviewed-  Recent Labs   Lab 05/06/23  2230   LACTATE 1.5     Organ dysfunction indicated by Acute respiratory failure    Fluid challenge Not needed - patient is not hypotensive      Post- resuscitation assessment No - Post resuscitation assessment not needed       Will Not start Pressors- Levophed for MAP of 65  Source control achieved by: iv antibiotics      Acute respiratory failure with hypoxia  He had to be placed on BiPAP with O2 in the ED  Now he is much improved    Patient with Hypoxic Respiratory failure which is Acute.  he is on home oxygen at 6 LPM. Supplemental  oxygen was provided and noted- Oxygen Concentration (%):  [40] 40    Signs/symptoms of respiratory failure include- tachypnea, increased work of breathing and wheezing. Contributing diagnoses includes - COPD, Obesity Hypoventilation and Pneumonia Labs and images were reviewed. Patient Has recent ABG, which has been reviewed. Will treat underlying causes and adjust management of respiratory failure as follows- on CPAP from home now.        Acute cystitis without hematuria  The UA is mildly equivocal  On Rocephin  Follow up CXS      ACP (advance care planning)  Advance Care Planning     Date: 05/07/2023    Living Will  During this visit, I engaged the patient  in the advance care planning process.  The patient and I reviewed the role for advance directives and their purpose in directing future healthcare if the patient's unable to speak for him.  At this point in time, the patient does have full decision-making capacity.  We discussed different extreme health states that he could experience, and reviewed what kind of medical care he would want in those situations.  The patient communicated that if he were comatose and had little chance of a meaningful recovery, he would want machines/life-sustaining treatments used.  I spent a total of 16 minutes engaging the patient in this advance care planning discussion.                Chronic kidney disease, stage 3b  He is at baseline Cr of 1.4  Monitor for now      Peripheral arterial disease  (Ankle-brachial index of 0.83 on the right and 0.79 on the left, indicating bilateral mild-to-moderate peripheral artery disease.  Elevated velocities in the left distal superficial femoral artery suggesting hemodynamically significant focal stenosis of the left distal superficial femoral artery.)      Follow up with Vascular      COPD (chronic obstructive pulmonary disease)    albuterol-ipratropium 2.5 mg-0.5 mg/3 mL nebulizer solution 3 mL, 3 mL, Nebulization, Q4H PRN and TID     fluticasone furoate-vilanteroL 100-25 mcg/dose diskus inhaler 1 puff, 1 puff, Inhalation, Daily    Personal history of DVT (deep vein thrombosis)  Continue home Eliquis 2.5mg po BID    EVONNE on CPAP  The patient states it varies from 12-22 and is at 6L NC O2  ABG shows no hypercarbia      Severe obesity (BMI >= 40)  Encourage healthy diet, exercise and weight loss      Type 2 diabetes mellitus, without long-term current use of insulin  On moderate dose SSI    Holding home meds:    canagliflozin (INVOKANA) 100 mg Tab tablet, Take 100 mg by mouth once daily     metformin (GLUCOPHAGE) 500 MG tablet, Take 500 mg by mouth 2 (two) times daily with meals     semaglutide (OZEMPIC) 0.25 mg or 0.5 mg(2 mg/1.5 mL) pen injector, Inject 0.5 mg into the skin every 7 days         Hypothyroid  Continue home Synthroid 100mcg po qday      Primary hypertension    lisinopriL tablet 40 mg, 40 mg, Oral, Daily   Call transfer center, patient request in transfer to [facility] where most of his doctors work.  Awaiting acceptance of transfer.  Updated patient and his son.  Discussed with nursing supervisor.  VTE Risk Mitigation (From admission, onward)         Ordered     apixaban tablet 2.5 mg  2 times daily         05/07/23 0110                Discharge Planning   LEW:  5/7/23    Code Status: Full Code   Is the patient medically ready for discharge?:     Reason for patient still in hospital (select all that apply): Patient trending condition       Transfer to [facility] Pending.               Precious Harding MD  Department of Hospital Medicine   [facility] Emergency Dept

## 2023-05-07 NOTE — ASSESSMENT & PLAN NOTE
On moderate dose SSI    Holding home meds:    canagliflozin (INVOKANA) 100 mg Tab tablet, Take 100 mg by mouth once daily     metformin (GLUCOPHAGE) 500 MG tablet, Take 500 mg by mouth 2 (two) times daily with meals     semaglutide (OZEMPIC) 0.25 mg or 0.5 mg(2 mg/1.5 mL) pen injector, Inject 0.5 mg into the skin every 7 days        Pt states she is coming in for a combination of the two, she is haveing body aches, chills, sore throat , coughing, her knee is swelling and went to the ER but left because all the sick people in there so she was never seen.

## 2023-05-07 NOTE — H&P
"[facility] Emergency [facility] Medicine  History & Physical    Patient Name: Иван Fuentes Sr.  MRN: [id number]  Admission Date: 5/6/2023  Attending Physician: WILMER Orozco MD   Primary Care Provider: Manuel Wiley MD         Patient information was obtained from patient, spouse/SO, past medical records and ER records.       Subjective:     Principal Problem:Community acquired bacterial pneumonia    Chief Complaint:   Chief Complaint   Patient presents with    Shortness of Breath        HPI:   Mr. Fuentes is a 71yo man with a past medical history of BPH, CHF, CVA, COPD, DM2, HLD, PNA, EVONNE, hypothyroidism, and DVT on Eliquis 2.5mg po BID.  He uses CPAP at home, but cannot remember his exact settings (thinks maybe, " 20/something with 6L NC").    He now comes to the ED with complaints of worsening shortness of breath over the past 2 days.  He started having fever to 103F today, so he became increasingly concerned.  He notes trouble even talking over the past few days.  He denies any cough.  He has been having rigors and chills.  He also notes some increased wheezing and leg swelling.    In the ED his VS were BP (!) 109/58   Pulse 72   Temp max 99.5 °F (37.5 °C) (Oral)   Resp (!) 38 -> 33   Ht 5' 10" (1.778 m)   Wt 136.1 kg (300 lb)   SpO2 88% "NC" -> 95% BiPAP 40% O2  BMI 43.05 kg/m².  Labs showed WBC 24, Cr 1.4, HCO3 15, Gluc 230, , Trop 0.018, UA with mod yeast, few bacteria, WBC 55.  ABG 4L NC: pH 7.47, PCO2 22.    CXR showed cardiomegaly, stable LLL effusion, and new RML infiltrate c/w pneumonia.  EKG showed sinus 91 with PAC's, QTc 425 ms, no ST-T changes.    In the ED he was treated with:  Medications   piperacillin-tazobactam (ZOSYN) 4.5 g in dextrose 5 % in water (D5W) 5 % 100 mL IVPB (MB+) (has no administration in time range) - NEVER GIVEN.   albuterol-ipratropium 2.5 mg-0.5 mg/3 mL nebulizer solution 3 mL (3 mLs Nebulization Given 5/6/23 0983)   levoFLOXacin 750 mg/150 " mL IVPB 750 mg (750 mg Intravenous New Bag 5/6/23 4409)               Past Medical History:   Diagnosis Date    Anticoagulant long-term use     Arthritis of both knees     BPH (benign prostatic hyperplasia)     CHF (congestive heart failure)     COPD (chronic obstructive pulmonary disease) 05/03/2021    CVA (cerebral vascular accident)     Deep vein thrombosis     Diabetes mellitus     Diabetes mellitus, type 2     High cholesterol     Hypertension     Left-sided weakness     Obese     Pneumonia due to COVID-19 virus 07/01/2020    Sleep apnea     Thyroid disease     Urosepsis        Past Surgical History:   Procedure Laterality Date    BACK SURGERY      HIP ARTHROPLASTY      HIP ARTHROPLASTY Right 10/28/2021    Procedure: ARTHROPLASTY, HIP;  Surgeon: Ever Hall MD;  Location: [facility] OR [address] [place] [address];  Service: Orthopedics;  Laterality: Right;    IMPLANTATION OF PERMANENT SACRAL NERVE STIMULATOR Right 12/21/2021    Procedure: INSERTION, NEUROSTIMULATOR, PERMANENT, SACRAL;  Surgeon: Erickson Magana MD;  Location: [facility] OR [address] [place] [address];  Service: Urology;  Laterality: Right;    INJECTION OF ANESTHETIC AGENT AROUND NERVE Right 11/19/2020    Procedure: BLOCK, NERVE, FEMORAL AND OBTURATOR;  Surgeon: Dania Garcia MD;  Location: [facility] PAIN MGT;  Service: Pain Management;  Laterality: Right;    INJECTION OF JOINT Right 3/23/2021    Procedure: INJECTION, JOINT, HIP Pt. can continue Eliquis per provider.;  Surgeon: Dania Garcia MD;  Location: [facility] PAIN MGT;  Service: Pain Management;  Laterality: Right;    JOINT REPLACEMENT      PERCUTANEOUS CRYOTHERAPY OF PERIPHERAL NERVE USING LIQUID NITROUS OXIDE IN CLOSED NEEDLE DEVICE Left 8/26/2019    Procedure: CRYOTHERAPY, NERVE, PERIPHERAL, PERCUTANEOUS, USING LIQUID NITROUS OXIDE IN CLOSED NEEDLE DEVICE LEFT KNEE SIMON;  Surgeon: MENG Beckwith;  Location: [facility] CATH LAB;  Service: Pain Management;  Laterality: Left;    PROSTATE  SURGERY  2015    RADIOFREQUENCY ABLATION Right 1/12/2021    Procedure: COOLED OBTURTOR FEMORAL ,needconsent;  Surgeon: Dania Garcia MD;  Location: [facility];  Service: Pain Management;  Laterality: Right;    SPINAL FUSION  09/2014    TONSILLECTOMY      TOTAL KNEE ARTHROPLASTY Left 9/3/2019    Procedure: ARTHROPLASTY, KNEE, TOTAL-SANDIP;  Surgeon: Ever Hall MD;  Location: [address];  Service: Orthopedics;  Laterality: Left;       Review of patient's allergies indicates:  No Known Allergies    No current facility-administered medications on file prior to encounter.     Current Outpatient Medications on File Prior to Encounter   Medication Sig    albuterol (VENTOLIN HFA) 90 mcg/actuation inhaler Inhale 2 puffs into the lungs every 6 (six) hours as needed for Wheezing. Rescue    aspirin (ECOTRIN) 81 MG EC tablet Take 1 tablet (81 mg total) by mouth once daily.    atorvastatin (LIPITOR) 80 MG tablet   = 1 tab, Oral, Daily, # 28 tab, 10 Refill(s), Maintenance, Pharmacy: [facility], 178, cm, 08/24/22 6:34:00 CDT, Height/Length Measured, 136.3, kg, 08/17/22 14:56:00 CDT, Weight Dosing    canagliflozin (INVOKANA) 100 mg Tab tablet Take 100 mg by mouth once daily.    ciprofloxacin HCl (CIPRO) 500 MG tablet Take 1 tablet (500 mg total) by mouth 2 (two) times daily.    diazePAM (VALIUM) 2 MG tablet Take 2 mg by mouth every evening.    diphenhydrAMINE-acetaminophen (TYLENOL PM)  mg Tab Take 1 tablet by mouth nightly as needed. Pain, sleep    ELIQUIS 2.5 mg Tab Take 2.5 mg by mouth 2 (two) times daily.    EScitalopram oxalate (LEXAPRO) 20 MG tablet Take 20 mg by mouth once daily.    furosemide (LASIX) 40 MG tablet 40 mg daily as needed.     KERENDIA 10 mg Tab Take by mouth.    levothyroxine (SYNTHROID) 100 MCG tablet Take 100 mcg by mouth.    lisinopril (PRINIVIL,ZESTRIL) 40 MG tablet Take 40 mg by mouth once daily.     LYRICA 100 mg capsule Take 150 mg by mouth 2 (two) times  daily.    melatonin 10 mg Cap Take by mouth every evening.    metformin (GLUCOPHAGE) 500 MG tablet Take 500 mg by mouth 2 (two) times daily with meals.    PULMICORT FLEXHALER 180 mcg/actuation AePB Inhale 2 puffs into the lungs 2 (two) times daily.    semaglutide (OZEMPIC) 0.25 mg or 0.5 mg(2 mg/1.5 mL) pen injector Inject 0.5 mg into the skin every 7 days.    sildenafiL (VIAGRA) 100 MG tablet Take by mouth.     Family History       Problem Relation (Age of Onset)    Diabetes Mellitus Mother    Hypertension Brother          Tobacco Use    Smoking status: Former     Packs/day: 0.50     Types: Cigarettes    Smokeless tobacco: Never    Tobacco comments:     pt quit 3 weeks ago   Substance and Sexual Activity    Alcohol use: Not Currently     Comment: history of alcohol excess until 2011    Drug use: No    Sexual activity: Not on file     Review of Systems   Constitutional:  Positive for activity change, chills, fatigue and fever.   HENT:  Negative for congestion.    Respiratory:  Positive for shortness of breath and wheezing. Negative for cough.    Cardiovascular:  Positive for leg swelling.   Gastrointestinal:  Negative for nausea and vomiting.   Endocrine: Positive for cold intolerance.   Musculoskeletal:  Positive for myalgias.   Neurological:  Negative for headaches.   Psychiatric/Behavioral:  Positive for decreased concentration.    Objective:     Vital Signs (Most Recent):  Temp: 99.5 °F (37.5 °C) (05/06/23 2225)  Pulse: 72 (05/06/23 2336)  Resp: (!) 33 (05/06/23 2336)  BP: (!) 109/58 (05/06/23 2315)  SpO2: 95 % (05/06/23 2336) Vital Signs (24h Range):  Temp:  [99.5 °F (37.5 °C)] 99.5 °F (37.5 °C)  Pulse:  [72-98] 72  Resp:  [25-38] 33  SpO2:  [88 %-95 %] 95 %  BP: (103-128)/(58-97) 109/58     Weight: 136.1 kg (300 lb)  Body mass index is 43.05 kg/m².     Physical Exam  Constitutional:       General: He is awake. He is not in acute distress.     Appearance: He is morbidly obese. He is ill-appearing. He  is not toxic-appearing or diaphoretic.      Comments: Wearing BiPAP when initially evaluated.   HENT:      Head: Normocephalic and atraumatic.   Pulmonary:      Effort: No tachypnea or bradypnea.   Genitourinary:     Comments: No blankenship in place  Neurological:      Mental Status: He is oriented to person, place, and time. He is lethargic.      GCS: GCS eye subscore is 4. GCS verbal subscore is 5. GCS motor subscore is 6.   Psychiatric:         Attention and Perception: Attention and perception normal.         Mood and Affect: Mood normal.         Cognition and Memory: Cognition and memory normal.              Significant Labs: All pertinent labs within the past 24 hours have been reviewed.  Recent Results (from the past 24 hour(s))   Brain natriuretic peptide    Collection Time: 05/06/23 10:30 PM   Result Value Ref Range     (H) 0 - 99 pg/mL   Complete Blood Count (CBC)    Collection Time: 05/06/23 10:30 PM   Result Value Ref Range    WBC 24.28 (H) 3.90 - 12.70 K/uL    RBC 4.63 4.60 - 6.20 M/uL    Hemoglobin 14.0 14.0 - 18.0 g/dL    Hematocrit 42.7 40.0 - 54.0 %    MCV 92 82 - 98 fL    MCH 30.2 27.0 - 31.0 pg    MCHC 32.8 32.0 - 36.0 g/dL    RDW 13.5 11.5 - 14.5 %    Platelets 224 150 - 450 K/uL    MPV 9.8 9.2 - 12.9 fL    Immature Granulocytes 1.2 (H) 0.0 - 0.5 %    Gran # (ANC) 19.4 (H) 1.8 - 7.7 K/uL    Immature Grans (Abs) 0.30 (H) 0.00 - 0.04 K/uL    Lymph # 1.9 1.0 - 4.8 K/uL    Mono # 2.6 (H) 0.3 - 1.0 K/uL    Eos # 0.0 0.0 - 0.5 K/uL    Baso # 0.13 0.00 - 0.20 K/uL    nRBC 0 0 /100 WBC    Gran % 79.9 (H) 38.0 - 73.0 %    Lymph % 7.8 (L) 18.0 - 48.0 %    Mono % 10.6 4.0 - 15.0 %    Eosinophil % 0.0 0.0 - 8.0 %    Basophil % 0.5 0.0 - 1.9 %    Differential Method Automated    Comprehensive Metabolic Panel (CMP)    Collection Time: 05/06/23 10:30 PM   Result Value Ref Range    Sodium 132 (L) 136 - 145 mmol/L    Potassium 4.4 3.5 - 5.1 mmol/L    Chloride 102 95 - 110 mmol/L    CO2 15 (L) 23 - 29 mmol/L     Glucose 230 (H) 70 - 110 mg/dL    BUN 26 (H) 8 - 23 mg/dL    Creatinine 1.4 0.5 - 1.4 mg/dL    Calcium 10.0 8.7 - 10.5 mg/dL    Total Protein 7.7 6.0 - 8.4 g/dL    Albumin 3.0 (L) 3.5 - 5.2 g/dL    Total Bilirubin 0.5 0.1 - 1.0 mg/dL    Alkaline Phosphatase 50 (L) 55 - 135 U/L    AST 10 10 - 40 U/L    ALT 14 10 - 44 U/L    Anion Gap 15 8 - 16 mmol/L    eGFR 53.1 (A) >60 mL/min/1.73 m^2   Troponin I    Collection Time: 05/06/23 10:30 PM   Result Value Ref Range    Troponin I 0.018 0.000 - 0.026 ng/mL   Lactic acid, plasma    Collection Time: 05/06/23 10:30 PM   Result Value Ref Range    Lactate (Lactic Acid) 1.5 0.5 - 2.2 mmol/L   Acetaminophen Level    Collection Time: 05/06/23 10:30 PM   Result Value Ref Range    Acetaminophen (Tylenol), Serum 11.8 10.0 - 20.0 ug/mL   ISTAT PROCEDURE    Collection Time: 05/06/23 10:55 PM   Result Value Ref Range    POC PH 7.473 (H) 7.35 - 7.45    POC PCO2 22.4 (LL) 35 - 45 mmHg    POC PO2 89 80 - 100 mmHg    POC HCO3 16.4 (L) 24 - 28 mmol/L    POC BE -7 -2 to 2 mmol/L    POC SATURATED O2 98 95 - 100 %    Sample ARTERIAL     Site RR     Allens Test Pass     DelSys Nasal Can     Mode SPONT     Flow 4     FiO2 36     Sp02 89    Urinalysis, Reflex to Urine Culture Urine, Clean Catch    Collection Time: 05/06/23 10:58 PM    Specimen: Urine   Result Value Ref Range    Specimen UA Urine, Clean Catch     Color, UA Yellow Yellow, Straw, Yashira    Appearance, UA Hazy (A) Clear    pH, UA 6.0 5.0 - 8.0    Specific Gravity, UA 1.025 1.005 - 1.030    Protein, UA 3+ (A) Negative    Glucose, UA 2+ (A) Negative    Ketones, UA Negative Negative    Bilirubin (UA) Negative Negative    Occult Blood UA 1+ (A) Negative    Nitrite, UA Negative Negative    Urobilinogen, UA Negative Negative EU/dL    Leukocytes, UA Negative Negative   Urinalysis Microscopic    Collection Time: 05/06/23 10:58 PM   Result Value Ref Range    RBC, UA 17 (H) 0 - 4 /hpf    WBC, UA 55 (H) 0 - 5 /hpf    Bacteria Few (A) None-Occ  /hpf    Yeast, UA Moderate (A) None    Squam Epithel, UA 8 /hpf    Hyaline Casts, UA 4 (A) 0-1/lpf /lpf    Microscopic Comment SEE COMMENT            Significant Imaging: I have reviewed all pertinent imaging results/findings within the past 24 hours.          Assessment/Plan:     * Community acquired bacterial pneumonia  He was given Levaquin in the ED  Will change to Rocephin and Zithro  He has no cough or sputum currently  Ordered sputum gram statin and Legionella work up         azithromycin (ZITHROMAX) 500 mg in dextrose 5 % (D5W) 250 mL IVPB (Vial-Mate), 500 mg, Intravenous, Q24H     benzonatate capsule 100 mg, 100 mg, Oral, TID PRN, cough    cefTRIAXone (ROCEPHIN) 2 g in dextrose 5 % in water (D5W) 5 % 50 mL IVPB (MB+), 2 g, Intravenous, Q24H    dextromethorphan-guaiFENesin  mg/5 ml liquid 10 mL, 10 mL, Oral, Q6H    Acute cystitis without hematuria  The UA is mildly equivocal  On Rocephin  Follow up CXS      Sepsis  This patient does have evidence of infective focus  My overall impression is sepsis.  Source: Respiratory  Antibiotics given-   Antibiotics (72h ago, onward)    Start     Stop Route Frequency Ordered    05/07/23 0900  azithromycin (ZITHROMAX) 500 mg in dextrose 5 % (D5W) 250 mL IVPB (Vial-Mate)  (Community Acquired Pneumonia (CAP) - Low MDR Risk)         05/12 0859 IV Every 24 hours (non-standard times) 05/07/23 0114    05/07/23 0134  cefTRIAXone (ROCEPHIN) 2 g in dextrose 5 % in water (D5W) 5 % 50 mL IVPB (MB+)  (Community Acquired Pneumonia (CAP) - Low MDR Risk)         05/12 0144 IV Every 24 hours (non-standard times) 05/07/23 0134        Latest lactate reviewed-  Recent Labs   Lab 05/06/23  2230   LACTATE 1.5     Organ dysfunction indicated by Acute respiratory failure    Fluid challenge Not needed - patient is not hypotensive      Post- resuscitation assessment No - Post resuscitation assessment not needed       Will Not start Pressors- Levophed for MAP of 65  Source control  achieved by: iv antibiotics      Chronic kidney disease, stage 3b  He is at baseline Cr of 1.4  Monitor for now      Acute respiratory failure with hypoxia  He had to be placed on BiPAP with O2 in the ED  Now he is much improved    Patient with Hypoxic Respiratory failure which is Acute.  he is on home oxygen at 6 LPM. Supplemental oxygen was provided and noted- Oxygen Concentration (%):  [40] 40    Signs/symptoms of respiratory failure include- tachypnea, increased work of breathing and wheezing. Contributing diagnoses includes - COPD, Obesity Hypoventilation and Pneumonia Labs and images were reviewed. Patient Has recent ABG, which has been reviewed. Will treat underlying causes and adjust management of respiratory failure as follows- on CPAP from home now.        Type 2 diabetes mellitus, without long-term current use of insulin  On moderate dose SSI    Holding home meds:    canagliflozin (INVOKANA) 100 mg Tab tablet, Take 100 mg by mouth once daily     metformin (GLUCOPHAGE) 500 MG tablet, Take 500 mg by mouth 2 (two) times daily with meals     semaglutide (OZEMPIC) 0.25 mg or 0.5 mg(2 mg/1.5 mL) pen injector, Inject 0.5 mg into the skin every 7 days         EVONNE on CPAP  The patient states it varies from 12-22 and is at 6L NC O2  ABG shows no hypercarbia      Primary hypertension    lisinopriL tablet 40 mg, 40 mg, Oral, Daily     COPD (chronic obstructive pulmonary disease)    albuterol-ipratropium 2.5 mg-0.5 mg/3 mL nebulizer solution 3 mL, 3 mL, Nebulization, Q4H PRN and TID    fluticasone furoate-vilanteroL 100-25 mcg/dose diskus inhaler 1 puff, 1 puff, Inhalation, Daily    Peripheral arterial disease  (Ankle-brachial index of 0.83 on the right and 0.79 on the left, indicating bilateral mild-to-moderate peripheral artery disease.  Elevated velocities in the left distal superficial femoral artery suggesting hemodynamically significant focal stenosis of the left distal superficial femoral artery.)       Follow up with Vascular      Personal history of DVT (deep vein thrombosis)  Continue home Eliquis 2.5mg po BID    Hypothyroid  Continue home Synthroid 100mcg po qday      Severe obesity (BMI >= 40)  Encourage healthy diet, exercise and weight loss        VTE Risk Mitigation (From admission, onward)         Ordered     apixaban tablet 2.5 mg  2 times daily         05/07/23 0110                     The attending portion of this evaluation, treatment, and documentation was performed per ROSA Orozco MD via Telemedicine AudioVisual using the secure [employer] software platform with 2 way audio/video. The provider was located off-site and the patient is located in the hospital. The aforementioned video software was utilized to document the relevant history and physical exam            ROSA Orozco MD  Department of Hospital Medicine   [facility] - Emergency Dept

## 2023-05-07 NOTE — SUBJECTIVE & OBJECTIVE
Past Medical History:   Diagnosis Date    Anticoagulant long-term use     Arthritis of both knees     BPH (benign prostatic hyperplasia)     CHF (congestive heart failure)     COPD (chronic obstructive pulmonary disease) 05/03/2021    CVA (cerebral vascular accident)     Deep vein thrombosis     Diabetes mellitus     Diabetes mellitus, type 2     High cholesterol     Hypertension     Left-sided weakness     Obese     Pneumonia due to COVID-19 virus 07/01/2020    Sleep apnea     Thyroid disease     Urosepsis        Past Surgical History:   Procedure Laterality Date    BACK SURGERY      HIP ARTHROPLASTY      HIP ARTHROPLASTY Right 10/28/2021    Procedure: ARTHROPLASTY, HIP;  Surgeon: Ever Hall MD;  Location: [facility] OR [address] [place] [address];  Service: Orthopedics;  Laterality: Right;    IMPLANTATION OF PERMANENT SACRAL NERVE STIMULATOR Right 12/21/2021    Procedure: INSERTION, NEUROSTIMULATOR, PERMANENT, SACRAL;  Surgeon: Erickson Magana MD;  Location: [facility] OR [address] [place] [address];  Service: Urology;  Laterality: Right;    INJECTION OF ANESTHETIC AGENT AROUND NERVE Right 11/19/2020    Procedure: BLOCK, NERVE, FEMORAL AND OBTURATOR;  Surgeon: Dania Garcia MD;  Location: [facility] PAIN MGT;  Service: Pain Management;  Laterality: Right;    INJECTION OF JOINT Right 3/23/2021    Procedure: INJECTION, JOINT, HIP Pt. can continue Eliquis per provider.;  Surgeon: Dania Garcia MD;  Location: [facility] PAIN MGT;  Service: Pain Management;  Laterality: Right;    JOINT REPLACEMENT      PERCUTANEOUS CRYOTHERAPY OF PERIPHERAL NERVE USING LIQUID NITROUS OXIDE IN CLOSED NEEDLE DEVICE Left 8/26/2019    Procedure: CRYOTHERAPY, NERVE, PERIPHERAL, PERCUTANEOUS, USING LIQUID NITROUS OXIDE IN CLOSED NEEDLE DEVICE LEFT KNEE SIMON;  Surgeon: MENG Beckwith;  Location: [facility] CATH LAB;  Service: Pain Management;  Laterality: Left;    PROSTATE SURGERY  2015    RADIOFREQUENCY ABLATION Right 1/12/2021    Procedure: COOLED OBTURTOR FEMORAL  ,needconsent;  Surgeon: Dania Garcia MD;  Location: [facility];  Service: Pain Management;  Laterality: Right;    SPINAL FUSION  09/2014    TONSILLECTOMY      TOTAL KNEE ARTHROPLASTY Left 9/3/2019    Procedure: ARTHROPLASTY, KNEE, TOTAL-SANDIP;  Surgeon: Ever Hall MD;  Location: [address];  Service: Orthopedics;  Laterality: Left;       Review of patient's allergies indicates:  No Known Allergies    No current facility-administered medications on file prior to encounter.     Current Outpatient Medications on File Prior to Encounter   Medication Sig    albuterol (VENTOLIN HFA) 90 mcg/actuation inhaler Inhale 2 puffs into the lungs every 6 (six) hours as needed for Wheezing. Rescue    aspirin (ECOTRIN) 81 MG EC tablet Take 1 tablet (81 mg total) by mouth once daily.    atorvastatin (LIPITOR) 80 MG tablet   = 1 tab, Oral, Daily, # 28 tab, 10 Refill(s), Maintenance, Pharmacy: [facility], 178, cm, 08/24/22 6:34:00 CDT, Height/Length Measured, 136.3, kg, 08/17/22 14:56:00 CDT, Weight Dosing    canagliflozin (INVOKANA) 100 mg Tab tablet Take 100 mg by mouth once daily.    ciprofloxacin HCl (CIPRO) 500 MG tablet Take 1 tablet (500 mg total) by mouth 2 (two) times daily.    diazePAM (VALIUM) 2 MG tablet Take 2 mg by mouth every evening.    diphenhydrAMINE-acetaminophen (TYLENOL PM)  mg Tab Take 1 tablet by mouth nightly as needed. Pain, sleep    ELIQUIS 2.5 mg Tab Take 2.5 mg by mouth 2 (two) times daily.    EScitalopram oxalate (LEXAPRO) 20 MG tablet Take 20 mg by mouth once daily.    furosemide (LASIX) 40 MG tablet 40 mg daily as needed.     KERENDIA 10 mg Tab Take by mouth.    levothyroxine (SYNTHROID) 100 MCG tablet Take 100 mcg by mouth.    lisinopril (PRINIVIL,ZESTRIL) 40 MG tablet Take 40 mg by mouth once daily.     LYRICA 100 mg capsule Take 150 mg by mouth 2 (two) times daily.    melatonin 10 mg Cap Take by mouth every evening.    metformin (GLUCOPHAGE) 500 MG tablet Take 500 mg  by mouth 2 (two) times daily with meals.    PULMICORT FLEXHALER 180 mcg/actuation AePB Inhale 2 puffs into the lungs 2 (two) times daily.    semaglutide (OZEMPIC) 0.25 mg or 0.5 mg(2 mg/1.5 mL) pen injector Inject 0.5 mg into the skin every 7 days.    sildenafiL (VIAGRA) 100 MG tablet Take by mouth.     Family History       Problem Relation (Age of Onset)    Diabetes Mellitus Mother    Hypertension Brother          Tobacco Use    Smoking status: Former     Packs/day: 0.50     Types: Cigarettes    Smokeless tobacco: Never    Tobacco comments:     pt quit 3 weeks ago   Substance and Sexual Activity    Alcohol use: Not Currently     Comment: history of alcohol excess until 2011    Drug use: No    Sexual activity: Not on file     Review of Systems   Constitutional:  Positive for activity change, chills, fatigue and fever.   HENT:  Negative for congestion.    Respiratory:  Positive for shortness of breath and wheezing. Negative for cough.    Cardiovascular:  Positive for leg swelling.   Gastrointestinal:  Negative for nausea and vomiting.   Endocrine: Positive for cold intolerance.   Musculoskeletal:  Positive for myalgias.   Neurological:  Negative for headaches.   Psychiatric/Behavioral:  Positive for decreased concentration.    Objective:     Vital Signs (Most Recent):  Temp: 99.5 °F (37.5 °C) (05/06/23 2225)  Pulse: 72 (05/06/23 2336)  Resp: (!) 33 (05/06/23 2336)  BP: (!) 109/58 (05/06/23 2315)  SpO2: 95 % (05/06/23 2336) Vital Signs (24h Range):  Temp:  [99.5 °F (37.5 °C)] 99.5 °F (37.5 °C)  Pulse:  [72-98] 72  Resp:  [25-38] 33  SpO2:  [88 %-95 %] 95 %  BP: (103-128)/(58-97) 109/58     Weight: 136.1 kg (300 lb)  Body mass index is 43.05 kg/m².     Physical Exam  Constitutional:       General: He is awake. He is not in acute distress.     Appearance: He is morbidly obese. He is ill-appearing. He is not toxic-appearing or diaphoretic.      Comments: Wearing BiPAP when initially evaluated.   HENT:      Head:  Normocephalic and atraumatic.   Pulmonary:      Effort: No tachypnea or bradypnea.   Genitourinary:     Comments: No blankenship in place  Neurological:      Mental Status: He is oriented to person, place, and time. He is lethargic.      GCS: GCS eye subscore is 4. GCS verbal subscore is 5. GCS motor subscore is 6.   Psychiatric:         Attention and Perception: Attention and perception normal.         Mood and Affect: Mood normal.         Cognition and Memory: Cognition and memory normal.              Significant Labs: All pertinent labs within the past 24 hours have been reviewed.  Recent Results (from the past 24 hour(s))   Brain natriuretic peptide    Collection Time: 05/06/23 10:30 PM   Result Value Ref Range     (H) 0 - 99 pg/mL   Complete Blood Count (CBC)    Collection Time: 05/06/23 10:30 PM   Result Value Ref Range    WBC 24.28 (H) 3.90 - 12.70 K/uL    RBC 4.63 4.60 - 6.20 M/uL    Hemoglobin 14.0 14.0 - 18.0 g/dL    Hematocrit 42.7 40.0 - 54.0 %    MCV 92 82 - 98 fL    MCH 30.2 27.0 - 31.0 pg    MCHC 32.8 32.0 - 36.0 g/dL    RDW 13.5 11.5 - 14.5 %    Platelets 224 150 - 450 K/uL    MPV 9.8 9.2 - 12.9 fL    Immature Granulocytes 1.2 (H) 0.0 - 0.5 %    Gran # (ANC) 19.4 (H) 1.8 - 7.7 K/uL    Immature Grans (Abs) 0.30 (H) 0.00 - 0.04 K/uL    Lymph # 1.9 1.0 - 4.8 K/uL    Mono # 2.6 (H) 0.3 - 1.0 K/uL    Eos # 0.0 0.0 - 0.5 K/uL    Baso # 0.13 0.00 - 0.20 K/uL    nRBC 0 0 /100 WBC    Gran % 79.9 (H) 38.0 - 73.0 %    Lymph % 7.8 (L) 18.0 - 48.0 %    Mono % 10.6 4.0 - 15.0 %    Eosinophil % 0.0 0.0 - 8.0 %    Basophil % 0.5 0.0 - 1.9 %    Differential Method Automated    Comprehensive Metabolic Panel (CMP)    Collection Time: 05/06/23 10:30 PM   Result Value Ref Range    Sodium 132 (L) 136 - 145 mmol/L    Potassium 4.4 3.5 - 5.1 mmol/L    Chloride 102 95 - 110 mmol/L    CO2 15 (L) 23 - 29 mmol/L    Glucose 230 (H) 70 - 110 mg/dL    BUN 26 (H) 8 - 23 mg/dL    Creatinine 1.4 0.5 - 1.4 mg/dL    Calcium 10.0 8.7 -  10.5 mg/dL    Total Protein 7.7 6.0 - 8.4 g/dL    Albumin 3.0 (L) 3.5 - 5.2 g/dL    Total Bilirubin 0.5 0.1 - 1.0 mg/dL    Alkaline Phosphatase 50 (L) 55 - 135 U/L    AST 10 10 - 40 U/L    ALT 14 10 - 44 U/L    Anion Gap 15 8 - 16 mmol/L    eGFR 53.1 (A) >60 mL/min/1.73 m^2   Troponin I    Collection Time: 05/06/23 10:30 PM   Result Value Ref Range    Troponin I 0.018 0.000 - 0.026 ng/mL   Lactic acid, plasma    Collection Time: 05/06/23 10:30 PM   Result Value Ref Range    Lactate (Lactic Acid) 1.5 0.5 - 2.2 mmol/L   Acetaminophen Level    Collection Time: 05/06/23 10:30 PM   Result Value Ref Range    Acetaminophen (Tylenol), Serum 11.8 10.0 - 20.0 ug/mL   ISTAT PROCEDURE    Collection Time: 05/06/23 10:55 PM   Result Value Ref Range    POC PH 7.473 (H) 7.35 - 7.45    POC PCO2 22.4 (LL) 35 - 45 mmHg    POC PO2 89 80 - 100 mmHg    POC HCO3 16.4 (L) 24 - 28 mmol/L    POC BE -7 -2 to 2 mmol/L    POC SATURATED O2 98 95 - 100 %    Sample ARTERIAL     Site RR     Allens Test Pass     DelSys Nasal Can     Mode SPONT     Flow 4     FiO2 36     Sp02 89    Urinalysis, Reflex to Urine Culture Urine, Clean Catch    Collection Time: 05/06/23 10:58 PM    Specimen: Urine   Result Value Ref Range    Specimen UA Urine, Clean Catch     Color, UA Yellow Yellow, Straw, Yashira    Appearance, UA Hazy (A) Clear    pH, UA 6.0 5.0 - 8.0    Specific Gravity, UA 1.025 1.005 - 1.030    Protein, UA 3+ (A) Negative    Glucose, UA 2+ (A) Negative    Ketones, UA Negative Negative    Bilirubin (UA) Negative Negative    Occult Blood UA 1+ (A) Negative    Nitrite, UA Negative Negative    Urobilinogen, UA Negative Negative EU/dL    Leukocytes, UA Negative Negative   Urinalysis Microscopic    Collection Time: 05/06/23 10:58 PM   Result Value Ref Range    RBC, UA 17 (H) 0 - 4 /hpf    WBC, UA 55 (H) 0 - 5 /hpf    Bacteria Few (A) None-Occ /hpf    Yeast, UA Moderate (A) None    Squam Epithel, UA 8 /hpf    Hyaline Casts, UA 4 (A) 0-1/lpf /lpf     Microscopic Comment SEE COMMENT            Significant Imaging: I have reviewed all pertinent imaging results/findings within the past 24 hours.

## 2023-05-07 NOTE — ASSESSMENT & PLAN NOTE
  albuterol-ipratropium 2.5 mg-0.5 mg/3 mL nebulizer solution 3 mL, 3 mL, Nebulization, Q4H PRN and TID    fluticasone furoate-vilanteroL 100-25 mcg/dose diskus inhaler 1 puff, 1 puff, Inhalation, Daily

## 2023-05-07 NOTE — ASSESSMENT & PLAN NOTE
He was given Levaquin in the ED  Will change to Rocephin and Zithro  He has no cough or sputum currently  Ordered sputum gram statin and Legionella work up         azithromycin (ZITHROMAX) 500 mg in dextrose 5 % (D5W) 250 mL IVPB (Vial-Mate), 500 mg, Intravenous, Q24H     benzonatate capsule 100 mg, 100 mg, Oral, TID PRN, cough    cefTRIAXone (ROCEPHIN) 2 g in dextrose 5 % in water (D5W) 5 % 50 mL IVPB (MB+), 2 g, Intravenous, Q24H    dextromethorphan-guaiFENesin  mg/5 ml liquid 10 mL, 10 mL, Oral, Q6H

## 2023-05-07 NOTE — ASSESSMENT & PLAN NOTE
This patient does have evidence of infective focus  My overall impression is sepsis.  Source: Respiratory  Antibiotics given-   Antibiotics (72h ago, onward)    Start     Stop Route Frequency Ordered    05/07/23 0900  azithromycin (ZITHROMAX) 500 mg in dextrose 5 % (D5W) 250 mL IVPB (Vial-Mate)  (Community Acquired Pneumonia (CAP) - Low MDR Risk)         05/12 0859 IV Every 24 hours (non-standard times) 05/07/23 0114    05/07/23 0134  cefTRIAXone (ROCEPHIN) 2 g in dextrose 5 % in water (D5W) 5 % 50 mL IVPB (MB+)  (Community Acquired Pneumonia (CAP) - Low MDR Risk)         05/12 0144 IV Every 24 hours (non-standard times) 05/07/23 0134        Latest lactate reviewed-  Recent Labs   Lab 05/06/23  2230   LACTATE 1.5     Organ dysfunction indicated by Acute respiratory failure    Fluid challenge Not needed - patient is not hypotensive      Post- resuscitation assessment No - Post resuscitation assessment not needed       Will Not start Pressors- Levophed for MAP of 65  Source control achieved by: iv antibiotics

## 2023-05-07 NOTE — ASSESSMENT & PLAN NOTE
On moderate dose SSI    Holding home meds:    canagliflozin (INVOKANA) 100 mg Tab tablet, Take 100 mg by mouth once daily     metformin (GLUCOPHAGE) 500 MG tablet, Take 500 mg by mouth 2 (two) times daily with meals     semaglutide (OZEMPIC) 0.25 mg or 0.5 mg(2 mg/1.5 mL) pen injector, Inject 0.5 mg into the skin every 7 days

## 2023-05-08 PROBLEM — E87.1 CHRONIC HYPONATREMIA: Status: ACTIVE | Noted: 2023-05-08

## 2023-05-08 LAB
ANION GAP SERPL CALC-SCNC: 13 MMOL/L (ref 8–16)
ANION GAP SERPL CALC-SCNC: 16 MMOL/L (ref 8–16)
BACTERIA UR CULT: ABNORMAL
BASOPHILS # BLD AUTO: 0.07 K/UL (ref 0–0.2)
BASOPHILS NFR BLD: 0.4 % (ref 0–1.9)
BNP SERPL-MCNC: 54 PG/ML (ref 0–99)
BUN SERPL-MCNC: 32 MG/DL (ref 8–23)
BUN SERPL-MCNC: 33 MG/DL (ref 8–23)
CALCIUM SERPL-MCNC: 8.9 MG/DL (ref 8.7–10.5)
CALCIUM SERPL-MCNC: 9 MG/DL (ref 8.7–10.5)
CHLORIDE SERPL-SCNC: 97 MMOL/L (ref 95–110)
CHLORIDE SERPL-SCNC: 98 MMOL/L (ref 95–110)
CO2 SERPL-SCNC: 14 MMOL/L (ref 23–29)
CO2 SERPL-SCNC: 18 MMOL/L (ref 23–29)
CREAT SERPL-MCNC: 1.6 MG/DL (ref 0.5–1.4)
CREAT SERPL-MCNC: 1.7 MG/DL (ref 0.5–1.4)
CREAT UR-MCNC: 138.4 MG/DL (ref 23–375)
DIFFERENTIAL METHOD: ABNORMAL
EOSINOPHIL # BLD AUTO: 0 K/UL (ref 0–0.5)
EOSINOPHIL NFR BLD: 0.2 % (ref 0–8)
ERYTHROCYTE [DISTWIDTH] IN BLOOD BY AUTOMATED COUNT: 13.8 % (ref 11.5–14.5)
EST. GFR  (NO RACE VARIABLE): 42 ML/MIN/1.73 M^2
EST. GFR  (NO RACE VARIABLE): 45.2 ML/MIN/1.73 M^2
GLUCOSE SERPL-MCNC: 208 MG/DL (ref 70–110)
GLUCOSE SERPL-MCNC: 330 MG/DL (ref 70–110)
HCT VFR BLD AUTO: 38.8 % (ref 40–54)
HGB BLD-MCNC: 12.5 G/DL (ref 14–18)
IMM GRANULOCYTES # BLD AUTO: 0.23 K/UL (ref 0–0.04)
IMM GRANULOCYTES NFR BLD AUTO: 1.4 % (ref 0–0.5)
LYMPHOCYTES # BLD AUTO: 2 K/UL (ref 1–4.8)
LYMPHOCYTES NFR BLD: 12 % (ref 18–48)
MAGNESIUM SERPL-MCNC: 1.7 MG/DL (ref 1.6–2.6)
MCH RBC QN AUTO: 30.1 PG (ref 27–31)
MCHC RBC AUTO-ENTMCNC: 32.2 G/DL (ref 32–36)
MCV RBC AUTO: 94 FL (ref 82–98)
MONOCYTES # BLD AUTO: 2.5 K/UL (ref 0.3–1)
MONOCYTES NFR BLD: 14.7 % (ref 4–15)
NEUTROPHILS # BLD AUTO: 12.1 K/UL (ref 1.8–7.7)
NEUTROPHILS NFR BLD: 71.3 % (ref 38–73)
NRBC BLD-RTO: 0 /100 WBC
PHOSPHATE SERPL-MCNC: 2.9 MG/DL (ref 2.7–4.5)
PLATELET # BLD AUTO: 208 K/UL (ref 150–450)
PMV BLD AUTO: 10.1 FL (ref 9.2–12.9)
POCT GLUCOSE: 222 MG/DL (ref 70–110)
POCT GLUCOSE: 279 MG/DL (ref 70–110)
POCT GLUCOSE: 285 MG/DL (ref 70–110)
POCT GLUCOSE: 291 MG/DL (ref 70–110)
POTASSIUM SERPL-SCNC: 4.2 MMOL/L (ref 3.5–5.1)
POTASSIUM SERPL-SCNC: 4.8 MMOL/L (ref 3.5–5.1)
RBC # BLD AUTO: 4.15 M/UL (ref 4.6–6.2)
SODIUM SERPL-SCNC: 128 MMOL/L (ref 136–145)
SODIUM SERPL-SCNC: 128 MMOL/L (ref 136–145)
TSH SERPL DL<=0.005 MIU/L-ACNC: 1.81 UIU/ML (ref 0.4–4)
WBC # BLD AUTO: 16.97 K/UL (ref 3.9–12.7)

## 2023-05-08 PROCEDURE — 25000003 PHARM REV CODE 250: Performed by: STUDENT IN AN ORGANIZED HEALTH CARE EDUCATION/TRAINING PROGRAM

## 2023-05-08 PROCEDURE — 94664 DEMO&/EVAL PT USE INHALER: CPT

## 2023-05-08 PROCEDURE — 99233 PR SUBSEQUENT HOSPITAL CARE,LEVL III: ICD-10-PCS | Mod: ,,, | Performed by: STUDENT IN AN ORGANIZED HEALTH CARE EDUCATION/TRAINING PROGRAM

## 2023-05-08 PROCEDURE — 84100 ASSAY OF PHOSPHORUS: CPT | Performed by: INTERNAL MEDICINE

## 2023-05-08 PROCEDURE — 36415 COLL VENOUS BLD VENIPUNCTURE: CPT | Performed by: INTERNAL MEDICINE

## 2023-05-08 PROCEDURE — 82570 ASSAY OF URINE CREATININE: CPT | Performed by: STUDENT IN AN ORGANIZED HEALTH CARE EDUCATION/TRAINING PROGRAM

## 2023-05-08 PROCEDURE — 94761 N-INVAS EAR/PLS OXIMETRY MLT: CPT

## 2023-05-08 PROCEDURE — 83735 ASSAY OF MAGNESIUM: CPT | Performed by: INTERNAL MEDICINE

## 2023-05-08 PROCEDURE — 84300 ASSAY OF URINE SODIUM: CPT | Performed by: STUDENT IN AN ORGANIZED HEALTH CARE EDUCATION/TRAINING PROGRAM

## 2023-05-08 PROCEDURE — 99900035 HC TECH TIME PER 15 MIN (STAT)

## 2023-05-08 PROCEDURE — 99233 SBSQ HOSP IP/OBS HIGH 50: CPT | Mod: ,,, | Performed by: STUDENT IN AN ORGANIZED HEALTH CARE EDUCATION/TRAINING PROGRAM

## 2023-05-08 PROCEDURE — 99497 PR ADVNCD CARE PLAN 30 MIN: ICD-10-PCS | Mod: ,,, | Performed by: STUDENT IN AN ORGANIZED HEALTH CARE EDUCATION/TRAINING PROGRAM

## 2023-05-08 PROCEDURE — 87449 NOS EACH ORGANISM AG IA: CPT | Performed by: INTERNAL MEDICINE

## 2023-05-08 PROCEDURE — 84443 ASSAY THYROID STIM HORMONE: CPT | Performed by: STUDENT IN AN ORGANIZED HEALTH CARE EDUCATION/TRAINING PROGRAM

## 2023-05-08 PROCEDURE — 83880 ASSAY OF NATRIURETIC PEPTIDE: CPT | Performed by: INTERNAL MEDICINE

## 2023-05-08 PROCEDURE — 83930 ASSAY OF BLOOD OSMOLALITY: CPT | Performed by: STUDENT IN AN ORGANIZED HEALTH CARE EDUCATION/TRAINING PROGRAM

## 2023-05-08 PROCEDURE — 27000221 HC OXYGEN, UP TO 24 HOURS

## 2023-05-08 PROCEDURE — 63600175 PHARM REV CODE 636 W HCPCS: Performed by: INTERNAL MEDICINE

## 2023-05-08 PROCEDURE — 80048 BASIC METABOLIC PNL TOTAL CA: CPT | Mod: 91 | Performed by: STUDENT IN AN ORGANIZED HEALTH CARE EDUCATION/TRAINING PROGRAM

## 2023-05-08 PROCEDURE — 25000242 PHARM REV CODE 250 ALT 637 W/ HCPCS: Performed by: INTERNAL MEDICINE

## 2023-05-08 PROCEDURE — 25000003 PHARM REV CODE 250: Performed by: INTERNAL MEDICINE

## 2023-05-08 PROCEDURE — 63600175 PHARM REV CODE 636 W HCPCS: Performed by: STUDENT IN AN ORGANIZED HEALTH CARE EDUCATION/TRAINING PROGRAM

## 2023-05-08 PROCEDURE — 87081 CULTURE SCREEN ONLY: CPT | Performed by: INTERNAL MEDICINE

## 2023-05-08 PROCEDURE — 87205 SMEAR GRAM STAIN: CPT | Performed by: INTERNAL MEDICINE

## 2023-05-08 PROCEDURE — 20000000 HC ICU ROOM

## 2023-05-08 PROCEDURE — 87070 CULTURE OTHR SPECIMN AEROBIC: CPT | Performed by: INTERNAL MEDICINE

## 2023-05-08 PROCEDURE — 94640 AIRWAY INHALATION TREATMENT: CPT

## 2023-05-08 PROCEDURE — 80048 BASIC METABOLIC PNL TOTAL CA: CPT | Performed by: INTERNAL MEDICINE

## 2023-05-08 PROCEDURE — 85025 COMPLETE CBC W/AUTO DIFF WBC: CPT | Performed by: INTERNAL MEDICINE

## 2023-05-08 PROCEDURE — 36415 COLL VENOUS BLD VENIPUNCTURE: CPT | Performed by: STUDENT IN AN ORGANIZED HEALTH CARE EDUCATION/TRAINING PROGRAM

## 2023-05-08 PROCEDURE — 99497 ADVNCD CARE PLAN 30 MIN: CPT | Mod: ,,, | Performed by: STUDENT IN AN ORGANIZED HEALTH CARE EDUCATION/TRAINING PROGRAM

## 2023-05-08 RX ORDER — SODIUM CHLORIDE, SODIUM LACTATE, POTASSIUM CHLORIDE, CALCIUM CHLORIDE 600; 310; 30; 20 MG/100ML; MG/100ML; MG/100ML; MG/100ML
INJECTION, SOLUTION INTRAVENOUS CONTINUOUS
Status: DISCONTINUED | OUTPATIENT
Start: 2023-05-08 | End: 2023-05-09

## 2023-05-08 RX ORDER — SODIUM CHLORIDE 9 MG/ML
INJECTION, SOLUTION INTRAVENOUS CONTINUOUS
Status: DISCONTINUED | OUTPATIENT
Start: 2023-05-08 | End: 2023-05-08

## 2023-05-08 RX ADMIN — LEVOTHYROXINE SODIUM 100 MCG: 25 TABLET ORAL at 05:05

## 2023-05-08 RX ADMIN — AZITHROMYCIN MONOHYDRATE 500 MG: 500 INJECTION, POWDER, LYOPHILIZED, FOR SOLUTION INTRAVENOUS at 09:05

## 2023-05-08 RX ADMIN — CEFTRIAXONE SODIUM 2 G: 2 INJECTION, POWDER, FOR SOLUTION INTRAMUSCULAR; INTRAVENOUS at 01:05

## 2023-05-08 RX ADMIN — PREGABALIN 150 MG: 25 CAPSULE ORAL at 09:05

## 2023-05-08 RX ADMIN — IPRATROPIUM BROMIDE AND ALBUTEROL SULFATE 3 ML: 2.5; .5 SOLUTION RESPIRATORY (INHALATION) at 12:05

## 2023-05-08 RX ADMIN — ASPIRIN 81 MG: 81 TABLET, COATED ORAL at 09:05

## 2023-05-08 RX ADMIN — ESCITALOPRAM OXALATE 20 MG: 10 TABLET ORAL at 09:05

## 2023-05-08 RX ADMIN — SODIUM CHLORIDE, POTASSIUM CHLORIDE, SODIUM LACTATE AND CALCIUM CHLORIDE: 600; 310; 30; 20 INJECTION, SOLUTION INTRAVENOUS at 09:05

## 2023-05-08 RX ADMIN — IPRATROPIUM BROMIDE AND ALBUTEROL SULFATE 3 ML: 2.5; .5 SOLUTION RESPIRATORY (INHALATION) at 04:05

## 2023-05-08 RX ADMIN — PREGABALIN 150 MG: 25 CAPSULE ORAL at 08:05

## 2023-05-08 RX ADMIN — IPRATROPIUM BROMIDE AND ALBUTEROL SULFATE 3 ML: 2.5; .5 SOLUTION RESPIRATORY (INHALATION) at 08:05

## 2023-05-08 RX ADMIN — CEFEPIME 2 G: 2 INJECTION, POWDER, FOR SOLUTION INTRAVENOUS at 09:05

## 2023-05-08 RX ADMIN — IPRATROPIUM BROMIDE AND ALBUTEROL SULFATE 3 ML: 2.5; .5 SOLUTION RESPIRATORY (INHALATION) at 11:05

## 2023-05-08 RX ADMIN — ATORVASTATIN CALCIUM 80 MG: 40 TABLET, FILM COATED ORAL at 08:05

## 2023-05-08 RX ADMIN — INSULIN ASPART 2 UNITS: 100 INJECTION, SOLUTION INTRAVENOUS; SUBCUTANEOUS at 08:05

## 2023-05-08 RX ADMIN — DIAZEPAM 2 MG: 2 TABLET ORAL at 08:05

## 2023-05-08 RX ADMIN — APIXABAN 2.5 MG: 2.5 TABLET, FILM COATED ORAL at 09:05

## 2023-05-08 RX ADMIN — SODIUM CHLORIDE, POTASSIUM CHLORIDE, SODIUM LACTATE AND CALCIUM CHLORIDE 500 ML: 600; 310; 30; 20 INJECTION, SOLUTION INTRAVENOUS at 04:05

## 2023-05-08 RX ADMIN — CEFEPIME 2 G: 2 INJECTION, POWDER, FOR SOLUTION INTRAVENOUS at 12:05

## 2023-05-08 RX ADMIN — GUAIFENESIN AND DEXTROMETHORPHAN 10 ML: 100; 10 SYRUP ORAL at 05:05

## 2023-05-08 RX ADMIN — INSULIN ASPART 6 UNITS: 100 INJECTION, SOLUTION INTRAVENOUS; SUBCUTANEOUS at 04:05

## 2023-05-08 RX ADMIN — POLYETHYLENE GLYCOL 3350 17 G: 17 POWDER, FOR SOLUTION ORAL at 09:05

## 2023-05-08 RX ADMIN — INSULIN ASPART 4 UNITS: 100 INJECTION, SOLUTION INTRAVENOUS; SUBCUTANEOUS at 07:05

## 2023-05-08 RX ADMIN — FLUTICASONE FUROATE AND VILANTEROL TRIFENATATE 1 PUFF: 100; 25 POWDER RESPIRATORY (INHALATION) at 08:05

## 2023-05-08 RX ADMIN — IPRATROPIUM BROMIDE AND ALBUTEROL SULFATE 3 ML: 2.5; .5 SOLUTION RESPIRATORY (INHALATION) at 07:05

## 2023-05-08 RX ADMIN — APIXABAN 2.5 MG: 2.5 TABLET, FILM COATED ORAL at 08:05

## 2023-05-08 RX ADMIN — INSULIN ASPART 6 UNITS: 100 INJECTION, SOLUTION INTRAVENOUS; SUBCUTANEOUS at 11:05

## 2023-05-08 RX ADMIN — LISINOPRIL 40 MG: 20 TABLET ORAL at 09:05

## 2023-05-08 RX ADMIN — SODIUM CHLORIDE: 9 INJECTION, SOLUTION INTRAVENOUS at 06:05

## 2023-05-08 NOTE — PLAN OF CARE
05/08/23 1622   Medicare Message   Important Message from Medicare regarding Discharge Appeal Rights Given to patient/caregiver;Explained to patient/caregiver;Signed/date by patient/caregiver   Date IMM was signed 05/08/23   Time IMM was signed 0049

## 2023-05-08 NOTE — PLAN OF CARE
05/08/23 1401   DICKSON Message   Medicare Outpatient and Observation Notification regarding financial responsibility Explained to patient/caregiver;Signed/date by patient/caregiver   Date DICKSON was signed 05/08/23   Time DICKSON was signed 1401

## 2023-05-08 NOTE — EICU
Intervention Initiated From:  COR / EICU    Fiordaliza intervened regarding:  Rounding (Video assessment) with bedside nurse    Nurse Notified:  Yes      Comments:  pt awake and siting up in bed. He has resp treatment in progress. VSS. NAD observed.  Pt is using 4L/NC.  Pt has no immediate needs; but, would like to be transferred to main campus. His  admitting  md is aware of his request.    English

## 2023-05-08 NOTE — ASSESSMENT & PLAN NOTE
Continue scheduled breathing treatments  Steroids on hold due to likely bacterial pneumonia  Continue home inhalers

## 2023-05-08 NOTE — ASSESSMENT & PLAN NOTE
Creatinine elevated from baseline  Continue IVF  Recheck renal function this afternoon  Monitor for now

## 2023-05-08 NOTE — ASSESSMENT & PLAN NOTE
Sodium lower this morning than previous days  Given elevated creatinine and poor PO intake it appears to be hypovolemic  Given IVF this morning  Rechecking this afternoon along with TSH

## 2023-05-08 NOTE — ASSESSMENT & PLAN NOTE
He was given Levaquin in the ED  Cefepime and azithromycin  He has no cough or sputum currently  F/U sputum gram statin and Legionella work up

## 2023-05-08 NOTE — SUBJECTIVE & OBJECTIVE
Interval History: Patient continues to require high levels of LFNC. Monitoring in our ICU due to possible need for HFNC. Sodium decreased this morning and creatinine elevated. Given IVF this morning. Will recheck sodium and renal function this afternoon. Hold further doses lisinopril. Switched abx to cefepime for pseudomonas coverage    Review of Systems   All other systems reviewed and are negative.  Objective:     Vital Signs (Most Recent):  Temp: 100.1 °F (37.8 °C) (05/08/23 1104)  Pulse: 85 (05/08/23 1205)  Resp: (!) 30 (05/08/23 1205)  BP: 116/74 (05/08/23 1100)  SpO2: (!) 92 % (05/08/23 1205) Vital Signs (24h Range):  Temp:  [98.5 °F (36.9 °C)-100.7 °F (38.2 °C)] 100.1 °F (37.8 °C)  Pulse:  [73-94] 85  Resp:  [14-33] 30  SpO2:  [88 %-99 %] 92 %  BP: (108-136)/() 116/74     Weight: (!) 144.4 kg (318 lb 5.5 oz)  Body mass index is 45.68 kg/m².    Intake/Output Summary (Last 24 hours) at 5/8/2023 1350  Last data filed at 5/8/2023 0434  Gross per 24 hour   Intake 290 ml   Output 700 ml   Net -410 ml         Physical Exam      Vitals reviewed  General: NAD, Well developed, Well Nourished  Head: NC/AT  Eyes: EOMI, MILAD  Cardiovascular: Pulses intact distally, Regular Rate and rhythm  Pulmonary: Increased RR, Wheezing on exam  Gi: Soft, Non-tender  Extremities: Warm, No edema present  Skin: Warm, dry  Neuro: Alert, Oriented x3, No focal Deficit  Psych: Appropriate mood and affect      Significant Labs: All pertinent labs within the past 24 hours have been reviewed.    Significant Imaging: I have reviewed all pertinent imaging results/findings within the past 24 hours.

## 2023-05-08 NOTE — PROGRESS NOTES
"[facility] Intensive [facility] Medicine  Progress Note    Patient Name: Иван Fuentes Sr.  MRN: [id number]  Patient Class: IP- Inpatient   Admission Date: 5/6/2023  Length of Stay: 2 days  Attending Physician: Bebo Chamorro MD  Primary Care Provider: Manuel Wiley MD        Subjective:     Principal Problem:Community acquired bacterial pneumonia        HPI:  Mr. Fuentes is a 73yo man with a past medical history of BPH, CHF, CVA, COPD, DM2, HLD, PNA, VEONNE, hypothyroidism, and DVT on Eliquis 2.5mg po BID.  He uses CPAP at home, but cannot remember his exact settings (thinks maybe, " 20/something with 6L NC").    He now comes to the ED with complaints of worsening shortness of breath over the past 2 days.  He started having fever to 103F today, so he became increasingly concerned.  He notes trouble even talking over the past few days.  He denies any cough.  He has been having rigors and chills.  He also notes some increased wheezing and leg swelling.    In the ED his VS were BP (!) 109/58   Pulse 72   Temp max 99.5 °F (37.5 °C) (Oral)   Resp (!) 38 -> 33   Ht 5' 10" (1.778 m)   Wt 136.1 kg (300 lb)   SpO2 88% "NC" -> 95% BiPAP 40% O2  BMI 43.05 kg/m².  Labs showed WBC 24, Cr 1.4, HCO3 15, Gluc 230, , Trop 0.018, UA with mod yeast, few bacteria, WBC 55.  ABG 4L NC: pH 7.47, PCO2 22.    CXR showed cardiomegaly, stable LLL effusion, and new RML infiltrate c/w pneumonia.  EKG showed sinus 91 with PAC's, QTc 425 ms, no ST-T changes.    In the ED he was treated with:  Medications   piperacillin-tazobactam (ZOSYN) 4.5 g in dextrose 5 % in water (D5W) 5 % 100 mL IVPB (MB+) (has no administration in time range) - NEVER GIVEN.   albuterol-ipratropium 2.5 mg-0.5 mg/3 mL nebulizer solution 3 mL (3 mLs Nebulization Given 5/6/23 1547)   levoFLOXacin 750 mg/150 mL IVPB 750 mg (750 mg Intravenous New Bag 5/6/23 8713)               Overview/Hospital Course:  No notes on file    Interval History: " Requiring 4-5L LFNC. Still audibly wheezing on exam. Patient states he has been on 4-5L at home for some time. Desaturates with minimal movement. Switching abx to cover for pseudomonas. Patient continues to request transfer to [facility]    Review of Systems   All other systems reviewed and are negative.  Objective:     Vital Signs (Most Recent):  Temp: 98.5 °F (36.9 °C) (05/08/23 0707)  Pulse: 85 (05/08/23 1000)  Resp: (!) 33 (05/08/23 1000)  BP: 114/75 (05/08/23 1000)  SpO2: (!) 90 % (05/08/23 1000) Vital Signs (24h Range):  Temp:  [98.5 °F (36.9 °C)-102.3 °F (39.1 °C)] 98.5 °F (36.9 °C)  Pulse:  [73-97] 85  Resp:  [14-33] 33  SpO2:  [88 %-99 %] 90 %  BP: (108-151)/() 114/75     Weight: (!) 144.4 kg (318 lb 5.5 oz)  Body mass index is 45.68 kg/m².    Intake/Output Summary (Last 24 hours) at 5/8/2023 1051  Last data filed at 5/8/2023 0434  Gross per 24 hour   Intake 290 ml   Output 1200 ml   Net -910 ml         Physical Exam  Vitals reviewed  General: NAD, Well developed, Well Nourished  Head: NC/AT  Eyes: EOMI, MILAD  Cardiovascular: Pulses intact distally, Regular Rate  Pulmonary: increased RR, wheezing on exam  Gi: Soft, Non-tender  Extremities: Warm, No edema present  Skin: Warm, dry  Neuro: Alert, Oriented x3, No focal Deficit  Psych: Appropriate mood and affect            Significant Labs: All pertinent labs within the past 24 hours have been reviewed.    Significant Imaging: I have reviewed all pertinent imaging results/findings within the past 24 hours.      Assessment/Plan:      * Community acquired bacterial pneumonia  He was given Levaquin in the ED  Cefepime and azithromycin  He has no cough or sputum currently  F/U sputum gram statin and Legionella work up         Chronic hyponatremia  Sodium lower this morning than previous days  Given elevated creatinine and poor PO intake it appears to be hypovolemic  Given IVF this morning  Rechecking this afternoon along with TSH      ACP (advance care  planning)  Advance Care Planning     Date: 05/07/2023    Living Will  During this visit, I engaged the patient  in the advance care planning process.  The patient and I reviewed the role for advance directives and their purpose in directing future healthcare if the patient's unable to speak for him.  At this point in time, the patient does have full decision-making capacity.  We discussed different extreme health states that he could experience, and reviewed what kind of medical care he would want in those situations.  The patient communicated that if he were comatose and had little chance of a meaningful recovery, he would want machines/life-sustaining treatments used.  I spent a total of 16 minutes engaging the patient in this advance care planning discussion.                Acute respiratory failure with hypoxia  He had to be placed on BiPAP with O2 in the ED  Now he is much improved    Patient with Hypoxic Respiratory failure which is Acute.  he is on home oxygen at 6 LPM. Supplemental oxygen was provided and noted-      Signs/symptoms of respiratory failure include- tachypnea, increased work of breathing and wheezing. Contributing diagnoses includes - COPD, Obesity Hypoventilation and Pneumonia Labs and images were reviewed. Patient Has recent ABG, which has been reviewed. Will treat underlying causes and adjust management of respiratory failure as follows-     Continue IV abx  Holding steroids for now          Chronic kidney disease, stage 3b  Creatinine elevated from baseline  Continue IVF  Recheck renal function this afternoon  Monitor for now      Sepsis  This patient does have evidence of infective focus  My overall impression is sepsis.  Source: Respiratory  Antibiotics given-   Antibiotics (72h ago, onward)    Start     Stop Route Frequency Ordered    05/08/23 1200  ceFEPIme (MAXIPIME) 2 g in dextrose 5 % in water (D5W) 5 % 50 mL IVPB (MB+)         -- IV Every 8 hours (non-standard times) 05/08/23 1052     05/07/23 0900  azithromycin (ZITHROMAX) 500 mg in dextrose 5 % (D5W) 250 mL IVPB (Vial-Mate)  (Community Acquired Pneumonia (CAP) - Low MDR Risk)         05/12 0859 IV Every 24 hours (non-standard times) 05/07/23 0114        Latest lactate reviewed-  Recent Labs   Lab 05/06/23  2230   LACTATE 1.5     Organ dysfunction indicated by Acute respiratory failure    Fluid challenge Not needed - patient is not hypotensive      Post- resuscitation assessment No - Post resuscitation assessment not needed       Will Not start Pressors- Levophed for MAP of 65  Source control achieved by: iv antibiotics      Peripheral arterial disease  (Ankle-brachial index of 0.83 on the right and 0.79 on the left, indicating bilateral mild-to-moderate peripheral artery disease.  Elevated velocities in the left distal superficial femoral artery suggesting hemodynamically significant focal stenosis of the left distal superficial femoral artery.)      Follow up with Vascular      Acute cystitis without hematuria  The UA is mildly equivocal  On IV abx  Follow up CXS      COPD (chronic obstructive pulmonary disease)  Continue scheduled breathing treatments  Steroids on hold due to likely bacterial pneumonia  Continue home inhalers    Personal history of DVT (deep vein thrombosis)  Continue home Eliquis 2.5mg po BID    EVONNE on CPAP  The patient states it varies from 12-22 and is at 6L NC O2  ABG shows no hypercarbia      Severe obesity (BMI >= 40)  Encourage healthy diet, exercise and weight loss      Type 2 diabetes mellitus, without long-term current use of insulin  On moderate dose SSI    Holding home meds:    canagliflozin (INVOKANA) 100 mg Tab tablet, Take 100 mg by mouth once daily     metformin (GLUCOPHAGE) 500 MG tablet, Take 500 mg by mouth 2 (two) times daily with meals     semaglutide (OZEMPIC) 0.25 mg or 0.5 mg(2 mg/1.5 mL) pen injector, Inject 0.5 mg into the skin every 7 days         Hypothyroid  Continue home Synthroid 100mcg po  qday  Check TSH    Primary hypertension  Hold further doses due to worsening renal function      VTE Risk Mitigation (From admission, onward)         Ordered     apixaban tablet 2.5 mg  2 times daily         05/07/23 0110                Discharge Planning   LEW:      Code Status: Full Code   Is the patient medically ready for discharge?:     Reason for patient still in hospital (select all that apply): Treatment                     Bebo Chamorro MD  Department of Hospital Medicine   [place] - Intensive [facility] [place]

## 2023-05-08 NOTE — PLAN OF CARE
Patient remains in ICU on 5L NC.  Patient has increased shortness of breath with any exertion.  Patient instructed on breathing exercises, verbalized understanding. NAD noted or verbalized by patient during shift.   Problem: Adult Inpatient Plan of Care  Goal: Plan of Care Review  Outcome: Ongoing, Progressing  Goal: Absence of Hospital-Acquired Illness or Injury  Outcome: Ongoing, Progressing  Goal: Optimal Comfort and Wellbeing  Outcome: Ongoing, Progressing  Goal: Readiness for Transition of Care  Outcome: Ongoing, Progressing     Problem: Infection  Goal: Absence of Infection Signs and Symptoms  Outcome: Ongoing, Progressing     Problem: Infection Progression (Sepsis/Septic Shock)  Goal: Absence of Infection Signs and Symptoms  Outcome: Ongoing, Progressing

## 2023-05-08 NOTE — PLAN OF CARE
Problem: Adult Inpatient Plan of Care  Goal: Plan of Care Review  Outcome: Ongoing, Progressing  Goal: Patient-Specific Goal (Individualized)  Outcome: Ongoing, Progressing  Goal: Absence of Hospital-Acquired Illness or Injury  Outcome: Ongoing, Progressing  Goal: Optimal Comfort and Wellbeing  Outcome: Ongoing, Progressing  Intervention: Monitor Pain and Promote Comfort  Flowsheets (Taken 5/8/2023 0325)  Pain Management Interventions:   position adjusted   pillow support provided  Goal: Readiness for Transition of Care  Outcome: Ongoing, Progressing     Problem: Bariatric Environmental Safety  Goal: Safety Maintained with Care  Outcome: Ongoing, Progressing     Problem: Glycemic Control Impaired (Sepsis/Septic Shock)  Goal: Blood Glucose Level Within Desired Range  Outcome: Ongoing, Progressing  Intervention: Optimize Glycemic Control  Flowsheets (Taken 5/8/2023 0325)  Glycemic Management:   blood glucose monitored   supplemental insulin given     Problem: Nutrition Impaired (Sepsis/Septic Shock)  Goal: Optimal Nutrition Intake  Outcome: Ongoing, Progressing     Problem: Fluid and Electrolyte Imbalance (Acute Kidney Injury/Impairment)  Goal: Fluid and Electrolyte Balance  Outcome: Ongoing, Progressing

## 2023-05-08 NOTE — SUBJECTIVE & OBJECTIVE
Interval History: Requiring 4-5L LFNC. Still audibly wheezing on exam. Patient states he has been on 4-5L at home for some time. Desaturates with minimal movement. Switching abx to cover for pseudomonas. Patient continues to request transfer to [facility]    Review of Systems   All other systems reviewed and are negative.  Objective:     Vital Signs (Most Recent):  Temp: 98.5 °F (36.9 °C) (05/08/23 0707)  Pulse: 85 (05/08/23 1000)  Resp: (!) 33 (05/08/23 1000)  BP: 114/75 (05/08/23 1000)  SpO2: (!) 90 % (05/08/23 1000) Vital Signs (24h Range):  Temp:  [98.5 °F (36.9 °C)-102.3 °F (39.1 °C)] 98.5 °F (36.9 °C)  Pulse:  [73-97] 85  Resp:  [14-33] 33  SpO2:  [88 %-99 %] 90 %  BP: (108-151)/() 114/75     Weight: (!) 144.4 kg (318 lb 5.5 oz)  Body mass index is 45.68 kg/m².    Intake/Output Summary (Last 24 hours) at 5/8/2023 1051  Last data filed at 5/8/2023 0434  Gross per 24 hour   Intake 290 ml   Output 1200 ml   Net -910 ml         Physical Exam  Vitals reviewed  General: NAD, Well developed, Well Nourished  Head: NC/AT  Eyes: EOMI, MILAD  Cardiovascular: Pulses intact distally, Regular Rate  Pulmonary: increased RR, wheezing on exam  Gi: Soft, Non-tender  Extremities: Warm, No edema present  Skin: Warm, dry  Neuro: Alert, Oriented x3, No focal Deficit  Psych: Appropriate mood and affect            Significant Labs: All pertinent labs within the past 24 hours have been reviewed.    Significant Imaging: I have reviewed all pertinent imaging results/findings within the past 24 hours.

## 2023-05-08 NOTE — ASSESSMENT & PLAN NOTE
He had to be placed on BiPAP with O2 in the ED  Now he is much improved    Patient with Hypoxic Respiratory failure which is Acute.  he is on home oxygen at 6 LPM. Supplemental oxygen was provided and noted-      Signs/symptoms of respiratory failure include- tachypnea, increased work of breathing and wheezing. Contributing diagnoses includes - COPD, Obesity Hypoventilation and Pneumonia Labs and images were reviewed. Patient Has recent ABG, which has been reviewed. Will treat underlying causes and adjust management of respiratory failure as follows-     Continue IV abx  Holding steroids for now

## 2023-05-08 NOTE — ASSESSMENT & PLAN NOTE
This patient does have evidence of infective focus  My overall impression is sepsis.  Source: Respiratory  Antibiotics given-   Antibiotics (72h ago, onward)    Start     Stop Route Frequency Ordered    05/08/23 1200  ceFEPIme (MAXIPIME) 2 g in dextrose 5 % in water (D5W) 5 % 50 mL IVPB (MB+)         -- IV Every 8 hours (non-standard times) 05/08/23 1052    05/07/23 0900  azithromycin (ZITHROMAX) 500 mg in dextrose 5 % (D5W) 250 mL IVPB (Vial-Mate)  (Community Acquired Pneumonia (CAP) - Low MDR Risk)         05/12 0859 IV Every 24 hours (non-standard times) 05/07/23 0114        Latest lactate reviewed-  Recent Labs   Lab 05/06/23  2230   LACTATE 1.5     Organ dysfunction indicated by Acute respiratory failure    Fluid challenge Not needed - patient is not hypotensive      Post- resuscitation assessment No - Post resuscitation assessment not needed       Will Not start Pressors- Levophed for MAP of 65  Source control achieved by: iv antibiotics

## 2023-05-08 NOTE — PLAN OF CARE
[facility] Intensive Care  Initial Discharge Assessment       Primary Care Provider: Manuel Wiley MD    Admission Diagnosis: Hypoxemia [R09.02]  SOB (shortness of breath) [R06.02]  Chronic obstructive pulmonary disease with acute lower respiratory infection [J44.0]  Sepsis [A41.9]  Pneumonia of right middle lobe due to infectious organism [J18.9]    Admission Date: 5/6/2023  Expected Discharge Date:   Assessment completed with patient who was alert and oriented. Patient's spouse was also in the room. Patient lives with his spouse. He has and uses a CPAP machine, oxygen (with [employer]), walker, cane and an electric scooter. He would like to have home health services in the home with [facility]. His PCP is dr. Manuel Wiley and all of his specialty doctors are through [facility]. Patient's preferred pharmacy is [facility] in [place]. Patient denies any additional needs at this time. Case management will continue to follow.  Discharge Barriers Identified: None    Payor: MEDICARE / Plan: MEDICARE PART A & B / Product Type: Government /     Extended Emergency Contact Information  Primary Emergency Contact: CateViktoria donald  Address: [address] [place] [address] [facility]  Home Phone: [phone]  Mobile Phone: [phone]  Relation: Spouse    Discharge Plan A: Home Health  Discharge Plan B: Home with family      [facility] - [place], [place] - [address]   [address] [facility] [address]  Phone: [phone] Fax: [phone]      Initial Assessment (most recent)       Adult Discharge Assessment - 05/08/23 1426          Discharge Assessment    Assessment Type Discharge Planning Assessment     Confirmed/corrected address, phone number and insurance Yes     Source of Information patient;family     Does patient/caregiver understand observation status Yes     Communicated LEW with patient/caregiver Date not available/Unable to determine     Reason For Admission hypoxemia      [facility] in Home spouse     Do you expect to return to your current living situation? Yes     Do you have help at home or someone to help you manage your care at home? Yes     Who are your caregiver(s) and their phone number(s)? spouse, Viktoria Fuentes [phone]     Prior to hospitilization cognitive status: Alert/Oriented     Current cognitive status: Alert/Oriented     Equipment Currently Used at Home CPAP;oxygen;walker, rolling;other (see comments)   electric scooter; oxygen with [employer]    Readmission within 30 days? No     Patient currently being followed by outpatient case management? No     Do you currently have service(s) that help you manage your care at home? No     Do you take prescription medications? Yes     Do you have prescription coverage? Yes     Do you have any problems affording any of your prescribed medications? No     Is the patient taking medications as prescribed? yes     Who is going to help you get home at discharge? spouse, Viktoria Fuentes [phone]     How do you get to doctors appointments? family or friend will provide     Are you on dialysis? No     Do you take coumadin? No     Discharge Plan A Home Health     Discharge Plan B Home with family     DME Needed Upon Discharge  none     Discharge Plan discussed with: Spouse/sig other;Patient     Discharge Barriers Identified None        Physical Activity    On average, how many days per week do you engage in moderate to strenuous exercise (like a brisk walk)? 2 days     On average, how many minutes do you engage in exercise at this level? 30 min        Financial Resource Strain    How hard is it for you to pay for the very basics like food, housing, medical care, and heating? Not hard at all        Housing Stability    In the last 12 months, was there a time when you were not able to pay the mortgage or rent on time? No     In the last 12 months, was there a time when you did not have a steady place to sleep or slept in a  shelter (including now)? No        Transportation Needs    In the past 12 months, has lack of transportation kept you from medical appointments or from getting medications? No     In the past 12 months, has lack of transportation kept you from meetings, work, or from getting things needed for daily living? No        Food Insecurity    Within the past 12 months, you worried that your food would run out before you got the money to buy more. Never true     Within the past 12 months, the food you bought just didn't last and you didn't have money to get more. Never true        Stress    Do you feel stress - tense, restless, nervous, or anxious, or unable to sleep at night because your mind is troubled all the time - these days? Only a little        Social Connections    In a typical week, how many times do you talk on the phone with family, friends, or neighbors? More than three times a week     How often do you get together with friends or relatives? More than three times a week     How often do you attend Methodist or Methodist services? More than 4 times per year     Do you belong to any clubs or organizations such as Methodist groups, unions, fraternal or athletic groups, or school groups? Yes     How often do you attend meetings of the clubs or organizations you belong to? More than 4 times per year     Are you , , , , never , or living with a partner?         Alcohol Use    Q1: How often do you have a drink containing alcohol? Patient refused     Q2: How many drinks containing alcohol do you have on a typical day when you are drinking? Patient refused     Q3: How often do you have six or more drinks on one occasion? Patient refused        OTHER    Name(s) of [facility] in Home spouseViktoria Alfredo [phone]

## 2023-05-09 LAB
ACETONE BLD-MCNC: NEGATIVE MG/DL
ACETONE, URINE: NEGATIVE
ALLENS TEST: ABNORMAL
ANION GAP SERPL CALC-SCNC: 13 MMOL/L (ref 8–16)
BACTERIA #/AREA URNS HPF: ABNORMAL /HPF
BASOPHILS # BLD AUTO: 0.09 K/UL (ref 0–0.2)
BASOPHILS NFR BLD: 0.6 % (ref 0–1.9)
BILIRUB UR QL STRIP: NEGATIVE
BUN SERPL-MCNC: 31 MG/DL (ref 8–23)
CALCIUM SERPL-MCNC: 8.7 MG/DL (ref 8.7–10.5)
CHLORIDE SERPL-SCNC: 101 MMOL/L (ref 95–110)
CLARITY UR: CLEAR
CO2 SERPL-SCNC: 15 MMOL/L (ref 23–29)
COLOR UR: YELLOW
CREAT SERPL-MCNC: 1.3 MG/DL (ref 0.5–1.4)
DELSYS: ABNORMAL
DIFFERENTIAL METHOD: ABNORMAL
EOSINOPHIL # BLD AUTO: 0.3 K/UL (ref 0–0.5)
EOSINOPHIL NFR BLD: 2 % (ref 0–8)
ERYTHROCYTE [DISTWIDTH] IN BLOOD BY AUTOMATED COUNT: 13.8 % (ref 11.5–14.5)
EST. GFR  (NO RACE VARIABLE): 58 ML/MIN/1.73 M^2
FIO2: 38
FLOW: 4.5
GLUCOSE SERPL-MCNC: 294 MG/DL (ref 70–110)
GLUCOSE UR QL STRIP: ABNORMAL
HCO3 UR-SCNC: 18.5 MMOL/L (ref 24–28)
HCT VFR BLD AUTO: 37.2 % (ref 40–54)
HGB BLD-MCNC: 12.4 G/DL (ref 14–18)
HGB UR QL STRIP: ABNORMAL
HYALINE CASTS #/AREA URNS LPF: 0 /LPF
IMM GRANULOCYTES # BLD AUTO: 0.28 K/UL (ref 0–0.04)
IMM GRANULOCYTES NFR BLD AUTO: 1.9 % (ref 0–0.5)
KETONES UR QL STRIP: NEGATIVE
LEUKOCYTE ESTERASE UR QL STRIP: ABNORMAL
LYMPHOCYTES # BLD AUTO: 2.8 K/UL (ref 1–4.8)
LYMPHOCYTES NFR BLD: 19.1 % (ref 18–48)
MAGNESIUM SERPL-MCNC: 1.7 MG/DL (ref 1.6–2.6)
MCH RBC QN AUTO: 30.2 PG (ref 27–31)
MCHC RBC AUTO-ENTMCNC: 33.3 G/DL (ref 32–36)
MCV RBC AUTO: 91 FL (ref 82–98)
MICROSCOPIC COMMENT: ABNORMAL
MODE: ABNORMAL
MONOCYTES # BLD AUTO: 2.2 K/UL (ref 0.3–1)
MONOCYTES NFR BLD: 15.2 % (ref 4–15)
NEUTROPHILS # BLD AUTO: 9 K/UL (ref 1.8–7.7)
NEUTROPHILS NFR BLD: 61.2 % (ref 38–73)
NITRITE UR QL STRIP: NEGATIVE
NRBC BLD-RTO: 0 /100 WBC
OSMOLALITY SERPL: 300 MOSM/KG (ref 280–300)
PCO2 BLDA: 33.4 MMHG (ref 35–45)
PH SMN: 7.35 [PH] (ref 7.35–7.45)
PH UR STRIP: 6 [PH] (ref 5–8)
PHOSPHATE SERPL-MCNC: 2.3 MG/DL (ref 2.7–4.5)
PLATELET # BLD AUTO: 218 K/UL (ref 150–450)
PMV BLD AUTO: 9.7 FL (ref 9.2–12.9)
PO2 BLDA: 59 MMHG (ref 80–100)
POC BE: -7 MMOL/L
POC SATURATED O2: 89 % (ref 95–100)
POCT GLUCOSE: 285 MG/DL (ref 70–110)
POCT GLUCOSE: 293 MG/DL (ref 70–110)
POCT GLUCOSE: 305 MG/DL (ref 70–110)
POCT GLUCOSE: 373 MG/DL (ref 70–110)
POTASSIUM SERPL-SCNC: 4.5 MMOL/L (ref 3.5–5.1)
PROT UR QL STRIP: ABNORMAL
RBC # BLD AUTO: 4.11 M/UL (ref 4.6–6.2)
RBC #/AREA URNS HPF: 10 /HPF (ref 0–4)
SAMPLE: ABNORMAL
SITE: ABNORMAL
SODIUM SERPL-SCNC: 129 MMOL/L (ref 136–145)
SODIUM UR-SCNC: 26 MMOL/L (ref 20–250)
SP GR UR STRIP: 1.01 (ref 1–1.03)
URN SPEC COLLECT METH UR: ABNORMAL
UROBILINOGEN UR STRIP-ACNC: NEGATIVE EU/DL
WBC # BLD AUTO: 14.72 K/UL (ref 3.9–12.7)
WBC #/AREA URNS HPF: 15 /HPF (ref 0–5)
YEAST URNS QL MICRO: ABNORMAL

## 2023-05-09 PROCEDURE — 99900035 HC TECH TIME PER 15 MIN (STAT)

## 2023-05-09 PROCEDURE — 85025 COMPLETE CBC W/AUTO DIFF WBC: CPT | Performed by: INTERNAL MEDICINE

## 2023-05-09 PROCEDURE — 94640 AIRWAY INHALATION TREATMENT: CPT

## 2023-05-09 PROCEDURE — 94664 DEMO&/EVAL PT USE INHALER: CPT

## 2023-05-09 PROCEDURE — 27000221 HC OXYGEN, UP TO 24 HOURS

## 2023-05-09 PROCEDURE — 25000003 PHARM REV CODE 250: Performed by: INTERNAL MEDICINE

## 2023-05-09 PROCEDURE — 36415 COLL VENOUS BLD VENIPUNCTURE: CPT | Performed by: INTERNAL MEDICINE

## 2023-05-09 PROCEDURE — 83735 ASSAY OF MAGNESIUM: CPT | Performed by: INTERNAL MEDICINE

## 2023-05-09 PROCEDURE — 25000242 PHARM REV CODE 250 ALT 637 W/ HCPCS

## 2023-05-09 PROCEDURE — 36600 WITHDRAWAL OF ARTERIAL BLOOD: CPT

## 2023-05-09 PROCEDURE — 94761 N-INVAS EAR/PLS OXIMETRY MLT: CPT

## 2023-05-09 PROCEDURE — 63600175 PHARM REV CODE 636 W HCPCS: Performed by: STUDENT IN AN ORGANIZED HEALTH CARE EDUCATION/TRAINING PROGRAM

## 2023-05-09 PROCEDURE — 82009 KETONE BODYS QUAL: CPT | Performed by: STUDENT IN AN ORGANIZED HEALTH CARE EDUCATION/TRAINING PROGRAM

## 2023-05-09 PROCEDURE — 99233 SBSQ HOSP IP/OBS HIGH 50: CPT | Mod: ,,, | Performed by: STUDENT IN AN ORGANIZED HEALTH CARE EDUCATION/TRAINING PROGRAM

## 2023-05-09 PROCEDURE — 84100 ASSAY OF PHOSPHORUS: CPT | Performed by: INTERNAL MEDICINE

## 2023-05-09 PROCEDURE — 21400001 HC TELEMETRY ROOM

## 2023-05-09 PROCEDURE — 97116 GAIT TRAINING THERAPY: CPT

## 2023-05-09 PROCEDURE — 99900031 HC PATIENT EDUCATION (STAT)

## 2023-05-09 PROCEDURE — 80048 BASIC METABOLIC PNL TOTAL CA: CPT | Performed by: INTERNAL MEDICINE

## 2023-05-09 PROCEDURE — 63600175 PHARM REV CODE 636 W HCPCS: Performed by: INTERNAL MEDICINE

## 2023-05-09 PROCEDURE — 25000003 PHARM REV CODE 250: Performed by: STUDENT IN AN ORGANIZED HEALTH CARE EDUCATION/TRAINING PROGRAM

## 2023-05-09 PROCEDURE — 36415 COLL VENOUS BLD VENIPUNCTURE: CPT | Performed by: STUDENT IN AN ORGANIZED HEALTH CARE EDUCATION/TRAINING PROGRAM

## 2023-05-09 PROCEDURE — 81000 URINALYSIS NONAUTO W/SCOPE: CPT | Performed by: STUDENT IN AN ORGANIZED HEALTH CARE EDUCATION/TRAINING PROGRAM

## 2023-05-09 PROCEDURE — 99233 PR SUBSEQUENT HOSPITAL CARE,LEVL III: ICD-10-PCS | Mod: ,,, | Performed by: STUDENT IN AN ORGANIZED HEALTH CARE EDUCATION/TRAINING PROGRAM

## 2023-05-09 PROCEDURE — 82803 BLOOD GASES ANY COMBINATION: CPT

## 2023-05-09 PROCEDURE — 97162 PT EVAL MOD COMPLEX 30 MIN: CPT

## 2023-05-09 PROCEDURE — 81002 URINALYSIS NONAUTO W/O SCOPE: CPT | Performed by: STUDENT IN AN ORGANIZED HEALTH CARE EDUCATION/TRAINING PROGRAM

## 2023-05-09 PROCEDURE — 25000242 PHARM REV CODE 250 ALT 637 W/ HCPCS: Performed by: INTERNAL MEDICINE

## 2023-05-09 RX ORDER — POLYETHYLENE GLYCOL 3350 17 G/17G
17 POWDER, FOR SOLUTION ORAL 2 TIMES DAILY
Status: DISCONTINUED | OUTPATIENT
Start: 2023-05-09 | End: 2023-05-12 | Stop reason: HOSPADM

## 2023-05-09 RX ORDER — AMOXICILLIN 250 MG
1 CAPSULE ORAL DAILY
Status: DISCONTINUED | OUTPATIENT
Start: 2023-05-09 | End: 2023-05-10

## 2023-05-09 RX ADMIN — CEFEPIME 2 G: 2 INJECTION, POWDER, FOR SOLUTION INTRAVENOUS at 11:05

## 2023-05-09 RX ADMIN — ASPIRIN 81 MG: 81 TABLET, COATED ORAL at 08:05

## 2023-05-09 RX ADMIN — IPRATROPIUM BROMIDE AND ALBUTEROL SULFATE 3 ML: 2.5; .5 SOLUTION RESPIRATORY (INHALATION) at 11:05

## 2023-05-09 RX ADMIN — INSULIN ASPART 8 UNITS: 100 INJECTION, SOLUTION INTRAVENOUS; SUBCUTANEOUS at 04:05

## 2023-05-09 RX ADMIN — APIXABAN 2.5 MG: 2.5 TABLET, FILM COATED ORAL at 08:05

## 2023-05-09 RX ADMIN — APIXABAN 2.5 MG: 2.5 TABLET, FILM COATED ORAL at 09:05

## 2023-05-09 RX ADMIN — IPRATROPIUM BROMIDE AND ALBUTEROL SULFATE 3 ML: .5; 3 SOLUTION RESPIRATORY (INHALATION) at 11:05

## 2023-05-09 RX ADMIN — ATORVASTATIN CALCIUM 80 MG: 40 TABLET, FILM COATED ORAL at 09:05

## 2023-05-09 RX ADMIN — FLUTICASONE FUROATE AND VILANTEROL TRIFENATATE 1 PUFF: 100; 25 POWDER RESPIRATORY (INHALATION) at 09:05

## 2023-05-09 RX ADMIN — IPRATROPIUM BROMIDE AND ALBUTEROL SULFATE 3 ML: 2.5; .5 SOLUTION RESPIRATORY (INHALATION) at 03:05

## 2023-05-09 RX ADMIN — IPRATROPIUM BROMIDE AND ALBUTEROL SULFATE 3 ML: 2.5; .5 SOLUTION RESPIRATORY (INHALATION) at 07:05

## 2023-05-09 RX ADMIN — AZITHROMYCIN MONOHYDRATE 500 MG: 500 INJECTION, POWDER, LYOPHILIZED, FOR SOLUTION INTRAVENOUS at 08:05

## 2023-05-09 RX ADMIN — DIAZEPAM 2 MG: 2 TABLET ORAL at 09:05

## 2023-05-09 RX ADMIN — CEFEPIME 2 G: 2 INJECTION, POWDER, FOR SOLUTION INTRAVENOUS at 04:05

## 2023-05-09 RX ADMIN — CEFEPIME 2 G: 2 INJECTION, POWDER, FOR SOLUTION INTRAVENOUS at 09:05

## 2023-05-09 RX ADMIN — INSULIN ASPART 3 UNITS: 100 INJECTION, SOLUTION INTRAVENOUS; SUBCUTANEOUS at 09:05

## 2023-05-09 RX ADMIN — INSULIN ASPART 10 UNITS: 100 INJECTION, SOLUTION INTRAVENOUS; SUBCUTANEOUS at 11:05

## 2023-05-09 RX ADMIN — PREGABALIN 150 MG: 25 CAPSULE ORAL at 08:05

## 2023-05-09 RX ADMIN — INSULIN ASPART 6 UNITS: 100 INJECTION, SOLUTION INTRAVENOUS; SUBCUTANEOUS at 07:05

## 2023-05-09 RX ADMIN — POLYETHYLENE GLYCOL 3350 17 G: 17 POWDER, FOR SOLUTION ORAL at 08:05

## 2023-05-09 RX ADMIN — POLYETHYLENE GLYCOL 3350 17 G: 17 POWDER, FOR SOLUTION ORAL at 09:05

## 2023-05-09 RX ADMIN — INSULIN DETEMIR 10 UNITS: 100 INJECTION, SOLUTION SUBCUTANEOUS at 09:05

## 2023-05-09 RX ADMIN — PREGABALIN 150 MG: 25 CAPSULE ORAL at 09:05

## 2023-05-09 RX ADMIN — SODIUM CHLORIDE, POTASSIUM CHLORIDE, SODIUM LACTATE AND CALCIUM CHLORIDE 1000 ML: 600; 310; 30; 20 INJECTION, SOLUTION INTRAVENOUS at 07:05

## 2023-05-09 RX ADMIN — ESCITALOPRAM OXALATE 20 MG: 10 TABLET ORAL at 08:05

## 2023-05-09 RX ADMIN — LEVOTHYROXINE SODIUM 100 MCG: 25 TABLET ORAL at 06:05

## 2023-05-09 RX ADMIN — DOCUSATE SODIUM 50MG AND SENNOSIDES 8.6MG 1 TABLET: 8.6; 5 TABLET, FILM COATED ORAL at 11:05

## 2023-05-09 NOTE — ASSESSMENT & PLAN NOTE
Patient's FSGs are uncontrolled due to hyperglycemia on current medication regimen.  Last A1c reviewed-   Lab Results   Component Value Date    HGBA1C 8.5 (H) 04/07/2023     Most recent fingerstick glucose reviewed-   Recent Labs   Lab 05/08/23  1102 05/08/23  1606 05/08/23  1913 05/09/23  0713   POCTGLUCOSE 291* 285* 279* 293*     Current correctional scale  Medium  Maintain anti-hyperglycemic dose as follows-   Antihyperglycemics (From admission, onward)    Start     Stop Route Frequency Ordered    05/09/23 2100  insulin detemir U-100 (Levemir) pen 10 Units         -- SubQ Nightly 05/09/23 1019    05/07/23 0243  insulin aspart U-100 pen 1-10 Units         -- SubQ Before meals & nightly PRN 05/07/23 0143        Hold Oral hypoglycemics while patient is in the hospital.

## 2023-05-09 NOTE — PLAN OF CARE
Problem: Adult Inpatient Plan of Care  Goal: Plan of Care Review  Outcome: Ongoing, Progressing  Goal: Patient-Specific Goal (Individualized)  Outcome: Ongoing, Progressing  Goal: Optimal Comfort and Wellbeing  Outcome: Ongoing, Progressing  Goal: Readiness for Transition of Care  Outcome: Ongoing, Progressing     Problem: Bariatric Environmental Safety  Goal: Safety Maintained with Care  Outcome: Ongoing, Progressing     Problem: Infection  Goal: Absence of Infection Signs and Symptoms  Outcome: Ongoing, Progressing     Problem: Infection Progression (Sepsis/Septic Shock)  Goal: Absence of Infection Signs and Symptoms  Outcome: Ongoing, Progressing

## 2023-05-09 NOTE — SUBJECTIVE & OBJECTIVE
Interval History: Patient feeling a bit better this morning. Creatinine improving. Ruled out euglycemic DKA in patient on canagliflozin. Sodium improving. Recheck this afternoon.    Review of Systems   All other systems reviewed and are negative.  Objective:     Vital Signs (Most Recent):  Temp: 98.5 °F (36.9 °C) (05/09/23 0704)  Pulse: 81 (05/09/23 1000)  Resp: (!) 27 (05/09/23 1000)  BP: 112/83 (05/09/23 1000)  SpO2: (!) 92 % (05/09/23 1000) Vital Signs (24h Range):  Temp:  [98.5 °F (36.9 °C)-100.1 °F (37.8 °C)] 98.5 °F (36.9 °C)  Pulse:  [64-99] 81  Resp:  [13-38] 27  SpO2:  [83 %-99 %] 92 %  BP: ()/(41-98) 112/83     Weight: (!) 144.4 kg (318 lb 5.5 oz)  Body mass index is 45.68 kg/m².    Intake/Output Summary (Last 24 hours) at 5/9/2023 1019  Last data filed at 5/9/2023 0842  Gross per 24 hour   Intake 1763.49 ml   Output 700 ml   Net 1063.49 ml         Physical Exam      Vitals reviewed  General: NAD, obese  Head: NC/AT  Eyes: EOMI, MILAD  Cardiovascular: Pulses intact distally, Regular Rate and rhythm  Pulmonary: Increased RR, Wheezing present  Gi: Soft, Non-tender  Extremities: Warm, No edema present  Skin: Warm, dry  Neuro: Alert, Oriented x3, No focal Deficit  Psych: Appropriate mood and affect      Significant Labs: All pertinent labs within the past 24 hours have been reviewed.    Significant Imaging: I have reviewed all pertinent imaging results/findings within the past 24 hours.

## 2023-05-09 NOTE — PROGRESS NOTES
"[facility] Intensive [facility] Medicine  Progress Note    Patient Name: Иван Fuentes Sr.  MRN: [id number]  Patient Class: IP- Inpatient   Admission Date: 5/6/2023  Length of Stay: 3 days  Attending Physician: Bebo Chamorro MD  Primary Care Provider: Manuel Wiley MD        Subjective:     Principal Problem:Community acquired bacterial pneumonia        HPI:  Mr. Fuentes is a 73yo man with a past medical history of BPH, CHF, CVA, COPD, DM2, HLD, PNA, EVONNE, hypothyroidism, and DVT on Eliquis 2.5mg po BID.  He uses CPAP at home, but cannot remember his exact settings (thinks maybe, " 20/something with 6L NC").    He now comes to the ED with complaints of worsening shortness of breath over the past 2 days.  He started having fever to 103F today, so he became increasingly concerned.  He notes trouble even talking over the past few days.  He denies any cough.  He has been having rigors and chills.  He also notes some increased wheezing and leg swelling.    In the ED his VS were BP (!) 109/58   Pulse 72   Temp max 99.5 °F (37.5 °C) (Oral)   Resp (!) 38 -> 33   Ht 5' 10" (1.778 m)   Wt 136.1 kg (300 lb)   SpO2 88% "NC" -> 95% BiPAP 40% O2  BMI 43.05 kg/m².  Labs showed WBC 24, Cr 1.4, HCO3 15, Gluc 230, , Trop 0.018, UA with mod yeast, few bacteria, WBC 55.  ABG 4L NC: pH 7.47, PCO2 22.    CXR showed cardiomegaly, stable LLL effusion, and new RML infiltrate c/w pneumonia.  EKG showed sinus 91 with PAC's, QTc 425 ms, no ST-T changes.    In the ED he was treated with:  Medications   piperacillin-tazobactam (ZOSYN) 4.5 g in dextrose 5 % in water (D5W) 5 % 100 mL IVPB (MB+) (has no administration in time range) - NEVER GIVEN.   albuterol-ipratropium 2.5 mg-0.5 mg/3 mL nebulizer solution 3 mL (3 mLs Nebulization Given 5/6/23 6998)   levoFLOXacin 750 mg/150 mL IVPB 750 mg (750 mg Intravenous New Bag 5/6/23 9128)               Overview/Hospital Course:  No notes on file    Interval History: " Patient feeling a bit better this morning. Creatinine improving. Ruled out euglycemic DKA in patient on canagliflozin. Sodium improving. Recheck this afternoon.    Review of Systems   All other systems reviewed and are negative.  Objective:     Vital Signs (Most Recent):  Temp: 98.5 °F (36.9 °C) (05/09/23 0704)  Pulse: 81 (05/09/23 1000)  Resp: (!) 27 (05/09/23 1000)  BP: 112/83 (05/09/23 1000)  SpO2: (!) 92 % (05/09/23 1000) Vital Signs (24h Range):  Temp:  [98.5 °F (36.9 °C)-100.1 °F (37.8 °C)] 98.5 °F (36.9 °C)  Pulse:  [64-99] 81  Resp:  [13-38] 27  SpO2:  [83 %-99 %] 92 %  BP: ()/(41-98) 112/83     Weight: (!) 144.4 kg (318 lb 5.5 oz)  Body mass index is 45.68 kg/m².    Intake/Output Summary (Last 24 hours) at 5/9/2023 1019  Last data filed at 5/9/2023 0842  Gross per 24 hour   Intake 1763.49 ml   Output 700 ml   Net 1063.49 ml         Physical Exam      Vitals reviewed  General: NAD, obese  Head: NC/AT  Eyes: EOMI, MILAD  Cardiovascular: Pulses intact distally, Regular Rate and rhythm  Pulmonary: Increased RR, Wheezing present  Gi: Soft, Non-tender  Extremities: Warm, No edema present  Skin: Warm, dry  Neuro: Alert, Oriented x3, No focal Deficit  Psych: Appropriate mood and affect      Significant Labs: All pertinent labs within the past 24 hours have been reviewed.    Significant Imaging: I have reviewed all pertinent imaging results/findings within the past 24 hours.      Assessment/Plan:      * Community acquired bacterial pneumonia  He was given Levaquin in the ED  Cefepime and azithromycin  He has no cough or sputum currently  F/U sputum gram statin and Legionella work up         Chronic hyponatremia  Improving with IVF. When corrected for glucose sodium is >130  Giving 1L IVF this morning and then stopping fluids  Encourage good PO intake      ACP (advance care planning)  Advance Care Planning     Date: 05/07/2023    Living Will  During this visit, I engaged the patient  in the advance care planning  process.  The patient and I reviewed the role for advance directives and their purpose in directing future healthcare if the patient's unable to speak for him.  At this point in time, the patient does have full decision-making capacity.  We discussed different extreme health states that he could experience, and reviewed what kind of medical care he would want in those situations.  The patient communicated that if he were comatose and had little chance of a meaningful recovery, he would want machines/life-sustaining treatments used.  I spent a total of 16 minutes engaging the patient in this advance care planning discussion.                Acute respiratory failure with hypoxia  He had to be placed on BiPAP with O2 in the ED  Now he is much improved    Patient with Hypoxic Respiratory failure which is Acute.  he is on home oxygen at 6 LPM. Supplemental oxygen was provided and noted- Oxygen Concentration (%):  [40] 40    Signs/symptoms of respiratory failure include- tachypnea, increased work of breathing and wheezing. Contributing diagnoses includes - COPD, Obesity Hypoventilation and Pneumonia Labs and images were reviewed. Patient Has recent ABG, which has been reviewed. Will treat underlying causes and adjust management of respiratory failure as follows-     Continue IV abx  Holding steroids for now          Chronic kidney disease, stage 3b  Creatinine improving  Monitor for now  Avoid nephrotoxic medications  Renally dose meds    Sepsis  This patient does have evidence of infective focus  My overall impression is sepsis.  Source: Respiratory  Antibiotics given-   Antibiotics (72h ago, onward)    Start     Stop Route Frequency Ordered    05/08/23 1200  ceFEPIme (MAXIPIME) 2 g in dextrose 5 % in water (D5W) 5 % 50 mL IVPB (MB+)         -- IV Every 8 hours (non-standard times) 05/08/23 1052    05/07/23 0900  azithromycin (ZITHROMAX) 500 mg in dextrose 5 % (D5W) 250 mL IVPB (Vial-Mate)  (Community Acquired Pneumonia  (CAP) - Low MDR Risk)         05/12 0859 IV Every 24 hours (non-standard times) 05/07/23 0114        Latest lactate reviewed-  Recent Labs   Lab 05/06/23  2230   LACTATE 1.5     Organ dysfunction indicated by Acute respiratory failure    Fluid challenge Not needed - patient is not hypotensive      Post- resuscitation assessment No - Post resuscitation assessment not needed       Will Not start Pressors- Levophed for MAP of 65  Source control achieved by: iv antibiotics      Peripheral arterial disease  (Ankle-brachial index of 0.83 on the right and 0.79 on the left, indicating bilateral mild-to-moderate peripheral artery disease.  Elevated velocities in the left distal superficial femoral artery suggesting hemodynamically significant focal stenosis of the left distal superficial femoral artery.)      Follow up with Vascular      Acute cystitis without hematuria  The UA is mildly equivocal  On IV abx  Follow up CXS      COPD (chronic obstructive pulmonary disease)  Continue scheduled breathing treatments  Steroids on hold due to likely bacterial pneumonia  Continue home inhalers    Personal history of DVT (deep vein thrombosis)  Continue home Eliquis 2.5mg po BID    EVONNE on CPAP  The patient states it varies from 12-22 and is at 6L NC O2  ABG shows no hypercarbia      Severe obesity (BMI >= 40)  Encourage healthy diet, exercise and weight loss      Type 2 diabetes mellitus, without long-term current use of insulin  Patient's FSGs are uncontrolled due to hyperglycemia on current medication regimen.  Last A1c reviewed-   Lab Results   Component Value Date    HGBA1C 8.5 (H) 04/07/2023     Most recent fingerstick glucose reviewed-   Recent Labs   Lab 05/08/23  1102 05/08/23  1606 05/08/23  1913 05/09/23  0713   POCTGLUCOSE 291* 285* 279* 293*     Current correctional scale  Medium  Maintain anti-hyperglycemic dose as follows-   Antihyperglycemics (From admission, onward)    Start     Stop Route Frequency Ordered     05/09/23 2100  insulin detemir U-100 (Levemir) pen 10 Units         -- SubQ Nightly 05/09/23 1019    05/07/23 0243  insulin aspart U-100 pen 1-10 Units         -- SubQ Before meals & nightly PRN 05/07/23 0143        Hold Oral hypoglycemics while patient is in the hospital.        Hypothyroid  Continue home Synthroid 100mcg po qday  TSH WNL    Primary hypertension  Hold further doses lisinopril- normotensive and with mild SEVEN      VTE Risk Mitigation (From admission, onward)         Ordered     apixaban tablet 2.5 mg  2 times daily         05/07/23 0110                Discharge Planning   LEW:      Code Status: Full Code   Is the patient medically ready for discharge?:     Reason for patient still in hospital (select all that apply): Treatment  Discharge Plan A: Home Health                  Bebo Chamorro MD  Department of Hospital Medicine   [facility] Intensive [facility]

## 2023-05-09 NOTE — EICU
Intervention Initiated From:  COR / EICU    Fiordaliza intervened regarding:  Rounding (Video assessment)    Nurse Notified:  Yes    Doctor Notified:  No    Comments: Pt on NC sats in 90's.  VSS. AAOx3. No medications infusing.  No family at bedside. RN at bedside. NAD observed.

## 2023-05-09 NOTE — ASSESSMENT & PLAN NOTE
Improving with IVF. When corrected for glucose sodium is >130  Giving 1L IVF this morning and then stopping fluids  Encourage good PO intake

## 2023-05-09 NOTE — ASSESSMENT & PLAN NOTE
He had to be placed on BiPAP with O2 in the ED  Now he is much improved    Patient with Hypoxic Respiratory failure which is Acute.  he is on home oxygen at 6 LPM. Supplemental oxygen was provided and noted- Oxygen Concentration (%):  [40] 40    Signs/symptoms of respiratory failure include- tachypnea, increased work of breathing and wheezing. Contributing diagnoses includes - COPD, Obesity Hypoventilation and Pneumonia Labs and images were reviewed. Patient Has recent ABG, which has been reviewed. Will treat underlying causes and adjust management of respiratory failure as follows-     Continue IV abx  Holding steroids for now

## 2023-05-09 NOTE — PLAN OF CARE
Problem: Bariatric Environmental Safety  Goal: Safety Maintained with Care  Outcome: Ongoing, Progressing     Problem: Adult Inpatient Plan of Care  Goal: Optimal Comfort and Wellbeing  Outcome: Ongoing, Progressing     Problem: Adjustment to Illness (Sepsis/Septic Shock)  Goal: Optimal Coping  Outcome: Ongoing, Progressing     Problem: Glycemic Control Impaired (Sepsis/Septic Shock)  Goal: Blood Glucose Level Within Desired Range  Outcome: Ongoing, Progressing     Problem: Infection Progression (Sepsis/Septic Shock)  Goal: Absence of Infection Signs and Symptoms  Outcome: Ongoing, Progressing     Problem: Diabetes Comorbidity  Goal: Blood Glucose Level Within Targeted Range  Outcome: Ongoing, Progressing

## 2023-05-09 NOTE — PT/OT/SLP EVAL
"Physical Therapy Evaluation and Treatment    Patient Name: Иван Fuentes Sr.   MRN: [id number]  Recent Surgery: * No surgery found *      Recommendations:     Discharge Recommendations: home with home health vs skilled   Discharge Equipment Recommendations: none   Barriers to discharge: None    Assessment:   HPI:  Mr. Fuentes is a 73yo man with a past medical history of BPH, CHF, CVA, COPD, DM2, HLD, PNA, EVONNE, hypothyroidism, and DVT on Eliquis 2.5mg po BID.  He uses CPAP at home, but cannot remember his exact settings (thinks maybe, " 20/something with 6L NC").     He now comes to the ED with complaints of worsening shortness of breath over the past 2 days.  He started having fever to 103F today, so he became increasingly concerned.  He notes trouble even talking over the past few days.  He denies any cough.  He has been having rigors and chills.  He also notes some increased wheezing and leg swelling.     Иван Fuentes Sr. is a 72 y.o. male admitted with a medical diagnosis of Community acquired bacterial pneumonia. He presents with the following impairments/functional limitations: weakness, impaired endurance, impaired self care skills, impaired functional mobility, gait instability, decreased ROM, edema, impaired cardiopulmonary response to activity.     Rehab Prognosis: Fair; patient would benefit from acute PT services to address these deficits and reach maximum level of function.    Plan:     During this hospitalization, patient to be seen  (3-5 x/wk) to address the above listed problems via gait training, therapeutic activities, therapeutic exercises    Plan of Care Expires:  (upon discharge from facility)    Subjective     Chief Complaint: community acquired bacterial pneumonia  Patient Comments/Goals: discharge home with home health  Pain/Comfort:  Pain Rating 1: 0/10    Social History:  Living Environment: Patient lives with their spouse in a 2 story home of which he resides on the " first floor with  no steps to enter  Prior Level of Function: Prior to admission, patient was modified independent with ADLs using rollator for mobility within home environment  Equipment Used at Home: rollator, oxygen, CPAP (electric scooter)  DME owned (not currently used): single point cane  Assistance Upon Discharge: significant other    Objective:     Communicated with RN prior to session. Patient found HOB elevated with blood pressure cuff, peripheral IV, telemetry, pulse ox (continuous) upon PT entry to room.    General Precautions: Standard, fall   Orthopedic Precautions: N/A   Braces: N/A    Respiratory Status: Nasal cannula, flow 4.5 L/min    Exams:  Cognition: Patient is oriented to Person, Place, Time, Situation  RLE ROM:  grossly WFL's for patients level of mobility and comorbidities  RLE Strength:  3/5 to 3+/5  LLE ROM:   grossly WFL's for patients level of mobility and comorbidities  LLE Strength:  3/5 to 3+/5    Functional Mobility:  Gait belt applied - N/A  Bed Mobility  Supine to Sit: moderate assistance for trunk management  Sit to Supine: minimum assistance for trunk management  Transfers  Sit to Stand: minimum assistance with rolling walker and with cues for hand placement and posture x 2  Gait  Patient ambulated 4 small side steps up toward HOB with rolling walker and  CGA to min assist . Patient demonstrates unsteady gait, decreased foot clearance, and flexed posture.   Balance  Sitting: stand by assistance  Standing:  CGA to min assist    Therapeutic Activities and Exercises:   Patient educated on role of acute care PT and PT POC, safety while in hospital including calling nurse for mobility, and call light usage  Patient performed 3 set(s) of 10 repetitions of the following seated exercises: ankle pumps and long arc quads for bilateral LE. Patient required skilled PT for instruction of exercises and appropriate cues to perform exercises safely and appropriately.  Patient educated about  importance of OOB mobility and remaining up in chair most of the day.    AM-PAC 6 CLICK MOBILITY  Total Score:     Patient left HOB elevated with all lines intact, call button in reach, and RN present at bedside .    GOALS:   CGA to min assist for bed mobility  CGA for transfers using RW  Ambulate 10' with CGA using RW  Tolerate sitting up in chair > 2 hours      History:     Past Medical History:   Diagnosis Date    Anticoagulant long-term use     Arthritis of both knees     BPH (benign prostatic hyperplasia)     CHF (congestive heart failure)     COPD (chronic obstructive pulmonary disease) 05/03/2021    CVA (cerebral vascular accident)     Deep vein thrombosis     Diabetes mellitus     Diabetes mellitus, type 2     High cholesterol     Hypertension     Left-sided weakness     Obese     Pneumonia due to COVID-19 virus 07/01/2020    Sleep apnea     Thyroid disease     Urosepsis        Past Surgical History:   Procedure Laterality Date    BACK SURGERY      HIP ARTHROPLASTY      HIP ARTHROPLASTY Right 10/28/2021    Procedure: ARTHROPLASTY, HIP;  Surgeon: Ever Hall MD;  Location: [facility] OR [address] [place] [address];  Service: Orthopedics;  Laterality: Right;    IMPLANTATION OF PERMANENT SACRAL NERVE STIMULATOR Right 12/21/2021    Procedure: INSERTION, NEUROSTIMULATOR, PERMANENT, SACRAL;  Surgeon: Erickson Magana MD;  Location: [facility] OR [address] [place] [address];  Service: Urology;  Laterality: Right;    INJECTION OF ANESTHETIC AGENT AROUND NERVE Right 11/19/2020    Procedure: BLOCK, NERVE, FEMORAL AND OBTURATOR;  Surgeon: Dania Garcia MD;  Location: [facility] PAIN MGT;  Service: Pain Management;  Laterality: Right;    INJECTION OF JOINT Right 3/23/2021    Procedure: INJECTION, JOINT, HIP Pt. can continue Eliquis per provider.;  Surgeon: Dania Garcia MD;  Location: [facility] PAIN MGT;  Service: Pain Management;  Laterality: Right;    JOINT REPLACEMENT      PERCUTANEOUS CRYOTHERAPY OF PERIPHERAL NERVE USING LIQUID NITROUS OXIDE IN CLOSED  NEEDLE DEVICE Left 8/26/2019    Procedure: CRYOTHERAPY, NERVE, PERIPHERAL, PERCUTANEOUS, USING LIQUID NITROUS OXIDE IN CLOSED NEEDLE DEVICE LEFT KNEE SIMON;  Surgeon: MENG Beckwith;  Location: [facility] CATH LAB;  Service: Pain Management;  Laterality: Left;    PROSTATE SURGERY  2015    RADIOFREQUENCY ABLATION Right 1/12/2021    Procedure: COOLED OBTURTOR FEMORAL ,needconsent;  Surgeon: Dania Garcia MD;  Location: [facility] PAIN MGT;  Service: Pain Management;  Laterality: Right;    SPINAL FUSION  09/2014    TONSILLECTOMY      TOTAL KNEE ARTHROPLASTY Left 9/3/2019    Procedure: ARTHROPLASTY, KNEE, TOTAL-SANDIP;  Surgeon: Ever Hall MD;  Location: [facility] OR [address] [place] [address];  Service: Orthopedics;  Laterality: Left;       Time Tracking:     PT Received On: 05/09/23  PT Start Time: 1033  PT Stop Time: 1100  PT Total Time (min): 27 min     Billable Minutes: Evaluation 10 15 min and Gait Training 12 min    5/9/2023

## 2023-05-09 NOTE — PLAN OF CARE
[facility] - Intensive Care  Discharge Reassessment    Primary Care Provider: Manuel Wiley MD    Expected Discharge Date:     Patient resting in bed still having difficulty breathing at times. He was supposed to see his pulmonologist this morning. Will reschedule appointment for him. He wants home health with [facility]. Will continue to follow.      Reassessment (most recent)       Discharge Reassessment - 05/09/23 0830          Discharge Reassessment    Assessment Type Discharge Planning Reassessment     Did the patient's condition or plan change since previous assessment? No     Discharge Plan discussed with: Patient     Communicated LEW with patient/caregiver Date not available/Unable to determine     Discharge Plan A Home Health     DME Needed Upon Discharge  none     Discharge Barriers Identified None     Why the patient remains in the hospital Requires continued medical care        Post-Acute Status    Post-Acute Authorization Home Health     Home Health Status Pending medical clearance/testing     Discharge Delays None known at this time

## 2023-05-10 LAB
ALLENS TEST: ABNORMAL
ALLENS TEST: ABNORMAL
ANION GAP SERPL CALC-SCNC: 12 MMOL/L (ref 8–16)
BACTERIA SPEC AEROBE CULT: NORMAL
BACTERIA SPEC AEROBE CULT: NORMAL
BASOPHILS # BLD AUTO: 0.11 K/UL (ref 0–0.2)
BASOPHILS NFR BLD: 0.7 % (ref 0–1.9)
BUN SERPL-MCNC: 29 MG/DL (ref 8–23)
CALCIUM SERPL-MCNC: 9 MG/DL (ref 8.7–10.5)
CHLORIDE SERPL-SCNC: 101 MMOL/L (ref 95–110)
CO2 SERPL-SCNC: 16 MMOL/L (ref 23–29)
CREAT SERPL-MCNC: 1.2 MG/DL (ref 0.5–1.4)
DELSYS: ABNORMAL
DELSYS: ABNORMAL
DIFFERENTIAL METHOD: ABNORMAL
EOSINOPHIL # BLD AUTO: 0.6 K/UL (ref 0–0.5)
EOSINOPHIL NFR BLD: 3.6 % (ref 0–8)
EP: 6
ERYTHROCYTE [DISTWIDTH] IN BLOOD BY AUTOMATED COUNT: 13.7 % (ref 11.5–14.5)
ERYTHROCYTE [SEDIMENTATION RATE] IN BLOOD BY WESTERGREN METHOD: 18 MM/H
EST. GFR  (NO RACE VARIABLE): >60 ML/MIN/1.73 M^2
FIO2: 40
FIO2: 70
FLOW: 5
GLUCOSE SERPL-MCNC: 289 MG/DL (ref 70–110)
GRAM STN SPEC: NORMAL
HCO3 UR-SCNC: 18.1 MMOL/L (ref 24–28)
HCO3 UR-SCNC: 20.6 MMOL/L (ref 24–28)
HCT VFR BLD AUTO: 37.5 % (ref 40–54)
HGB BLD-MCNC: 12.3 G/DL (ref 14–18)
IMM GRANULOCYTES # BLD AUTO: 0.48 K/UL (ref 0–0.04)
IMM GRANULOCYTES NFR BLD AUTO: 3.1 % (ref 0–0.5)
IP: 17
LYMPHOCYTES # BLD AUTO: 1.9 K/UL (ref 1–4.8)
LYMPHOCYTES NFR BLD: 12.6 % (ref 18–48)
MAGNESIUM SERPL-MCNC: 1.7 MG/DL (ref 1.6–2.6)
MCH RBC QN AUTO: 29.9 PG (ref 27–31)
MCHC RBC AUTO-ENTMCNC: 32.8 G/DL (ref 32–36)
MCV RBC AUTO: 91 FL (ref 82–98)
MODE: ABNORMAL
MODE: ABNORMAL
MONOCYTES # BLD AUTO: 1.7 K/UL (ref 0.3–1)
MONOCYTES NFR BLD: 10.7 % (ref 4–15)
NEUTROPHILS # BLD AUTO: 10.6 K/UL (ref 1.8–7.7)
NEUTROPHILS NFR BLD: 69.3 % (ref 38–73)
NRBC BLD-RTO: 0 /100 WBC
PCO2 BLDA: 28.6 MMHG (ref 35–45)
PCO2 BLDA: 35.4 MMHG (ref 35–45)
PH SMN: 7.37 [PH] (ref 7.35–7.45)
PH SMN: 7.41 [PH] (ref 7.35–7.45)
PHOSPHATE SERPL-MCNC: 2.1 MG/DL (ref 2.7–4.5)
PLATELET # BLD AUTO: 253 K/UL (ref 150–450)
PMV BLD AUTO: 9.5 FL (ref 9.2–12.9)
PO2 BLDA: 50 MMHG (ref 80–100)
PO2 BLDA: 96 MMHG (ref 80–100)
POC BE: -5 MMOL/L
POC BE: -7 MMOL/L
POC SATURATED O2: 86 % (ref 95–100)
POC SATURATED O2: 97 % (ref 95–100)
POCT GLUCOSE: 243 MG/DL (ref 70–110)
POCT GLUCOSE: 321 MG/DL (ref 70–110)
POCT GLUCOSE: 324 MG/DL (ref 70–110)
POCT GLUCOSE: 336 MG/DL (ref 70–110)
POCT GLUCOSE: 413 MG/DL (ref 70–110)
POTASSIUM SERPL-SCNC: 4.7 MMOL/L (ref 3.5–5.1)
RBC # BLD AUTO: 4.12 M/UL (ref 4.6–6.2)
SAMPLE: ABNORMAL
SAMPLE: ABNORMAL
SITE: ABNORMAL
SITE: ABNORMAL
SODIUM SERPL-SCNC: 129 MMOL/L (ref 136–145)
SP02: 90
SP02: 98
SPONT RATE: 26
WBC # BLD AUTO: 15.36 K/UL (ref 3.9–12.7)

## 2023-05-10 PROCEDURE — 25000242 PHARM REV CODE 250 ALT 637 W/ HCPCS: Performed by: INTERNAL MEDICINE

## 2023-05-10 PROCEDURE — 97535 SELF CARE MNGMENT TRAINING: CPT

## 2023-05-10 PROCEDURE — 21400001 HC TELEMETRY ROOM

## 2023-05-10 PROCEDURE — 99233 PR SUBSEQUENT HOSPITAL CARE,LEVL III: ICD-10-PCS | Mod: ,,, | Performed by: STUDENT IN AN ORGANIZED HEALTH CARE EDUCATION/TRAINING PROGRAM

## 2023-05-10 PROCEDURE — 27000221 HC OXYGEN, UP TO 24 HOURS

## 2023-05-10 PROCEDURE — 85025 COMPLETE CBC W/AUTO DIFF WBC: CPT | Performed by: INTERNAL MEDICINE

## 2023-05-10 PROCEDURE — 97166 OT EVAL MOD COMPLEX 45 MIN: CPT

## 2023-05-10 PROCEDURE — 94640 AIRWAY INHALATION TREATMENT: CPT

## 2023-05-10 PROCEDURE — 63600175 PHARM REV CODE 636 W HCPCS: Performed by: STUDENT IN AN ORGANIZED HEALTH CARE EDUCATION/TRAINING PROGRAM

## 2023-05-10 PROCEDURE — 25000003 PHARM REV CODE 250: Performed by: STUDENT IN AN ORGANIZED HEALTH CARE EDUCATION/TRAINING PROGRAM

## 2023-05-10 PROCEDURE — 94761 N-INVAS EAR/PLS OXIMETRY MLT: CPT

## 2023-05-10 PROCEDURE — 94660 CPAP INITIATION&MGMT: CPT

## 2023-05-10 PROCEDURE — 83735 ASSAY OF MAGNESIUM: CPT | Performed by: INTERNAL MEDICINE

## 2023-05-10 PROCEDURE — 99233 SBSQ HOSP IP/OBS HIGH 50: CPT | Mod: ,,, | Performed by: STUDENT IN AN ORGANIZED HEALTH CARE EDUCATION/TRAINING PROGRAM

## 2023-05-10 PROCEDURE — 80048 BASIC METABOLIC PNL TOTAL CA: CPT | Performed by: INTERNAL MEDICINE

## 2023-05-10 PROCEDURE — 99900035 HC TECH TIME PER 15 MIN (STAT)

## 2023-05-10 PROCEDURE — 84100 ASSAY OF PHOSPHORUS: CPT | Performed by: INTERNAL MEDICINE

## 2023-05-10 PROCEDURE — 94664 DEMO&/EVAL PT USE INHALER: CPT

## 2023-05-10 PROCEDURE — 99900031 HC PATIENT EDUCATION (STAT)

## 2023-05-10 PROCEDURE — 94799 UNLISTED PULMONARY SVC/PX: CPT

## 2023-05-10 PROCEDURE — 63600175 PHARM REV CODE 636 W HCPCS: Performed by: INTERNAL MEDICINE

## 2023-05-10 PROCEDURE — 25000003 PHARM REV CODE 250: Performed by: INTERNAL MEDICINE

## 2023-05-10 RX ORDER — MAGNESIUM SULFATE 1 G/100ML
1 INJECTION INTRAVENOUS ONCE
Status: COMPLETED | OUTPATIENT
Start: 2023-05-10 | End: 2023-05-10

## 2023-05-10 RX ORDER — PREDNISONE 10 MG/1
40 TABLET ORAL DAILY
Status: DISCONTINUED | OUTPATIENT
Start: 2023-05-10 | End: 2023-05-12

## 2023-05-10 RX ORDER — LISINOPRIL 20 MG/1
20 TABLET ORAL DAILY
Status: DISCONTINUED | OUTPATIENT
Start: 2023-05-11 | End: 2023-05-11

## 2023-05-10 RX ORDER — PANTOPRAZOLE SODIUM 40 MG/1
40 TABLET, DELAYED RELEASE ORAL DAILY
Status: DISCONTINUED | OUTPATIENT
Start: 2023-05-10 | End: 2023-05-12 | Stop reason: HOSPADM

## 2023-05-10 RX ORDER — MUPIROCIN 20 MG/G
OINTMENT TOPICAL 2 TIMES DAILY
Status: DISCONTINUED | OUTPATIENT
Start: 2023-05-10 | End: 2023-05-12 | Stop reason: HOSPADM

## 2023-05-10 RX ORDER — GUAIFENESIN 600 MG/1
1200 TABLET, EXTENDED RELEASE ORAL 2 TIMES DAILY
Status: DISCONTINUED | OUTPATIENT
Start: 2023-05-10 | End: 2023-05-12 | Stop reason: HOSPADM

## 2023-05-10 RX ADMIN — IPRATROPIUM BROMIDE AND ALBUTEROL SULFATE 3 ML: 2.5; .5 SOLUTION RESPIRATORY (INHALATION) at 03:05

## 2023-05-10 RX ADMIN — INSULIN ASPART 8 UNITS: 100 INJECTION, SOLUTION INTRAVENOUS; SUBCUTANEOUS at 07:05

## 2023-05-10 RX ADMIN — ESCITALOPRAM OXALATE 20 MG: 10 TABLET ORAL at 08:05

## 2023-05-10 RX ADMIN — GUAIFENESIN 1200 MG: 600 TABLET, EXTENDED RELEASE ORAL at 08:05

## 2023-05-10 RX ADMIN — MAGNESIUM SULFATE IN DEXTROSE 1 G: 10 INJECTION, SOLUTION INTRAVENOUS at 08:05

## 2023-05-10 RX ADMIN — APIXABAN 2.5 MG: 2.5 TABLET, FILM COATED ORAL at 08:05

## 2023-05-10 RX ADMIN — INSULIN ASPART 4 UNITS: 100 INJECTION, SOLUTION INTRAVENOUS; SUBCUTANEOUS at 09:05

## 2023-05-10 RX ADMIN — ATORVASTATIN CALCIUM 80 MG: 40 TABLET, FILM COATED ORAL at 08:05

## 2023-05-10 RX ADMIN — POLYETHYLENE GLYCOL 3350 17 G: 17 POWDER, FOR SOLUTION ORAL at 08:05

## 2023-05-10 RX ADMIN — MUPIROCIN: 20 OINTMENT TOPICAL at 02:05

## 2023-05-10 RX ADMIN — CEFEPIME 2 G: 2 INJECTION, POWDER, FOR SOLUTION INTRAVENOUS at 08:05

## 2023-05-10 RX ADMIN — PREGABALIN 150 MG: 25 CAPSULE ORAL at 08:05

## 2023-05-10 RX ADMIN — IPRATROPIUM BROMIDE AND ALBUTEROL SULFATE 3 ML: 2.5; .5 SOLUTION RESPIRATORY (INHALATION) at 11:05

## 2023-05-10 RX ADMIN — MUPIROCIN: 20 OINTMENT TOPICAL at 09:05

## 2023-05-10 RX ADMIN — CEFEPIME 2 G: 2 INJECTION, POWDER, FOR SOLUTION INTRAVENOUS at 04:05

## 2023-05-10 RX ADMIN — ASPIRIN 81 MG: 81 TABLET, COATED ORAL at 08:05

## 2023-05-10 RX ADMIN — INSULIN DETEMIR 25 UNITS: 100 INJECTION, SOLUTION SUBCUTANEOUS at 09:05

## 2023-05-10 RX ADMIN — PREDNISONE 40 MG: 10 TABLET ORAL at 05:05

## 2023-05-10 RX ADMIN — DOCUSATE SODIUM 50MG AND SENNOSIDES 8.6MG 1 TABLET: 8.6; 5 TABLET, FILM COATED ORAL at 08:05

## 2023-05-10 RX ADMIN — INSULIN ASPART 8 UNITS: 100 INJECTION, SOLUTION INTRAVENOUS; SUBCUTANEOUS at 12:05

## 2023-05-10 RX ADMIN — PANTOPRAZOLE SODIUM 40 MG: 40 TABLET, DELAYED RELEASE ORAL at 10:05

## 2023-05-10 RX ADMIN — DIAZEPAM 2 MG: 2 TABLET ORAL at 08:05

## 2023-05-10 RX ADMIN — FLUTICASONE FUROATE AND VILANTEROL TRIFENATATE 1 PUFF: 100; 25 POWDER RESPIRATORY (INHALATION) at 07:05

## 2023-05-10 RX ADMIN — INSULIN DETEMIR 10 UNITS: 100 INJECTION, SOLUTION SUBCUTANEOUS at 11:05

## 2023-05-10 RX ADMIN — IPRATROPIUM BROMIDE AND ALBUTEROL SULFATE 3 ML: 2.5; .5 SOLUTION RESPIRATORY (INHALATION) at 07:05

## 2023-05-10 RX ADMIN — IPRATROPIUM BROMIDE AND ALBUTEROL SULFATE 3 ML: 2.5; .5 SOLUTION RESPIRATORY (INHALATION) at 04:05

## 2023-05-10 RX ADMIN — CEFEPIME 2 G: 2 INJECTION, POWDER, FOR SOLUTION INTRAVENOUS at 12:05

## 2023-05-10 RX ADMIN — INSULIN ASPART 10 UNITS: 100 INJECTION, SOLUTION INTRAVENOUS; SUBCUTANEOUS at 11:05

## 2023-05-10 RX ADMIN — AZITHROMYCIN MONOHYDRATE 500 MG: 500 INJECTION, POWDER, LYOPHILIZED, FOR SOLUTION INTRAVENOUS at 09:05

## 2023-05-10 RX ADMIN — INSULIN ASPART 4 UNITS: 100 INJECTION, SOLUTION INTRAVENOUS; SUBCUTANEOUS at 04:05

## 2023-05-10 NOTE — NURSING
MD notified of patient's CBG of 413.  Orders received for 10 units of Levemir (see MAR). MD states to not get a stat serum blood glucose level.  NAD noted or verbalized by patient at present.

## 2023-05-10 NOTE — PT/OT/SLP EVAL
"Occupational Therapy   Evaluation    Name: Иван Fuentes Sr.  MRN: [id number]  Admitting Diagnosis: Community acquired bacterial pneumonia  Recent Surgery: * No surgery found *      Recommendations:     Discharge Recommendations:    Discharge Equipment Recommendations:     Barriers to discharge:       Assessment:     Иван Fuentes Sr. is a 72 y.o. male with a medical diagnosis of Community acquired bacterial pneumonia.  He presents with decreased ind w/all adls. Performance deficits affecting function:  decreased strength, decreased functional activity tolerance.      Rehab Prognosis: Good; patient would benefit from acute skilled OT services to address these deficits and reach maximum level of function.       Plan:     Patient to be seen   3-5x's per week to address the above listed problems via  ot intervention  Plan of Care Expires:  upon dc   Plan of Care Reviewed with:  patient/wife    Subjective     Chief Complaint: bacterial pneumonia    Patient/Family Comments/goals: "to get better and go home."    Occupational Profile:  Living Environment: patient lives w/ wife in 2 story home. Patient does not go on to second floor.   Previous level of function: mod ind w/ all adls and Iadls.  Patient utilizes a scooter for most functional mobility throughout the home and sometimes ambulates with a rollator.    Roles and Routines: retired  Equipment Used at Home:  rollator, scooter, elevated toilet seat, walk in bathtub.   Assistance upon Discharge: will have assistance from wife.     Pain/Comfort:   Patient currently in no pain     Patients cultural, spiritual, Judaism conflicts given the current situation:  will not interfere w/ this hospital stay     Objective:     Communicated with: nursing prior to session.  Patient found supine with WIFE IN ROOM  upon OT entry to room.    General Precautions: Standard,    Orthopedic Precautions:  NONE  Braces:  NONE  Respiratory Status: Nasal cannula, flow 2 " L/min    Occupational Performance:  POOR FUNCTIONAL ACTIVITY TOLERANCE.   Bed Mobility:    Patient completed Rolling/Turning to Left with  minimum assistance  Patient completed Rolling/Turning to Right with minimum assistance  Patient completed Scooting/Bridging with minimum assistance  Patient completed Supine to Sit with minimum assistance  Patient completed Sit to Supine with minimum assistance    Functional Mobility/Transfers:  Patient completed Sit <> Stand Transfer with minimum assistance  with  hand-held assist       Activities of Daily Living:  Feeding:  stand by assistance from bed  Grooming: stand by assistance from bed  Bathing: moderate assistance bed bath at this time  Upper Body Dressing: minimum assistance from sit  Lower Body Dressing: maximal assistance from supine  Toileting: moderate assistance at this time    Cognitive/Visual Perceptual:  Cognitive/Psychosocial Skills:     -       Oriented to: Person, Place, Time, and Situation   -       Follows Commands/attention:Follows multistep  commands  -       Communication: clear/fluent  -       Memory: No Deficits noted  -       Safety awareness/insight to disability: intact     Physical Exam:  Static/dynamic sitting on eob =fair+/fair;  static/dynamic stand = fair/poor+    Treatment & Education:  Patient educated on importance of remaining in seated, upright position during hospital stay for optimal lung function.     Patient left supine with  nursing and wife  notified and present to take to cat scan.    GOALS:   Patient will perform ub  dressing w/ sua  Patient will perform lb dressing w/ cga  Patient will increase his functional activity tolerance to fair in order to increase his ind w/all adls.       History:     Past Medical History:   Diagnosis Date    Anticoagulant long-term use     Arthritis of both knees     BPH (benign prostatic hyperplasia)     CHF (congestive heart failure)     COPD (chronic obstructive pulmonary disease) 05/03/2021    CVA  (cerebral vascular accident)     Deep vein thrombosis     Diabetes mellitus     Diabetes mellitus, type 2     High cholesterol     Hypertension     Left-sided weakness     Obese     Pneumonia due to COVID-19 virus 07/01/2020    Sleep apnea     Thyroid disease     Urosepsis          Past Surgical History:   Procedure Laterality Date    BACK SURGERY      HIP ARTHROPLASTY      HIP ARTHROPLASTY Right 10/28/2021    Procedure: ARTHROPLASTY, HIP;  Surgeon: Ever Hall MD;  Location: [facility] OR [address] [place] [address];  Service: Orthopedics;  Laterality: Right;    IMPLANTATION OF PERMANENT SACRAL NERVE STIMULATOR Right 12/21/2021    Procedure: INSERTION, NEUROSTIMULATOR, PERMANENT, SACRAL;  Surgeon: Erickson Magana MD;  Location: [facility] OR [address] [place] [address];  Service: Urology;  Laterality: Right;    INJECTION OF ANESTHETIC AGENT AROUND NERVE Right 11/19/2020    Procedure: BLOCK, NERVE, FEMORAL AND OBTURATOR;  Surgeon: Dania Garcia MD;  Location: [facility] PAIN MGT;  Service: Pain Management;  Laterality: Right;    INJECTION OF JOINT Right 3/23/2021    Procedure: INJECTION, JOINT, HIP Pt. can continue Eliquis per provider.;  Surgeon: Dania Garcia MD;  Location: [facility] PAIN MGT;  Service: Pain Management;  Laterality: Right;    JOINT REPLACEMENT      PERCUTANEOUS CRYOTHERAPY OF PERIPHERAL NERVE USING LIQUID NITROUS OXIDE IN CLOSED NEEDLE DEVICE Left 8/26/2019    Procedure: CRYOTHERAPY, NERVE, PERIPHERAL, PERCUTANEOUS, USING LIQUID NITROUS OXIDE IN CLOSED NEEDLE DEVICE LEFT KNEE SIMON;  Surgeon: MENG Beckwith;  Location: [facility] CATH LAB;  Service: Pain Management;  Laterality: Left;    PROSTATE SURGERY  2015    RADIOFREQUENCY ABLATION Right 1/12/2021    Procedure: COOLED OBTURTOR FEMORAL ,needconsent;  Surgeon: Dania Garcia MD;  Location: [facility] PAIN MGT;  Service: Pain Management;  Laterality: Right;    SPINAL FUSION  09/2014    TONSILLECTOMY      TOTAL KNEE ARTHROPLASTY Left 9/3/2019    Procedure: ARTHROPLASTY, KNEE,  TOTAL-SANDIP;  Surgeon: Ever Hall MD;  Location: [facility] OR [address] [place] [address];  Service: Orthopedics;  Laterality: Left;       Time Tracking:     OT Date of Treatment:  05/10/2023  OT Start Time:  11:05AM  OT Stop Time:  11:30AM  OT Total Time (min):  25 MIN    Billable Minutes:Evaluation 15  Self Care/Home Management 10    5/10/2023

## 2023-05-10 NOTE — PLAN OF CARE
Patient in no apparent distress. Patient received on BIPAP with settings as documented. FIO2 weaned as tolerated. Patient placed on nasal cannula at 4 lpm for day. Aerosol treatments given Q 4 with aerobika. IS started. Patient wears home O2 at 4 lpm during the day and 6 lpm bleed in with home cpap. . Will continue to monitor.

## 2023-05-10 NOTE — RESPIRATORY THERAPY
RT switched Pt back to a v60 Bipap machine from his home bipap machine due to his home machine not keeping his sats up where they need to be and that was with 6L 02 titrated inline, also his mask would not keep a very good seal and his apnea was increasing so RT placed him back on a V60 in a Bipap S/T mode at Ipap of 16 and EPAP of 6 with FI02 set at 40%.  Pt. Seems to be holding his sats better at this pressure and mask is fitting better so holding pressure is no longer an issue.  Lovely RRT

## 2023-05-10 NOTE — ASSESSMENT & PLAN NOTE
This patient does have evidence of infective focus  My overall impression is sepsis.  Source: Respiratory  Antibiotics given-   Antibiotics (72h ago, onward)    Start     Stop Route Frequency Ordered    05/10/23 1215  mupirocin 2 % ointment         05/15 0859 Nasl 2 times daily 05/10/23 1108    05/08/23 1200  ceFEPIme (MAXIPIME) 2 g in dextrose 5 % in water (D5W) 5 % 50 mL IVPB (MB+)         -- IV Every 8 hours (non-standard times) 05/08/23 1052    05/07/23 0900  azithromycin (ZITHROMAX) 500 mg in dextrose 5 % (D5W) 250 mL IVPB (Vial-Mate)  (Community Acquired Pneumonia (CAP) - Low MDR Risk)         05/12 0859 IV Every 24 hours (non-standard times) 05/07/23 0114        Latest lactate reviewed-  No results for input(s): LACTATE in the last 72 hours.  Organ dysfunction indicated by Acute respiratory failure    Fluid challenge Not needed - patient is not hypotensive      Post- resuscitation assessment No - Post resuscitation assessment not needed       Will Not start Pressors- Levophed for MAP of 65  Source control achieved by: iv antibiotics

## 2023-05-10 NOTE — PROGRESS NOTES
"[facility] Intensive [facility] Medicine  Progress Note    Patient Name: Иван Fuentes Sr.  MRN: [id number]  Patient Class: IP- Inpatient   Admission Date: 5/6/2023  Length of Stay: 4 days  Attending Physician: Bebo Chamorro MD  Primary Care Provider: Manuel Wiley MD        Subjective:     Principal Problem:Community acquired bacterial pneumonia        HPI:  Mr. Fuentes is a 73yo man with a past medical history of BPH, CHF, CVA, COPD, DM2, HLD, PNA, EVONNE, hypothyroidism, and DVT on Eliquis 2.5mg po BID.  He uses CPAP at home, but cannot remember his exact settings (thinks maybe, " 20/something with 6L NC").    He now comes to the ED with complaints of worsening shortness of breath over the past 2 days.  He started having fever to 103F today, so he became increasingly concerned.  He notes trouble even talking over the past few days.  He denies any cough.  He has been having rigors and chills.  He also notes some increased wheezing and leg swelling.    In the ED his VS were BP (!) 109/58   Pulse 72   Temp max 99.5 °F (37.5 °C) (Oral)   Resp (!) 38 -> 33   Ht 5' 10" (1.778 m)   Wt 136.1 kg (300 lb)   SpO2 88% "NC" -> 95% BiPAP 40% O2  BMI 43.05 kg/m².  Labs showed WBC 24, Cr 1.4, HCO3 15, Gluc 230, , Trop 0.018, UA with mod yeast, few bacteria, WBC 55.  ABG 4L NC: pH 7.47, PCO2 22.    CXR showed cardiomegaly, stable LLL effusion, and new RML infiltrate c/w pneumonia.  EKG showed sinus 91 with PAC's, QTc 425 ms, no ST-T changes.    In the ED he was treated with:  Medications   piperacillin-tazobactam (ZOSYN) 4.5 g in dextrose 5 % in water (D5W) 5 % 100 mL IVPB (MB+) (has no administration in time range) - NEVER GIVEN.   albuterol-ipratropium 2.5 mg-0.5 mg/3 mL nebulizer solution 3 mL (3 mLs Nebulization Given 5/6/23 9168)   levoFLOXacin 750 mg/150 mL IVPB 750 mg (750 mg Intravenous New Bag 5/6/23 6479)               Overview/Hospital Course:  No notes on file    Interval History: " Breathing not improved today. WBC mildly increased. CT completed. No abscess or empyema. Respiratory culture with normal arlin. Starting steroids. Will order patient home trilogy tomorrow. Out of bed tomorrow to help with likely compressive atelectasis. Anticipate increased insulin need.    Review of Systems   All other systems reviewed and are negative.  Objective:     Vital Signs (Most Recent):  Temp: 98.1 °F (36.7 °C) (05/10/23 1530)  Pulse: 71 (05/10/23 1530)  Resp: (!) 23 (05/10/23 1530)  BP: (!) 149/92 (05/10/23 1530)  SpO2: 96 % (05/10/23 1530) Vital Signs (24h Range):  Temp:  [97.4 °F (36.3 °C)-98.8 °F (37.1 °C)] 98.1 °F (36.7 °C)  Pulse:  [69-84] 71  Resp:  [7-30] 23  SpO2:  [89 %-98 %] 96 %  BP: (114-150)/(65-92) 149/92     Weight: (!) 144.4 kg (318 lb 5.5 oz)  Body mass index is 45.68 kg/m².    Intake/Output Summary (Last 24 hours) at 5/10/2023 1655  Last data filed at 5/10/2023 1439  Gross per 24 hour   Intake 1080 ml   Output 1000 ml   Net 80 ml         Physical Exam      Vitals reviewed  General: NAD, obese  Head: NC/AT  Eyes: EOMI, MILAD  Cardiovascular: Pulses intact distally, Regular Rate and rhythm  Pulmonary: Increased RR, Wheezing present  Gi: Soft, Non-tender  Extremities: Warm, No edema present  Skin: Warm, dry  Neuro: Alert, Oriented x3, No focal Deficit  Psych: Appropriate mood and affect       Significant Labs: All pertinent labs within the past 24 hours have been reviewed.    Significant Imaging: I have reviewed all pertinent imaging results/findings within the past 24 hours.      Assessment/Plan:      * Community acquired bacterial pneumonia  He was given Levaquin in the ED  Cefepime and azithromycin  He has no cough or sputum currently    Microbiology Results (last 7 days)     Procedure Component Value Units Date/Time    Blood culture #2 **CANNOT BE ORDERED STAT** [[id number]] Collected: 05/06/23 5939    Order Status: Completed Specimen: Blood from Peripheral, Forearm, Left Updated:  05/10/23 1225     Blood Culture, Routine No Growth to date      No Growth to date      No Growth to date      No Growth to date    Blood culture #1 **CANNOT BE ORDERED STAT** [[id number]] Collected: 05/06/23 2230    Order Status: Completed Specimen: Blood from Peripheral, Forearm, Right Updated: 05/10/23 1212     Blood Culture, Routine No Growth to date      No Growth to date      No Growth to date      No Growth to date    Culture, Respiratory with Gram Stain [[id number]] Collected: 05/08/23 0643    Order Status: Completed Specimen: Respiratory from Sputum Updated: 05/10/23 0901     Respiratory Culture Normal respiratory arlin      No S aureus or Pseudomonas isolated.     Gram Stain (Respiratory) <10 epithelial cells per low power field.     Gram Stain (Respiratory) Rare WBC's     Gram Stain (Respiratory) Rare Gram positive cocci    Urine culture [[id number]]  (Abnormal) Collected: 05/06/23 2258    Order Status: Completed Specimen: Urine Updated: 05/08/23 1355     Urine Culture, Routine GISSELLE ALBICANS  10,000 - 49,999 cfu/ml  Treatment of asymptomatic candiduria is not recommended (except for   specific populations). Candida isolated in the urine typically   represents colonization. If an indwelling urinary catheter is present  it should be removed or replaced.      Narrative:      Preferred Collection Type->Urine, Clean Catch  Specimen Source->Urine                 Chronic hyponatremia  Improved with IVF. When corrected for glucose sodium is >130  Encourage good PO intake      ACP (advance care planning)  Advance Care Planning     Date: 05/07/2023    Living Will  During this visit, I engaged the patient  in the advance care planning process.  The patient and I reviewed the role for advance directives and their purpose in directing future healthcare if the patient's unable to speak for him.  At this point in time, the patient does have full decision-making capacity.  We discussed different extreme health states that  he could experience, and reviewed what kind of medical care he would want in those situations.  The patient communicated that if he were comatose and had little chance of a meaningful recovery, he would want machines/life-sustaining treatments used.  I spent a total of 16 minutes engaging the patient in this advance care planning discussion.                Acute respiratory failure with hypoxia  He had to be placed on BiPAP with O2 in the ED  Now he is much improved    Patient with Hypoxic Respiratory failure which is Acute.  he is on home oxygen at 6 LPM. Supplemental oxygen was provided and noted- Oxygen Concentration (%):  [36-70] 36    Signs/symptoms of respiratory failure include- tachypnea, increased work of breathing and wheezing. Contributing diagnoses includes - COPD, Obesity Hypoventilation and Pneumonia Labs and images were reviewed. Patient Has recent ABG, which has been reviewed. Will treat underlying causes and adjust management of respiratory failure as follows-     Continue IV abx  Started steroids now          Chronic kidney disease, stage 3b  Creatinine improving  Monitor for now  Avoid nephrotoxic medications  Renally dose meds    Sepsis  This patient does have evidence of infective focus  My overall impression is sepsis.  Source: Respiratory  Antibiotics given-   Antibiotics (72h ago, onward)    Start     Stop Route Frequency Ordered    05/10/23 1215  mupirocin 2 % ointment         05/15 0859 Nasl 2 times daily 05/10/23 1108    05/08/23 1200  ceFEPIme (MAXIPIME) 2 g in dextrose 5 % in water (D5W) 5 % 50 mL IVPB (MB+)         -- IV Every 8 hours (non-standard times) 05/08/23 1052    05/07/23 0900  azithromycin (ZITHROMAX) 500 mg in dextrose 5 % (D5W) 250 mL IVPB (Vial-Mate)  (Community Acquired Pneumonia (CAP) - Low MDR Risk)         05/12 0859 IV Every 24 hours (non-standard times) 05/07/23 0114        Latest lactate reviewed-  No results for input(s): LACTATE in the last 72 hours.  Organ  dysfunction indicated by Acute respiratory failure    Fluid challenge Not needed - patient is not hypotensive      Post- resuscitation assessment No - Post resuscitation assessment not needed       Will Not start Pressors- Levophed for MAP of 65  Source control achieved by: iv antibiotics      Peripheral arterial disease  (Ankle-brachial index of 0.83 on the right and 0.79 on the left, indicating bilateral mild-to-moderate peripheral artery disease.  Elevated velocities in the left distal superficial femoral artery suggesting hemodynamically significant focal stenosis of the left distal superficial femoral artery.)      Follow up with Vascular      Acute cystitis without hematuria  The UA is mildly equivocal  On IV abx  Follow up CXS      COPD (chronic obstructive pulmonary disease)  Continue scheduled breathing treatments  Starting steroids today since cultures NGTD and patient not improving clinically as quick as expected  Continue home inhalers  CPT, IS  Mucinex  IV abx    Personal history of DVT (deep vein thrombosis)  Continue home Eliquis 2.5mg po BID    EVONNE on CPAP  Order patient trilogy tomorrow      Severe obesity (BMI >= 40)  Encourage healthy diet, exercise and weight loss      Type 2 diabetes mellitus, without long-term current use of insulin  Patient's FSGs are uncontrolled due to hyperglycemia on current medication regimen.  Last A1c reviewed-   Lab Results   Component Value Date    HGBA1C 8.5 (H) 04/07/2023     Most recent fingerstick glucose reviewed-   Recent Labs   Lab 05/09/23  2103 05/10/23  0710 05/10/23  1100 05/10/23  1159   POCTGLUCOSE 285* 321* 413* 336*     Current correctional scale  Medium  Maintain anti-hyperglycemic dose as follows-   Antihyperglycemics (From admission, onward)    Start     Stop Route Frequency Ordered    05/10/23 2100  insulin detemir U-100 (Levemir) pen 25 Units         -- SubQ 2 times daily 05/10/23 1646    05/07/23 0243  insulin aspart U-100 pen 1-10 Units          -- SubQ Before meals & nightly PRN 05/07/23 0143        Hold Oral hypoglycemics while patient is in the hospital.        Hypothyroid  Continue home Synthroid 100mcg po qday  TSH WNL    Primary hypertension  Can restart lisinopril half dose tomorrow    VTE Risk Mitigation (From admission, onward)         Ordered     apixaban tablet 2.5 mg  2 times daily         05/07/23 0110                Discharge Planning   LEW:      Code Status: Full Code   Is the patient medically ready for discharge?:     Reason for patient still in hospital (select all that apply): Treatment  Discharge Plan A: Home Health   Discharge Delays: None known at this time              Bebo Chamorro MD  Department of Hospital Medicine   [facility] Intensive [facility]

## 2023-05-10 NOTE — RESPIRATORY THERAPY
Pt. Is now back on the V60 in Bipap S/T because his sats were staying 84-90.  RT took his home mask and used it with our V60 Bipap and he has done well with that.  IPAP 17 cmh20   EPAP  6 cmh20 with a Gy02bmo on 70% to keep his sats  Between 96-98. Pt keeps taking off his mask to drink water because he says he is dried out.  mdPkira RT

## 2023-05-10 NOTE — ASSESSMENT & PLAN NOTE
He had to be placed on BiPAP with O2 in the ED  Now he is much improved    Patient with Hypoxic Respiratory failure which is Acute.  he is on home oxygen at 6 LPM. Supplemental oxygen was provided and noted- Oxygen Concentration (%):  [36-70] 36    Signs/symptoms of respiratory failure include- tachypnea, increased work of breathing and wheezing. Contributing diagnoses includes - COPD, Obesity Hypoventilation and Pneumonia Labs and images were reviewed. Patient Has recent ABG, which has been reviewed. Will treat underlying causes and adjust management of respiratory failure as follows-     Continue IV abx  Started steroids now

## 2023-05-10 NOTE — PLAN OF CARE
Problem: Bariatric Environmental Safety  Goal: Safety Maintained with Care  Outcome: Ongoing, Progressing     Problem: Glycemic Control Impaired (Sepsis/Septic Shock)  Goal: Blood Glucose Level Within Desired Range  Outcome: Ongoing, Progressing     Problem: Fluid and Electrolyte Imbalance (Acute Kidney Injury/Impairment)  Goal: Fluid and Electrolyte Balance  Outcome: Ongoing, Progressing     Problem: Diabetes Comorbidity  Goal: Blood Glucose Level Within Targeted Range  Outcome: Ongoing, Progressing

## 2023-05-10 NOTE — PT/OT/SLP PROGRESS
PT withheld on this date per patient request to rest this afternoon. He stood up at bedside this morning for bed linen change and went down to CT and is very tired . PT to follow up with patient tomorrow.

## 2023-05-10 NOTE — RESPIRATORY THERAPY
We have had a difficult night trying to sleep. Pt. Wants to try his machine again because he takes it on and off to drink water throughout the night. RT changed him back over and will monitor him.  Lovely GUZMAN

## 2023-05-10 NOTE — ASSESSMENT & PLAN NOTE
Continue scheduled breathing treatments  Starting steroids today since cultures NGTD and patient not improving clinically as quick as expected  Continue home inhalers  CPT, IS  Mucinex  IV abx

## 2023-05-10 NOTE — SUBJECTIVE & OBJECTIVE
Interval History: Breathing not improved today. WBC mildly increased. CT completed. No abscess or empyema. Respiratory culture with normal arlin. Starting steroids. Will order patient home trilogy tomorrow. Out of bed tomorrow to help with likely compressive atelectasis. Anticipate increased insulin need.    Review of Systems   All other systems reviewed and are negative.  Objective:     Vital Signs (Most Recent):  Temp: 98.1 °F (36.7 °C) (05/10/23 1530)  Pulse: 71 (05/10/23 1530)  Resp: (!) 23 (05/10/23 1530)  BP: (!) 149/92 (05/10/23 1530)  SpO2: 96 % (05/10/23 1530) Vital Signs (24h Range):  Temp:  [97.4 °F (36.3 °C)-98.8 °F (37.1 °C)] 98.1 °F (36.7 °C)  Pulse:  [69-84] 71  Resp:  [7-30] 23  SpO2:  [89 %-98 %] 96 %  BP: (114-150)/(65-92) 149/92     Weight: (!) 144.4 kg (318 lb 5.5 oz)  Body mass index is 45.68 kg/m².    Intake/Output Summary (Last 24 hours) at 5/10/2023 1655  Last data filed at 5/10/2023 1439  Gross per 24 hour   Intake 1080 ml   Output 1000 ml   Net 80 ml         Physical Exam      Vitals reviewed  General: NAD, obese  Head: NC/AT  Eyes: EOMI, MILAD  Cardiovascular: Pulses intact distally, Regular Rate and rhythm  Pulmonary: Increased RR, Wheezing present  Gi: Soft, Non-tender  Extremities: Warm, No edema present  Skin: Warm, dry  Neuro: Alert, Oriented x3, No focal Deficit  Psych: Appropriate mood and affect       Significant Labs: All pertinent labs within the past 24 hours have been reviewed.    Significant Imaging: I have reviewed all pertinent imaging results/findings within the past 24 hours.

## 2023-05-10 NOTE — PLAN OF CARE
[facility] - Intensive Care  Discharge Reassessment    Primary Care Provider: Manuel Wiley MD    Expected Discharge Date:   Patient resting in bed with spouse at bedside. Provided them with follow up appointment with pulmonologist  Dr Charles Lane for next Friday May 19th in [place]. Demonstrated understanding by verbal feedback. Patient only want home health when discharged & not go somewhere for rehab. He may need a trilogy when ready for discharge. He gets his oxygen through [employer]. Denies any other needs at this time. Will continue to follow.      Reassessment (most recent)       Discharge Reassessment - 05/10/23 1623          Discharge Reassessment    Assessment Type Discharge Planning Reassessment     Did the patient's condition or plan change since previous assessment? No     Discharge Plan discussed with: Patient;Spouse/sig other     Name(s) and Number(s) Viktoria Fuentes spouse [phone]     Communicated LEW with patient/caregiver Date not available/Unable to determine     Discharge Plan A Home Health     Discharge Plan B Home Health     DME Needed Upon Discharge  none     Discharge Barriers Identified None     Why the patient remains in the hospital Requires continued medical care        Post-Acute Status    Post-Acute Authorization Home Health     Home Health Status Referrals Sent     Hospital Resources/Appts/Education Provided Appointments scheduled and added to AVS     Discharge Delays None known at this time

## 2023-05-10 NOTE — ASSESSMENT & PLAN NOTE
Patient's FSGs are uncontrolled due to hyperglycemia on current medication regimen.  Last A1c reviewed-   Lab Results   Component Value Date    HGBA1C 8.5 (H) 04/07/2023     Most recent fingerstick glucose reviewed-   Recent Labs   Lab 05/09/23  2103 05/10/23  0710 05/10/23  1100 05/10/23  1159   POCTGLUCOSE 285* 321* 413* 336*     Current correctional scale  Medium  Maintain anti-hyperglycemic dose as follows-   Antihyperglycemics (From admission, onward)    Start     Stop Route Frequency Ordered    05/10/23 2100  insulin detemir U-100 (Levemir) pen 25 Units         -- SubQ 2 times daily 05/10/23 1646    05/07/23 0243  insulin aspart U-100 pen 1-10 Units         -- SubQ Before meals & nightly PRN 05/07/23 0143        Hold Oral hypoglycemics while patient is in the hospital.

## 2023-05-11 PROBLEM — N30.00 ACUTE CYSTITIS WITHOUT HEMATURIA: Status: RESOLVED | Noted: 2023-04-06 | Resolved: 2023-05-11

## 2023-05-11 LAB
ANION GAP SERPL CALC-SCNC: 11 MMOL/L (ref 8–16)
BASOPHILS # BLD AUTO: ABNORMAL K/UL (ref 0–0.2)
BASOPHILS NFR BLD: 0 % (ref 0–1.9)
BUN SERPL-MCNC: 32 MG/DL (ref 8–23)
CALCIUM SERPL-MCNC: 9.3 MG/DL (ref 8.7–10.5)
CHLORIDE SERPL-SCNC: 103 MMOL/L (ref 95–110)
CO2 SERPL-SCNC: 18 MMOL/L (ref 23–29)
CREAT SERPL-MCNC: 1.2 MG/DL (ref 0.5–1.4)
DIFFERENTIAL METHOD: ABNORMAL
EOSINOPHIL # BLD AUTO: ABNORMAL K/UL (ref 0–0.5)
EOSINOPHIL NFR BLD: 0 % (ref 0–8)
ERYTHROCYTE [DISTWIDTH] IN BLOOD BY AUTOMATED COUNT: 13.8 % (ref 11.5–14.5)
EST. GFR  (NO RACE VARIABLE): >60 ML/MIN/1.73 M^2
GLUCOSE SERPL-MCNC: 331 MG/DL (ref 70–110)
HCT VFR BLD AUTO: 37.5 % (ref 40–54)
HGB BLD-MCNC: 12.4 G/DL (ref 14–18)
IMM GRANULOCYTES # BLD AUTO: ABNORMAL K/UL (ref 0–0.04)
IMM GRANULOCYTES NFR BLD AUTO: ABNORMAL % (ref 0–0.5)
L PNEUMO AG UR QL IA: NEGATIVE
LYMPHOCYTES # BLD AUTO: ABNORMAL K/UL (ref 1–4.8)
LYMPHOCYTES NFR BLD: 8 % (ref 18–48)
MAGNESIUM SERPL-MCNC: 2 MG/DL (ref 1.6–2.6)
MCH RBC QN AUTO: 30.5 PG (ref 27–31)
MCHC RBC AUTO-ENTMCNC: 33.1 G/DL (ref 32–36)
MCV RBC AUTO: 92 FL (ref 82–98)
METAMYELOCYTES NFR BLD MANUAL: 2 %
MONOCYTES # BLD AUTO: ABNORMAL K/UL (ref 0.3–1)
MONOCYTES NFR BLD: 6 % (ref 4–15)
MYELOCYTES NFR BLD MANUAL: 1 %
NEUTROPHILS NFR BLD: 83 % (ref 38–73)
NRBC BLD-RTO: 0 /100 WBC
PHOSPHATE SERPL-MCNC: 3.3 MG/DL (ref 2.7–4.5)
PLATELET # BLD AUTO: 292 K/UL (ref 150–450)
PMV BLD AUTO: 9.4 FL (ref 9.2–12.9)
POCT GLUCOSE: 340 MG/DL (ref 70–110)
POCT GLUCOSE: 357 MG/DL (ref 70–110)
POCT GLUCOSE: 416 MG/DL (ref 70–110)
POCT GLUCOSE: 433 MG/DL (ref 70–110)
POCT GLUCOSE: 435 MG/DL (ref 70–110)
POTASSIUM SERPL-SCNC: 5.2 MMOL/L (ref 3.5–5.1)
RBC # BLD AUTO: 4.06 M/UL (ref 4.6–6.2)
SODIUM SERPL-SCNC: 132 MMOL/L (ref 136–145)
WBC # BLD AUTO: 16.61 K/UL (ref 3.9–12.7)

## 2023-05-11 PROCEDURE — 94640 AIRWAY INHALATION TREATMENT: CPT

## 2023-05-11 PROCEDURE — 25000242 PHARM REV CODE 250 ALT 637 W/ HCPCS: Performed by: INTERNAL MEDICINE

## 2023-05-11 PROCEDURE — 99233 PR SUBSEQUENT HOSPITAL CARE,LEVL III: ICD-10-PCS | Mod: ,,, | Performed by: STUDENT IN AN ORGANIZED HEALTH CARE EDUCATION/TRAINING PROGRAM

## 2023-05-11 PROCEDURE — 27000646 HC AEROBIKA DEVICE

## 2023-05-11 PROCEDURE — 21400001 HC TELEMETRY ROOM

## 2023-05-11 PROCEDURE — 85007 BL SMEAR W/DIFF WBC COUNT: CPT | Performed by: INTERNAL MEDICINE

## 2023-05-11 PROCEDURE — 84100 ASSAY OF PHOSPHORUS: CPT | Performed by: INTERNAL MEDICINE

## 2023-05-11 PROCEDURE — 27000221 HC OXYGEN, UP TO 24 HOURS

## 2023-05-11 PROCEDURE — 99900031 HC PATIENT EDUCATION (STAT)

## 2023-05-11 PROCEDURE — 25000003 PHARM REV CODE 250: Performed by: STUDENT IN AN ORGANIZED HEALTH CARE EDUCATION/TRAINING PROGRAM

## 2023-05-11 PROCEDURE — 94761 N-INVAS EAR/PLS OXIMETRY MLT: CPT

## 2023-05-11 PROCEDURE — 25000003 PHARM REV CODE 250: Performed by: HOSPITALIST

## 2023-05-11 PROCEDURE — 63600175 PHARM REV CODE 636 W HCPCS: Performed by: STUDENT IN AN ORGANIZED HEALTH CARE EDUCATION/TRAINING PROGRAM

## 2023-05-11 PROCEDURE — 83735 ASSAY OF MAGNESIUM: CPT | Performed by: INTERNAL MEDICINE

## 2023-05-11 PROCEDURE — 25000003 PHARM REV CODE 250: Performed by: INTERNAL MEDICINE

## 2023-05-11 PROCEDURE — 85027 COMPLETE CBC AUTOMATED: CPT | Performed by: INTERNAL MEDICINE

## 2023-05-11 PROCEDURE — 97110 THERAPEUTIC EXERCISES: CPT

## 2023-05-11 PROCEDURE — 99233 SBSQ HOSP IP/OBS HIGH 50: CPT | Mod: ,,, | Performed by: STUDENT IN AN ORGANIZED HEALTH CARE EDUCATION/TRAINING PROGRAM

## 2023-05-11 PROCEDURE — 94664 DEMO&/EVAL PT USE INHALER: CPT

## 2023-05-11 PROCEDURE — 63600175 PHARM REV CODE 636 W HCPCS: Performed by: INTERNAL MEDICINE

## 2023-05-11 PROCEDURE — 99900035 HC TECH TIME PER 15 MIN (STAT)

## 2023-05-11 PROCEDURE — 80048 BASIC METABOLIC PNL TOTAL CA: CPT | Performed by: INTERNAL MEDICINE

## 2023-05-11 RX ORDER — SODIUM CHLORIDE 9 MG/ML
INJECTION, SOLUTION INTRAVENOUS CONTINUOUS
Status: ACTIVE | OUTPATIENT
Start: 2023-05-11 | End: 2023-05-11

## 2023-05-11 RX ORDER — MAG HYDROX/ALUMINUM HYD/SIMETH 200-200-20
30 SUSPENSION, ORAL (FINAL DOSE FORM) ORAL EVERY 6 HOURS PRN
Status: DISCONTINUED | OUTPATIENT
Start: 2023-05-11 | End: 2023-05-12 | Stop reason: HOSPADM

## 2023-05-11 RX ADMIN — HYDROCODONE BITARTRATE AND ACETAMINOPHEN 1 TABLET: 5; 325 TABLET ORAL at 10:05

## 2023-05-11 RX ADMIN — POLYETHYLENE GLYCOL 3350 17 G: 17 POWDER, FOR SOLUTION ORAL at 10:05

## 2023-05-11 RX ADMIN — CEFEPIME 2 G: 2 INJECTION, POWDER, FOR SOLUTION INTRAVENOUS at 04:05

## 2023-05-11 RX ADMIN — PREGABALIN 150 MG: 25 CAPSULE ORAL at 10:05

## 2023-05-11 RX ADMIN — GUAIFENESIN 1200 MG: 600 TABLET, EXTENDED RELEASE ORAL at 09:05

## 2023-05-11 RX ADMIN — DIAZEPAM 2 MG: 2 TABLET ORAL at 09:05

## 2023-05-11 RX ADMIN — ASPIRIN 81 MG: 81 TABLET, COATED ORAL at 10:05

## 2023-05-11 RX ADMIN — ATORVASTATIN CALCIUM 80 MG: 40 TABLET, FILM COATED ORAL at 09:05

## 2023-05-11 RX ADMIN — CEFEPIME 2 G: 2 INJECTION, POWDER, FOR SOLUTION INTRAVENOUS at 08:05

## 2023-05-11 RX ADMIN — IPRATROPIUM BROMIDE AND ALBUTEROL SULFATE 3 ML: 2.5; .5 SOLUTION RESPIRATORY (INHALATION) at 04:05

## 2023-05-11 RX ADMIN — IPRATROPIUM BROMIDE AND ALBUTEROL SULFATE 3 ML: 2.5; .5 SOLUTION RESPIRATORY (INHALATION) at 03:05

## 2023-05-11 RX ADMIN — LEVOTHYROXINE SODIUM 100 MCG: 25 TABLET ORAL at 05:05

## 2023-05-11 RX ADMIN — APIXABAN 2.5 MG: 2.5 TABLET, FILM COATED ORAL at 10:05

## 2023-05-11 RX ADMIN — INSULIN ASPART 8 UNITS: 100 INJECTION, SOLUTION INTRAVENOUS; SUBCUTANEOUS at 07:05

## 2023-05-11 RX ADMIN — AZITHROMYCIN MONOHYDRATE 500 MG: 500 INJECTION, POWDER, LYOPHILIZED, FOR SOLUTION INTRAVENOUS at 11:05

## 2023-05-11 RX ADMIN — SODIUM CHLORIDE: 0.9 INJECTION, SOLUTION INTRAVENOUS at 08:05

## 2023-05-11 RX ADMIN — INSULIN ASPART 5 UNITS: 100 INJECTION, SOLUTION INTRAVENOUS; SUBCUTANEOUS at 09:05

## 2023-05-11 RX ADMIN — IPRATROPIUM BROMIDE AND ALBUTEROL SULFATE 3 ML: 2.5; .5 SOLUTION RESPIRATORY (INHALATION) at 11:05

## 2023-05-11 RX ADMIN — PREGABALIN 150 MG: 25 CAPSULE ORAL at 09:05

## 2023-05-11 RX ADMIN — INSULIN DETEMIR 25 UNITS: 100 INJECTION, SOLUTION SUBCUTANEOUS at 10:05

## 2023-05-11 RX ADMIN — MUPIROCIN: 20 OINTMENT TOPICAL at 10:05

## 2023-05-11 RX ADMIN — CEFEPIME 2 G: 2 INJECTION, POWDER, FOR SOLUTION INTRAVENOUS at 12:05

## 2023-05-11 RX ADMIN — ESCITALOPRAM OXALATE 20 MG: 10 TABLET ORAL at 10:05

## 2023-05-11 RX ADMIN — Medication 6 MG: at 10:05

## 2023-05-11 RX ADMIN — GUAIFENESIN 1200 MG: 600 TABLET, EXTENDED RELEASE ORAL at 10:05

## 2023-05-11 RX ADMIN — APIXABAN 2.5 MG: 2.5 TABLET, FILM COATED ORAL at 09:05

## 2023-05-11 RX ADMIN — PREDNISONE 40 MG: 10 TABLET ORAL at 10:05

## 2023-05-11 RX ADMIN — FLUTICASONE FUROATE AND VILANTEROL TRIFENATATE 1 PUFF: 100; 25 POWDER RESPIRATORY (INHALATION) at 07:05

## 2023-05-11 RX ADMIN — IPRATROPIUM BROMIDE AND ALBUTEROL SULFATE 3 ML: 2.5; .5 SOLUTION RESPIRATORY (INHALATION) at 07:05

## 2023-05-11 RX ADMIN — PANTOPRAZOLE SODIUM 40 MG: 40 TABLET, DELAYED RELEASE ORAL at 10:05

## 2023-05-11 RX ADMIN — INSULIN ASPART 10 UNITS: 100 INJECTION, SOLUTION INTRAVENOUS; SUBCUTANEOUS at 04:05

## 2023-05-11 RX ADMIN — ALUMINUM HYDROXIDE, MAGNESIUM HYDROXIDE, AND SIMETHICONE 30 ML: 200; 200; 20 SUSPENSION ORAL at 11:05

## 2023-05-11 RX ADMIN — INSULIN ASPART 10 UNITS: 100 INJECTION, SOLUTION INTRAVENOUS; SUBCUTANEOUS at 12:05

## 2023-05-11 NOTE — RESPIRATORY THERAPY
Pt. Slept much better last night using our V60 machine with his mask IPAP  17  EPAP 6 FI02 35%the patient was given a break during the night from BIPAP and placed back on NC at 4L sats held at 90-93.  This was around midnight and Pt. Was placed back on Bipap at 0140 where he slept well until around 04:30 . AT this time Bipap was placed on standby and Pt. Was put back on NC at 4L so nurning could bathe him.  Overall much better night.  mdPullen RT

## 2023-05-11 NOTE — PLAN OF CARE
[facility] - Intensive Care  Discharge Reassessment    Primary Care Provider: Manuel Wiley MD    Expected Discharge Date:     Patient resting in bed with wife at bedside. Plan to be discharged tomorrow if medically ready. His wife is concerned about his blood sugar & WBC being high. Patient thinks his oxygen isn't working correctly at home. His wife will bring his portable concentrator to the hospital tomorrow & [facility] will come speak with patient & spouse tomorrow at 9:00am. Will get follow up appointment with Dr Wiley for next week. Home health orders will be sent to [facility] tomorrow. Denies any other needs at this time.     Reassessment (most recent)       Discharge Reassessment - 05/11/23 1630          Discharge Reassessment    Assessment Type Discharge Planning Reassessment     Did the patient's condition or plan change since previous assessment? No     Discharge Plan discussed with: Patient     Communicated LEW with patient/caregiver Yes     Discharge Plan A Home Health     Discharge Plan B Home with family     DME Needed Upon Discharge  oxygen     Discharge Barriers Identified None     Why the patient remains in the hospital Requires continued medical care        Post-Acute Status    Post-Acute Authorization Home Health;HME     HME Status Referrals Sent     Home Health Status Pending medical clearance/testing     Discharge Delays None known at this time

## 2023-05-11 NOTE — ASSESSMENT & PLAN NOTE
Continue scheduled breathing treatments  Continue steroids  Continue home inhalers  CPT, IS  Mucinex  IV abx

## 2023-05-11 NOTE — PT/OT/SLP PROGRESS
"Occupational Therapy   Treatment    Name: Иван Fuentes Sr.  MRN: [id number]  Admitting Diagnosis:  Community acquired bacterial pneumonia       Recommendations:     Discharge Recommendations:  Home with home health   Discharge Equipment Recommendations:   Rolling walker  Barriers to discharge:   None    Assessment:   Mr. Fuentes is a 71yo man with a past medical history of BPH, CHF, CVA, COPD, DM2, HLD, PNA, EVONNE, hypothyroidism, and DVT on Eliquis 2.5mg po BID.  He uses CPAP at home, but cannot remember his exact settings (thinks maybe, " 20/something with 6L NC").    Rehab Prognosis:  Good     Plan:     Patient to be seen   to address the above listed problems via    Plan of Care Expires:  When patient is discharged from hospital.  Patient will be treated 3-5x per week.  Plan of Care Reviewed with:  Patient     Subjective     Pain/Comfort:   No pain    Objective:     Communicated with: OT spoke with patient's nurse prior to entering patient's room for session.    General Precautions: Standard,      Orthopedic Precautions:   Braces:    Respiratory Status: 2L  O2     Occupational Performance:     Bed Mobility:    Patient requires Evette with bed mobility.     Functional Mobility/Transfers:  Patient requires Evette with functional transfers with use of rolling walker.    Activities of Daily Living:  Patient requires modA with all ADLs.        Treatment & Education:  Patient tolerated skilled OT session well.  Patient was eager to mobilize during session.  Patient requires Evette with bed mobility and functional transfers with use of rolling walker.      GOALS: See initial evaluation for goals.      Time Tracking:     OT Date of Treatment:  5/11/2023  OT Start Time:  9:18  OT Stop Time:  9:38  OT Total Time (min):  20 minutes     Jw Herrera OTR/L               5/11/2023      "

## 2023-05-11 NOTE — RESPIRATORY THERAPY
00:00 Pt asked to take a break from Bipap for a bit, RT nebulized his breathing tx through the Bipap first and then took him off Bipap at his request to take a break and put him back on nasal cannula at 4L.  mdPullverito RT

## 2023-05-11 NOTE — ASSESSMENT & PLAN NOTE
Patient's FSGs are uncontrolled due to hyperglycemia on current medication regimen.  Last A1c reviewed-   Lab Results   Component Value Date    HGBA1C 8.5 (H) 04/07/2023     Most recent fingerstick glucose reviewed-   Recent Labs   Lab 05/10/23  1628 05/10/23  2043 05/11/23  1157   POCTGLUCOSE 243* 324* 433*     Current correctional scale  Medium  Maintain anti-hyperglycemic dose as follows-   Antihyperglycemics (From admission, onward)    Start     Stop Route Frequency Ordered    05/11/23 2100  insulin detemir U-100 (Levemir) pen 35 Units         -- SubQ 2 times daily 05/11/23 1440    05/07/23 0243  insulin aspart U-100 pen 1-10 Units         -- SubQ Before meals & nightly PRN 05/07/23 0143        Hold Oral hypoglycemics while patient is in the hospital.

## 2023-05-11 NOTE — SUBJECTIVE & OBJECTIVE
Interval History: Able to wean patient's O2 to 2L today. Starte steroids. Glucoses elevated. Will increase insulin tonight. Trilogy ordered.    Review of Systems  Objective:     Vital Signs (Most Recent):  Temp: 97.8 °F (36.6 °C) (05/11/23 0832)  Pulse: (!) 58 (05/11/23 1102)  Resp: 20 (05/11/23 1102)  BP: 104/65 (05/11/23 0832)  SpO2: 95 % (05/11/23 1102) Vital Signs (24h Range):  Temp:  [97.5 °F (36.4 °C)-98.9 °F (37.2 °C)] 97.8 °F (36.6 °C)  Pulse:  [53-73] 58  Resp:  [13-27] 20  SpO2:  [91 %-99 %] 95 %  BP: (101-158)/(64-92) 104/65     Weight: (!) 144.4 kg (318 lb 5.5 oz)  Body mass index is 45.68 kg/m².    Intake/Output Summary (Last 24 hours) at 5/11/2023 1439  Last data filed at 5/11/2023 0536  Gross per 24 hour   Intake 878.6 ml   Output 2700 ml   Net -1821.4 ml         Physical Exam      Vitals reviewed  General: NAD, obese  Head: NC/AT  Eyes: EOMI, MILAD  Cardiovascular: Pulses intact distally, Regular Rate and rhythm  Pulmonary: Increased RR, Wheezing present  Gi: Soft, Non-tender  Extremities: Warm, No edema present  Skin: Warm, dry  Neuro: Alert, Oriented x3, No focal Deficit  Psych: Appropriate mood and affect    Significant Labs: All pertinent labs within the past 24 hours have been reviewed.    Significant Imaging: I have reviewed all pertinent imaging results/findings within the past 24 hours.

## 2023-05-11 NOTE — PROGRESS NOTES
"[facility] Intensive [facility] Medicine  Progress Note    Patient Name: Иван Fuentes Sr.  MRN: [id number]  Patient Class: IP- Inpatient   Admission Date: 5/6/2023  Length of Stay: 5 days  Attending Physician: Bebo Chamorro MD  Primary Care Provider: Manuel Wiley MD        Subjective:     Principal Problem:Community acquired bacterial pneumonia        HPI:  Mr. Fuentes is a 71yo man with a past medical history of BPH, CHF, CVA, COPD, DM2, HLD, PNA, EVONNE, hypothyroidism, and DVT on Eliquis 2.5mg po BID.  He uses CPAP at home, but cannot remember his exact settings (thinks maybe, " 20/something with 6L NC").    He now comes to the ED with complaints of worsening shortness of breath over the past 2 days.  He started having fever to 103F today, so he became increasingly concerned.  He notes trouble even talking over the past few days.  He denies any cough.  He has been having rigors and chills.  He also notes some increased wheezing and leg swelling.    In the ED his VS were BP (!) 109/58   Pulse 72   Temp max 99.5 °F (37.5 °C) (Oral)   Resp (!) 38 -> 33   Ht 5' 10" (1.778 m)   Wt 136.1 kg (300 lb)   SpO2 88% "NC" -> 95% BiPAP 40% O2  BMI 43.05 kg/m².  Labs showed WBC 24, Cr 1.4, HCO3 15, Gluc 230, , Trop 0.018, UA with mod yeast, few bacteria, WBC 55.  ABG 4L NC: pH 7.47, PCO2 22.    CXR showed cardiomegaly, stable LLL effusion, and new RML infiltrate c/w pneumonia.  EKG showed sinus 91 with PAC's, QTc 425 ms, no ST-T changes.    In the ED he was treated with:  Medications   piperacillin-tazobactam (ZOSYN) 4.5 g in dextrose 5 % in water (D5W) 5 % 100 mL IVPB (MB+) (has no administration in time range) - NEVER GIVEN.   albuterol-ipratropium 2.5 mg-0.5 mg/3 mL nebulizer solution 3 mL (3 mLs Nebulization Given 5/6/23 1374)   levoFLOXacin 750 mg/150 mL IVPB 750 mg (750 mg Intravenous New Bag 5/6/23 3355)               Overview/Hospital Course:  No notes on file    Interval History: " Able to wean patient's O2 to 2L today. Starte steroids. Glucoses elevated. Will increase insulin tonight. Trilogy ordered.    Review of Systems  Objective:     Vital Signs (Most Recent):  Temp: 97.8 °F (36.6 °C) (05/11/23 0832)  Pulse: (!) 58 (05/11/23 1102)  Resp: 20 (05/11/23 1102)  BP: 104/65 (05/11/23 0832)  SpO2: 95 % (05/11/23 1102) Vital Signs (24h Range):  Temp:  [97.5 °F (36.4 °C)-98.9 °F (37.2 °C)] 97.8 °F (36.6 °C)  Pulse:  [53-73] 58  Resp:  [13-27] 20  SpO2:  [91 %-99 %] 95 %  BP: (101-158)/(64-92) 104/65     Weight: (!) 144.4 kg (318 lb 5.5 oz)  Body mass index is 45.68 kg/m².    Intake/Output Summary (Last 24 hours) at 5/11/2023 1439  Last data filed at 5/11/2023 0536  Gross per 24 hour   Intake 878.6 ml   Output 2700 ml   Net -1821.4 ml         Physical Exam      Vitals reviewed  General: NAD, obese  Head: NC/AT  Eyes: EOMI, MILAD  Cardiovascular: Pulses intact distally, Regular Rate and rhythm  Pulmonary: Increased RR, Wheezing present  Gi: Soft, Non-tender  Extremities: Warm, No edema present  Skin: Warm, dry  Neuro: Alert, Oriented x3, No focal Deficit  Psych: Appropriate mood and affect    Significant Labs: All pertinent labs within the past 24 hours have been reviewed.    Significant Imaging: I have reviewed all pertinent imaging results/findings within the past 24 hours.      Assessment/Plan:      * Community acquired bacterial pneumonia  He was given Levaquin in the ED  Cefepime and azithromycin  He has no cough or sputum currently    Microbiology Results (last 7 days)       Procedure Component Value Units Date/Time    Blood culture #2 **CANNOT BE ORDERED STAT** [[id number]] Collected: 05/06/23 3878    Order Status: Completed Specimen: Blood from Peripheral, Forearm, Left Updated: 05/10/23 1226     Blood Culture, Routine No Growth to date      No Growth to date      No Growth to date      No Growth to date    Blood culture #1 **CANNOT BE ORDERED STAT** [[id number]] Collected: 05/06/23 7977     Order Status: Completed Specimen: Blood from Peripheral, Forearm, Right Updated: 05/10/23 1212     Blood Culture, Routine No Growth to date      No Growth to date      No Growth to date      No Growth to date    Culture, Respiratory with Gram Stain [[id number]] Collected: 05/08/23 0643    Order Status: Completed Specimen: Respiratory from Sputum Updated: 05/10/23 0901     Respiratory Culture Normal respiratory arlin      No S aureus or Pseudomonas isolated.     Gram Stain (Respiratory) <10 epithelial cells per low power field.     Gram Stain (Respiratory) Rare WBC's     Gram Stain (Respiratory) Rare Gram positive cocci    Urine culture [[id number]]  (Abnormal) Collected: 05/06/23 2258    Order Status: Completed Specimen: Urine Updated: 05/08/23 1355     Urine Culture, Routine GISSELLE ALBICANS  10,000 - 49,999 cfu/ml  Treatment of asymptomatic candiduria is not recommended (except for   specific populations). Candida isolated in the urine typically   represents colonization. If an indwelling urinary catheter is present  it should be removed or replaced.      Narrative:      Preferred Collection Type->Urine, Clean Catch  Specimen Source->Urine                   Chronic hyponatremia  Improved with IVF. When corrected for glucose sodium is >130  Encourage good PO intake      ACP (advance care planning)  Advance Care Planning     Date: 05/07/2023    Living Will  During this visit, I engaged the patient  in the advance care planning process.  The patient and I reviewed the role for advance directives and their purpose in directing future healthcare if the patient's unable to speak for him.  At this point in time, the patient does have full decision-making capacity.  We discussed different extreme health states that he could experience, and reviewed what kind of medical care he would want in those situations.  The patient communicated that if he were comatose and had little chance of a meaningful recovery, he would want  machines/life-sustaining treatments used.  I spent a total of 16 minutes engaging the patient in this advance care planning discussion.                Acute respiratory failure with hypoxia  He had to be placed on BiPAP with O2 in the ED  Now he is much improved    Patient with Hypoxic Respiratory failure which is Acute.  he is on home oxygen at 6 LPM. Supplemental oxygen was provided and noted- Oxygen Concentration (%):  [36-70] 36    Signs/symptoms of respiratory failure include- tachypnea, increased work of breathing and wheezing. Contributing diagnoses includes - COPD, Obesity Hypoventilation and Pneumonia Labs and images were reviewed. Patient Has recent ABG, which has been reviewed. Will treat underlying causes and adjust management of respiratory failure as follows-     Continue IV abx  Started steroids now          Chronic kidney disease, stage 3b  Creatinine improving  Monitor for now  Avoid nephrotoxic medications  Renally dose meds    Sepsis  This patient does have evidence of infective focus  My overall impression is sepsis.  Source: Respiratory  Antibiotics given-   Antibiotics (72h ago, onward)      Start     Stop Route Frequency Ordered    05/10/23 1215  mupirocin 2 % ointment         05/15 0859 Nasl 2 times daily 05/10/23 1108    05/08/23 1200  ceFEPIme (MAXIPIME) 2 g in dextrose 5 % in water (D5W) 5 % 50 mL IVPB (MB+)         -- IV Every 8 hours (non-standard times) 05/08/23 1052    05/07/23 0900  azithromycin (ZITHROMAX) 500 mg in dextrose 5 % (D5W) 250 mL IVPB (Vial-Mate)  (Community Acquired Pneumonia (CAP) - Low MDR Risk)         05/12 0859 IV Every 24 hours (non-standard times) 05/07/23 0114          Latest lactate reviewed-  No results for input(s): LACTATE in the last 72 hours.  Organ dysfunction indicated by Acute respiratory failure    Fluid challenge Not needed - patient is not hypotensive      Post- resuscitation assessment No - Post resuscitation assessment not needed       Will Not  start Pressors- Levophed for MAP of 65  Source control achieved by: iv antibiotics      Peripheral arterial disease  (Ankle-brachial index of 0.83 on the right and 0.79 on the left, indicating bilateral mild-to-moderate peripheral artery disease.  Elevated velocities in the left distal superficial femoral artery suggesting hemodynamically significant focal stenosis of the left distal superficial femoral artery.)      Follow up with Vascular      COPD (chronic obstructive pulmonary disease)  Patient presented to ED in respiratory failure due to progression of COPD. He suffers from chronic respiratory failure secondary to his COPD. He now requires guaranteed target volume and advanced alarms due to severity of his disease. Bilevel has been considered but is ineffective treatment as it does not provide target volume. Without home NIV, patient will very likely return to ED in respiratory failure.  Continue scheduled breathing treatments  Continue steroids  Continue home inhalers  CPT, IS  Mucinex  IV abx    Personal history of DVT (deep vein thrombosis)  Continue home Eliquis 2.5mg po BID    EVONNE on CPAP  Home CPAP not effective      Severe obesity (BMI >= 40)  Encourage healthy diet, exercise and weight loss      Type 2 diabetes mellitus, without long-term current use of insulin  Patient's FSGs are uncontrolled due to hyperglycemia on current medication regimen.  Last A1c reviewed-   Lab Results   Component Value Date    HGBA1C 8.5 (H) 04/07/2023     Most recent fingerstick glucose reviewed-   Recent Labs   Lab 05/10/23  1628 05/10/23  2043 05/11/23  1157   POCTGLUCOSE 243* 324* 433*     Current correctional scale  Medium  Maintain anti-hyperglycemic dose as follows-   Antihyperglycemics (From admission, onward)      Start     Stop Route Frequency Ordered    05/11/23 2100  insulin detemir U-100 (Levemir) pen 35 Units         -- SubQ 2 times daily 05/11/23 1440    05/07/23 0243  insulin aspart U-100 pen 1-10 Units          -- SubQ Before meals & nightly PRN 05/07/23 0143          Hold Oral hypoglycemics while patient is in the hospital.        Hypothyroid  Continue home Synthroid 100mcg po qday  TSH WNL    Primary hypertension  Can restart lisinopril half dose tomorrow      VTE Risk Mitigation (From admission, onward)           Ordered     apixaban tablet 2.5 mg  2 times daily         05/07/23 0110                    Discharge Planning   LEW:      Code Status: Full Code   Is the patient medically ready for discharge?:     Reason for patient still in hospital (select all that apply): Treatment  Discharge Plan A: Home Health   Discharge Delays: None known at this time              Bebo Chamorro MD  Department of Hospital Medicine   [facility] Intensive [facility]

## 2023-05-11 NOTE — PLAN OF CARE
Patient in no apparent distress. Patient received on 4 lpm and weaned to 2 lpm. Patient does have home O2. Aerosol treatments given Q 4 with aerobika. IS done. BIPAP Q HS  . Will continue to monitor.

## 2023-05-11 NOTE — PLAN OF CARE
Problem: Adult Inpatient Plan of Care  Goal: Plan of Care Review  Outcome: Ongoing, Progressing     Problem: Infection  Goal: Absence of Infection Signs and Symptoms  Outcome: Ongoing, Progressing  Intervention: Prevent or Manage Infection  Flowsheets (Taken 5/11/2023 0300)  Fever Reduction/Comfort Measures:   lightweight bedding   lightweight clothing  Infection Management: aseptic technique maintained     POC reviewed with patient this shift.  A/O x4.  Respirations unlabored.  Skin w/d.  O2 continues at 4L/NC with bipap placed at night.  ABX Tx in progress with no adverse effects noted.  Tolerates meds whole with water without difficulty.  No-care provided per staff.  CBG monitoring in progress.  Scheduled/SS insulin administered as ordered.  VSS.  See flowsheet for full assessment.  Able to verbalize wants/needs.  No c/o pain or discomfort at this time.  Fall/safety precautions maintained.

## 2023-05-11 NOTE — ASSESSMENT & PLAN NOTE
Patient presented to ED in respiratory failure due to progression of COPD. He suffers from chronic respiratory failure secondary to his COPD. He now requires guaranteed target volume and advanced alarms due to severity of his disease. Bilevel has been considered but is ineffective treatment as it does not provide target volume. Without home NIV, patient will very likely return to ED in respiratory failure.  Continue scheduled breathing treatments  Continue steroids  Continue home inhalers  CPT, IS  Mucinex  IV abx

## 2023-05-11 NOTE — PT/OT/SLP PROGRESS
Physical Therapy Treatment    Patient Name:  Иван Fuentes Sr.   MRN:  [id number]    Recommendations:     Discharge Recommendations: home with home health  Discharge Equipment Recommendations: none  Barriers to discharge: None    Assessment:     Иван Fuentes Sr. is a 72 y.o. male admitted with a medical diagnosis of Community acquired bacterial pneumonia.  He presents with the following impairments/functional limitations: weakness, impaired endurance, impaired self care skills, impaired functional mobility, gait instability, decreased ROM, edema, impaired cardiopulmonary response to activity.    Rehab Prognosis: Fair; patient would benefit from acute skilled PT services to address these deficits and reach maximum level of function.    Recent Surgery: * No surgery found *      Plan:     During this hospitalization, patient to be seen  (3-5 x/wk) to address the identified rehab impairments via gait training, therapeutic activities, therapeutic exercises and progress toward the following goals:    Plan of Care Expires:   (upon discharge from facility)    Subjective     Chief Complaint: community acquired pneumonia  Patient/Family Comments/goals: patients wife at bedside stating patient functions primarily from electric scooter at home and has not been ambulating with rollator  Pain/Comfort:  Pain Rating 1: 0/10      Objective:     Communicated with RN prior to session.  Patient found up in chair with blood pressure cuff, peripheral IV, telemetry, pulse ox (continuous) upon PT entry to room.     General Precautions: Standard, fall  Orthopedic Precautions: N/A  Braces: N/A  Respiratory Status: Nasal cannula, flow 2.0 L/min     Functional Mobility:  Not assessed on this date secondary to patient sitting up in chair at onset and completion of therapy session    Therapeutic Activities and Exercises:   Patient educated on role of acute care PT and PT POC, safety while in hospital including calling nurse for  mobility, and call light usage  Patient performed 3 set(s) of 10 repetitions of the following seated exercises: ankle pumps, heel raises, knee extension, and marching. Patient required skilled PT for instruction of exercises and appropriate cues to perform exercises safely and appropriately.  Patient educated about importance of OOB mobility and remaining up in chair most of the day.    Patient left up in chair with all lines intact, call button in reach, RN notified, and spouse present..    GOALS:   See initial PT evaluation for goals      Time Tracking:     PT Received On: 05/11/23  PT Start Time: 0948     PT Stop Time: 1008  PT Total Time (min): 20 min     Billable Minutes: Therapeutic Exercise 20 min    Treatment Type: Treatment  PT/PTA: PT           05/11/2023

## 2023-05-12 VITALS
TEMPERATURE: 98 F | BODY MASS INDEX: 45.1 KG/M2 | OXYGEN SATURATION: 95 % | HEIGHT: 70 IN | WEIGHT: 315 LBS | SYSTOLIC BLOOD PRESSURE: 115 MMHG | DIASTOLIC BLOOD PRESSURE: 70 MMHG | RESPIRATION RATE: 16 BRPM | HEART RATE: 50 BPM

## 2023-05-12 LAB
ANION GAP SERPL CALC-SCNC: 11 MMOL/L (ref 8–16)
BACTERIA BLD CULT: NORMAL
BACTERIA BLD CULT: NORMAL
BASOPHILS # BLD AUTO: 0.14 K/UL (ref 0–0.2)
BASOPHILS NFR BLD: 0.6 % (ref 0–1.9)
BUN SERPL-MCNC: 33 MG/DL (ref 8–23)
CALCIUM SERPL-MCNC: 9.4 MG/DL (ref 8.7–10.5)
CHLORIDE SERPL-SCNC: 104 MMOL/L (ref 95–110)
CO2 SERPL-SCNC: 20 MMOL/L (ref 23–29)
CREAT SERPL-MCNC: 1 MG/DL (ref 0.5–1.4)
DIFFERENTIAL METHOD: ABNORMAL
EOSINOPHIL # BLD AUTO: 0.2 K/UL (ref 0–0.5)
EOSINOPHIL NFR BLD: 0.9 % (ref 0–8)
ERYTHROCYTE [DISTWIDTH] IN BLOOD BY AUTOMATED COUNT: 13.7 % (ref 11.5–14.5)
EST. GFR  (NO RACE VARIABLE): >60 ML/MIN/1.73 M^2
GLUCOSE SERPL-MCNC: 318 MG/DL (ref 70–110)
HCT VFR BLD AUTO: 36.6 % (ref 40–54)
HGB BLD-MCNC: 12 G/DL (ref 14–18)
IMM GRANULOCYTES # BLD AUTO: 1.39 K/UL (ref 0–0.04)
IMM GRANULOCYTES NFR BLD AUTO: 6.4 % (ref 0–0.5)
LYMPHOCYTES # BLD AUTO: 2.6 K/UL (ref 1–4.8)
LYMPHOCYTES NFR BLD: 12 % (ref 18–48)
MCH RBC QN AUTO: 29.9 PG (ref 27–31)
MCHC RBC AUTO-ENTMCNC: 32.8 G/DL (ref 32–36)
MCV RBC AUTO: 91 FL (ref 82–98)
MONOCYTES # BLD AUTO: 1.4 K/UL (ref 0.3–1)
MONOCYTES NFR BLD: 6.2 % (ref 4–15)
NEUTROPHILS # BLD AUTO: 16 K/UL (ref 1.8–7.7)
NEUTROPHILS NFR BLD: 73.9 % (ref 38–73)
NRBC BLD-RTO: 0 /100 WBC
PLATELET # BLD AUTO: 341 K/UL (ref 150–450)
PLATELET BLD QL SMEAR: ABNORMAL
PMV BLD AUTO: 9.4 FL (ref 9.2–12.9)
POCT GLUCOSE: 274 MG/DL (ref 70–110)
POCT GLUCOSE: 290 MG/DL (ref 70–110)
POTASSIUM SERPL-SCNC: 4.7 MMOL/L (ref 3.5–5.1)
RBC # BLD AUTO: 4.02 M/UL (ref 4.6–6.2)
SODIUM SERPL-SCNC: 135 MMOL/L (ref 136–145)
WBC # BLD AUTO: 21.72 K/UL (ref 3.9–12.7)

## 2023-05-12 PROCEDURE — 25000003 PHARM REV CODE 250: Performed by: STUDENT IN AN ORGANIZED HEALTH CARE EDUCATION/TRAINING PROGRAM

## 2023-05-12 PROCEDURE — 94640 AIRWAY INHALATION TREATMENT: CPT

## 2023-05-12 PROCEDURE — 99239 HOSP IP/OBS DSCHRG MGMT >30: CPT | Mod: ,,, | Performed by: STUDENT IN AN ORGANIZED HEALTH CARE EDUCATION/TRAINING PROGRAM

## 2023-05-12 PROCEDURE — 99239 PR HOSPITAL DISCHARGE DAY,>30 MIN: ICD-10-PCS | Mod: ,,, | Performed by: STUDENT IN AN ORGANIZED HEALTH CARE EDUCATION/TRAINING PROGRAM

## 2023-05-12 PROCEDURE — 94664 DEMO&/EVAL PT USE INHALER: CPT

## 2023-05-12 PROCEDURE — 85007 BL SMEAR W/DIFF WBC COUNT: CPT | Performed by: INTERNAL MEDICINE

## 2023-05-12 PROCEDURE — 25000003 PHARM REV CODE 250: Performed by: INTERNAL MEDICINE

## 2023-05-12 PROCEDURE — 94761 N-INVAS EAR/PLS OXIMETRY MLT: CPT

## 2023-05-12 PROCEDURE — 63600175 PHARM REV CODE 636 W HCPCS: Performed by: STUDENT IN AN ORGANIZED HEALTH CARE EDUCATION/TRAINING PROGRAM

## 2023-05-12 PROCEDURE — 25000242 PHARM REV CODE 250 ALT 637 W/ HCPCS: Performed by: INTERNAL MEDICINE

## 2023-05-12 PROCEDURE — 80048 BASIC METABOLIC PNL TOTAL CA: CPT | Performed by: STUDENT IN AN ORGANIZED HEALTH CARE EDUCATION/TRAINING PROGRAM

## 2023-05-12 PROCEDURE — 85027 COMPLETE CBC AUTOMATED: CPT | Performed by: INTERNAL MEDICINE

## 2023-05-12 PROCEDURE — 99900035 HC TECH TIME PER 15 MIN (STAT)

## 2023-05-12 PROCEDURE — 97110 THERAPEUTIC EXERCISES: CPT

## 2023-05-12 PROCEDURE — 27000221 HC OXYGEN, UP TO 24 HOURS

## 2023-05-12 RX ORDER — LEVOFLOXACIN 750 MG/1
750 TABLET ORAL DAILY
Qty: 2 TABLET | Refills: 0 | OUTPATIENT
Start: 2023-05-12 | End: 2023-05-13

## 2023-05-12 RX ORDER — FUROSEMIDE 20 MG/1
40 TABLET ORAL DAILY
Status: DISCONTINUED | OUTPATIENT
Start: 2023-05-12 | End: 2023-05-12 | Stop reason: HOSPADM

## 2023-05-12 RX ORDER — IPRATROPIUM BROMIDE AND ALBUTEROL SULFATE 2.5; .5 MG/3ML; MG/3ML
3 SOLUTION RESPIRATORY (INHALATION) EVERY 6 HOURS PRN
Qty: 75 ML | Refills: 0 | Status: SHIPPED | OUTPATIENT
Start: 2023-05-12 | End: 2024-05-11

## 2023-05-12 RX ADMIN — IPRATROPIUM BROMIDE AND ALBUTEROL SULFATE 3 ML: 2.5; .5 SOLUTION RESPIRATORY (INHALATION) at 03:05

## 2023-05-12 RX ADMIN — PANTOPRAZOLE SODIUM 40 MG: 40 TABLET, DELAYED RELEASE ORAL at 08:05

## 2023-05-12 RX ADMIN — IPRATROPIUM BROMIDE AND ALBUTEROL SULFATE 3 ML: 2.5; .5 SOLUTION RESPIRATORY (INHALATION) at 12:05

## 2023-05-12 RX ADMIN — INSULIN ASPART 6 UNITS: 100 INJECTION, SOLUTION INTRAVENOUS; SUBCUTANEOUS at 08:05

## 2023-05-12 RX ADMIN — FLUTICASONE FUROATE AND VILANTEROL TRIFENATATE 1 PUFF: 100; 25 POWDER RESPIRATORY (INHALATION) at 07:05

## 2023-05-12 RX ADMIN — PREGABALIN 150 MG: 25 CAPSULE ORAL at 08:05

## 2023-05-12 RX ADMIN — ESCITALOPRAM OXALATE 20 MG: 10 TABLET ORAL at 08:05

## 2023-05-12 RX ADMIN — ASPIRIN 81 MG: 81 TABLET, COATED ORAL at 08:05

## 2023-05-12 RX ADMIN — IPRATROPIUM BROMIDE AND ALBUTEROL SULFATE 3 ML: 2.5; .5 SOLUTION RESPIRATORY (INHALATION) at 07:05

## 2023-05-12 RX ADMIN — GUAIFENESIN 1200 MG: 600 TABLET, EXTENDED RELEASE ORAL at 08:05

## 2023-05-12 RX ADMIN — LEVOTHYROXINE SODIUM 100 MCG: 25 TABLET ORAL at 05:05

## 2023-05-12 RX ADMIN — POLYETHYLENE GLYCOL 3350 17 G: 17 POWDER, FOR SOLUTION ORAL at 08:05

## 2023-05-12 RX ADMIN — FUROSEMIDE 40 MG: 20 TABLET ORAL at 10:05

## 2023-05-12 RX ADMIN — MUPIROCIN: 20 OINTMENT TOPICAL at 08:05

## 2023-05-12 RX ADMIN — INSULIN ASPART 6 UNITS: 100 INJECTION, SOLUTION INTRAVENOUS; SUBCUTANEOUS at 11:05

## 2023-05-12 RX ADMIN — CEFEPIME 2 G: 2 INJECTION, POWDER, FOR SOLUTION INTRAVENOUS at 04:05

## 2023-05-12 RX ADMIN — INSULIN DETEMIR 20 UNITS: 100 INJECTION, SOLUTION SUBCUTANEOUS at 08:05

## 2023-05-12 RX ADMIN — APIXABAN 2.5 MG: 2.5 TABLET, FILM COATED ORAL at 08:05

## 2023-05-12 NOTE — PLAN OF CARE
[facility] - Intensive Care  Discharge Final Note    Primary Care Provider: Manuel Wiley MD    Expected Discharge Date: 5/12/2023    Paris was here to speak with patient & spouse about their respiratory equipment. Patient doesn't qualify for a trilogy but can get a Bipap & nebulizer. They will  from [facility] in [place]. Home health set up with [facility]. Follow up appointment with pulmonology provided to patient & spouse. Someone from Dr Wiley's office will call him with appointment. Demonstrated understanding by verbal feedback. No other needs at this time.    Final Discharge Note (most recent)       Final Note - 05/12/23 1030          Final Note    Assessment Type Final Discharge Note     Anticipated Discharge Disposition Home-Health Care Svc     What phone number can be called within the next 1-3 days to see how you are doing after discharge? [phone]     Hospital Resources/Appts/Education Provided Appointments scheduled and added to AVS        Post-Acute Status    Post-Acute Authorization Home Health     HME Status Set-up Complete/Auth obtained     Home Health Status Set-up Complete/Auth obtained     Discharge Delays None known at this time                     Important Message from Medicare  Important Message from Medicare regarding Discharge Appeal Rights: Given to patient/caregiver, Explained to patient/caregiver, Signed/date by patient/caregiver     Date IMM was signed: 05/08/23  Time IMM was signed: 1622    Contact Info       Gage Ramirez MD   Specialty: Pulmonary Disease, Critical Care Medicine    [address] [place] [id number]   Phone: [phone]       Next Steps: Go on 7/6/2023    Instructions: appointment Friday May 19th at 2:00pm for pulmonary follow up; WILL BE SEEN BY DR EDDY Wiley MD   Specialty: Nephrology   Relationship: PCP - General    [address] [facility]  [place] [address]   Phone: [phone]       Next  Steps: Go to    Instructions: someone will call you with an appointment

## 2023-05-12 NOTE — PT/OT/SLP PROGRESS
Physical Therapy Treatment    Patient Name:  Иван Fuentes Sr.   MRN:  [id number]    Recommendations:     Discharge Recommendations: home with home health  Discharge Equipment Recommendations: none  Barriers to discharge: None    Assessment:     Иван Fuentes Sr. is a 72 y.o. male admitted with a medical diagnosis of Community acquired bacterial pneumonia.  He presents with the following impairments/functional limitations: weakness, impaired endurance, impaired self care skills, impaired functional mobility, gait instability, decreased ROM, edema, impaired cardiopulmonary response to activity.    Rehab Prognosis: Fair; patient would benefit from acute skilled PT services to address these deficits and reach maximum level of function.    Recent Surgery: * No surgery found *      Plan:     During this hospitalization, patient to be seen  (3-5 x/wk) to address the identified rehab impairments via gait training, therapeutic activities, therapeutic exercises and progress toward the following goals:    Plan of Care Expires:   (upon discharge from facility)    Subjective     Chief Complaint: community acquired pneumonia  Patient/Family Comments/goals: patients wife at bedside stating patient functions primarily from electric scooter at home and has not been ambulating with rollator  Pain/Comfort:         Objective:     Communicated with RN prior to session.  Patient found up in chair with   upon PT entry to room.     General Precautions: Standard, fall  Orthopedic Precautions: N/A  Braces: N/A  Respiratory Status: Nasal cannula, flow 2.0 L/min     Functional Mobility:  Not assessed on this date secondary to patient sitting up in chair at onset and completion of therapy session    Therapeutic Activities and Exercises:   Patient educated on role of acute care PT and PT POC, safety while in hospital including calling nurse for mobility, and call light usage  Patient performed 3 set(s) of 10 repetitions of the  following seated exercises: ankle pumps, heel raises, knee extension, and marching. Patient required skilled PT for instruction of exercises and appropriate cues to perform exercises safely and appropriately.  Patient educated about importance of OOB mobility and remaining up in chair most of the day.    Patient left up in chair with all lines intact, call button in reach, RN notified, and spouse present..    GOALS:   See initial PT evaluation for goals      Time Tracking:     PT Received On: 05/12/23  PT Start Time: 0655     PT Stop Time: 0720  PT Total Time (min): 25 min     Billable Minutes: 23 min                   05/12/2023

## 2023-05-12 NOTE — PLAN OF CARE
Problem: Adult Inpatient Plan of Care  Goal: Plan of Care Review  Outcome: Met  Goal: Patient-Specific Goal (Individualized)  Outcome: Met  Goal: Absence of Hospital-Acquired Illness or Injury  Outcome: Met  Goal: Optimal Comfort and Wellbeing  Outcome: Met  Goal: Readiness for Transition of Care  Outcome: Met     Problem: Bariatric Environmental Safety  Goal: Safety Maintained with Care  Outcome: Met     Problem: Infection  Goal: Absence of Infection Signs and Symptoms  Outcome: Met     Problem: Adjustment to Illness (Sepsis/Septic Shock)  Goal: Optimal Coping  Outcome: Met     Problem: Bleeding (Sepsis/Septic Shock)  Goal: Absence of Bleeding  Outcome: Met     Problem: Glycemic Control Impaired (Sepsis/Septic Shock)  Goal: Blood Glucose Level Within Desired Range  Outcome: Met     Problem: Infection Progression (Sepsis/Septic Shock)  Goal: Absence of Infection Signs and Symptoms  Outcome: Met     Problem: Nutrition Impaired (Sepsis/Septic Shock)  Goal: Optimal Nutrition Intake  Outcome: Met     Problem: Fluid and Electrolyte Imbalance (Acute Kidney Injury/Impairment)  Goal: Fluid and Electrolyte Balance  Outcome: Met     Problem: Oral Intake Inadequate (Acute Kidney Injury/Impairment)  Goal: Optimal Nutrition Intake  Outcome: Met     Problem: Renal Function Impairment (Acute Kidney Injury/Impairment)  Goal: Effective Renal Function  Outcome: Met     Problem: Diabetes Comorbidity  Goal: Blood Glucose Level Within Targeted Range  Outcome: Met     Problem: Skin Injury Risk Increased  Goal: Skin Health and Integrity  Outcome: Met     Problem: Gas Exchange Impaired  Goal: Optimal Gas Exchange  Outcome: Met     Problem: Fall Injury Risk  Goal: Absence of Fall and Fall-Related Injury  Outcome: Met      Statement Selected

## 2023-05-12 NOTE — NURSING
2149>Pt's blood sugar 416.  Notified Dr. Mccracken.   Said to recheck later after insulin interventions.  2313>Pt's blood sugar 357.  Notified Dr. Mccracken.   Said ok to check in morning.

## 2023-05-12 NOTE — NURSING
Patient is being discharged home via WC with family member @ approx 1200. Patient assisted into vehicle via RN x2. IV site CDI with no redness, swelling, or drainage. Tele removed prior. AVS reviewed. Medication education given. No questions or concerns at this time. All patient needs met and anticipated.

## 2023-05-12 NOTE — PLAN OF CARE
Problem: Adult Inpatient Plan of Care  Goal: Plan of Care Review  Outcome: Ongoing, Progressing  Goal: Patient-Specific Goal (Individualized)  Outcome: Ongoing, Progressing  Goal: Absence of Hospital-Acquired Illness or Injury  Outcome: Ongoing, Progressing  Goal: Optimal Comfort and Wellbeing  Outcome: Ongoing, Progressing  Goal: Readiness for Transition of Care  Outcome: Ongoing, Progressing     Problem: Bariatric Environmental Safety  Goal: Safety Maintained with Care  Outcome: Ongoing, Progressing     Problem: Infection  Goal: Absence of Infection Signs and Symptoms  Outcome: Ongoing, Progressing     Problem: Adjustment to Illness (Sepsis/Septic Shock)  Goal: Optimal Coping  Outcome: Ongoing, Progressing     Problem: Bleeding (Sepsis/Septic Shock)  Goal: Absence of Bleeding  Outcome: Ongoing, Progressing     Problem: Glycemic Control Impaired (Sepsis/Septic Shock)  Goal: Blood Glucose Level Within Desired Range  Outcome: Ongoing, Progressing     Problem: Infection Progression (Sepsis/Septic Shock)  Goal: Absence of Infection Signs and Symptoms  Outcome: Ongoing, Progressing     Problem: Nutrition Impaired (Sepsis/Septic Shock)  Goal: Optimal Nutrition Intake  Outcome: Ongoing, Progressing     Problem: Fluid and Electrolyte Imbalance (Acute Kidney Injury/Impairment)  Goal: Fluid and Electrolyte Balance  Outcome: Ongoing, Progressing     Problem: Oral Intake Inadequate (Acute Kidney Injury/Impairment)  Goal: Optimal Nutrition Intake  Outcome: Ongoing, Progressing     Problem: Renal Function Impairment (Acute Kidney Injury/Impairment)  Goal: Effective Renal Function  Outcome: Ongoing, Progressing     Problem: Diabetes Comorbidity  Goal: Blood Glucose Level Within Targeted Range  Outcome: Ongoing, Progressing     Problem: Skin Injury Risk Increased  Goal: Skin Health and Integrity  Outcome: Ongoing, Progressing     Problem: Gas Exchange Impaired  Goal: Optimal Gas Exchange  Outcome: Ongoing, Progressing

## 2023-05-13 ENCOUNTER — HOSPITAL ENCOUNTER (EMERGENCY)
Facility: HOSPITAL | Age: 73
Discharge: HOME | End: 2023-05-13
Attending: EMERGENCY MEDICINE
Payer: MEDICARE

## 2023-05-13 VITALS
RESPIRATION RATE: 20 BRPM | SYSTOLIC BLOOD PRESSURE: 118 MMHG | OXYGEN SATURATION: 96 % | BODY MASS INDEX: 47.13 KG/M2 | DIASTOLIC BLOOD PRESSURE: 58 MMHG | HEART RATE: 62 BPM | TEMPERATURE: 98 F | WEIGHT: 315 LBS

## 2023-05-13 DIAGNOSIS — N39.0 URINARY TRACT INFECTION WITHOUT HEMATURIA, SITE UNSPECIFIED: Primary | ICD-10-CM

## 2023-05-13 DIAGNOSIS — R09.02 HYPOXIA: ICD-10-CM

## 2023-05-13 LAB
ALBUMIN SERPL BCP-MCNC: 2.5 G/DL (ref 3.5–5.2)
ALP SERPL-CCNC: 93 U/L (ref 55–135)
ALT SERPL W/O P-5'-P-CCNC: 60 U/L (ref 10–44)
ANION GAP SERPL CALC-SCNC: 9 MMOL/L (ref 8–16)
AST SERPL-CCNC: 43 U/L (ref 10–40)
BACTERIA #/AREA URNS AUTO: ABNORMAL /HPF
BASOPHILS NFR BLD: 0 % (ref 0–1.9)
BILIRUB SERPL-MCNC: 0.3 MG/DL (ref 0.1–1)
BILIRUB UR QL STRIP: NEGATIVE
BUN SERPL-MCNC: 39 MG/DL (ref 8–23)
CALCIUM SERPL-MCNC: 9.1 MG/DL (ref 8.7–10.5)
CHLORIDE SERPL-SCNC: 105 MMOL/L (ref 95–110)
CLARITY UR REFRACT.AUTO: CLEAR
CO2 SERPL-SCNC: 20 MMOL/L (ref 23–29)
COLOR UR AUTO: YELLOW
CREAT SERPL-MCNC: 1.3 MG/DL (ref 0.5–1.4)
DIFFERENTIAL METHOD: ABNORMAL
DOHLE BOD BLD QL SMEAR: PRESENT
EOSINOPHIL NFR BLD: 2 % (ref 0–8)
ERYTHROCYTE [DISTWIDTH] IN BLOOD BY AUTOMATED COUNT: 14.6 % (ref 11.5–14.5)
EST. GFR  (NO RACE VARIABLE): 58 ML/MIN/1.73 M^2
GLUCOSE SERPL-MCNC: 202 MG/DL (ref 70–110)
GLUCOSE UR QL STRIP: ABNORMAL
HCT VFR BLD AUTO: 44.6 % (ref 40–54)
HGB BLD-MCNC: 13.7 G/DL (ref 14–18)
HGB UR QL STRIP: NEGATIVE
HYALINE CASTS UR QL AUTO: 1 /LPF
IMM GRANULOCYTES # BLD AUTO: ABNORMAL K/UL (ref 0–0.04)
IMM GRANULOCYTES NFR BLD AUTO: ABNORMAL % (ref 0–0.5)
KETONES UR QL STRIP: ABNORMAL
LEUKOCYTE ESTERASE UR QL STRIP: ABNORMAL
LYMPHOCYTES NFR BLD: 17 % (ref 18–48)
MCH RBC QN AUTO: 29.1 PG (ref 27–31)
MCHC RBC AUTO-ENTMCNC: 30.7 G/DL (ref 32–36)
MCV RBC AUTO: 95 FL (ref 82–98)
METAMYELOCYTES NFR BLD MANUAL: 2 %
MICROSCOPIC COMMENT: ABNORMAL
MONOCYTES NFR BLD: 5 % (ref 4–15)
MYELOCYTES NFR BLD MANUAL: 5 %
NEUTROPHILS NFR BLD: 68 % (ref 38–73)
NEUTS BAND NFR BLD MANUAL: 1 %
NITRITE UR QL STRIP: NEGATIVE
NRBC BLD-RTO: 0 /100 WBC
PH UR STRIP: 5 [PH] (ref 5–8)
PLATELET # BLD AUTO: 443 K/UL (ref 150–450)
PLATELET BLD QL SMEAR: ABNORMAL
PMV BLD AUTO: 9.8 FL (ref 9.2–12.9)
POTASSIUM SERPL-SCNC: 5.5 MMOL/L (ref 3.5–5.1)
PROT SERPL-MCNC: 7.5 G/DL (ref 6–8.4)
PROT UR QL STRIP: ABNORMAL
RBC # BLD AUTO: 4.71 M/UL (ref 4.6–6.2)
RBC #/AREA URNS AUTO: 2 /HPF (ref 0–4)
SMUDGE CELLS BLD QL SMEAR: PRESENT
SODIUM SERPL-SCNC: 134 MMOL/L (ref 136–145)
SP GR UR STRIP: 1.02 (ref 1–1.03)
SQUAMOUS #/AREA URNS AUTO: 1 /HPF
TOXIC GRANULES BLD QL SMEAR: PRESENT
URN SPEC COLLECT METH UR: ABNORMAL
WBC # BLD AUTO: 19.22 K/UL (ref 3.9–12.7)
WBC #/AREA URNS AUTO: 6 /HPF (ref 0–5)
WBC TOXIC VACUOLES BLD QL SMEAR: PRESENT

## 2023-05-13 PROCEDURE — 81001 URINALYSIS AUTO W/SCOPE: CPT | Performed by: EMERGENCY MEDICINE

## 2023-05-13 PROCEDURE — 85007 BL SMEAR W/DIFF WBC COUNT: CPT | Performed by: EMERGENCY MEDICINE

## 2023-05-13 PROCEDURE — 25000003 PHARM REV CODE 250: Performed by: EMERGENCY MEDICINE

## 2023-05-13 PROCEDURE — 85027 COMPLETE CBC AUTOMATED: CPT | Performed by: EMERGENCY MEDICINE

## 2023-05-13 PROCEDURE — 99284 EMERGENCY DEPT VISIT MOD MDM: CPT | Mod: ,,, | Performed by: PHYSICIAN ASSISTANT

## 2023-05-13 PROCEDURE — 99284 PR EMERGENCY DEPT VISIT,LEVEL IV: ICD-10-PCS | Mod: ,,, | Performed by: PHYSICIAN ASSISTANT

## 2023-05-13 PROCEDURE — 80053 COMPREHEN METABOLIC PANEL: CPT | Performed by: EMERGENCY MEDICINE

## 2023-05-13 PROCEDURE — 99284 EMERGENCY DEPT VISIT MOD MDM: CPT | Mod: 25

## 2023-05-13 RX ORDER — LEVOFLOXACIN 750 MG/1
750 TABLET ORAL
Status: COMPLETED | OUTPATIENT
Start: 2023-05-13 | End: 2023-05-13

## 2023-05-13 RX ORDER — LEVOFLOXACIN 750 MG/1
750 TABLET ORAL DAILY
Qty: 4 TABLET | Refills: 0 | Status: SHIPPED | OUTPATIENT
Start: 2023-05-14 | End: 2023-05-18

## 2023-05-13 RX ADMIN — LEVOFLOXACIN 750 MG: 750 TABLET, FILM COATED ORAL at 08:05

## 2023-05-13 NOTE — ED NOTES
"Per wife, "they just got out of [facility]" with pneumonia and sepsis and white count going up.  Patient was discharged yesterday and was in the ICU. "I think he has a uti". Wife noticed pts urine smelled bad and she used a test strip at home that tested + for uti. "He gets septic really quick". Pts wife also reports low O2 sat. COPD. CPAP at night. "Ok during the day but the last week or so" has started using a portable oxygen machine on 3L.     No nausea no vomiting no diarrhea.   "

## 2023-05-13 NOTE — ED TRIAGE NOTES
Иванevelina Womackolive Fuentes Sr., a 72 y.o. male presents to the ED w/ complaint of malodorous urine. Pt sated he gets UTI frequently and becomes septic from them, pt also reports he was discharge from ICU yesterday and was treated for pneumonia     Triage note:  Chief Complaint   Patient presents with    Dysuria     Starting last night, odorous urine. Already has urinary issues so can't tell if urinary symptoms are new.      Review of patient's allergies indicates:  No Known Allergies  Past Medical History:   Diagnosis Date    Anticoagulant long-term use     Arthritis of both knees     BPH (benign prostatic hyperplasia)     CHF (congestive heart failure)     COPD (chronic obstructive pulmonary disease) 05/03/2021    CVA (cerebral vascular accident)     Deep vein thrombosis     Diabetes mellitus     Diabetes mellitus, type 2     High cholesterol     Hypertension     Left-sided weakness     Obese     Pneumonia due to COVID-19 virus 07/01/2020    Sleep apnea     Thyroid disease     Urosepsis

## 2023-05-13 NOTE — ED PROVIDER NOTES
Encounter Date: 5/13/2023       History     Source of history:  Patient.    History obtained without limitations.      HPI:  The patient is a 72-year-old with the below past medical history who presents by personal transportation with his wife.  She noticed foul-smelling urine last night that tested positive for infection using a home dipstick.  He was discharged from [facility] yesterday after being admitted on 5/6 for pneumonia and sepsis.  He uses CPAP when he sleeps.  He was started on continuous supplemental oxygen yesterday, when she was not on prior to the admission.  He denies feeling acutely short of breath.  He denies dysuria and difficulty urinating.  He denies fever, chills, nausea, vomiting, headache, dizziness, chest pain, abdominal pain, and diarrhea.      Review of patient's allergies indicates:  No Known Allergies  Past Medical History:   Diagnosis Date    Anticoagulant long-term use     Arthritis of both knees     BPH (benign prostatic hyperplasia)     CHF (congestive heart failure)     COPD (chronic obstructive pulmonary disease) 05/03/2021    CVA (cerebral vascular accident)     Deep vein thrombosis     Diabetes mellitus     Diabetes mellitus, type 2     High cholesterol     Hypertension     Left-sided weakness     Obese     Pneumonia due to COVID-19 virus 07/01/2020    Sleep apnea     Thyroid disease     Urosepsis      Past Surgical History:   Procedure Laterality Date    BACK SURGERY      HIP ARTHROPLASTY      HIP ARTHROPLASTY Right 10/28/2021    Procedure: ARTHROPLASTY, HIP;  Surgeon: Ever Hall MD;  Location: [facility] OR [address] [place] [address];  Service: Orthopedics;  Laterality: Right;    IMPLANTATION OF PERMANENT SACRAL NERVE STIMULATOR Right 12/21/2021    Procedure: INSERTION, NEUROSTIMULATOR, PERMANENT, SACRAL;  Surgeon: Erickson Magana MD;  Location: [facility] OR [address] [place] [address];  Service: Urology;  Laterality: Right;    INJECTION OF ANESTHETIC AGENT AROUND NERVE Right 11/19/2020    Procedure: BLOCK,  NERVE, FEMORAL AND OBTURATOR;  Surgeon: Dania Garcia MD;  Location: [facility]IN [facility]GT;  Service: Pain Management;  Laterality: Right;    INJECTION OF JOINT Right 3/23/2021    Procedure: INJECTION, JOINT, HIP Pt. can continue Eliquis per provider.;  Surgeon: Dania Garcia MD;  Location: [facility];  Service: Pain Management;  Laterality: Right;    JOINT REPLACEMENT      PERCUTANEOUS CRYOTHERAPY OF PERIPHERAL NERVE USING LIQUID NITROUS OXIDE IN CLOSED NEEDLE DEVICE Left 8/26/2019    Procedure: CRYOTHERAPY, NERVE, PERIPHERAL, PERCUTANEOUS, USING LIQUID NITROUS OXIDE IN CLOSED NEEDLE DEVICE LEFT KNEE SIMON;  Surgeon: MENG Beckwith;  Location: [facility] CATH LAB;  Service: Pain Management;  Laterality: Left;    PROSTATE SURGERY  2015    RADIOFREQUENCY ABLATION Right 1/12/2021    Procedure: COOLED OBTURTOR FEMORAL ,needconsent;  Surgeon: Dania Garcia MD;  Location: [facility];  Service: Pain Management;  Laterality: Right;    SPINAL FUSION  09/2014    TONSILLECTOMY      TOTAL KNEE ARTHROPLASTY Left 9/3/2019    Procedure: ARTHROPLASTY, KNEE, TOTAL-SANDIP;  Surgeon: Ever Hall MD;  Location: [facility] OR [address] [place] [address];  Service: Orthopedics;  Laterality: Left;     Family History   Problem Relation Age of Onset    Hypertension Brother     Diabetes Mellitus Mother     Heart attack Neg Hx     Heart disease Neg Hx      Social History     Tobacco Use    Smoking status: Former     Packs/day: 0.50     Types: Cigarettes    Smokeless tobacco: Never    Tobacco comments:     pt quit 3 weeks ago   Substance Use Topics    Alcohol use: Not Currently     Comment: history of alcohol excess until 2011    Drug use: No     Review of Systems  See HPI.  Remainder of 10 point systems review negative.    Physical Exam     Initial Vitals [05/13/23 1543]   BP Pulse Resp Temp SpO2   127/60 67 18 98.2 °F (36.8 °C) (!) 93 %      MAP       --         Physical Exam    Nursing note and vitals reviewed.  Constitutional: He is  not diaphoretic. No distress.   HENT:   Head: Normocephalic and atraumatic.   Mouth/Throat: Oropharynx is clear and moist.   Eyes: Conjunctivae are normal. No scleral icterus.   Neck: No JVD present.   Cardiovascular:  Normal rate, regular rhythm and normal heart sounds.     Exam reveals no gallop and no friction rub.       No murmur heard.  Pulmonary/Chest: Effort normal. No stridor. No respiratory distress. He has no decreased breath sounds. He has no wheezes. He has no rhonchi. He has rales.   Bibasilar rales.   Abdominal: There is no abdominal tenderness.   Protuberant abdomen.     Neurological: He is alert and oriented to person, place, and time. GCS score is 15. GCS eye subscore is 4. GCS verbal subscore is 5. GCS motor subscore is 6.   Skin: Skin is warm and dry. No pallor.       ED Course   Procedures  Labs Reviewed   CBC W/ AUTO DIFFERENTIAL - Abnormal; Notable for the following components:       Result Value    WBC 19.22 (*)     Hemoglobin 13.7 (*)     MCHC 30.7 (*)     RDW 14.6 (*)     Lymph % 17.0 (*)     All other components within normal limits   COMPREHENSIVE METABOLIC PANEL - Abnormal; Notable for the following components:    Sodium 134 (*)     Potassium 5.5 (*)     CO2 20 (*)     Glucose 202 (*)     BUN 39 (*)     Albumin 2.5 (*)     AST 43 (*)     ALT 60 (*)     eGFR 58.0 (*)     All other components within normal limits   URINALYSIS, REFLEX TO URINE CULTURE - Abnormal; Notable for the following components:    Protein, UA 2+ (*)     Glucose, UA 2+ (*)     Ketones, UA Trace (*)     Leukocytes, UA Trace (*)     All other components within normal limits    Narrative:     Specimen Source->Urine   URINALYSIS MICROSCOPIC - Abnormal; Notable for the following components:    WBC, UA 6 (*)     All other components within normal limits    Narrative:     Specimen Source->Urine     EKG Readings: (Independently Interpreted)   Time of study:  05/13/2023 17:03  Normal sinus rhythm.  Ventricular rate 65 beats per  minute.  Normal axis.  Normal QRS interval.  Prolonged QTc interval ( m 457s).  No ST segment elevation or depression.     Imaging Results              X-Ray Chest AP Portable (Final result)  Result time 05/13/23 20:41:16      Final result by Osmar Napoles MD (05/13/23 20:41:16)                   Impression:      Stable airspace opacity in the right midlung zone and left lung base.    Stable appearance of a small left-sided pleural effusion.      Electronically signed by: Osmar Napoles MD  Date:    05/13/2023  Time:    20:41               Narrative:    EXAMINATION:  XR CHEST AP PORTABLE    CLINICAL HISTORY:  Hypoxemia    TECHNIQUE:  Single frontal view of the chest was performed.    COMPARISON:  05/06/2023.    FINDINGS:  There are partially visualized postoperative changes in the cervical spine.  Monitoring EKG leads are present.    The trachea is unremarkable.  There are calcifications of the aortic knob.  The cardiomediastinal silhouette is enlarged.  There is no pleural effusion on the right.  There is a stable small left-sided pleural effusion.  There is no evidence of a pneumothorax.  There is no evidence of pneumomediastinum.  There is a stable airspace opacity in the right midlung and the left lung base.  There are degenerative changes in the osseous structures.                                       Medications   levoFLOXacin tablet 750 mg (750 mg Oral Given 5/13/23 2052)     Medical Decision Making:   Clinical Tests:   Lab Tests: Reviewed  Radiological Study: Reviewed    MDM:  This was an emergent evaluation.  The patient and his wife were concern for urinary tract infection due to foul-smelling urine and a positive home urine dipstick test.  He was afebrile with stable vitals.  He not appear to be in acute distress.  He had a benign abdomen.  Trace leukocytes and 6 WBC/field on UA.  Findings concerning for developing urinary tract infection.  Elevated white count on lab review.  On chart review, it was  improved from the prior day and likely related to unresolved pneumonia.  The patient was unable to feel the Levaquin that was prescribed at discharge.  He was prescribed a five day course of Levaquin at discharge on this visit to complete treatment for pneumonia and to treat urinary tract infection.                        Clinical Impression:   Final diagnoses:  [R09.02] Hypoxia  [N39.0] Urinary tract infection without hematuria, site unspecified (Primary)        ED Disposition Condition    Discharge Stable          ED Prescriptions       Medication Sig Dispense Start Date End Date Auth. Provider    levoFLOXacin (LEVAQUIN) 750 MG tablet  Take 1 tablet (750 mg total) by mouth once daily. for 4 days 4 tablet 5/14/2023 5/18/2023 Nikhil Simth III, MD          Follow-up Information       Follow up With Specialties Details Why Contact Info    ER   Return to the ER for worsened symptoms or for any other concerns.              Nikhil Smith III, MD  05/19/23 0136

## 2023-05-14 NOTE — HOSPITAL COURSE
Patient was admitted for acute on hypoxic respiratory failure 2/2 COPD exacerbation caused by bacterial CAP (unknown organism). Patient was treated with abx and breathing treatments without much initial improvement. Patient was placed on steroids eventually after careful consideration. He was also given aerobika and IS. Patient improved markedly and was down to 2L LFNC on day of discharge. He had significant hyperglycemia on the steroids so decision was made to stop these at discharge since patient currently does not use insulin at home. All cultures NG at time of discharge.     Patient also with chronic hyponatremia during admission with mild SEVEN secondary to hypovolemia. This was corrected with IVF and he was continued on his home lasix at discharge. His home blood pressure medication was held at discharge since he was had low to normal blood pressure throughout admission.     Patient has been using recalled CPAP device and likely not thoroughly cleaning it for some time. I advised patient that this could be contributing to recurrent lung infections and advised him against using this. We attempted to qualify patient for home trilogy but he did not qualify. Patient is planning to buy new BiPAP device and have it delivered to his house to avoid recurrent admissions 2/2 hypoxic respiratory failure.     Patient and his wife were provided thorough discharge instructions and strict return precautions.

## 2023-05-14 NOTE — DISCHARGE SUMMARY
"[facility] Medicine  Discharge Summary      Patient Name: Иван Fuentes Sr.  MRN: [id number]  [facility]: [id number]  Patient Class: IP- Inpatient  Admission Date: 5/6/2023  Hospital Length of Stay: 6 days  Discharge Date and Time: 5/12/2023 12:00 PM  Attending Physician: No att. providers found   Discharging Provider: Bebo Chamorro MD  Primary Care Provider: Manuel Wiley MD    Primary Care Team: Networked reference to record PCT     HPI:   Mr. Fuentes is a 73yo man with a past medical history of BPH, CHF, CVA, COPD, DM2, HLD, PNA, EVONNE, hypothyroidism, and DVT on Eliquis 2.5mg po BID.  He uses CPAP at home, but cannot remember his exact settings (thinks maybe, " 20/something with 6L NC").    He now comes to the ED with complaints of worsening shortness of breath over the past 2 days.  He started having fever to 103F today, so he became increasingly concerned.  He notes trouble even talking over the past few days.  He denies any cough.  He has been having rigors and chills.  He also notes some increased wheezing and leg swelling.    In the ED his VS were BP (!) 109/58   Pulse 72   Temp max 99.5 °F (37.5 °C) (Oral)   Resp (!) 38 -> 33   Ht 5' 10" (1.778 m)   Wt 136.1 kg (300 lb)   SpO2 88% "NC" -> 95% BiPAP 40% O2  BMI 43.05 kg/m².  Labs showed WBC 24, Cr 1.4, HCO3 15, Gluc 230, , Trop 0.018, UA with mod yeast, few bacteria, WBC 55.  ABG 4L NC: pH 7.47, PCO2 22.    CXR showed cardiomegaly, stable LLL effusion, and new RML infiltrate c/w pneumonia.  EKG showed sinus 91 with PAC's, QTc 425 ms, no ST-T changes.    In the ED he was treated with:  Medications   piperacillin-tazobactam (ZOSYN) 4.5 g in dextrose 5 % in water (D5W) 5 % 100 mL IVPB (MB+) (has no administration in time range) - NEVER GIVEN.   albuterol-ipratropium 2.5 mg-0.5 mg/3 mL nebulizer solution 3 mL (3 mLs Nebulization Given 5/6/23 5793)   levoFLOXacin 750 mg/150 mL IVPB 750 mg (750 mg Intravenous New " Bag 5/6/23 8877)               * No surgery found *      Hospital Course:   Patient was admitted for acute on hypoxic respiratory failure 2/2 COPD exacerbation caused by bacterial CAP (unknown organism). Patient was treated with abx and breathing treatments without much initial improvement. Patient was placed on steroids eventually after careful consideration. He was also given aerobika and IS. Patient improved markedly and was down to 2L LFNC on day of discharge. He had significant hyperglycemia on the steroids so decision was made to stop these at discharge since patient currently does not use insulin at home. All cultures NG at time of discharge.     Patient also with chronic hyponatremia during admission with mild SEVEN secondary to hypovolemia. This was corrected with IVF and he was continued on his home lasix at discharge. His home blood pressure medication was held at discharge since he was had low to normal blood pressure throughout admission.     Patient has been using recalled CPAP device and likely not thoroughly cleaning it for some time. I advised patient that this could be contributing to recurrent lung infections and advised him against using this. We attempted to qualify patient for home trilogy but he did not qualify. Patient is planning to buy new BiPAP device and have it delivered to his house to avoid recurrent admissions 2/2 hypoxic respiratory failure.     Patient and his wife were provided thorough discharge instructions and strict return precautions.       Goals of Care Treatment Preferences:  Code Status: Full Code      Consults:     Pulmonary  COPD (chronic obstructive pulmonary disease)  Patient presented to ED in respiratory failure due to progression of COPD. He suffers from chronic respiratory failure secondary to his COPD. He now requires guaranteed target volume and advanced alarms due to severity of his disease. Bilevel has been considered but is ineffective treatment as it does not  provide target volume. Without home NIV, patient will very likely return to ED in respiratory failure.  Continue scheduled breathing treatments  Continue steroids  Continue home inhalers  CPT, IS  Mucinex  IV abx    Other  EVONNE on CPAP  Home CPAP not effective        Final Active Diagnoses:    Diagnosis Date Noted POA    PRINCIPAL PROBLEM:  Community acquired bacterial pneumonia [J15.9] 05/07/2023 Yes    Chronic hyponatremia [E87.1] 05/08/2023 Yes    Sepsis [A41.9] 05/07/2023 Yes    Chronic kidney disease, stage 3b [N18.32] 05/07/2023 Yes    Acute respiratory failure with hypoxia [J96.01] 05/07/2023 Yes    ACP (advance care planning) [Z71.89] 05/07/2023 Not Applicable    Peripheral arterial disease [I73.9] 04/06/2023 Yes    COPD (chronic obstructive pulmonary disease) [J44.9] 05/03/2021 Yes    EVONNE on CPAP [G47.33, Z99.89] 08/26/2019 Not Applicable    Personal history of DVT (deep vein thrombosis) [Z86.718] 08/26/2019 Not Applicable    Type 2 diabetes mellitus, without long-term current use of insulin [E11.9] 10/07/2014 Yes    Severe obesity (BMI >= 40) [E66.01] 10/07/2014 Yes    Primary hypertension [I10] 10/01/2014 Yes    Hypothyroid [E03.9] 10/01/2014 Yes      Problems Resolved During this Admission:    Diagnosis Date Noted Date Resolved POA    Acute cystitis without hematuria [N30.00] 04/06/2023 05/11/2023 Yes       Discharged Condition: good    Disposition: Home or Self Care    Follow Up:   Follow-up Information     Gage Ramirez MD. Go on 7/6/2023.    Specialties: Pulmonary Disease, Critical Care Medicine  Why: appointment Friday May 19th at 2:00pm for pulmonary follow up; WILL BE SEEN BY DR EDDY WANG  Contact information:  [address] [facility] [address]  [phone]             Manuel Wiley MD. Go to.    Specialty: Nephrology  Why: someone will call you with an appointment  Contact information:  [address] [facility]  [place]  "[id number]  [phone]                       Patient Instructions:      VENTILATOR FOR HOME USE     Order Specific Question Answer Comments   Height: 5' 10" (1.778 m)    Weight: 144.4 kg (318 lb 5.5 oz)    Does patient have medical equipment at home? rollator electric scooter / electric scooter / electric scooter   Does patient have medical equipment at home? oxygen    Does patient have medical equipment at home? CPAP    Length of need (1-99 months): 99    Type (): Non-invasive    Interface needed: Full face mask Nasal mask if patient prefers   Mode: AVAPS    PEEP - EPAP 8.0 CM/H2O    Humidifier needed? Yes If possible     NEBULIZER FOR HOME USE     Order Specific Question Answer Comments   Height: 5' 10" (1.778 m)    Weight: 144.4 kg (318 lb 5.5 oz)    Does patient have medical equipment at home? rollator electric scooter / electric scooter / electric scooter   Does patient have medical equipment at home? oxygen    Does patient have medical equipment at home? CPAP    Length of need (1-99 months): 99      Ambulatory referral/consult to Home Health   Standing Status: Future   Referral Priority: Routine Referral Type: Home Health   Referral Reason: Specialty Services Required   Requested Specialty: Home Health Services   Number of Visits Requested: 1     Diet Cardiac     Diet diabetic     Notify your health care provider if you experience any of the following:  difficulty breathing or increased cough     Notify your health care provider if you experience any of the following:  temperature >100.4     Notify your health care provider if you experience any of the following:  increased confusion or weakness       Significant Diagnostic Studies:   Recent Results (from the past 168 hour(s))   POCT glucose    Collection Time: 05/07/23  4:34 PM   Result Value Ref Range    POCT Glucose 210 (H) 70 - 110 mg/dL   POCT glucose    Collection Time: 05/07/23 10:00 PM   Result Value Ref Range    POCT Glucose 267 (H) 70 - 110 mg/dL "   Basic Metabolic Panel (BMP)    Collection Time: 05/08/23  4:34 AM   Result Value Ref Range    Sodium 128 (L) 136 - 145 mmol/L    Potassium 4.2 3.5 - 5.1 mmol/L    Chloride 97 95 - 110 mmol/L    CO2 18 (L) 23 - 29 mmol/L    Glucose 208 (H) 70 - 110 mg/dL    BUN 32 (H) 8 - 23 mg/dL    Creatinine 1.7 (H) 0.5 - 1.4 mg/dL    Calcium 9.0 8.7 - 10.5 mg/dL    Anion Gap 13 8 - 16 mmol/L    eGFR 42.0 (A) >60 mL/min/1.73 m^2   Magnesium    Collection Time: 05/08/23  4:34 AM   Result Value Ref Range    Magnesium 1.7 1.6 - 2.6 mg/dL   Phosphorus    Collection Time: 05/08/23  4:34 AM   Result Value Ref Range    Phosphorus 2.9 2.7 - 4.5 mg/dL   CBC auto differential    Collection Time: 05/08/23  4:34 AM   Result Value Ref Range    WBC 16.97 (H) 3.90 - 12.70 K/uL    RBC 4.15 (L) 4.60 - 6.20 M/uL    Hemoglobin 12.5 (L) 14.0 - 18.0 g/dL    Hematocrit 38.8 (L) 40.0 - 54.0 %    MCV 94 82 - 98 fL    MCH 30.1 27.0 - 31.0 pg    MCHC 32.2 32.0 - 36.0 g/dL    RDW 13.8 11.5 - 14.5 %    Platelets 208 150 - 450 K/uL    MPV 10.1 9.2 - 12.9 fL    Immature Granulocytes 1.4 (H) 0.0 - 0.5 %    Gran # (ANC) 12.1 (H) 1.8 - 7.7 K/uL    Immature Grans (Abs) 0.23 (H) 0.00 - 0.04 K/uL    Lymph # 2.0 1.0 - 4.8 K/uL    Mono # 2.5 (H) 0.3 - 1.0 K/uL    Eos # 0.0 0.0 - 0.5 K/uL    Baso # 0.07 0.00 - 0.20 K/uL    nRBC 0 0 /100 WBC    Gran % 71.3 38.0 - 73.0 %    Lymph % 12.0 (L) 18.0 - 48.0 %    Mono % 14.7 4.0 - 15.0 %    Eosinophil % 0.2 0.0 - 8.0 %    Basophil % 0.4 0.0 - 1.9 %    Differential Method Automated    BNP    Collection Time: 05/08/23  4:34 AM   Result Value Ref Range    BNP 54 0 - 99 pg/mL   Culture, Respiratory with Gram Stain    Collection Time: 05/08/23  6:43 AM    Specimen: Sputum; Respiratory   Result Value Ref Range    Respiratory Culture Normal respiratory arlin     Respiratory Culture No S aureus or Pseudomonas isolated.     Gram Stain (Respiratory) <10 epithelial cells per low power field.     Gram Stain (Respiratory) Rare WBC's      Gram Stain (Respiratory) Rare Gram positive cocci    Legionella culture Sputum, Expectorated    Collection Time: 05/08/23  6:44 AM    Specimen: Sputum Expectorated   Result Value Ref Range    Legionella Culture Source sputum    POCT glucose    Collection Time: 05/08/23  6:59 AM   Result Value Ref Range    POCT Glucose 222 (H) 70 - 110 mg/dL   POCT glucose    Collection Time: 05/08/23 11:02 AM   Result Value Ref Range    POCT Glucose 291 (H) 70 - 110 mg/dL   Basic metabolic panel    Collection Time: 05/08/23  1:59 PM   Result Value Ref Range    Sodium 128 (L) 136 - 145 mmol/L    Potassium 4.8 3.5 - 5.1 mmol/L    Chloride 98 95 - 110 mmol/L    CO2 14 (L) 23 - 29 mmol/L    Glucose 330 (H) 70 - 110 mg/dL    BUN 33 (H) 8 - 23 mg/dL    Creatinine 1.6 (H) 0.5 - 1.4 mg/dL    Calcium 8.9 8.7 - 10.5 mg/dL    Anion Gap 16 8 - 16 mmol/L    eGFR 45.2 (A) >60 mL/min/1.73 m^2   TSH    Collection Time: 05/08/23  1:59 PM   Result Value Ref Range    TSH 1.815 0.400 - 4.000 uIU/mL   Osmolality, Serum    Collection Time: 05/08/23  1:59 PM   Result Value Ref Range    Osmolality 300 280 - 300 mOsm/kg   Legionella antigen, urine    Collection Time: 05/08/23  3:44 PM   Result Value Ref Range    L. pneumo. Ur Antigen Negative Negative   Sodium, urine, random    Collection Time: 05/08/23  3:44 PM   Result Value Ref Range    Sodium, Urine 26 20 - 250 mmol/L   Creatinine, urine, random    Collection Time: 05/08/23  3:44 PM   Result Value Ref Range    Creatinine, Urine 138.4 23.0 - 375.0 mg/dL   POCT glucose    Collection Time: 05/08/23  4:06 PM   Result Value Ref Range    POCT Glucose 285 (H) 70 - 110 mg/dL   POCT glucose    Collection Time: 05/08/23  7:13 PM   Result Value Ref Range    POCT Glucose 279 (H) 70 - 110 mg/dL   Basic Metabolic Panel (BMP)    Collection Time: 05/09/23  6:10 AM   Result Value Ref Range    Sodium 129 (L) 136 - 145 mmol/L    Potassium 4.5 3.5 - 5.1 mmol/L    Chloride 101 95 - 110 mmol/L    CO2 15 (L) 23 - 29 mmol/L     Glucose 294 (H) 70 - 110 mg/dL    BUN 31 (H) 8 - 23 mg/dL    Creatinine 1.3 0.5 - 1.4 mg/dL    Calcium 8.7 8.7 - 10.5 mg/dL    Anion Gap 13 8 - 16 mmol/L    eGFR 58.0 (A) >60 mL/min/1.73 m^2   Magnesium    Collection Time: 05/09/23  6:10 AM   Result Value Ref Range    Magnesium 1.7 1.6 - 2.6 mg/dL   Phosphorus    Collection Time: 05/09/23  6:10 AM   Result Value Ref Range    Phosphorus 2.3 (L) 2.7 - 4.5 mg/dL   CBC auto differential    Collection Time: 05/09/23  6:10 AM   Result Value Ref Range    WBC 14.72 (H) 3.90 - 12.70 K/uL    RBC 4.11 (L) 4.60 - 6.20 M/uL    Hemoglobin 12.4 (L) 14.0 - 18.0 g/dL    Hematocrit 37.2 (L) 40.0 - 54.0 %    MCV 91 82 - 98 fL    MCH 30.2 27.0 - 31.0 pg    MCHC 33.3 32.0 - 36.0 g/dL    RDW 13.8 11.5 - 14.5 %    Platelets 218 150 - 450 K/uL    MPV 9.7 9.2 - 12.9 fL    Immature Granulocytes 1.9 (H) 0.0 - 0.5 %    Gran # (ANC) 9.0 (H) 1.8 - 7.7 K/uL    Immature Grans (Abs) 0.28 (H) 0.00 - 0.04 K/uL    Lymph # 2.8 1.0 - 4.8 K/uL    Mono # 2.2 (H) 0.3 - 1.0 K/uL    Eos # 0.3 0.0 - 0.5 K/uL    Baso # 0.09 0.00 - 0.20 K/uL    nRBC 0 0 /100 WBC    Gran % 61.2 38.0 - 73.0 %    Lymph % 19.1 18.0 - 48.0 %    Mono % 15.2 (H) 4.0 - 15.0 %    Eosinophil % 2.0 0.0 - 8.0 %    Basophil % 0.6 0.0 - 1.9 %    Differential Method Automated    POCT glucose    Collection Time: 05/09/23  7:13 AM   Result Value Ref Range    POCT Glucose 293 (H) 70 - 110 mg/dL   Acetone    Collection Time: 05/09/23  7:54 AM   Result Value Ref Range    Acetone, Bld Negative Negative   Acetone, qualitative, urine    Collection Time: 05/09/23  7:56 AM   Result Value Ref Range    Acetone, Urine Negative Negative   Urinalysis    Collection Time: 05/09/23  7:56 AM   Result Value Ref Range    Specimen UA Urine, Unspecified     Color, UA Yellow Yellow, Straw, Yashira    Appearance, UA Clear Clear    pH, UA 6.0 5.0 - 8.0    Specific Gravity, UA 1.015 1.005 - 1.030    Protein, UA 3+ (A) Negative    Glucose, UA 2+ (A) Negative    Ketones,  UA Negative Negative    Bilirubin (UA) Negative Negative    Occult Blood UA 1+ (A) Negative    Nitrite, UA Negative Negative    Urobilinogen, UA Negative Negative EU/dL    Leukocytes, UA Trace (A) Negative   Urinalysis Microscopic    Collection Time: 05/09/23  7:56 AM   Result Value Ref Range    RBC, UA 10 (H) 0 - 4 /hpf    WBC, UA 15 (H) 0 - 5 /hpf    Bacteria Many (A) None-Occ /hpf    Yeast, UA Few (A) None    Hyaline Casts, UA 0 0-1/lpf /lpf    Microscopic Comment SEE COMMENT    ISTAT PROCEDURE    Collection Time: 05/09/23  8:06 AM   Result Value Ref Range    POC PH 7.352 7.35 - 7.45    POC PCO2 33.4 (L) 35 - 45 mmHg    POC PO2 59 (LL) 80 - 100 mmHg    POC HCO3 18.5 (L) 24 - 28 mmol/L    POC BE -7 -2 to 2 mmol/L    POC SATURATED O2 89 (L) 95 - 100 %    Sample ARTERIAL     Site LR     Allens Test Pass     DelSys Nasal Can     Mode SPONT     Flow 4.5     FiO2 38    POCT glucose    Collection Time: 05/09/23 11:13 AM   Result Value Ref Range    POCT Glucose 373 (H) 70 - 110 mg/dL   POCT glucose    Collection Time: 05/09/23  4:16 PM   Result Value Ref Range    POCT Glucose 305 (H) 70 - 110 mg/dL   POCT glucose    Collection Time: 05/09/23  9:03 PM   Result Value Ref Range    POCT Glucose 285 (H) 70 - 110 mg/dL   ISTAT PROCEDURE    Collection Time: 05/10/23  2:19 AM   Result Value Ref Range    POC PH 7.408 7.35 - 7.45    POC PCO2 28.6 (LL) 35 - 45 mmHg    POC PO2 50 (LL) 80 - 100 mmHg    POC HCO3 18.1 (L) 24 - 28 mmol/L    POC BE -7 -2 to 2 mmol/L    POC SATURATED O2 86 (L) 95 - 100 %    Sample ARTERIAL     Site LR     Allens Test Pass     DelSys CPAP/BiPAP     Mode SPONT     Flow 5     FiO2 40     Sp02 90    Basic Metabolic Panel (BMP)    Collection Time: 05/10/23  4:34 AM   Result Value Ref Range    Sodium 129 (L) 136 - 145 mmol/L    Potassium 4.7 3.5 - 5.1 mmol/L    Chloride 101 95 - 110 mmol/L    CO2 16 (L) 23 - 29 mmol/L    Glucose 289 (H) 70 - 110 mg/dL    BUN 29 (H) 8 - 23 mg/dL    Creatinine 1.2 0.5 - 1.4 mg/dL     Calcium 9.0 8.7 - 10.5 mg/dL    Anion Gap 12 8 - 16 mmol/L    eGFR >60.0 >60 mL/min/1.73 m^2   Magnesium    Collection Time: 05/10/23  4:34 AM   Result Value Ref Range    Magnesium 1.7 1.6 - 2.6 mg/dL   Phosphorus    Collection Time: 05/10/23  4:34 AM   Result Value Ref Range    Phosphorus 2.1 (L) 2.7 - 4.5 mg/dL   CBC auto differential    Collection Time: 05/10/23  4:34 AM   Result Value Ref Range    WBC 15.36 (H) 3.90 - 12.70 K/uL    RBC 4.12 (L) 4.60 - 6.20 M/uL    Hemoglobin 12.3 (L) 14.0 - 18.0 g/dL    Hematocrit 37.5 (L) 40.0 - 54.0 %    MCV 91 82 - 98 fL    MCH 29.9 27.0 - 31.0 pg    MCHC 32.8 32.0 - 36.0 g/dL    RDW 13.7 11.5 - 14.5 %    Platelets 253 150 - 450 K/uL    MPV 9.5 9.2 - 12.9 fL    Immature Granulocytes 3.1 (H) 0.0 - 0.5 %    Gran # (ANC) 10.6 (H) 1.8 - 7.7 K/uL    Immature Grans (Abs) 0.48 (H) 0.00 - 0.04 K/uL    Lymph # 1.9 1.0 - 4.8 K/uL    Mono # 1.7 (H) 0.3 - 1.0 K/uL    Eos # 0.6 (H) 0.0 - 0.5 K/uL    Baso # 0.11 0.00 - 0.20 K/uL    nRBC 0 0 /100 WBC    Gran % 69.3 38.0 - 73.0 %    Lymph % 12.6 (L) 18.0 - 48.0 %    Mono % 10.7 4.0 - 15.0 %    Eosinophil % 3.6 0.0 - 8.0 %    Basophil % 0.7 0.0 - 1.9 %    Differential Method Automated    ISTAT PROCEDURE    Collection Time: 05/10/23  5:53 AM   Result Value Ref Range    POC PH 7.373 7.35 - 7.45    POC PCO2 35.4 35 - 45 mmHg    POC PO2 96 80 - 100 mmHg    POC HCO3 20.6 (L) 24 - 28 mmol/L    POC BE -5 -2 to 2 mmol/L    POC SATURATED O2 97 95 - 100 %    Rate 18     Sample ARTERIAL     Site LR     Allens Test Pass     DelSys CPAP/BiPAP     Mode BiPAP     FiO2 70     Spont Rate 26     Sp02 98     IP 17     EP 6    POCT glucose    Collection Time: 05/10/23  7:10 AM   Result Value Ref Range    POCT Glucose 321 (H) 70 - 110 mg/dL   POCT glucose    Collection Time: 05/10/23 11:00 AM   Result Value Ref Range    POCT Glucose 413 (H) 70 - 110 mg/dL   POCT glucose    Collection Time: 05/10/23 11:59 AM   Result Value Ref Range    POCT Glucose 336 (H) 70 -  110 mg/dL   POCT glucose    Collection Time: 05/10/23  4:28 PM   Result Value Ref Range    POCT Glucose 243 (H) 70 - 110 mg/dL   POCT glucose    Collection Time: 05/10/23  8:43 PM   Result Value Ref Range    POCT Glucose 324 (H) 70 - 110 mg/dL   Basic Metabolic Panel (BMP)    Collection Time: 05/11/23  4:14 AM   Result Value Ref Range    Sodium 132 (L) 136 - 145 mmol/L    Potassium 5.2 (H) 3.5 - 5.1 mmol/L    Chloride 103 95 - 110 mmol/L    CO2 18 (L) 23 - 29 mmol/L    Glucose 331 (H) 70 - 110 mg/dL    BUN 32 (H) 8 - 23 mg/dL    Creatinine 1.2 0.5 - 1.4 mg/dL    Calcium 9.3 8.7 - 10.5 mg/dL    Anion Gap 11 8 - 16 mmol/L    eGFR >60.0 >60 mL/min/1.73 m^2   Magnesium    Collection Time: 05/11/23  4:14 AM   Result Value Ref Range    Magnesium 2.0 1.6 - 2.6 mg/dL   Phosphorus    Collection Time: 05/11/23  4:14 AM   Result Value Ref Range    Phosphorus 3.3 2.7 - 4.5 mg/dL   CBC auto differential    Collection Time: 05/11/23  4:14 AM   Result Value Ref Range    WBC 16.61 (H) 3.90 - 12.70 K/uL    RBC 4.06 (L) 4.60 - 6.20 M/uL    Hemoglobin 12.4 (L) 14.0 - 18.0 g/dL    Hematocrit 37.5 (L) 40.0 - 54.0 %    MCV 92 82 - 98 fL    MCH 30.5 27.0 - 31.0 pg    MCHC 33.1 32.0 - 36.0 g/dL    RDW 13.8 11.5 - 14.5 %    Platelets 292 150 - 450 K/uL    MPV 9.4 9.2 - 12.9 fL    Immature Granulocytes CANCELED 0.0 - 0.5 %    Immature Grans (Abs) CANCELED 0.00 - 0.04 K/uL    Lymph # CANCELED 1.0 - 4.8 K/uL    Mono # CANCELED 0.3 - 1.0 K/uL    Eos # CANCELED 0.0 - 0.5 K/uL    Baso # CANCELED 0.00 - 0.20 K/uL    nRBC 0 0 /100 WBC    Gran % 83.0 (H) 38.0 - 73.0 %    Lymph % 8.0 (L) 18.0 - 48.0 %    Mono % 6.0 4.0 - 15.0 %    Eosinophil % 0.0 0.0 - 8.0 %    Basophil % 0.0 0.0 - 1.9 %    Metamyelocytes 2.0 %    Myelocytes 1.0 %    Differential Method Manual    POCT glucose    Collection Time: 05/11/23  7:27 AM   Result Value Ref Range    POCT Glucose 340 (H) 70 - 110 mg/dL   POCT glucose    Collection Time: 05/11/23 11:57 AM   Result Value Ref  Range    POCT Glucose 433 (H) 70 - 110 mg/dL   POCT glucose    Collection Time: 05/11/23  4:54 PM   Result Value Ref Range    POCT Glucose 435 (H) 70 - 110 mg/dL   POCT glucose    Collection Time: 05/11/23  9:14 PM   Result Value Ref Range    POCT Glucose 416 (H) 70 - 110 mg/dL   POCT glucose    Collection Time: 05/11/23 11:12 PM   Result Value Ref Range    POCT Glucose 357 (H) 70 - 110 mg/dL   CBC auto differential    Collection Time: 05/12/23  4:29 AM   Result Value Ref Range    WBC 21.72 (H) 3.90 - 12.70 K/uL    RBC 4.02 (L) 4.60 - 6.20 M/uL    Hemoglobin 12.0 (L) 14.0 - 18.0 g/dL    Hematocrit 36.6 (L) 40.0 - 54.0 %    MCV 91 82 - 98 fL    MCH 29.9 27.0 - 31.0 pg    MCHC 32.8 32.0 - 36.0 g/dL    RDW 13.7 11.5 - 14.5 %    Platelets 341 150 - 450 K/uL    MPV 9.4 9.2 - 12.9 fL    Immature Granulocytes 6.4 (H) 0.0 - 0.5 %    Gran # (ANC) 16.0 (H) 1.8 - 7.7 K/uL    Immature Grans (Abs) 1.39 (H) 0.00 - 0.04 K/uL    Lymph # 2.6 1.0 - 4.8 K/uL    Mono # 1.4 (H) 0.3 - 1.0 K/uL    Eos # 0.2 0.0 - 0.5 K/uL    Baso # 0.14 0.00 - 0.20 K/uL    nRBC 0 0 /100 WBC    Gran % 73.9 (H) 38.0 - 73.0 %    Lymph % 12.0 (L) 18.0 - 48.0 %    Mono % 6.2 4.0 - 15.0 %    Eosinophil % 0.9 0.0 - 8.0 %    Basophil % 0.6 0.0 - 1.9 %    Platelet Estimate Appears normal     Differential Method Automated    Basic Metabolic Panel    Collection Time: 05/12/23  4:29 AM   Result Value Ref Range    Sodium 135 (L) 136 - 145 mmol/L    Potassium 4.7 3.5 - 5.1 mmol/L    Chloride 104 95 - 110 mmol/L    CO2 20 (L) 23 - 29 mmol/L    Glucose 318 (H) 70 - 110 mg/dL    BUN 33 (H) 8 - 23 mg/dL    Creatinine 1.0 0.5 - 1.4 mg/dL    Calcium 9.4 8.7 - 10.5 mg/dL    Anion Gap 11 8 - 16 mmol/L    eGFR >60.0 >60 mL/min/1.73 m^2   POCT glucose    Collection Time: 05/12/23  7:20 AM   Result Value Ref Range    POCT Glucose 274 (H) 70 - 110 mg/dL   POCT glucose    Collection Time: 05/12/23 11:15 AM   Result Value Ref Range    POCT Glucose 290 (H) 70 - 110 mg/dL    Urinalysis, Reflex to Urine Culture Urine, Clean Catch    Collection Time: 05/13/23  5:31 PM    Specimen: Urine   Result Value Ref Range    Specimen UA Urine, Clean Catch     Color, UA Yellow Yellow, Straw, Yashira    Appearance, UA Clear Clear    pH, UA 5.0 5.0 - 8.0    Specific Gravity, UA 1.020 1.005 - 1.030    Protein, UA 2+ (A) Negative    Glucose, UA 2+ (A) Negative    Ketones, UA Trace (A) Negative    Bilirubin (UA) Negative Negative    Occult Blood UA Negative Negative    Nitrite, UA Negative Negative    Leukocytes, UA Trace (A) Negative   Urinalysis Microscopic    Collection Time: 05/13/23  5:31 PM   Result Value Ref Range    RBC, UA 2 0 - 4 /hpf    WBC, UA 6 (H) 0 - 5 /hpf    Bacteria Rare None-Occ /hpf    Squam Epithel, UA 1 /hpf    Hyaline Casts, UA 1 0-1/lpf /lpf    Microscopic Comment SEE COMMENT    CBC auto differential    Collection Time: 05/13/23  5:34 PM   Result Value Ref Range    WBC 19.22 (H) 3.90 - 12.70 K/uL    RBC 4.71 4.60 - 6.20 M/uL    Hemoglobin 13.7 (L) 14.0 - 18.0 g/dL    Hematocrit 44.6 40.0 - 54.0 %    MCV 95 82 - 98 fL    MCH 29.1 27.0 - 31.0 pg    MCHC 30.7 (L) 32.0 - 36.0 g/dL    RDW 14.6 (H) 11.5 - 14.5 %    Platelets 443 150 - 450 K/uL    MPV 9.8 9.2 - 12.9 fL    Immature Granulocytes CANCELED 0.0 - 0.5 %    Immature Grans (Abs) CANCELED 0.00 - 0.04 K/uL    nRBC 0 0 /100 WBC    Gran % 68.0 38.0 - 73.0 %    Lymph % 17.0 (L) 18.0 - 48.0 %    Mono % 5.0 4.0 - 15.0 %    Eosinophil % 2.0 0.0 - 8.0 %    Basophil % 0.0 0.0 - 1.9 %    Bands 1.0 %    Metamyelocytes 2.0 %    Myelocytes 5.0 %    Platelet Estimate Appears normal     Dohle Bodies Present     Toxic Granulation Present     Smudge Cells Present     Vacuolated Granulocytes Present     Differential Method Manual    Comprehensive metabolic panel    Collection Time: 05/13/23  5:34 PM   Result Value Ref Range    Sodium 134 (L) 136 - 145 mmol/L    Potassium 5.5 (H) 3.5 - 5.1 mmol/L    Chloride 105 95 - 110 mmol/L    CO2 20 (L) 23 - 29  mmol/L    Glucose 202 (H) 70 - 110 mg/dL    BUN 39 (H) 8 - 23 mg/dL    Creatinine 1.3 0.5 - 1.4 mg/dL    Calcium 9.1 8.7 - 10.5 mg/dL    Total Protein 7.5 6.0 - 8.4 g/dL    Albumin 2.5 (L) 3.5 - 5.2 g/dL    Total Bilirubin 0.3 0.1 - 1.0 mg/dL    Alkaline Phosphatase 93 55 - 135 U/L    AST 43 (H) 10 - 40 U/L    ALT 60 (H) 10 - 44 U/L    Anion Gap 9 8 - 16 mmol/L    eGFR 58.0 (A) >60 mL/min/1.73 m^2         Pending Diagnostic Studies:     None         Medications:  Reconciled Home Medications:      Medication List      START taking these medications    albuterol-ipratropium 2.5 mg-0.5 mg/3 mL nebulizer solution  Commonly known as: DUO-NEB  Take 3 mLs by nebulization every 6 (six) hours as needed for Wheezing. Rescue        CONTINUE taking these medications    albuterol 90 mcg/actuation inhaler  Commonly known as: VENTOLIN HFA  Inhale 2 puffs into the lungs every 6 (six) hours as needed for Wheezing. Rescue     aspirin 81 MG EC tablet  Commonly known as: ECOTRIN  Take 1 tablet (81 mg total) by mouth once daily.     atorvastatin 80 MG tablet  Commonly known as: LIPITOR  = 1 tab, Oral, Daily, # 28 tab, 10 Refill(s), Maintenance, Pharmacy: [facility], 178, cm, 08/24/22 6:34:00 CDT, Height/Length Measured, 136.3, kg, 08/17/22 14:56:00 CDT, Weight Dosing     diazePAM 2 MG tablet  Commonly known as: VALIUM  Take 2 mg by mouth every evening.     diphenhydrAMINE-acetaminophen  mg Tab  Commonly known as: TYLENOL PM  Take 1 tablet by mouth nightly as needed. Pain, sleep     ELIQUIS 2.5 mg Tab  Generic drug: apixaban  Take 2.5 mg by mouth 2 (two) times daily.     EScitalopram oxalate 20 MG tablet  Commonly known as: LEXAPRO  Take 20 mg by mouth once daily.     furosemide 40 MG tablet  Commonly known as: LASIX  40 mg daily as needed.     INVOKANA 100 mg Tab tablet  Generic drug: canagliflozin  Take 100 mg by mouth once daily.     KERENDIA 10 mg Tab  Generic drug: finerenone  Take by mouth.     levothyroxine 100 MCG  tablet  Commonly known as: SYNTHROID  Take 100 mcg by mouth.     LYRICA 100 MG capsule  Generic drug: pregabalin  Take 150 mg by mouth 2 (two) times daily.     melatonin 10 mg Cap  Take by mouth every evening.     metFORMIN 500 MG tablet  Commonly known as: GLUCOPHAGE  Take 500 mg by mouth 2 (two) times daily with meals.     OZEMPIC 0.25 mg or 0.5 mg(2 mg/1.5 mL) pen injector  Generic drug: semaglutide  Inject 0.5 mg into the skin every 7 days.     PULMICORT FLEXHALER 180 mcg/actuation Aepb  Generic drug: budesonide 180mcg  Inhale 2 puffs into the lungs 2 (two) times daily.     sildenafiL 100 MG tablet  Commonly known as: VIAGRA  Take by mouth.        STOP taking these medications    ciprofloxacin HCl 500 MG tablet  Commonly known as: CIPRO        ASK your doctor about these medications    lisinopriL 40 MG tablet  Commonly known as: PRINIVIL,ZESTRIL  Take 40 mg by mouth once daily.            Indwelling Lines/Drains at time of discharge:   Lines/Drains/Airways     None                 Time spent on the discharge of patient: 45 minutes         Bebo Chamorro MD  Department of Hospital Medicine  [facility]

## 2023-05-14 NOTE — DISCHARGE INSTRUCTIONS
Thank you for allowing us to participate in your healthcare needs. We really appreciate it and hope that the care we provided was satisfactory.    - Dr. Smith

## 2023-05-15 ENCOUNTER — TELEPHONE (OUTPATIENT)
Dept: PULMONOLOGY | Facility: CLINIC | Age: 73
End: 2023-05-15
Payer: MEDICARE

## 2023-05-15 ENCOUNTER — PATIENT OUTREACH (OUTPATIENT)
Dept: ADMINISTRATIVE | Facility: CLINIC | Age: 73
End: 2023-05-15
Payer: MEDICARE

## 2023-05-15 NOTE — PROGRESS NOTES
C3 nurse attempted to contact Иван Fuentes Sr.  for a TCC post hospital discharge follow up call. No answer. The patient does not have a scheduled HOSFU appointment, and the pt does not have an [facility] PCP.

## 2023-05-15 NOTE — PROGRESS NOTES
C3 nurse spoke with Иван Fuentes Sr. And wife for a TCC post hospital discharge follow up call. The patient reports does not have a scheduled HOSFU appointment. C3 nurse was unable to schedule HOSFU appointment for Non-[facility] PCP. Patient advised to contact their PCP to schedule a HOSPFU within 5-7 days.

## 2023-05-19 DIAGNOSIS — M25.551 RIGHT HIP PAIN: Primary | ICD-10-CM

## 2023-05-20 LAB
LEGIONELLA CULTURE STATUS: NORMAL
LEGIONELLA SPEC CULT: NORMAL
SPECIMEN SOURCE: NORMAL

## 2023-07-03 ENCOUNTER — PATIENT MESSAGE (OUTPATIENT)
Dept: UROLOGY | Facility: CLINIC | Age: 73
End: 2023-07-03
Payer: MEDICARE

## 2023-07-10 PROBLEM — N17.9 AKI (ACUTE KIDNEY INJURY): Status: RESOLVED | Noted: 2023-04-07 | Resolved: 2023-07-10

## 2023-08-07 PROBLEM — A41.9 SEPSIS: Status: RESOLVED | Noted: 2023-05-07 | Resolved: 2023-08-07

## 2023-08-07 PROBLEM — J96.01 ACUTE RESPIRATORY FAILURE WITH HYPOXIA: Status: RESOLVED | Noted: 2023-05-07 | Resolved: 2023-08-07

## 2023-08-07 PROBLEM — J15.9 COMMUNITY ACQUIRED BACTERIAL PNEUMONIA: Status: RESOLVED | Noted: 2023-05-07 | Resolved: 2023-08-07

## 2024-01-26 NOTE — ASSESSMENT & PLAN NOTE
He was given Levaquin in the ED  Cefepime and azithromycin  He has no cough or sputum currently    Microbiology Results (last 7 days)     Procedure Component Value Units Date/Time    Blood culture #2 **CANNOT BE ORDERED STAT** [[id number]] Collected: 05/06/23 2258    Order Status: Completed Specimen: Blood from Peripheral, Forearm, Left Updated: 05/10/23 1225     Blood Culture, Routine No Growth to date      No Growth to date      No Growth to date      No Growth to date    Blood culture #1 **CANNOT BE ORDERED STAT** [[id number]] Collected: 05/06/23 2230    Order Status: Completed Specimen: Blood from Peripheral, Forearm, Right Updated: 05/10/23 1212     Blood Culture, Routine No Growth to date      No Growth to date      No Growth to date      No Growth to date    Culture, Respiratory with Gram Stain [[id number]] Collected: 05/08/23 0643    Order Status: Completed Specimen: Respiratory from Sputum Updated: 05/10/23 0901     Respiratory Culture Normal respiratory arlin      No S aureus or Pseudomonas isolated.     Gram Stain (Respiratory) <10 epithelial cells per low power field.     Gram Stain (Respiratory) Rare WBC's     Gram Stain (Respiratory) Rare Gram positive cocci    Urine culture [[id number]]  (Abnormal) Collected: 05/06/23 2258    Order Status: Completed Specimen: Urine Updated: 05/08/23 1355     Urine Culture, Routine GISSELLE ALBICANS  10,000 - 49,999 cfu/ml  Treatment of asymptomatic candiduria is not recommended (except for   specific populations). Candida isolated in the urine typically   represents colonization. If an indwelling urinary catheter is present  it should be removed or replaced.      Narrative:      Preferred Collection Type->Urine, Clean Catch  Specimen Source->Urine                Detail Level: Simple Instructions: This plan will send the code FBSE to the PM system.  DO NOT or CHANGE the price. Price (Do Not Change): 0.00

## 2024-05-06 NOTE — PROGRESS NOTES
"Patient arrived for physical therapy 9/10/19 at appointed time. PT went out with a wheelchair to assist patient in getting out of the car when the patient's caregiver expressed concern about the amount of swelling in patient's operative leg. Patient began to swing his legs out of the vehicle when PT noticed the amount of swelling and redness throughout the leg. Patient stated "that's nothing, look at my foot" and proceeded to take off his shoe and sock. Patient's entire foot is very swollen and discolored. Patient and caregiver state it started swelling yesterday and he has kept it elevated above his heart all day today. Patient states he has compression stockings at home and was using them intermittently. He does not have stockings on arrival.   PT assessed patient for DVT according to Clinical Prediction Rule:  Patient scoring one point for each:  Major surgery within 4 weeks  Localized tenderness with palpation in posterior calf, popliteal space  Entire LE swelling  Calf swelling > 3 cm compared with RLE  Pitting edema    Based on these findings patient is at high probability for DVT. PT advised patient to seek medical treatment through ER at [facility]. Patient states he would rather go to [facility] and prefers to see what Dr. Hall has to say. PT placed 2 calls to Dr. Hall's office during patient's appointed time, but was unsuccessful in reaching clinical staff. PT again educated patient in probability of DVT based on CPR and suggested he seek medical attention. Patient stated he would, either in [facility] or at [facility].   " none present

## (undated) DEVICE — ELECTRODE REM PLYHSV RETURN 9

## (undated) DEVICE — HANDSET INTERSTIM X COMM

## (undated) DEVICE — DRESSING LEUKOPLAST FLEX 1X3IN

## (undated) DEVICE — DRAPE STERI INSTRUMENT 1018

## (undated) DEVICE — SUT VICRYL+ 1 CT1 18IN

## (undated) DEVICE — MASK FLYTE HOOD PEEL AWAY

## (undated) DEVICE — DRESSING TRANS 4X4 TEGADERM

## (undated) DEVICE — DRAPE C ARM 42 X 120 10/BX

## (undated) DEVICE — ADHESIVE DERMABOND ADVANCED

## (undated) DEVICE — SUT VICRYL CTD 2-0 GI 27 SH

## (undated) DEVICE — BLADE SURG CARBON STEEL SZ11

## (undated) DEVICE — HOOD T-5 TEAR AWAY STERILE

## (undated) DEVICE — SUT MONOCRYL 3-0 PS-1

## (undated) DEVICE — DURAPREP SURG SCRUB 26ML

## (undated) DEVICE — Device

## (undated) DEVICE — BLADE SAGITTAL 18 X 1.27 X 90M

## (undated) DEVICE — BANDAGE ADHESIVE

## (undated) DEVICE — TAPE SURG DURAPORE 2 X10YD

## (undated) DEVICE — PAD KNEE POLAR XL

## (undated) DEVICE — SUT MONOCRYL 3-0 SH U/D

## (undated) DEVICE — SUTURE STRATAFIX PGA PCL 3-0

## (undated) DEVICE — SUT CTD VICRYL VIL BR CT-2

## (undated) DEVICE — KIT TOTAL KNEE TKOFG

## (undated) DEVICE — DRESSING TELFA STRL 4X3 LF

## (undated) DEVICE — TOWEL OR NONABSORB ADH 17X26

## (undated) DEVICE — SOL IRR NACL .9% 3000ML

## (undated) DEVICE — DRAPE CESAREAN IOBAN POUCH

## (undated) DEVICE — SEE L#156916

## (undated) DEVICE — SUT VICRYL PLUS 2-0 CT1 18

## (undated) DEVICE — TRAY DRY SKIN SCRUB PREP

## (undated) DEVICE — DRESSING AQUACEL AG 3.5X10IN

## (undated) DEVICE — TRAY MINOR GEN SURG

## (undated) DEVICE — SEE MEDLINE ITEM 146298

## (undated) DEVICE — DRESSING ANTIMICROBIAL 1 INCH

## (undated) DEVICE — NDL HYPO REG 25G X 1 1/2

## (undated) DEVICE — SUT VICRYL PLUS 0 CT1 18IN

## (undated) DEVICE — SYR ONLY LUER LOCK 20CC

## (undated) DEVICE — PAD CAST SPECIALIST STRL 6

## (undated) DEVICE — NDL INTERSTIM FORAMN 18.5G 5IN

## (undated) DEVICE — SYR 50CC LL

## (undated) DEVICE — SUT CTD VICRYL VIL BR UR-6

## (undated) DEVICE — SEE MEDLINE ITEM 146347

## (undated) DEVICE — SEE MEDLINE ITEM 146292

## (undated) DEVICE — SCREENER ISTIM EXT NEROSTIMLTR

## (undated) DEVICE — SET CEMENT (SCULP)

## (undated) DEVICE — COVERS PROBE NR-48 STERILE

## (undated) DEVICE — DRAPE INCISE IOBAN 2 23X33IN

## (undated) DEVICE — PAD COLD THERAPY KNEE WRAP ON

## (undated) DEVICE — SOL WATER STRL IRR 1000ML

## (undated) DEVICE — TOURNIQUET SB QC DP 34X4IN

## (undated) DEVICE — KIT PROBE COVER WITH GEL

## (undated) DEVICE — DRESSING AQUACEL AG ADV 3.5X12

## (undated) DEVICE — DRESSING ANTIMICROBIAL 3/4 IN

## (undated) DEVICE — BLADE RECP OFFSET 77.5X11X1.23

## (undated) DEVICE — SUT ETHIBOND XTRA 1 OS-6

## (undated) DEVICE — SEE MEDLINE ITEM 157131

## (undated) DEVICE — SEE MEDLINE ITEM 146270

## (undated) DEVICE — SEE MEDLINE ITEM 154981

## (undated) DEVICE — SYS LABEL CORRECT MED

## (undated) DEVICE — BOWL CEMENT

## (undated) DEVICE — SEE MEDLINE ITEM 146271

## (undated) DEVICE — SUT 2-0 VICRYL / SH (J417)

## (undated) DEVICE — KIT INTERSTIM II PERC LEAD EXT

## (undated) DEVICE — PUMP COLD THERAPY

## (undated) DEVICE — KIT IRR SUCTION HND PIECE

## (undated) DEVICE — DRAPE C-ARMOR EQUIPMENT COVER

## (undated) DEVICE — NDL 18GA X1 1/2 REG BEVEL

## (undated) DEVICE — SUT VICRYL BR 1 GEN 27 CT-1

## (undated) DEVICE — DRESSING TRANS 2X2 TEGADERM

## (undated) DEVICE — TIP SMART 309 3X8.5MM

## (undated) DEVICE — SEE MEDLINE ITEM 152487

## (undated) DEVICE — SCRUB HIBICLENS 4% CHG 4OZ